# Patient Record
Sex: FEMALE | Race: WHITE | NOT HISPANIC OR LATINO | Employment: OTHER | URBAN - METROPOLITAN AREA
[De-identification: names, ages, dates, MRNs, and addresses within clinical notes are randomized per-mention and may not be internally consistent; named-entity substitution may affect disease eponyms.]

---

## 2017-01-08 DIAGNOSIS — I10 BENIGN ESSENTIAL HYPERTENSION: ICD-10-CM

## 2017-01-09 RX ORDER — METOPROLOL SUCCINATE 100 MG/1
TABLET, EXTENDED RELEASE ORAL
Qty: 90 TABLET | Refills: 1 | OUTPATIENT
Start: 2017-01-09

## 2017-01-09 RX ORDER — TRIAMTERENE AND HYDROCHLOROTHIAZIDE 37.5; 25 MG/1; MG/1
TABLET ORAL
Qty: 90 TABLET | Refills: 1 | OUTPATIENT
Start: 2017-01-09

## 2017-01-10 DIAGNOSIS — E11.9 DIABETES MELLITUS WITHOUT COMPLICATION (HCC): Primary | ICD-10-CM

## 2017-01-15 LAB — HBA1C MFR BLD: 7.9 % (ref 4.8–5.6)

## 2017-01-17 ENCOUNTER — OFFICE VISIT (OUTPATIENT)
Dept: FAMILY MEDICINE CLINIC | Facility: CLINIC | Age: 57
End: 2017-01-17

## 2017-01-17 VITALS
HEART RATE: 68 BPM | BODY MASS INDEX: 39.68 KG/M2 | DIASTOLIC BLOOD PRESSURE: 68 MMHG | WEIGHT: 293 LBS | SYSTOLIC BLOOD PRESSURE: 115 MMHG | RESPIRATION RATE: 16 BRPM | HEIGHT: 72 IN | TEMPERATURE: 98.6 F

## 2017-01-17 DIAGNOSIS — M54.2 CERVICALGIA: ICD-10-CM

## 2017-01-17 DIAGNOSIS — E03.9 ACQUIRED HYPOTHYROIDISM: ICD-10-CM

## 2017-01-17 DIAGNOSIS — IMO0001 UNCONTROLLED TYPE 2 DIABETES MELLITUS WITHOUT COMPLICATION, WITHOUT LONG-TERM CURRENT USE OF INSULIN: Primary | ICD-10-CM

## 2017-01-17 DIAGNOSIS — I10 BENIGN ESSENTIAL HYPERTENSION: ICD-10-CM

## 2017-01-17 DIAGNOSIS — F33.42 MAJOR DEPRESSIVE DISORDER, RECURRENT, IN FULL REMISSION (HCC): ICD-10-CM

## 2017-01-17 PROBLEM — E78.5 DYSLIPIDEMIA: Status: ACTIVE | Noted: 2017-01-17

## 2017-01-17 PROBLEM — I48.0 AF (PAROXYSMAL ATRIAL FIBRILLATION): Status: ACTIVE | Noted: 2017-01-17

## 2017-01-17 PROBLEM — G47.33 OBSTRUCTIVE APNEA: Status: ACTIVE | Noted: 2017-01-17

## 2017-01-17 PROCEDURE — 99214 OFFICE O/P EST MOD 30 MIN: CPT | Performed by: FAMILY MEDICINE

## 2017-01-17 RX ORDER — DAPAGLIFLOZIN 5 MG/1
5 TABLET, FILM COATED ORAL DAILY
Qty: 90 TABLET | Refills: 1 | Status: SHIPPED | OUTPATIENT
Start: 2017-01-17 | End: 2017-04-11 | Stop reason: SDUPTHER

## 2017-01-17 RX ORDER — BACLOFEN 10 MG/1
10 TABLET ORAL 3 TIMES DAILY
Qty: 270 TABLET | Refills: 1 | Status: SHIPPED | OUTPATIENT
Start: 2017-01-17 | End: 2017-04-11 | Stop reason: SDUPTHER

## 2017-01-17 RX ORDER — ALPRAZOLAM 0.5 MG/1
0.5 TABLET ORAL DAILY
Qty: 30 TABLET | Refills: 2 | Status: SHIPPED | OUTPATIENT
Start: 2017-01-17 | End: 2017-04-11 | Stop reason: SDUPTHER

## 2017-01-17 RX ORDER — LEVOTHYROXINE SODIUM 0.07 MG/1
75 TABLET ORAL DAILY
Qty: 90 TABLET | Refills: 1 | Status: SHIPPED | OUTPATIENT
Start: 2017-01-17 | End: 2017-04-11 | Stop reason: SDUPTHER

## 2017-01-17 RX ORDER — METOPROLOL SUCCINATE 100 MG/1
100 TABLET, EXTENDED RELEASE ORAL DAILY
Qty: 90 TABLET | Refills: 1 | Status: SHIPPED | OUTPATIENT
Start: 2017-01-17 | End: 2017-04-11 | Stop reason: SDUPTHER

## 2017-01-17 RX ORDER — LOSARTAN POTASSIUM 50 MG/1
50 TABLET ORAL DAILY
Qty: 90 TABLET | Refills: 1 | Status: SHIPPED | OUTPATIENT
Start: 2017-01-17 | End: 2017-04-11 | Stop reason: SDUPTHER

## 2017-01-17 RX ORDER — TRIAMTERENE AND HYDROCHLOROTHIAZIDE 37.5; 25 MG/1; MG/1
1 TABLET ORAL DAILY
Qty: 90 TABLET | Refills: 1 | Status: SHIPPED | OUTPATIENT
Start: 2017-01-17 | End: 2017-04-11 | Stop reason: SDUPTHER

## 2017-01-17 NOTE — MR AVS SNAPSHOT
Pratibha Pascal   1/17/2017 3:45 PM   Office Visit    Provider:  Benitez Wu MD   Department:  [facility]   Dept Phone:  [phone]                Your Full Care Plan              Today's Medication Changes          These changes are accurate as of: 1/17/17  4:38 PM.  If you have any questions, ask your nurse or doctor.               Stop taking medication(s)listed here:     hydrOXYzine 25 MG tablet   Commonly known as:  ATARAX   Stopped by:  Benitez Wu MD                Where to Get Your Medications      These medications were sent to [facility]/pharmacy #[id number] - [facility], [place] - [address] AT [facility] [phone] [facility] [phone]   [address] [facility] [address]     Phone:  [phone]     baclofen 10 MG tablet    FARXIGA 5 MG tablet tablet    levothyroxine 75 MCG tablet    losartan 50 MG tablet    metFORMIN 1000 MG tablet    metoprolol succinate  MG 24 hr tablet    triamterene-hydrochlorothiazide 37.5-25 MG per tablet         You can get these medications from any pharmacy     Bring a paper prescription for each of these medications     ALPRAZolam 0.5 MG tablet                  Your Updated Medication List          This list is accurate as of: 1/17/17  4:38 PM.  Always use your most recent med list.                ALPRAZolam 0.5 MG tablet   Commonly known as:  XANAX   Take 1 tablet by mouth Daily. TAKE 1 TABLET BY MOUTH EVERY DAY AS NEEDED       aspirin 81 MG tablet       atorvastatin 40 MG tablet   Commonly known as:  LIPITOR   Take one tablet once daily       baclofen 10 MG tablet   Commonly known as:  LIORESAL   Take 1 tablet by mouth 3 (Three) Times a Day.       celecoxib 200 MG capsule   Commonly known as:  CeleBREX       CIMZIA STARTER KIT 6 X 200 MG/ML kit   Generic drug:  Certolizumab Pegol       FARXIGA 5 MG tablet tablet   Generic drug:  dapagliflozin   Take 1 tablet by mouth Daily. TAKE 1  TABLET BY MOUTH EVERY MORNING       FLUoxetine 20 MG capsule   Commonly known as:  PROzac   Take 3 capsules daily       folic acid 1 MG tablet   Commonly known as:  FOLVITE       hydroxychloroquine 200 MG tablet   Commonly known as:  PLAQUENIL       levothyroxine 75 MCG tablet   Commonly known as:  SYNTHROID, LEVOTHROID   Take 1 tablet by mouth Daily.       losartan 50 MG tablet   Commonly known as:  COZAAR   Take 1 tablet by mouth Daily. TAKE 1 TABLET BY MOUTH EVERY DAY       metFORMIN 1000 MG tablet   Commonly known as:  GLUCOPHAGE   Take 1 tablet by mouth 2 (Two) Times a Day With Meals.       metoprolol succinate  MG 24 hr tablet   Commonly known as:  TOPROL-XL   Take 1 tablet by mouth Daily. TAKE 1 TABLET BY MOUTH EVERY DAY       sulfaSALAzine 500 MG tablet   Commonly known as:  AZULFIDINE       triamterene-hydrochlorothiazide 37.5-25 MG per tablet   Commonly known as:  MAXZIDE-25   Take 1 tablet by mouth Daily. TAKE 1 TABLET BY MOUTH EVERY DAY               You Were Diagnosed With        Codes Comments    Uncontrolled type 2 diabetes mellitus without complication, without long-term current use of insulin    -  Primary ICD-10-CM: E11.65  ICD-9-CM: 250.02     Major depressive disorder, recurrent, in full remission     ICD-10-CM: F33.42  ICD-9-CM: 296.36     Acquired hypothyroidism     ICD-10-CM: E03.9  ICD-9-CM: 244.9     Benign essential hypertension     ICD-10-CM: I10  ICD-9-CM: 401.1     Cervicalgia     ICD-10-CM: M54.2  ICD-9-CM: 723.1       Instructions     None    Patient Instructions History      [facility] Signup     Our records indicate that you have an active [employer] account.    You can view your After Visit Summary by going to [facility] and logging in with your V[employer] username and password.  If you don't have a [employer] username and password but a parent or guardian has access to your record, the parent or guardian should login with their own V[employer] username and  "password and access your record to view the After Visit Summary.    If you have questions, you can email [email or web address] or call [phone] to talk to our [employer] staff.  Remember, [employer] is NOT to be used for urgent needs.  For medical emergencies, dial 911.               Other Info from Your Visit           Allergies     Erythromycin      Talwin [Pentazocine]        Reason for Visit     Diabetes med refill     Hypertension     Hyperlipidemia     Hypothyroidism     lab results           Vital Signs     Blood Pressure Pulse Temperature Respirations Height Weight    115/68 68 98.6 °F (37 °C) (Oral) 16 72\" (182.9 cm) 329 lb (149 kg)    Body Mass Index Smoking Status                44.62 kg/m2 Former Smoker          Problems and Diagnoses Noted     Atrial fibrillation (irregular heartbeat)    Benign essential hypertension    High blood pressure    Neck pain    Dyslipidemia    Underactive thyroid    Major depressive disorder, recurrent, in full remission    Obstructive apnea    Type II diabetes mellitus without control      "

## 2017-01-17 NOTE — PROGRESS NOTES
Subjective   Pratibha Pascal is a 56 y.o. female.     History of Present Illness     Chief Complaint:   Chief Complaint   Patient presents with   • Diabetes     med refill    • Hypertension   • Hyperlipidemia   • Hypothyroidism   • lab results       Pratibha Pascal 56 y.o. female who presents today for Medical Management of the below listed issues and medication refills.  she has a history of   Patient Active Problem List   Diagnosis   • CAD (coronary artery disease)   • Type 2 diabetes mellitus, uncontrolled   • HLD (hyperlipidemia)   • Benign essential hypertension   • Hypothyroidism   • Major depressive disorder, recurrent, in full remission   • Rheumatoid arthritis   • Tobacco abuse   • Cervicalgia   • Diabetes mellitus   • Dyslipidemia   • BP (high blood pressure)   • Obstructive apnea   • AF (paroxysmal atrial fibrillation)   .  Since the last visit, she has overall felt well.  she has been compliant with   Current Outpatient Prescriptions:   •  ALPRAZolam (XANAX) 0.5 MG tablet, Take 1 tablet by mouth Daily. TAKE 1 TABLET BY MOUTH EVERY DAY AS NEEDED, Disp: 30 tablet, Rfl: 2  •  baclofen (LIORESAL) 10 MG tablet, Take 1 tablet by mouth 3 (Three) Times a Day., Disp: 270 tablet, Rfl: 1  •  FARXIGA 5 MG tablet tablet, Take 1 tablet by mouth Daily. TAKE 1 TABLET BY MOUTH EVERY MORNING, Disp: 90 tablet, Rfl: 1  •  levothyroxine (SYNTHROID, LEVOTHROID) 75 MCG tablet, Take 1 tablet by mouth Daily., Disp: 90 tablet, Rfl: 1  •  losartan (COZAAR) 50 MG tablet, Take 1 tablet by mouth Daily. TAKE 1 TABLET BY MOUTH EVERY DAY, Disp: 90 tablet, Rfl: 1  •  metFORMIN (GLUCOPHAGE) 1000 MG tablet, Take 1 tablet by mouth 2 (Two) Times a Day With Meals., Disp: 180 tablet, Rfl: 1  •  metoprolol succinate XL (TOPROL-XL) 100 MG 24 hr tablet, Take 1 tablet by mouth Daily. TAKE 1 TABLET BY MOUTH EVERY DAY, Disp: 90 tablet, Rfl: 1  •  triamterene-hydrochlorothiazide (MAXZIDE-25) 37.5-25 MG per tablet, Take 1 tablet by mouth Daily. TAKE 1  "TABLET BY MOUTH EVERY DAY, Disp: 90 tablet, Rfl: 1  •  aspirin 81 MG tablet, Take by mouth daily., Disp: , Rfl:   •  atorvastatin (LIPITOR) 40 MG tablet, Take one tablet once daily, Disp: 90 tablet, Rfl: 1  •  celecoxib (CeleBREX) 200 MG capsule, TAKE ONE CAPSULE BY MOUTH EVERY DAY, Disp: , Rfl: 1  •  CIMZIA STARTER KIT 6 X 200 MG/ML kit, , Disp: , Rfl:   •  FLUoxetine (PROzac) 20 MG capsule, Take 3 capsules daily, Disp: 270 capsule, Rfl: 1  •  folic acid (FOLVITE) 1 MG tablet, , Disp: , Rfl:   •  hydroxychloroquine (PLAQUENIL) 200 MG tablet, TAKE 2 TABLETS AT BEDTIME, Disp: , Rfl: 11  •  sulfaSALAzine (AZULFIDINE) 500 MG tablet, TAKE 3 TABLETS BY MOUTH TWICE DAILY, Disp: , Rfl: 1.  she denies medication side effects.    All of the chronic condition(s) listed above are stable w/o issues.    Visit Vitals   • /68   • Pulse 68   • Temp 98.6 °F (37 °C) (Oral)   • Resp 16   • Ht 72\" (182.9 cm)   • Wt (!) 329 lb (149 kg)   • BMI 44.62 kg/m2       Results for orders placed or performed in visit on 01/10/17   Hemoglobin A1c   Result Value Ref Range    Hemoglobin A1C 7.9 (H) 4.8 - 5.6 %         The following portions of the patient's history were reviewed and updated as appropriate: allergies, current medications, past family history, past medical history, past social history, past surgical history and problem list.    Review of Systems   Constitutional: Negative for activity change, chills, fatigue and fever.   Respiratory: Negative for cough and chest tightness.    Cardiovascular: Negative for chest pain and palpitations.   Gastrointestinal: Negative for abdominal pain and nausea.   Endocrine: Negative for cold intolerance.   Psychiatric/Behavioral: Negative for behavioral problems and dysphoric mood.       Objective   Physical Exam   Constitutional: She appears well-developed and well-nourished.   Neck: Neck supple. No thyromegaly present.   Cardiovascular: Normal rate and regular rhythm.    No murmur " heard.  Pulmonary/Chest: Effort normal and breath sounds normal.   Abdominal: Bowel sounds are normal.   Psychiatric: She has a normal mood and affect. Her behavior is normal.   Nursing note and vitals reviewed.  Labs reviewed with pt today during visit. All questions answered.    The patient has read and signed the [facility] Controlled Substance Contract.  I will continue to see patient for regular follow up appointments.  They are well controlled on their medication.  DILLAN has been reviewed by me and is updated every 3 months. The patient is aware of the potential for addiction and dependence.    Assessment/Plan   Pratibha was seen today for diabetes, hypertension, hyperlipidemia, hypothyroidism and lab results.    Diagnoses and all orders for this visit:    Uncontrolled type 2 diabetes mellitus without complication, without long-term current use of insulin  -     FARXIGA 5 MG tablet tablet; Take 1 tablet by mouth Daily. TAKE 1 TABLET BY MOUTH EVERY MORNING  -     metFORMIN (GLUCOPHAGE) 1000 MG tablet; Take 1 tablet by mouth 2 (Two) Times a Day With Meals.    Major depressive disorder, recurrent, in full remission  -     ALPRAZolam (XANAX) 0.5 MG tablet; Take 1 tablet by mouth Daily. TAKE 1 TABLET BY MOUTH EVERY DAY AS NEEDED    Acquired hypothyroidism  -     levothyroxine (SYNTHROID, LEVOTHROID) 75 MCG tablet; Take 1 tablet by mouth Daily.    Benign essential hypertension  -     triamterene-hydrochlorothiazide (MAXZIDE-25) 37.5-25 MG per tablet; Take 1 tablet by mouth Daily. TAKE 1 TABLET BY MOUTH EVERY DAY  -     losartan (COZAAR) 50 MG tablet; Take 1 tablet by mouth Daily. TAKE 1 TABLET BY MOUTH EVERY DAY  -     metoprolol succinate XL (TOPROL-XL) 100 MG 24 hr tablet; Take 1 tablet by mouth Daily. TAKE 1 TABLET BY MOUTH EVERY DAY    Cervicalgia  -     baclofen (LIORESAL) 10 MG tablet; Take 1 tablet by mouth 3 (Three) Times a Day.      Diet/exercise/weight management to get HgA1C down.

## 2017-01-22 DIAGNOSIS — E03.9 ACQUIRED HYPOTHYROIDISM: ICD-10-CM

## 2017-02-07 DIAGNOSIS — F33.42 MAJOR DEPRESSIVE DISORDER, RECURRENT, IN FULL REMISSION (HCC): ICD-10-CM

## 2017-02-07 RX ORDER — FLUOXETINE HYDROCHLORIDE 20 MG/1
CAPSULE ORAL
Qty: 270 CAPSULE | Refills: 1 | Status: SHIPPED | OUTPATIENT
Start: 2017-02-07 | End: 2017-04-11 | Stop reason: SDUPTHER

## 2017-04-11 ENCOUNTER — OFFICE VISIT (OUTPATIENT)
Dept: FAMILY MEDICINE CLINIC | Facility: CLINIC | Age: 57
End: 2017-04-11

## 2017-04-11 VITALS
TEMPERATURE: 98.5 F | BODY MASS INDEX: 39.68 KG/M2 | RESPIRATION RATE: 16 BRPM | HEART RATE: 80 BPM | SYSTOLIC BLOOD PRESSURE: 132 MMHG | HEIGHT: 72 IN | WEIGHT: 293 LBS | DIASTOLIC BLOOD PRESSURE: 79 MMHG

## 2017-04-11 DIAGNOSIS — M54.2 CERVICALGIA: ICD-10-CM

## 2017-04-11 DIAGNOSIS — E03.9 ACQUIRED HYPOTHYROIDISM: ICD-10-CM

## 2017-04-11 DIAGNOSIS — I10 BENIGN ESSENTIAL HYPERTENSION: ICD-10-CM

## 2017-04-11 DIAGNOSIS — F33.42 MAJOR DEPRESSIVE DISORDER, RECURRENT, IN FULL REMISSION (HCC): ICD-10-CM

## 2017-04-11 DIAGNOSIS — IMO0001 UNCONTROLLED TYPE 2 DIABETES MELLITUS WITHOUT COMPLICATION, WITHOUT LONG-TERM CURRENT USE OF INSULIN: ICD-10-CM

## 2017-04-11 PROCEDURE — 99214 OFFICE O/P EST MOD 30 MIN: CPT | Performed by: FAMILY MEDICINE

## 2017-04-11 RX ORDER — DAPAGLIFLOZIN 5 MG/1
5 TABLET, FILM COATED ORAL DAILY
Qty: 90 TABLET | Refills: 1 | Status: SHIPPED | OUTPATIENT
Start: 2017-04-11 | End: 2017-10-09 | Stop reason: SDUPTHER

## 2017-04-11 RX ORDER — FLUOXETINE HYDROCHLORIDE 20 MG/1
60 CAPSULE ORAL DAILY
Qty: 270 CAPSULE | Refills: 1 | Status: SHIPPED | OUTPATIENT
Start: 2017-04-11 | End: 2017-10-16 | Stop reason: SDUPTHER

## 2017-04-11 RX ORDER — ALPRAZOLAM 0.5 MG/1
0.5 TABLET ORAL DAILY
Qty: 30 TABLET | Refills: 2 | Status: SHIPPED | OUTPATIENT
Start: 2017-04-11 | End: 2017-08-03 | Stop reason: SDUPTHER

## 2017-04-11 RX ORDER — LEVOTHYROXINE SODIUM 0.07 MG/1
75 TABLET ORAL DAILY
Qty: 90 TABLET | Refills: 1 | Status: SHIPPED | OUTPATIENT
Start: 2017-04-11 | End: 2017-10-16 | Stop reason: SDUPTHER

## 2017-04-11 RX ORDER — BACLOFEN 10 MG/1
10 TABLET ORAL 3 TIMES DAILY
Qty: 270 TABLET | Refills: 1 | Status: SHIPPED | OUTPATIENT
Start: 2017-04-11 | End: 2017-10-16 | Stop reason: SDUPTHER

## 2017-04-11 RX ORDER — METOPROLOL SUCCINATE 100 MG/1
100 TABLET, EXTENDED RELEASE ORAL DAILY
Qty: 90 TABLET | Refills: 1 | Status: SHIPPED | OUTPATIENT
Start: 2017-04-11 | End: 2017-10-16 | Stop reason: SDUPTHER

## 2017-04-11 RX ORDER — TRIAMTERENE AND HYDROCHLOROTHIAZIDE 37.5; 25 MG/1; MG/1
1 TABLET ORAL DAILY
Qty: 90 TABLET | Refills: 1 | Status: SHIPPED | OUTPATIENT
Start: 2017-04-11 | End: 2017-10-12 | Stop reason: SDUPTHER

## 2017-04-11 RX ORDER — LOSARTAN POTASSIUM 50 MG/1
50 TABLET ORAL DAILY
Qty: 90 TABLET | Refills: 1 | Status: SHIPPED | OUTPATIENT
Start: 2017-04-11 | End: 2017-10-16 | Stop reason: SDUPTHER

## 2017-04-11 NOTE — PROGRESS NOTES
Subjective   Pratibha Pascal is a 57 y.o. female.     History of Present Illness     Chief Complaint:   Chief Complaint   Patient presents with   • Hypertension     MED REFILL   - [facility]   • Hyperlipidemia   • Hypothyroidism   • Diabetes       Pratibha Pascal 57 y.o. female who presents today for Medical Management of the below listed issues and medication refills.  she has a history of   Patient Active Problem List   Diagnosis   • CAD (coronary artery disease)   • Type 2 diabetes mellitus, uncontrolled   • HLD (hyperlipidemia)   • Benign essential hypertension   • Hypothyroidism   • Major depressive disorder, recurrent, in full remission   • Rheumatoid arthritis   • Tobacco abuse   • Cervicalgia   • Diabetes mellitus   • Dyslipidemia   • BP (high blood pressure)   • Obstructive apnea   • AF (paroxysmal atrial fibrillation)   .  Since the last visit, she has overall felt well.  she has been compliant with   Current Outpatient Prescriptions:   •  ALPRAZolam (XANAX) 0.5 MG tablet, Take 1 tablet by mouth Daily. TAKE 1 TABLET BY MOUTH EVERY DAY AS NEEDED, Disp: 30 tablet, Rfl: 2  •  baclofen (LIORESAL) 10 MG tablet, Take 1 tablet by mouth 3 (Three) Times a Day., Disp: 270 tablet, Rfl: 1  •  FARXIGA 5 MG tablet tablet, Take 1 tablet by mouth Daily. TAKE 1 TABLET BY MOUTH EVERY MORNING, Disp: 90 tablet, Rfl: 1  •  FLUoxetine (PROzac) 20 MG capsule, Take 3 capsules by mouth Daily., Disp: 270 capsule, Rfl: 1  •  levothyroxine (SYNTHROID, LEVOTHROID) 75 MCG tablet, Take 1 tablet by mouth Daily., Disp: 90 tablet, Rfl: 1  •  losartan (COZAAR) 50 MG tablet, Take 1 tablet by mouth Daily. TAKE 1 TABLET BY MOUTH EVERY DAY, Disp: 90 tablet, Rfl: 1  •  metFORMIN (GLUCOPHAGE) 1000 MG tablet, Take 1 tablet by mouth 2 (Two) Times a Day With Meals., Disp: 180 tablet, Rfl: 1  •  metoprolol succinate XL (TOPROL-XL) 100 MG 24 hr tablet, Take 1 tablet by mouth Daily. TAKE 1 TABLET BY MOUTH EVERY DAY, Disp: 90 tablet, Rfl: 1  •   "triamterene-hydrochlorothiazide (MAXZIDE-25) 37.5-25 MG per tablet, Take 1 tablet by mouth Daily. TAKE 1 TABLET BY MOUTH EVERY DAY, Disp: 90 tablet, Rfl: 1  •  aspirin 81 MG tablet, Take by mouth daily., Disp: , Rfl:   •  atorvastatin (LIPITOR) 40 MG tablet, Take one tablet once daily, Disp: 90 tablet, Rfl: 1  •  celecoxib (CeleBREX) 200 MG capsule, TAKE ONE CAPSULE BY MOUTH EVERY DAY, Disp: , Rfl: 1  •  CIMZIA STARTER KIT 6 X 200 MG/ML kit, , Disp: , Rfl:   •  folic acid (FOLVITE) 1 MG tablet, , Disp: , Rfl:   •  sulfaSALAzine (AZULFIDINE) 500 MG tablet, TAKE 3 TABLETS BY MOUTH TWICE DAILY, Disp: , Rfl: 1.  she denies medication side effects.    All of the chronic condition(s) listed above are stable w/o issues.    /79  Pulse 80  Temp 98.5 °F (36.9 °C) (Oral)   Resp 16  Ht 72\" (182.9 cm)  Wt (!) 326 lb (148 kg)  BMI 44.21 kg/m2    Results for orders placed or performed in visit on 01/10/17   Hemoglobin A1c   Result Value Ref Range    Hemoglobin A1C 7.9 (H) 4.8 - 5.6 %         The following portions of the patient's history were reviewed and updated as appropriate: allergies, current medications, past family history, past medical history, past social history, past surgical history and problem list.    Review of Systems   Constitutional: Negative for activity change, chills, fatigue and fever.   Respiratory: Negative for cough and chest tightness.    Cardiovascular: Negative for chest pain and palpitations.   Gastrointestinal: Negative for abdominal pain and nausea.   Endocrine: Negative for cold intolerance.   Psychiatric/Behavioral: Negative for behavioral problems and dysphoric mood.       Objective   Physical Exam   Constitutional: She appears well-developed and well-nourished.   Neck: Neck supple. No thyromegaly present.   Cardiovascular: Normal rate and regular rhythm.    No murmur heard.  Pulmonary/Chest: Effort normal and breath sounds normal.   Abdominal: Bowel sounds are normal.   Psychiatric: " She has a normal mood and affect. Her behavior is normal.   Nursing note and vitals reviewed.  Labs reviewed with pt today during visit. All questions answered.    The patient has read and signed the [facility] Controlled Substance Contract.  I will continue to see patient for regular follow up appointments.  They are well controlled on their medication.  DILLAN has been reviewed by me and is updated every 3 months. The patient is aware of the potential for addiction and dependence.    Assessment/Plan   Pratibha was seen today for hypertension, hyperlipidemia, hypothyroidism and diabetes.    Diagnoses and all orders for this visit:    Uncontrolled type 2 diabetes mellitus without complication, without long-term current use of insulin  -     FARXIGA 5 MG tablet tablet; Take 1 tablet by mouth Daily. TAKE 1 TABLET BY MOUTH EVERY MORNING  -     metFORMIN (GLUCOPHAGE) 1000 MG tablet; Take 1 tablet by mouth 2 (Two) Times a Day With Meals.  -     Lipid Panel  -     MicroAlbumin, Urine, Random  -     Hemoglobin A1c    Major depressive disorder, recurrent, in full remission  -     ALPRAZolam (XANAX) 0.5 MG tablet; Take 1 tablet by mouth Daily. TAKE 1 TABLET BY MOUTH EVERY DAY AS NEEDED  -     FLUoxetine (PROzac) 20 MG capsule; Take 3 capsules by mouth Daily.    Cervicalgia  -     baclofen (LIORESAL) 10 MG tablet; Take 1 tablet by mouth 3 (Three) Times a Day.    Acquired hypothyroidism  -     levothyroxine (SYNTHROID, LEVOTHROID) 75 MCG tablet; Take 1 tablet by mouth Daily.    Benign essential hypertension  -     losartan (COZAAR) 50 MG tablet; Take 1 tablet by mouth Daily. TAKE 1 TABLET BY MOUTH EVERY DAY  -     metoprolol succinate XL (TOPROL-XL) 100 MG 24 hr tablet; Take 1 tablet by mouth Daily. TAKE 1 TABLET BY MOUTH EVERY DAY  -     triamterene-hydrochlorothiazide (MAXZIDE-25) 37.5-25 MG per tablet; Take 1 tablet by mouth Daily. TAKE 1 TABLET BY MOUTH EVERY DAY  -     Lipid Panel

## 2017-07-09 LAB
CHOLEST SERPL-MCNC: 93 MG/DL (ref 100–199)
HBA1C MFR BLD: 8.4 % (ref 4.8–5.6)
HDLC SERPL-MCNC: 34 MG/DL
LDLC SERPL CALC-MCNC: ABNORMAL MG/DL (ref 0–99)
MICROALBUMIN UR-MCNC: 886.2 UG/ML
TRIGL SERPL-MCNC: 325 MG/DL (ref 0–149)
VLDLC SERPL CALC-MCNC: 65 MG/DL (ref 5–40)

## 2017-07-13 ENCOUNTER — OFFICE VISIT (OUTPATIENT)
Dept: FAMILY MEDICINE CLINIC | Facility: CLINIC | Age: 57
End: 2017-07-13

## 2017-07-13 VITALS
WEIGHT: 293 LBS | TEMPERATURE: 98.4 F | RESPIRATION RATE: 16 BRPM | HEART RATE: 80 BPM | HEIGHT: 72 IN | DIASTOLIC BLOOD PRESSURE: 84 MMHG | BODY MASS INDEX: 39.68 KG/M2 | SYSTOLIC BLOOD PRESSURE: 138 MMHG

## 2017-07-13 DIAGNOSIS — I10 BENIGN ESSENTIAL HYPERTENSION: ICD-10-CM

## 2017-07-13 DIAGNOSIS — IMO0001 UNCONTROLLED TYPE 2 DIABETES MELLITUS WITHOUT COMPLICATION, WITHOUT LONG-TERM CURRENT USE OF INSULIN: Primary | ICD-10-CM

## 2017-07-13 DIAGNOSIS — R80.9 PROTEINURIA: ICD-10-CM

## 2017-07-13 DIAGNOSIS — E78.2 MIXED HYPERLIPIDEMIA: ICD-10-CM

## 2017-07-13 PROCEDURE — 99214 OFFICE O/P EST MOD 30 MIN: CPT | Performed by: FAMILY MEDICINE

## 2017-07-13 RX ORDER — ATORVASTATIN CALCIUM 10 MG/1
5 TABLET, FILM COATED ORAL DAILY
Qty: 45 TABLET | Refills: 1 | Status: SHIPPED | OUTPATIENT
Start: 2017-07-13 | End: 2017-10-16 | Stop reason: SDUPTHER

## 2017-07-13 NOTE — PROGRESS NOTES
Subjective   Pratibha Pascal is a 57 y.o. female.     History of Present Illness     Chief Complaint:   Chief Complaint   Patient presents with   • Diabetes     STATES DOES NOT NEED MED REFILLS AT THIS TIME   • Hypertension     DIABETIC FOOT EXAM -  DUE - PT DECLINES - NOT SURE IF NEEDED    • Hyperlipidemia   • Hypothyroidism       Pratibha Pascal 57 y.o. female who presents today for Medical Management of the below listed issues and medication refills.  she has a history of   Patient Active Problem List   Diagnosis   • CAD (coronary artery disease)   • Type 2 diabetes mellitus, uncontrolled   • HLD (hyperlipidemia)   • Benign essential hypertension   • Hypothyroidism   • Major depressive disorder, recurrent, in full remission   • Rheumatoid arthritis   • Tobacco abuse   • Cervicalgia   • Diabetes mellitus   • Dyslipidemia   • BP (high blood pressure)   • Obstructive apnea   • AF (paroxysmal atrial fibrillation)   • Proteinuria   .  Since the last visit, she has overall felt well. She is already cutting her Lipitor to 20mg already with these labs reflecting this.   she has been compliant with   Current Outpatient Prescriptions:   •  ALPRAZolam (XANAX) 0.5 MG tablet, Take 1 tablet by mouth Daily. TAKE 1 TABLET BY MOUTH EVERY DAY AS NEEDED, Disp: 30 tablet, Rfl: 2  •  aspirin 81 MG tablet, Take by mouth daily., Disp: , Rfl:   •  atorvastatin (LIPITOR) 10 MG tablet, Take 0.5 tablets by mouth Daily. Take one tablet once daily, Disp: 45 tablet, Rfl: 1  •  baclofen (LIORESAL) 10 MG tablet, Take 1 tablet by mouth 3 (Three) Times a Day., Disp: 270 tablet, Rfl: 1  •  celecoxib (CeleBREX) 200 MG capsule, TAKE ONE CAPSULE BY MOUTH EVERY DAY, Disp: , Rfl: 1  •  CIMZIA STARTER KIT 6 X 200 MG/ML kit, , Disp: , Rfl:   •  FARXIGA 5 MG tablet tablet, Take 1 tablet by mouth Daily. TAKE 1 TABLET BY MOUTH EVERY MORNING, Disp: 90 tablet, Rfl: 1  •  FLUoxetine (PROzac) 20 MG capsule, Take 3 capsules by mouth Daily., Disp: 270 capsule, Rfl:  "1  •  folic acid (FOLVITE) 1 MG tablet, , Disp: , Rfl:   •  levothyroxine (SYNTHROID, LEVOTHROID) 75 MCG tablet, Take 1 tablet by mouth Daily., Disp: 90 tablet, Rfl: 1  •  losartan (COZAAR) 50 MG tablet, Take 1 tablet by mouth Daily. TAKE 1 TABLET BY MOUTH EVERY DAY, Disp: 90 tablet, Rfl: 1  •  metFORMIN (GLUCOPHAGE) 1000 MG tablet, Take 1 tablet by mouth 2 (Two) Times a Day With Meals., Disp: 180 tablet, Rfl: 1  •  metoprolol succinate XL (TOPROL-XL) 100 MG 24 hr tablet, Take 1 tablet by mouth Daily. TAKE 1 TABLET BY MOUTH EVERY DAY, Disp: 90 tablet, Rfl: 1  •  SITagliptin (JANUVIA) 100 MG tablet, Take 1 tablet by mouth Daily., Disp: 90 tablet, Rfl: 1  •  sulfaSALAzine (AZULFIDINE) 500 MG tablet, TAKE 3 TABLETS BY MOUTH TWICE DAILY, Disp: , Rfl: 1  •  triamterene-hydrochlorothiazide (MAXZIDE-25) 37.5-25 MG per tablet, Take 1 tablet by mouth Daily. TAKE 1 TABLET BY MOUTH EVERY DAY, Disp: 90 tablet, Rfl: 1.  she denies medication side effects.    All of the chronic condition(s) listed above are stable w/o issues.    /84  Pulse 80  Temp 98.4 °F (36.9 °C) (Oral)   Resp 16  Ht 72\" (182.9 cm)  Wt (!) 316 lb (143 kg)  BMI 42.86 kg/m2    Results for orders placed or performed in visit on 04/11/17   Lipid Panel   Result Value Ref Range    Total Cholesterol 93 (L) 100 - 199 mg/dL    Triglycerides 325 (H) 0 - 149 mg/dL    HDL Cholesterol 34 (L) >39 mg/dL    VLDL Cholesterol 65 (H) 5 - 40 mg/dL    LDL Cholesterol  Comment (A) 0 - 99 mg/dL   MicroAlbumin, Urine, Random   Result Value Ref Range    Microalbumin, Urine 886.2 Not Estab. ug/mL   Hemoglobin A1c   Result Value Ref Range    Hemoglobin A1C 8.4 (H) 4.8 - 5.6 %         The following portions of the patient's history were reviewed and updated as appropriate: allergies, current medications, past family history, past medical history, past social history, past surgical history and problem list.    Review of Systems   Constitutional: Negative for activity change, " chills, fatigue and fever.   Respiratory: Negative for cough and chest tightness.    Cardiovascular: Negative for chest pain and palpitations.   Gastrointestinal: Negative for abdominal pain and nausea.   Endocrine: Negative for cold intolerance.   Psychiatric/Behavioral: Negative for behavioral problems and dysphoric mood.       Objective   Physical Exam   Constitutional: She appears well-developed and well-nourished.   Neck: Neck supple. No thyromegaly present.   Cardiovascular: Normal rate and regular rhythm.    No murmur heard.  Pulmonary/Chest: Effort normal and breath sounds normal.   Abdominal: Bowel sounds are normal.   Psychiatric: She has a normal mood and affect. Her behavior is normal.   Nursing note and vitals reviewed.  Labs reviewed with pt today during visit. All questions answered.      Assessment/Plan   Pratibha was seen today for diabetes, hypertension, hyperlipidemia and hypothyroidism.    Diagnoses and all orders for this visit:    Uncontrolled type 2 diabetes mellitus without complication, without long-term current use of insulin  -     SITagliptin (JANUVIA) 100 MG tablet; Take 1 tablet by mouth Daily.    Benign essential hypertension    Mixed hyperlipidemia  -     atorvastatin (LIPITOR) 10 MG tablet; Take 0.5 tablets by mouth Daily. Take one tablet once daily    Proteinuria  -     Ambulatory Referral to Nephrology

## 2017-07-21 ENCOUNTER — OFFICE VISIT (OUTPATIENT)
Dept: RETAIL CLINIC | Facility: CLINIC | Age: 57
End: 2017-07-21

## 2017-07-21 VITALS — TEMPERATURE: 98.8 F

## 2017-07-21 DIAGNOSIS — T30.0 BURN: Primary | ICD-10-CM

## 2017-07-21 PROCEDURE — 99213 OFFICE O/P EST LOW 20 MIN: CPT | Performed by: NURSE PRACTITIONER

## 2017-07-21 NOTE — PROGRESS NOTES
Subjective   Patient ID: Pratibha Pascal is a 57 y.o. female presents with   Chief Complaint   Patient presents with   • Abrasion       HPI Comments: 58 yo wf  Cc: developed large blister to l plantar foot yesterday.  Blister ruptured this am after walking. She denies direct injury.  She rarely goes barefoot due to her neuropathy but does occasionally walk out to her mailbox. She has not taken any rx for relief and denies severe pain. Her sensory perception is decreased.    Abrasion   Pertinent negatives include no abdominal pain, chest pain, fatigue, fever, joint swelling, nausea, rash or vomiting.       Allergies   Allergen Reactions   • Erythromycin    • Talwin [Pentazocine]        The following portions of the patient's history were reviewed and updated as appropriate: allergies, current medications, past family history, past medical history, past social history, past surgical history and problem list.      Review of Systems   Constitutional: Negative for activity change, appetite change, fatigue, fever and unexpected weight change.   HENT: Negative for facial swelling, mouth sores and trouble swallowing.    Eyes: Negative for pain, discharge, itching and visual disturbance.   Respiratory: Negative for chest tightness, shortness of breath and wheezing.    Cardiovascular: Negative for chest pain and palpitations.   Gastrointestinal: Negative for abdominal pain, diarrhea, nausea and vomiting.   Endocrine: Negative.    Genitourinary: Negative.    Musculoskeletal: Negative for joint swelling.   Skin: Positive for color change and wound. Negative for rash.   Allergic/Immunologic: Negative.    Neurological: Negative for seizures and syncope.   Hematological: Does not bruise/bleed easily.   Psychiatric/Behavioral: Negative.        Objective     Vitals:    07/21/17 1808   Temp: 98.8 °F (37.1 °C)         Physical Exam   Constitutional: She is oriented to person, place, and time. She appears well-developed and  well-nourished. No distress.   HENT:   Head: Normocephalic and atraumatic.   Right Ear: External ear normal.   Left Ear: External ear normal.   Eyes: Conjunctivae and EOM are normal. Pupils are equal, round, and reactive to light. Right eye exhibits no discharge. Left eye exhibits no discharge. No scleral icterus.   Neck: Normal range of motion. Neck supple.   Pulmonary/Chest: Effort normal.   Musculoskeletal: Normal range of motion.   Neurological: She is alert and oriented to person, place, and time. She exhibits normal muscle tone.   Skin: Skin is warm and dry. Burn noted. No rash noted. She is not diaphoretic. There is erythema.   Large bullae to plantar aspect of l 5th mtp no d/c or tenderness at site.    Psychiatric: She has a normal mood and affect. Her behavior is normal. Judgment and thought content normal.   Nursing note and vitals reviewed.        Pratibha was seen today for abrasion.    Diagnoses and all orders for this visit:    Burn  -     silver sulfadiazine (SILVADENE) 1 % cream; Apply  topically 2 (Two) Times a Day.      Keep area clean and dry with bid dressing changes. She was advised to see Dr. Wu on Monday for f/u.   Follow-up with Primary Care Physician in 24-48 hours if these symptoms worsen or fail to improve as anticipated.

## 2017-07-22 ENCOUNTER — DOCUMENTATION (OUTPATIENT)
Dept: RETAIL CLINIC | Facility: CLINIC | Age: 57
End: 2017-07-22

## 2017-07-22 ENCOUNTER — HOSPITAL ENCOUNTER (INPATIENT)
Facility: HOSPITAL | Age: 57
LOS: 3 days | Discharge: HOME HEALTH | End: 2017-07-25
Attending: FAMILY MEDICINE | Admitting: HOSPITALIST

## 2017-07-22 DIAGNOSIS — L03.116 CELLULITIS OF LEFT LEG: Primary | ICD-10-CM

## 2017-07-22 PROBLEM — L97.509 DIABETIC FOOT ULCER: Status: ACTIVE | Noted: 2017-07-22

## 2017-07-22 PROBLEM — E11.621 DIABETIC FOOT ULCER: Status: ACTIVE | Noted: 2017-07-22

## 2017-07-22 PROBLEM — L03.90 CELLULITIS: Status: ACTIVE | Noted: 2017-07-22

## 2017-07-22 PROBLEM — E11.49 DIABETES MELLITUS WITH NEUROLOGICAL MANIFESTATION: Status: ACTIVE | Noted: 2017-07-22

## 2017-07-22 LAB
ALBUMIN SERPL-MCNC: 4.4 G/DL (ref 3.5–5.2)
ALBUMIN/GLOB SERPL: 1.4 G/DL
ALP SERPL-CCNC: 73 U/L (ref 39–117)
ALT SERPL W P-5'-P-CCNC: 43 U/L (ref 1–33)
ANION GAP SERPL CALCULATED.3IONS-SCNC: 18.4 MMOL/L
AST SERPL-CCNC: 26 U/L (ref 1–32)
BACTERIA UR QL AUTO: ABNORMAL /HPF
BASOPHILS # BLD AUTO: 0.06 10*3/MM3 (ref 0–0.2)
BASOPHILS NFR BLD AUTO: 0.6 % (ref 0–1.5)
BILIRUB SERPL-MCNC: 0.8 MG/DL (ref 0.1–1.2)
BILIRUB UR QL STRIP: NEGATIVE
BUN BLD-MCNC: 13 MG/DL (ref 6–20)
BUN/CREAT SERPL: 17.8 (ref 7–25)
CALCIUM SPEC-SCNC: 10.5 MG/DL (ref 8.6–10.5)
CHLORIDE SERPL-SCNC: 97 MMOL/L (ref 98–107)
CLARITY UR: ABNORMAL
CO2 SERPL-SCNC: 25.6 MMOL/L (ref 22–29)
COLOR UR: YELLOW
CREAT BLD-MCNC: 0.73 MG/DL (ref 0.57–1)
D-LACTATE SERPL-SCNC: 2 MMOL/L (ref 0.5–2)
D-LACTATE SERPL-SCNC: 3.3 MMOL/L (ref 0.5–2)
DEPRECATED RDW RBC AUTO: 45.2 FL (ref 37–54)
EOSINOPHIL # BLD AUTO: 0.22 10*3/MM3 (ref 0–0.7)
EOSINOPHIL NFR BLD AUTO: 2.2 % (ref 0.3–6.2)
ERYTHROCYTE [DISTWIDTH] IN BLOOD BY AUTOMATED COUNT: 13.3 % (ref 11.7–13)
GFR SERPL CREATININE-BSD FRML MDRD: 82 ML/MIN/1.73
GLOBULIN UR ELPH-MCNC: 3.1 GM/DL
GLUCOSE BLD-MCNC: 179 MG/DL (ref 65–99)
GLUCOSE BLDC GLUCOMTR-MCNC: 137 MG/DL (ref 70–130)
GLUCOSE BLDC GLUCOMTR-MCNC: 143 MG/DL (ref 70–130)
GLUCOSE UR STRIP-MCNC: ABNORMAL MG/DL
HCT VFR BLD AUTO: 44.5 % (ref 35.6–45.5)
HGB BLD-MCNC: 14.5 G/DL (ref 11.9–15.5)
HGB UR QL STRIP.AUTO: ABNORMAL
HYALINE CASTS UR QL AUTO: ABNORMAL /LPF
IMM GRANULOCYTES # BLD: 0.07 10*3/MM3 (ref 0–0.03)
IMM GRANULOCYTES NFR BLD: 0.7 % (ref 0–0.5)
KETONES UR QL STRIP: NEGATIVE
LEUKOCYTE ESTERASE UR QL STRIP.AUTO: ABNORMAL
LYMPHOCYTES # BLD AUTO: 2.03 10*3/MM3 (ref 0.9–4.8)
LYMPHOCYTES NFR BLD AUTO: 20.1 % (ref 19.6–45.3)
MCH RBC QN AUTO: 30.3 PG (ref 26.9–32)
MCHC RBC AUTO-ENTMCNC: 32.6 G/DL (ref 32.4–36.3)
MCV RBC AUTO: 92.9 FL (ref 80.5–98.2)
MONOCYTES # BLD AUTO: 1.86 10*3/MM3 (ref 0.2–1.2)
MONOCYTES NFR BLD AUTO: 18.4 % (ref 5–12)
NEUTROPHILS # BLD AUTO: 5.85 10*3/MM3 (ref 1.9–8.1)
NEUTROPHILS NFR BLD AUTO: 58 % (ref 42.7–76)
NITRITE UR QL STRIP: NEGATIVE
PH UR STRIP.AUTO: 6 [PH] (ref 5–8)
PLATELET # BLD AUTO: 231 10*3/MM3 (ref 140–500)
PMV BLD AUTO: 9.7 FL (ref 6–12)
POTASSIUM BLD-SCNC: 3.8 MMOL/L (ref 3.5–5.2)
PROT SERPL-MCNC: 7.5 G/DL (ref 6–8.5)
PROT UR QL STRIP: ABNORMAL
RBC # BLD AUTO: 4.79 10*6/MM3 (ref 3.9–5.2)
RBC # UR: ABNORMAL /HPF
REF LAB TEST METHOD: ABNORMAL
SODIUM BLD-SCNC: 141 MMOL/L (ref 136–145)
SP GR UR STRIP: 1.03 (ref 1–1.03)
SQUAMOUS #/AREA URNS HPF: ABNORMAL /HPF
UROBILINOGEN UR QL STRIP: ABNORMAL
WBC NRBC COR # BLD: 10.09 10*3/MM3 (ref 4.5–10.7)
WBC UR QL AUTO: ABNORMAL /HPF

## 2017-07-22 PROCEDURE — 25010000002 VANCOMYCIN: Performed by: FAMILY MEDICINE

## 2017-07-22 PROCEDURE — 87205 SMEAR GRAM STAIN: CPT | Performed by: FAMILY MEDICINE

## 2017-07-22 PROCEDURE — 87186 SC STD MICRODIL/AGAR DIL: CPT | Performed by: FAMILY MEDICINE

## 2017-07-22 PROCEDURE — 87147 CULTURE TYPE IMMUNOLOGIC: CPT | Performed by: FAMILY MEDICINE

## 2017-07-22 PROCEDURE — 87086 URINE CULTURE/COLONY COUNT: CPT | Performed by: FAMILY MEDICINE

## 2017-07-22 PROCEDURE — 85025 COMPLETE CBC W/AUTO DIFF WBC: CPT | Performed by: FAMILY MEDICINE

## 2017-07-22 PROCEDURE — 36415 COLL VENOUS BLD VENIPUNCTURE: CPT | Performed by: FAMILY MEDICINE

## 2017-07-22 PROCEDURE — 25010000002 ENOXAPARIN PER 10 MG: Performed by: HOSPITALIST

## 2017-07-22 PROCEDURE — 63710000001 INSULIN DETEMER PER 5 UNITS: Performed by: HOSPITALIST

## 2017-07-22 PROCEDURE — 87015 SPECIMEN INFECT AGNT CONCNTJ: CPT | Performed by: FAMILY MEDICINE

## 2017-07-22 PROCEDURE — 81001 URINALYSIS AUTO W/SCOPE: CPT | Performed by: FAMILY MEDICINE

## 2017-07-22 PROCEDURE — 82962 GLUCOSE BLOOD TEST: CPT

## 2017-07-22 PROCEDURE — 83605 ASSAY OF LACTIC ACID: CPT | Performed by: FAMILY MEDICINE

## 2017-07-22 PROCEDURE — 80053 COMPREHEN METABOLIC PANEL: CPT | Performed by: FAMILY MEDICINE

## 2017-07-22 PROCEDURE — 87070 CULTURE OTHR SPECIMN AEROBIC: CPT | Performed by: FAMILY MEDICINE

## 2017-07-22 PROCEDURE — 87077 CULTURE AEROBIC IDENTIFY: CPT | Performed by: FAMILY MEDICINE

## 2017-07-22 PROCEDURE — 25010000002 PIPERACILLIN SOD-TAZOBACTAM PER 1 G: Performed by: HOSPITALIST

## 2017-07-22 PROCEDURE — 99283 EMERGENCY DEPT VISIT LOW MDM: CPT

## 2017-07-22 RX ORDER — TRIAMTERENE AND HYDROCHLOROTHIAZIDE 37.5; 25 MG/1; MG/1
1 TABLET ORAL DAILY
Status: DISCONTINUED | OUTPATIENT
Start: 2017-07-22 | End: 2017-07-25 | Stop reason: HOSPADM

## 2017-07-22 RX ORDER — ATORVASTATIN CALCIUM 10 MG/1
5 TABLET, FILM COATED ORAL DAILY
Status: DISCONTINUED | OUTPATIENT
Start: 2017-07-22 | End: 2017-07-25 | Stop reason: HOSPADM

## 2017-07-22 RX ORDER — BACLOFEN 10 MG/1
10 TABLET ORAL 3 TIMES DAILY
Status: DISCONTINUED | OUTPATIENT
Start: 2017-07-22 | End: 2017-07-25 | Stop reason: HOSPADM

## 2017-07-22 RX ORDER — ACETAMINOPHEN 325 MG/1
650 TABLET ORAL EVERY 4 HOURS PRN
Status: DISCONTINUED | OUTPATIENT
Start: 2017-07-22 | End: 2017-07-25 | Stop reason: HOSPADM

## 2017-07-22 RX ORDER — ASPIRIN 81 MG/1
81 TABLET, CHEWABLE ORAL DAILY
Status: DISCONTINUED | OUTPATIENT
Start: 2017-07-22 | End: 2017-07-25 | Stop reason: HOSPADM

## 2017-07-22 RX ORDER — SACCHAROMYCES BOULARDII 250 MG
250 CAPSULE ORAL 2 TIMES DAILY
Status: DISCONTINUED | OUTPATIENT
Start: 2017-07-22 | End: 2017-07-25 | Stop reason: HOSPADM

## 2017-07-22 RX ORDER — FLUOXETINE HYDROCHLORIDE 20 MG/1
60 CAPSULE ORAL NIGHTLY
Status: DISCONTINUED | OUTPATIENT
Start: 2017-07-23 | End: 2017-07-25 | Stop reason: HOSPADM

## 2017-07-22 RX ORDER — NICOTINE POLACRILEX 4 MG
15 LOZENGE BUCCAL
Status: DISCONTINUED | OUTPATIENT
Start: 2017-07-22 | End: 2017-07-25 | Stop reason: HOSPADM

## 2017-07-22 RX ORDER — SODIUM CHLORIDE 0.9 % (FLUSH) 0.9 %
1-10 SYRINGE (ML) INJECTION AS NEEDED
Status: DISCONTINUED | OUTPATIENT
Start: 2017-07-22 | End: 2017-07-25 | Stop reason: HOSPADM

## 2017-07-22 RX ORDER — LEVOTHYROXINE SODIUM 0.07 MG/1
75 TABLET ORAL DAILY
Status: DISCONTINUED | OUTPATIENT
Start: 2017-07-22 | End: 2017-07-25 | Stop reason: HOSPADM

## 2017-07-22 RX ORDER — HYDROCODONE BITARTRATE AND ACETAMINOPHEN 7.5; 325 MG/1; MG/1
1 TABLET ORAL EVERY 6 HOURS PRN
Status: DISCONTINUED | OUTPATIENT
Start: 2017-07-22 | End: 2017-07-25 | Stop reason: HOSPADM

## 2017-07-22 RX ORDER — DEXTROSE MONOHYDRATE 25 G/50ML
25 INJECTION, SOLUTION INTRAVENOUS
Status: DISCONTINUED | OUTPATIENT
Start: 2017-07-22 | End: 2017-07-25 | Stop reason: HOSPADM

## 2017-07-22 RX ORDER — FLUOXETINE HYDROCHLORIDE 20 MG/1
60 CAPSULE ORAL DAILY
Status: DISCONTINUED | OUTPATIENT
Start: 2017-07-22 | End: 2017-07-22

## 2017-07-22 RX ORDER — ONDANSETRON 2 MG/ML
4 INJECTION INTRAMUSCULAR; INTRAVENOUS EVERY 6 HOURS PRN
Status: DISCONTINUED | OUTPATIENT
Start: 2017-07-22 | End: 2017-07-25 | Stop reason: HOSPADM

## 2017-07-22 RX ORDER — LOSARTAN POTASSIUM 50 MG/1
50 TABLET ORAL DAILY
Status: DISCONTINUED | OUTPATIENT
Start: 2017-07-22 | End: 2017-07-25 | Stop reason: HOSPADM

## 2017-07-22 RX ORDER — METOPROLOL SUCCINATE 100 MG/1
100 TABLET, EXTENDED RELEASE ORAL DAILY
Status: DISCONTINUED | OUTPATIENT
Start: 2017-07-22 | End: 2017-07-25 | Stop reason: HOSPADM

## 2017-07-22 RX ORDER — ALPRAZOLAM 0.5 MG/1
0.5 TABLET ORAL DAILY
Status: DISCONTINUED | OUTPATIENT
Start: 2017-07-22 | End: 2017-07-25 | Stop reason: HOSPADM

## 2017-07-22 RX ADMIN — ACETAMINOPHEN 650 MG: 325 TABLET ORAL at 20:40

## 2017-07-22 RX ADMIN — Medication 250 MG: at 17:50

## 2017-07-22 RX ADMIN — FLUOXETINE HYDROCHLORIDE 60 MG: 20 CAPSULE ORAL at 20:39

## 2017-07-22 RX ADMIN — TAZOBACTAM SODIUM AND PIPERACILLIN SODIUM 3.38 G: 375; 3 INJECTION, SOLUTION INTRAVENOUS at 18:48

## 2017-07-22 RX ADMIN — VANCOMYCIN HYDROCHLORIDE 2750 MG: 1 INJECTION, POWDER, LYOPHILIZED, FOR SOLUTION INTRAVENOUS at 13:05

## 2017-07-22 RX ADMIN — INSULIN DETEMIR 10 UNITS: 100 INJECTION, SOLUTION SUBCUTANEOUS at 20:41

## 2017-07-22 RX ADMIN — ENOXAPARIN SODIUM 40 MG: 40 INJECTION SUBCUTANEOUS at 16:30

## 2017-07-22 RX ADMIN — BACLOFEN 10 MG: 10 TABLET ORAL at 20:40

## 2017-07-22 RX ADMIN — SILVER SULFADIAZINE: 10 CREAM TOPICAL at 17:50

## 2017-07-22 RX ADMIN — TAZOBACTAM SODIUM AND PIPERACILLIN SODIUM 3.38 G: 375; 3 INJECTION, SOLUTION INTRAVENOUS at 23:30

## 2017-07-22 RX ADMIN — METFORMIN HYDROCHLORIDE 1000 MG: 1000 TABLET ORAL at 17:50

## 2017-07-22 NOTE — ED NOTES
Report called and given to Tyesha LUCIO on 6S - 607, updated on pt current status, discussed completed ER orders, and admitting diagnosis. RN had no further questions at this time.   Pt had no questions at this time.       Kelsey Holden RN  07/22/17 4873

## 2017-07-22 NOTE — ED NOTES
Attempted to call and give report. Unsuccessful will attempt again later.     Kelsey Holden RN  07/22/17 [id number]

## 2017-07-22 NOTE — PROGRESS NOTES
Phone call from patient today.  She reports advancing redness to foot and ankle region over past 12 hours. She reports seepage of fluid from the blistering area. I have advised her to proceed without delay to ER for wound cultures and surgical eval.  I now suspect possible strep or staph infection of this wound  but this needs confirmation by culture due to her DM condition.  She reports understanding.

## 2017-07-22 NOTE — ED NOTES
Attempted to call and give report unsuccessful will attempt later.     Kelsey Holden RN  07/22/17 [id number]

## 2017-07-22 NOTE — ED PROVIDER NOTES
EMERGENCY DEPARTMENT ENCOUNTER    CHIEF COMPLAINT  Chief Complaint: lower extremity problem  History given by: patient  History limited by: N/A  Room Number: 10/10  PMD: Benitez Wu MD      HPI:  Pt has hx of diabetes (on oral medication, not using insulin) and does not regularly check her blood glucose levels. About 4 days ago, she developed a blistering tender wound to the plantar aspect of the left foot. She has also had drainage from the wound, left foot bruising, and left foot swelling. This morning, she noticed that there was redness that propagated from the left foot to the left knee. She denies recent known direct injury or trauma, recent left foot procedures, walking on any hot substances such as concrete, fevers, chills, chest pain, trouble breathing, N/V/D, abd pain, pain and difficulty with urination, numbness, and motor loss. She states that she has chronic intermittent BLE tingling secondary to lower back surgery in 1995 (no acute change). She reports that she went to [facility] yesterday and was prescribed silvadene cream. However, despite using the silvadene cream and applying dressings to the left foot wound, her sx have worsened. Pt has no other complaints at this time.     Location: left lower extremity  Radiation: none  Quality: tender  Intensity/Severity: moderate  Duration: started about 4 days ago  Onset quality: gradual  Timing: constant  Progression: worsening  Aggravating Factors: bearing weight  Alleviating Factors: none  Previous Episodes: none  Treatment before arrival: Pt states that she has used silvadene cream and applied dressings to the left foot wound without sx relief.   Associated Symptoms: blistering tender wound to plantar aspect of left foot with drainage, left foot bruising, left foot swelling, redness from left foot to left knee       PAST MEDICAL HISTORY  Active Ambulatory Problems     Diagnosis Date Noted   • CAD (coronary artery disease)    • Type 2 diabetes mellitus,  uncontrolled    • HLD (hyperlipidemia)    • Benign essential hypertension    • Hypothyroidism    • Major depressive disorder, recurrent, in full remission    • Rheumatoid arthritis    • Tobacco abuse    • Cervicalgia 01/11/2016   • Diabetes mellitus 05/12/2016   • Dyslipidemia 01/17/2017   • BP (high blood pressure) 01/17/2017   • Obstructive apnea 01/17/2017   • AF (paroxysmal atrial fibrillation) 01/17/2017   • Proteinuria 07/13/2017   • Burn 07/21/2017     Resolved Ambulatory Problems     Diagnosis Date Noted   • IFG (impaired fasting glucose)      Past Medical History:   Diagnosis Date   • Allergy to mold    • Benign essential hypertension    • CAD (coronary artery disease)    • Diabetes mellitus    • Encounter for urine test 07/2015   • H/O bone density study unclear   • H/O complete eye exam 2 -3 years   • History of myocardial infarction    • HLD (hyperlipidemia)    • Hypothyroidism    • Major depressive disorder, recurrent, in full remission    • Myocardial infarction    • BRANNON (obstructive sleep apnea)    • Rheumatoid arthritis    • Seasonal allergies    • Tobacco abuse          PAST SURGICAL HISTORY  Past Surgical History:   Procedure Laterality Date   • BACK SURGERY  10/1995    also 2/02; L5-S1; Dr. Carroll Avitia   • CORONARY STENT PLACEMENT  06/2014   • KNEE ARTHROSCOPY Right 2010   • MAMMO BILATERAL  due   • PAP SMEAR  due   • TONSILLECTOMY      age 26         FAMILY HISTORY  Family History   Problem Relation Age of Onset   • Cancer Mother      breast   • Hypertension Mother    • Rheum arthritis Mother    • Thyroid disease Mother    • Hypertension Father    • Thyroid disease Father    • Heart failure Maternal Grandmother    • Heart attack Maternal Grandmother    • Heart attack Paternal Grandfather    • Rheum arthritis Other      aunt         SOCIAL HISTORY  Social History     Social History   • Marital status:      Spouse name: N/A   • Number of children: N/A   • Years of education: N/A      Occupational History   • Not on file.     Social History Main Topics   • Smoking status: Former Smoker     Packs/day: 0.50   • Smokeless tobacco: Never Used      Comment: quit 6/27/2014   • Alcohol use No   • Drug use: No   • Sexual activity: Yes     Partners: Male     Other Topics Concern   • Not on file     Social History Narrative         ALLERGIES  Erythromycin and Talwin [pentazocine]        REVIEW OF SYSTEMS  Review of Systems   Constitutional: Negative for chills and fever.   HENT: Negative for congestion, rhinorrhea and sore throat.    Eyes: Negative for pain.   Respiratory: Negative for cough and shortness of breath.    Cardiovascular: Negative for chest pain and palpitations.   Gastrointestinal: Negative for abdominal pain, diarrhea, nausea and vomiting.   Endocrine: Negative.    Genitourinary: Negative for difficulty urinating.   Musculoskeletal: Negative for myalgias.        Left foot bruising, left foot swelling   Skin: Positive for color change (redness from left foot to left knee   ) and wound (blistering tender wound to plantar aspect of left foot with drainage).   Neurological: Negative for speech difficulty, weakness, numbness and headaches.        Intermittent tingling to BLE (chronic, no acute change)   Psychiatric/Behavioral: Negative.    All other systems reviewed and are negative.           PHYSICAL EXAM  ED Triage Vitals   Temp Heart Rate Resp BP SpO2   07/22/17 1225 07/22/17 1225 07/22/17 1225 07/22/17 1234 07/22/17 1225   97.4 °F (36.3 °C) 110 16 142/76 96 % WNL      Temp src Heart Rate Source Patient Position BP Location FiO2 (%)   -- 07/22/17 1320 07/22/17 1234 07/22/17 1234 --    Monitor Lying Right arm        Physical Exam   Constitutional: She is oriented to person, place, and time.  Non-toxic appearance. No distress.   HENT:   Head: Normocephalic.   Mouth/Throat: Mucous membranes are normal.   Eyes: EOM are normal. Pupils are equal, round, and reactive to light.   Neck: Normal  range of motion. Neck supple.   Cardiovascular: Normal rate, regular rhythm and normal heart sounds.    Pulmonary/Chest: Effort normal and breath sounds normal. No respiratory distress. She has no decreased breath sounds. She has no wheezes. She has no rhonchi. She has no rales.   Abdominal: Soft. There is no tenderness. There is no rebound and no guarding.   Musculoskeletal: Normal range of motion.   Neurological: She is alert and oriented to person, place, and time. She has normal sensation.   Skin: Skin is warm and dry.   LLE- Erythema to left foot that extends to left ankle, anterior left lower leg, and anterior left knee; mild left foot swelling; bulla that has ruptured over the left 5th metatarsal; hemorrhagic fluid that tracks from the sole of the left foot to the left heel; no signs of necrotizing fasciitis to LLE; NV intact distally to LLE   Psychiatric: Mood and affect normal.   Nursing note and vitals reviewed.          LAB RESULTS  Recent Results (from the past 24 hour(s))   Comprehensive Metabolic Panel    Collection Time: 07/22/17  1:01 PM   Result Value Ref Range    Glucose 179 (H) 65 - 99 mg/dL    BUN 13 6 - 20 mg/dL    Creatinine 0.73 0.57 - 1.00 mg/dL    Sodium 141 136 - 145 mmol/L    Potassium 3.8 3.5 - 5.2 mmol/L    Chloride 97 (L) 98 - 107 mmol/L    CO2 25.6 22.0 - 29.0 mmol/L    Calcium 10.5 8.6 - 10.5 mg/dL    Total Protein 7.5 6.0 - 8.5 g/dL    Albumin 4.40 3.50 - 5.20 g/dL    ALT (SGPT) 43 (H) 1 - 33 U/L    AST (SGOT) 26 1 - 32 U/L    Alkaline Phosphatase 73 39 - 117 U/L    Total Bilirubin 0.8 0.1 - 1.2 mg/dL    eGFR Non African Amer 82 >60 mL/min/1.73    Globulin 3.1 gm/dL    A/G Ratio 1.4 g/dL    BUN/Creatinine Ratio 17.8 7.0 - 25.0    Anion Gap 18.4 mmol/L   Lactic Acid, Plasma    Collection Time: 07/22/17  1:01 PM   Result Value Ref Range    Lactate 3.3 (C) 0.5 - 2.0 mmol/L   CBC Auto Differential    Collection Time: 07/22/17  1:01 PM   Result Value Ref Range    WBC 10.09 4.50 - 10.70  10*3/mm3    RBC 4.79 3.90 - 5.20 10*6/mm3    Hemoglobin 14.5 11.9 - 15.5 g/dL    Hematocrit 44.5 35.6 - 45.5 %    MCV 92.9 80.5 - 98.2 fL    MCH 30.3 26.9 - 32.0 pg    MCHC 32.6 32.4 - 36.3 g/dL    RDW 13.3 (H) 11.7 - 13.0 %    RDW-SD 45.2 37.0 - 54.0 fl    MPV 9.7 6.0 - 12.0 fL    Platelets 231 140 - 500 10*3/mm3    Neutrophil % 58.0 42.7 - 76.0 %    Lymphocyte % 20.1 19.6 - 45.3 %    Monocyte % 18.4 (H) 5.0 - 12.0 %    Eosinophil % 2.2 0.3 - 6.2 %    Basophil % 0.6 0.0 - 1.5 %    Immature Grans % 0.7 (H) 0.0 - 0.5 %    Neutrophils, Absolute 5.85 1.90 - 8.10 10*3/mm3    Lymphocytes, Absolute 2.03 0.90 - 4.80 10*3/mm3    Monocytes, Absolute 1.86 (H) 0.20 - 1.20 10*3/mm3    Eosinophils, Absolute 0.22 0.00 - 0.70 10*3/mm3    Basophils, Absolute 0.06 0.00 - 0.20 10*3/mm3    Immature Grans, Absolute 0.07 (H) 0.00 - 0.03 10*3/mm3   Urinalysis With / Culture If Indicated    Collection Time: 07/22/17  1:13 PM   Result Value Ref Range    Color, UA Yellow Yellow, Straw    Appearance, UA Cloudy (A) Clear    pH, UA 6.0 5.0 - 8.0    Specific Gravity, UA 1.027 1.005 - 1.030    Glucose, UA >=1000 mg/dL (3+) (A) Negative    Ketones, UA Negative Negative    Bilirubin, UA Negative Negative    Blood, UA Trace (A) Negative    Protein, UA 30 mg/dL (1+) (A) Negative    Leuk Esterase, UA Moderate (2+) (A) Negative    Nitrite, UA Negative Negative    Urobilinogen, UA 0.2 E.U./dL 0.2 - 1.0 E.U./dL   Urinalysis, Microscopic Only    Collection Time: 07/22/17  1:13 PM   Result Value Ref Range    RBC, UA 3-5 (A) None Seen, 0-2 /HPF    WBC, UA Too Numerous to Count (A) None Seen, 0-2 /HPF    Bacteria, UA 2+ (A) None Seen /HPF    Squamous Epithelial Cells, UA 3-6 (A) None Seen, 0-2 /HPF    Hyaline Casts, UA 3-6 None Seen /LPF    Methodology Automated Microscopy    Lactate Acid, Reflex    Collection Time: 07/22/17  5:04 PM   Result Value Ref Range    Lactate 2.0 0.5 - 2.0 mmol/L   POC Glucose Fingerstick    Collection Time: 07/22/17  5:17 PM    Result Value Ref Range    Glucose 143 (H) 70 - 130 mg/dL       Ordered the above labs and reviewed the results.        PROCEDURES  Procedures      PROGRESS AND CONSULTS  ED Course     12:39 PM- Ordered blood work, wound culture, lactic acid, and UA for further evaluation. Sent call out to [facility]. Admission decision time= 1239    12:41 PM- Ordered vancomycin for LLE cellulitis.    1:37 PM- Discussed case with Dr Zaragoza ([facility] hospitalist)  Reviewed history, exam, results and treatments.  Discussed concerns and plan of care. Dr Zaragoza accepts pt to be admitted to med/surg.    1:39 PM- Rechecked pt. She is resting comfortably and is in no acute distress. Discussed with pt about all pertinent results that are concerning for LLE cellulitis. Discussed pt admission for further care and observation. Pt verbalizes understanding and agrees with plan. Pt is ready for admission.      MEDICAL DECISION MAKING  Results were reviewed/discussed with the patient.     MDM  Number of Diagnoses or Management Options     Amount and/or Complexity of Data Reviewed  Clinical lab tests: ordered and reviewed (WBC= 10.09, lactic acid= 3.3, glucose= 179, BUN= 13, creatinine= 0.73, UA)  Discuss the patient with other providers: yes (Dr Zaragoza ([facility] hospitalist) will admit. )    Patient Progress  Patient progress: stable             DIAGNOSIS  Final diagnoses:   Cellulitis of left leg         DISPOSITION  Admitted- med/surg    Discussed treatment plan and reason for admission with pt and admitting physician.  Pt voiced understanding of the plan for admission for further testing/treatment as needed.         Latest Documented Vital Signs:  As of 7:24 PM  BP- 132/76 HR- 80 Temp- 98.2 °F (36.8 °C) (Oral) O2 sat- 96%        --  Documentation assistance provided by alesia Healy for Dr Mtz.  Information recorded by the scribe was done at my direction and has been verified and validated by me.        Jarvis Healy  07/22/17 1928       Carlos Mtz,  MD  07/22/17 2017

## 2017-07-22 NOTE — ED TRIAGE NOTES
Pt states she has an sore on bottom of Left foot. Was seen yesterday and given Silvadene Cream for foot. Pt feels something more is wrong

## 2017-07-22 NOTE — PROGRESS NOTES
"Pharmacokinetic Consult - Vancomycin Dosing     Pratibha Pascal is a 57 y.o. female who is on day 1 pharmacy to dose vancomycin for SSTI.  Pharmacy dosing vancomycin per Dr. Zaragoza's request.   Other antimicrobials: Zosyn 3.375 gram IV q8h extended infusion  Goal trough: 10-20 mg/L    Current Vancomycin dose: s/p Vancomycin 2750mg IV x1 dose started at ~ 1300    HPI: from KAL[facility] Wilson note on 7/22  Phone call from patient today.  She reports advancing redness to foot and ankle region over past 12 hours. She reports seepage of fluid from the blistering area. I have advised her to proceed without delay to ER for wound cultures and surgical eval.  I now suspect possible strep or staph infection of this wound  but this needs confirmation by culture due to her DM condition.  She reports understanding.     Relevant clinical data and objective history reviewed:  72\" (182.9 cm)  (!) 315 lb (143 kg)  Body mass index is 42.72 kg/(m^2).     She has a past medical history of Allergy to mold; Benign essential hypertension; CAD (coronary artery disease); Diabetes mellitus; Encounter for urine test (07/2015); H/O bone density study (unclear); H/O complete eye exam (2 -3 years); History of myocardial infarction; HLD (hyperlipidemia); Hypothyroidism; Major depressive disorder, recurrent, in full remission; Myocardial infarction; BRANNON (obstructive sleep apnea); Rheumatoid arthritis; Seasonal allergies; and Tobacco abuse.    Allergies as of 07/22/2017 - Timi as Reviewed 07/22/2017   Allergen Reaction Noted   • Erythromycin  01/08/2016   • Talwin [pentazocine]  01/08/2016     Vital Signs (last 24 hours)       07/21 0700  -  07/22 0659 07/22 0700  -  07/22 1448   Most Recent    Temp (°F)   97.4 -  98.2     98.2 (36.8)    Heart Rate   80 -  110     80    Resp     16     16    BP   125/71 -  142/76     132/76    SpO2 (%)     96     96        Estimated Creatinine Clearance: 133.9 mL/min (by C-G formula based on Cr of 0.73).    Results " from last 7 days  Lab Units 07/22/17  1301   CREATININE mg/dL 0.73       Results from last 7 days  Lab Units 07/22/17  1301   WBC 10*3/mm3 10.09     Baseline culture/source/susceptibility:   7/22 UC pending  7/22 L foot fluid culture pending     Labs:  7/22 Lactate 3.3  7/22 UA has >1000 mg/dL glucose, 30 mg/dL protein, TNTC WBC, 2+ bacteria, moderate leuk est    Assessment/Plan  1) Will continue Vancomycin at 1500mg IV q12h given obesity and DM history (~10mg/kg ABW or ~15mg/kg AdjBW)  Plan for trough level before 4th 1500mg IV dose scheduled 7/24 afternoon  2) Will monitor serum creatinine at least every 24h for first 72h followed by at least q48 hours per dosing recommendations.    3) Encourage hydration as allowed by MD to help prevent toxic accumulation; monitor for s/sxn of toxicity including increase in SCr and decrease in UOP.    Pharmacy will continue to follow daily while on vancomycin and adjust as needed.     Thanks, Benitez Carrasquillo, PharmD, BCPS

## 2017-07-22 NOTE — H&P
HISTORY AND PHYSICAL   [facility]        Patient Identification:  Name: Pratibha Pascal  Age: 57 y.o.  Sex: female  :  1960  MRN: [id number]                     Primary Care Physician: Benitez Wu MD    Chief Complaint:  Left leg redness    History of Present Illness:   Ms Pascal is a 57-year-old female who has uncontrolled diabetes.  Her A1c has gotten worse from 7.9 to 8.4.  She is on 3 by mouth medications but is not using any type of insulin.  She is unsure whether or not she has a pre-existing diagnosis of neuropathy though she does make complaints that are consistent with this diagnosis.  She is unsure about this blister on the bottom of her foot as far as when it showed up how long it's been there as she has not felt it.  She noticed over the last 24 hours that when she awoke her left leg was red all up to her knee.  She denies any fever chills or night sweats nausea vomiting or diarrhea.  She denies having any recent foot procedures and denies walking on any hot substances such as asphalt or concrete though she does normally check her mail barefoot walking out to her mailbox but she claims she normally walks in the grass.  In the ER she was found to have cellulitis and was given vancomycin and A was called for further management.    Past Medical History:  Past Medical History:   Diagnosis Date   • Allergy to mold    • Benign essential hypertension    • CAD (coronary artery disease)    • Diabetes mellitus    • Encounter for urine test 2015    microalbumin; 461   • H/O bone density study unclear   • H/O complete eye exam 2 -3 years   • History of myocardial infarction    • HLD (hyperlipidemia)    • Hypothyroidism    • Major depressive disorder, recurrent, in full remission    • Myocardial infarction    • BRANNON (obstructive sleep apnea)    • Rheumatoid arthritis    • Seasonal allergies    • Tobacco abuse      Past Surgical History:  Past Surgical History:   Procedure Laterality Date   •  BACK SURGERY  10/1995    also 2/02; L5-S1; Dr. Carroll Avitia   • CORONARY STENT PLACEMENT  06/2014   • KNEE ARTHROSCOPY Right 2010   • MAMMO BILATERAL  due   • PAP SMEAR  due   • TONSILLECTOMY      age 26      Home Meds:  Prescriptions Prior to Admission   Medication Sig Dispense Refill Last Dose   • ALPRAZolam (XANAX) 0.5 MG tablet Take 1 tablet by mouth Daily. TAKE 1 TABLET BY MOUTH EVERY DAY AS NEEDED 30 tablet 2    • aspirin 81 MG tablet Take by mouth daily.      • atorvastatin (LIPITOR) 10 MG tablet Take 0.5 tablets by mouth Daily. Take one tablet once daily 45 tablet 1    • baclofen (LIORESAL) 10 MG tablet Take 1 tablet by mouth 3 (Three) Times a Day. 270 tablet 1    • celecoxib (CeleBREX) 200 MG capsule TAKE ONE CAPSULE BY MOUTH EVERY DAY  1    • CIMZIA STARTER KIT 6 X 200 MG/ML kit       • FARXIGA 5 MG tablet tablet Take 1 tablet by mouth Daily. TAKE 1 TABLET BY MOUTH EVERY MORNING 90 tablet 1    • FLUoxetine (PROzac) 20 MG capsule Take 3 capsules by mouth Daily. 270 capsule 1    • folic acid (FOLVITE) 1 MG tablet       • levothyroxine (SYNTHROID, LEVOTHROID) 75 MCG tablet Take 1 tablet by mouth Daily. 90 tablet 1    • losartan (COZAAR) 50 MG tablet Take 1 tablet by mouth Daily. TAKE 1 TABLET BY MOUTH EVERY DAY 90 tablet 1    • metFORMIN (GLUCOPHAGE) 1000 MG tablet Take 1 tablet by mouth 2 (Two) Times a Day With Meals. 180 tablet 1    • metoprolol succinate XL (TOPROL-XL) 100 MG 24 hr tablet Take 1 tablet by mouth Daily. TAKE 1 TABLET BY MOUTH EVERY DAY 90 tablet 1    • silver sulfadiazine (SILVADENE) 1 % cream Apply  topically 2 (Two) Times a Day. 50 g 1    • SITagliptin (JANUVIA) 100 MG tablet Take 1 tablet by mouth Daily. 90 tablet 1    • sulfaSALAzine (AZULFIDINE) 500 MG tablet TAKE 3 TABLETS BY MOUTH TWICE DAILY  1 Taking   • triamterene-hydrochlorothiazide (MAXZIDE-25) 37.5-25 MG per tablet Take 1 tablet by mouth Daily. TAKE 1 TABLET BY MOUTH EVERY DAY 90 tablet 1        Allergies:  Allergies    Allergen Reactions   • Erythromycin    • Talwin [Pentazocine]      Immunizations:  Immunization History   Administered Date(s) Administered   • Influenza (IM) Preservative Free 10/03/2016   • Influenza TIV (IM) 10/07/2014   • Pneumococcal Conjugate 13-Valent 2016     Social History:   Social History     Social History Narrative     Social History     Social History   • Marital status:      Spouse name: N/A   • Number of children: N/A   • Years of education: N/A     Occupational History   • Not on file.     Social History Main Topics   • Smoking status: Former Smoker     Packs/day: 0.50   • Smokeless tobacco: Never Used      Comment: quit 2014   • Alcohol use No   • Drug use: No   • Sexual activity: Yes     Partners: Male     Other Topics Concern   • Not on file     Social History Narrative       Family History:  Family History   Problem Relation Age of Onset   • Cancer Mother      breast   • Hypertension Mother    • Rheum arthritis Mother    • Thyroid disease Mother    • Hypertension Father    • Thyroid disease Father    • Heart failure Maternal Grandmother    • Heart attack Maternal Grandmother    • Heart attack Paternal Grandfather    • Rheum arthritis Other      aunt        Review of Systems  See history of present illness and past medical history.  Patient denies any acute changes to vision smell taste or sound.  Denies any headache dizziness or loss of consciousness.  Denies any neck pain stiffness or dysphagia.  Denies any chest pain palpitations cough shortness of breath nausea vomiting abdominal pain dysuria.  Admits to redness to her left lower leg as well as blister.  Remainder of ROS is negative.    Objective:  tMax 24 hrs: Temp (24hrs), Av.8 °F (36.6 °C), Min:97.4 °F (36.3 °C), Max:98.2 °F (36.8 °C)    Vitals Ranges:   Temp:  [97.4 °F (36.3 °C)-98.2 °F (36.8 °C)] 98.2 °F (36.8 °C)  Heart Rate:  [] 80  Resp:  [16] 16  BP: (125-142)/(69-76) 132/76      Exam:  /76 (BP  "Location: Right arm, Patient Position: Lying)  Pulse 80  Temp 98.2 °F (36.8 °C) (Oral)   Resp 16  Ht 72\" (182.9 cm)  Wt (!) 315 lb (143 kg)  SpO2 96%  BMI 42.72 kg/m2    General Appearance:    Alert, cooperative, no distress, AOx3, not toxic   Head:    Normocephalic, without obvious abnormality, atraumatic   Eyes:    PERRL, conjunctiva/corneas clear, EOM's intact, both eyes   Ears:    Normal external ear canals, both ears   Nose:   Nares normal, septum midline, mucosa normal, no drainage    or sinus tenderness   Throat:   Lips, mucosa, and tongue normal   Neck:   Supple, No meningismus or JVD       Lungs:     Clear to auscultation bilaterally, respirations unlabored   Chest Wall:    No tenderness or deformity    Heart:    Regular rate and rhythm, S1 and S2 normal, no murmur, rub   or gallop   Abdomen:     Soft, non-tender, bowel sounds active all four quadrants,     no masses, no hepatomegaly, no splenomegaly   Extremities:   Left plantar aspect of her foot has blistering which almost seems consistent with a burn.  She has poor foot hygiene with cellulitis streaking up the anterior portion of her leg to her knee which is outlined    Pulses:   2+ and symmetric all extremities           Neurologic:   CNII-XII intact, normal strength, No focal deficit       .    Data Review:  Labs in chart were reviewed.             Imaging Results (all)     None            Assessment:  Principal Problem:    Cellulitis of left leg  Active Problems:    CAD (coronary artery disease)    Type 2 diabetes mellitus, uncontrolled    HLD (hyperlipidemia)    Benign essential hypertension    Hypothyroidism    Diabetes mellitus with neurological manifestation      Plan:  Cellulitis of left foot extending up the anterior aspect of her left lower extremity to her knee with the etiology secondary to large diabetic foot ulcer vs burn/blister on the bottom of her left foot.  It appears as if she burned her foot given the recent high heat indexes " but she states she has not walked on any type of concrete or asphalt barefooted though I that she has.  I presume this patient has aspects of neuropathy secondary to uncontrolled diabetes.  The hygiene on her adjacent foot is very poor.   -Continue Vanco and Zosyn and add Florastor for GI aversion prophylaxis   -Wound care for left foot blister.  May need additional surgical evaluation but will hold for now   -Elevated lactic acid at admission though no fevers and no elevated white blood cell count and I feel sepsis is not present at admission    DM2 - uncontrolled with probable neuropathy   -A1c is worsening 8.4 per review previous lab work.  Will continue with metformin though hold additional hypoglycemic medications and place on sliding scale insulin as well as initiate Levemir 10 units subcutaneous daily at bedtime    HTN/HLD/CAD - stable continue meds    Questionable UTI as sample was obtained via clean catch and favor contamination given her body habitus as well as no genitourinary symptoms    Lovenox for DVT prophylaxis    Further recommendations to follow as clinical course unfolds    Quentin Zaragoza MD  7/22/2017  2:53 PM

## 2017-07-22 NOTE — PLAN OF CARE
Problem: Patient Care Overview (Adult)  Goal: Plan of Care Review  Outcome: Ongoing (interventions implemented as appropriate)    07/22/17 4820   Coping/Psychosocial Response Interventions   Plan Of Care Reviewed With patient   Patient Care Overview   Progress no change   Outcome Evaluation   Outcome Summary/Follow up Plan PT admitted to unit for wound on left foot turned into cellulitis, cellulitis area marked on pt's leg, wound cleaned and dressed per MD order, awaiting wound consult, pt on iv antibiotics, will continue to monitor.        Goal: Discharge Needs Assessment  Outcome: Ongoing (interventions implemented as appropriate)    Problem: Cellulitis (Adult)  Goal: Signs and Symptoms of Listed Potential Problems Will be Absent or Manageable (Cellulitis)  Outcome: Ongoing (interventions implemented as appropriate)    Problem: Diabetes, Type 2 (Adult)  Goal: Signs and Symptoms of Listed Potential Problems Will be Absent or Manageable (Diabetes, Type 2)  Outcome: Outcome(s) achieved Date Met:  07/22/17    Problem: Wound, Traumatic, Nonburn (Adult)  Goal: Signs and Symptoms of Listed Potential Problems Will be Absent or Manageable (Wound, Traumatic, Nonburn)  Outcome: Outcome(s) achieved Date Met:  07/22/17    Problem: Infection, Risk/Actual (Adult)  Goal: Identify Related Risk Factors and Signs and Symptoms  Outcome: Outcome(s) achieved Date Met:  07/22/17  Goal: Infection Prevention/Resolution  Outcome: Ongoing (interventions implemented as appropriate)

## 2017-07-23 LAB
CREAT BLD-MCNC: 0.69 MG/DL (ref 0.57–1)
GFR SERPL CREATININE-BSD FRML MDRD: 87 ML/MIN/1.73
GLUCOSE BLDC GLUCOMTR-MCNC: 148 MG/DL (ref 70–130)
GLUCOSE BLDC GLUCOMTR-MCNC: 151 MG/DL (ref 70–130)
GLUCOSE BLDC GLUCOMTR-MCNC: 173 MG/DL (ref 70–130)
GLUCOSE BLDC GLUCOMTR-MCNC: 281 MG/DL (ref 70–130)

## 2017-07-23 PROCEDURE — 82962 GLUCOSE BLOOD TEST: CPT

## 2017-07-23 PROCEDURE — 25010000002 PIPERACILLIN SOD-TAZOBACTAM PER 1 G: Performed by: HOSPITALIST

## 2017-07-23 PROCEDURE — 25010000002 ENOXAPARIN PER 10 MG: Performed by: HOSPITALIST

## 2017-07-23 PROCEDURE — 82565 ASSAY OF CREATININE: CPT | Performed by: HOSPITALIST

## 2017-07-23 PROCEDURE — 63710000001 INSULIN DETEMER PER 5 UNITS: Performed by: HOSPITALIST

## 2017-07-23 PROCEDURE — 25010000002 VANCOMYCIN 10 G RECONSTITUTED SOLUTION: Performed by: HOSPITALIST

## 2017-07-23 PROCEDURE — 63710000001 INSULIN ASPART PER 5 UNITS: Performed by: HOSPITALIST

## 2017-07-23 RX ADMIN — INSULIN DETEMIR 10 UNITS: 100 INJECTION, SOLUTION SUBCUTANEOUS at 20:30

## 2017-07-23 RX ADMIN — SILVER SULFADIAZINE: 10 CREAM TOPICAL at 08:31

## 2017-07-23 RX ADMIN — Medication 250 MG: at 08:19

## 2017-07-23 RX ADMIN — VANCOMYCIN HYDROCHLORIDE 1500 MG: 10 INJECTION, POWDER, LYOPHILIZED, FOR SOLUTION INTRAVENOUS at 03:55

## 2017-07-23 RX ADMIN — Medication 250 MG: at 16:59

## 2017-07-23 RX ADMIN — SILVER SULFADIAZINE: 10 CREAM TOPICAL at 18:54

## 2017-07-23 RX ADMIN — ASPIRIN 81 MG: 81 TABLET, CHEWABLE ORAL at 08:19

## 2017-07-23 RX ADMIN — INSULIN ASPART 2 UNITS: 100 INJECTION, SOLUTION INTRAVENOUS; SUBCUTANEOUS at 20:30

## 2017-07-23 RX ADMIN — INSULIN ASPART 6 UNITS: 100 INJECTION, SOLUTION INTRAVENOUS; SUBCUTANEOUS at 08:38

## 2017-07-23 RX ADMIN — METFORMIN HYDROCHLORIDE 1000 MG: 1000 TABLET ORAL at 17:00

## 2017-07-23 RX ADMIN — TAZOBACTAM SODIUM AND PIPERACILLIN SODIUM 3.38 G: 375; 3 INJECTION, SOLUTION INTRAVENOUS at 18:33

## 2017-07-23 RX ADMIN — METOPROLOL SUCCINATE 100 MG: 100 TABLET, FILM COATED, EXTENDED RELEASE ORAL at 08:19

## 2017-07-23 RX ADMIN — METFORMIN HYDROCHLORIDE 1000 MG: 1000 TABLET ORAL at 08:19

## 2017-07-23 RX ADMIN — LEVOTHYROXINE SODIUM 75 MCG: 75 TABLET ORAL at 08:19

## 2017-07-23 RX ADMIN — FLUOXETINE HYDROCHLORIDE 60 MG: 20 CAPSULE ORAL at 20:30

## 2017-07-23 RX ADMIN — BACLOFEN 10 MG: 10 TABLET ORAL at 20:30

## 2017-07-23 RX ADMIN — ENOXAPARIN SODIUM 40 MG: 40 INJECTION SUBCUTANEOUS at 17:00

## 2017-07-23 RX ADMIN — TAZOBACTAM SODIUM AND PIPERACILLIN SODIUM 3.38 G: 375; 3 INJECTION, SOLUTION INTRAVENOUS at 08:20

## 2017-07-23 RX ADMIN — ATORVASTATIN CALCIUM 5 MG: 10 TABLET, FILM COATED ORAL at 08:19

## 2017-07-23 RX ADMIN — ALPRAZOLAM 0.5 MG: 0.5 TABLET ORAL at 08:18

## 2017-07-23 RX ADMIN — TRIAMTERENE AND HYDROCHLOROTHIAZIDE 1 TABLET: 37.5; 25 TABLET ORAL at 08:19

## 2017-07-23 RX ADMIN — VANCOMYCIN HYDROCHLORIDE 1500 MG: 10 INJECTION, POWDER, LYOPHILIZED, FOR SOLUTION INTRAVENOUS at 14:51

## 2017-07-23 RX ADMIN — LOSARTAN POTASSIUM 50 MG: 50 TABLET, FILM COATED ORAL at 08:19

## 2017-07-23 NOTE — PLAN OF CARE
Problem: Patient Care Overview (Adult)  Goal: Plan of Care Review  Outcome: Ongoing (interventions implemented as appropriate)    07/23/17 0601 07/23/17 1451 07/23/17 1803   Coping/Psychosocial Response Interventions   Plan Of Care Reviewed With --  patient --    Patient Care Overview   Progress no change --  --    Outcome Evaluation   Outcome Summary/Follow up Plan --  --  Pt receiving IV abx, wound care BID until wound care RN -expected on monday 7/24. vitals stable will continue to monitor.        Goal: Discharge Needs Assessment  Outcome: Ongoing (interventions implemented as appropriate)    Problem: Cellulitis (Adult)  Goal: Signs and Symptoms of Listed Potential Problems Will be Absent or Manageable (Cellulitis)  Outcome: Outcome(s) achieved Date Met:  07/23/17    Problem: Infection, Risk/Actual (Adult)  Goal: Infection Prevention/Resolution  Outcome: Ongoing (interventions implemented as appropriate)

## 2017-07-23 NOTE — PLAN OF CARE
Problem: Patient Care Overview (Adult)  Goal: Plan of Care Review  Outcome: Ongoing (interventions implemented as appropriate)    07/23/17 0601   Coping/Psychosocial Response Interventions   Plan Of Care Reviewed With patient   Patient Care Overview   Progress no change   Outcome Evaluation   Outcome Summary/Follow up Plan Pt receiving IV ABX. Wound care BID until wound nurse comes to see pt. Pt tolerated meds without difficulty.

## 2017-07-23 NOTE — PROGRESS NOTES
Name: Pratibha Pascal ADMIT: 2017   : 1960  PCP: Benitez Wu MD    MRN: [id number] LOS: 1 days   AGE/SEX: 57 y.o. female  ROOM: 607/   Subjective      Leg still erythematous and edematous but better   no soa or chest pain  No n/v/d/rash    Subjective    Objective   Vital Signs  Temp:  [98.2 °F (36.8 °C)-98.6 °F (37 °C)] 98.6 °F (37 °C)  Heart Rate:  [64-86] 86  Resp:  [16] 16  BP: ()/(50-76) 120/66  SpO2:  [96 %-97 %] 96 %  on   ;   O2 Device: room air  Body mass index is 42.72 kg/(m^2).    Physical Exam   Constitutional: She is oriented to person, place, and time. She appears well-developed and well-nourished.   HENT:   Head: Normocephalic and atraumatic.   Mouth/Throat: Oropharynx is clear and moist. No oropharyngeal exudate.   Eyes: Conjunctivae and EOM are normal. Pupils are equal, round, and reactive to light. No scleral icterus.   Cardiovascular: Normal rate, regular rhythm and normal heart sounds.  Exam reveals no gallop and no friction rub.    No murmur heard.  Pulmonary/Chest: Effort normal and breath sounds normal. No respiratory distress. She has no wheezes. She has no rales.   Abdominal: Soft. Bowel sounds are normal. She exhibits no distension. There is no tenderness. There is no rebound and no guarding.   Musculoskeletal: She exhibits edema (left leg ). She exhibits no tenderness.   Neurological: She is alert and oriented to person, place, and time. No cranial nerve deficit.   Skin: Skin is warm and dry. There is erythema (left leg, seems to be improving based on marker lines).   Large blister on the dorsum of left foot, foot wrapped so didn't unwrap it, seen on pictures before and after chemical debridement today     Psychiatric: She has a normal mood and affect. Her behavior is normal.       Results Review:       I reviewed the patient's new clinical results.    Results from last 7 days  Lab Units 17  1301   WBC 10*3/mm3 10.09   HEMOGLOBIN g/dL 14.5   PLATELETS 10*3/mm3  231     Results from last 7 days  Lab Units 07/23/17  0512 07/22/17  1301   SODIUM mmol/L  --  141   POTASSIUM mmol/L  --  3.8   CHLORIDE mmol/L  --  97*   CO2 mmol/L  --  25.6   BUN mg/dL  --  13   CREATININE mg/dL 0.69 0.73   GLUCOSE mg/dL  --  179*   Estimated Creatinine Clearance: 141.7 mL/min (by C-G formula based on Cr of 0.69).  Results from last 7 days  Lab Units 07/22/17  1301   CALCIUM mg/dL 10.5   ALBUMIN g/dL 4.40     Lab Results   Component Value Date    HGBA1C 8.4 (H) 07/08/2017    HGBA1C 7.9 (H) 01/14/2017    HGBA1C 7.1 (H) 08/27/2016     Glucose   Date/Time Value Ref Range Status   07/23/2017 1208 148 (H) 70 - 130 mg/dL Final   07/23/2017 0813 281 (H) 70 - 130 mg/dL Final   07/22/2017 2022 137 (H) 70 - 130 mg/dL Final   07/22/2017 1717 143 (H) 70 - 130 mg/dL Final         ALPRAZolam 0.5 mg Oral Daily   aspirin 81 mg Oral Daily   atorvastatin 5 mg Oral Daily   baclofen 10 mg Oral TID   enoxaparin 40 mg Subcutaneous Daily   FLUoxetine 60 mg Oral Nightly   insulin aspart 0-9 Units Subcutaneous 4x Daily With Meals & Nightly   insulin detemir 10 Units Subcutaneous Nightly   levothyroxine 75 mcg Oral Daily   losartan 50 mg Oral Daily   metFORMIN 1,000 mg Oral BID With Meals   metoprolol succinate  mg Oral Daily   piperacillin-tazobactam 3.375 g Intravenous Q8H   saccharomyces boulardii 250 mg Oral BID   silver sulfadiazine  Topical BID   triamterene-hydrochlorothiazide 1 tablet Oral Daily   vancomycin 1,500 mg Intravenous Q12H       Pharmacy to dose vancomycin    Diet Regular; Consistent Carbohydrate, Cardiac      Assessment/Plan   Assessment:     Active Hospital Problems (** Indicates Principal Problem)    Diagnosis Date Noted   • **Cellulitis of left leg [L03.116] 07/22/2017   • Diabetes mellitus with neurological manifestation [E11.49] 07/22/2017   • Type 2 diabetes mellitus, uncontrolled [E11.65]    • CAD (coronary artery disease) [I25.10]    • HLD (hyperlipidemia) [E78.5]    • Benign essential  hypertension [I10]    • Hypothyroidism [E03.9]       Resolved Hospital Problems    Diagnosis Date Noted Date Resolved   No resolved problems to display.       Plan:     Cellulitis of left foot extending up the anterior aspect of her left lower extremity to her knee with the etiology secondary to large diabetic foot ulcer vs burn/blister on the bottom of her left foot. Underlying neuropathy                        -Continue Vanc and Zosyn, probably can stop zosyn tomorrow    -Cx growing staph aureus and GNR, follow cultures                        -Wound care for left foot blister.  May need additional surgical evaluation but doesn't need now                        -Elevated lactic acid at admission resolved, no sepsis     DM2 - uncontrolled with probable neuropathy                        -A1c is worsening 8.4 per review previous lab work.      -continue metformin though hold additional hypoglycemic medications and place on sliding   scale insulin     -initiated Levemir 10 units subcutaneous daily at bedtime     HTN/HLD/CAD - stable continue meds     Questionable UTI as sample was obtained via clean catch and favor contamination given her body habitus as well as no genitourinary symptoms     Lovenox for DVT prophylaxis      Disposition  TBD.      Eric Johnson MD  [facility]  07/23/17  12:35 PM

## 2017-07-24 ENCOUNTER — APPOINTMENT (OUTPATIENT)
Dept: GENERAL RADIOLOGY | Facility: HOSPITAL | Age: 57
End: 2017-07-24
Attending: INTERNAL MEDICINE

## 2017-07-24 PROBLEM — A49.01 MSSA (METHICILLIN SUSCEPTIBLE STAPHYLOCOCCUS AUREUS) INFECTION: Status: ACTIVE | Noted: 2017-07-24

## 2017-07-24 LAB
BACTERIA FLD CULT: ABNORMAL
BACTERIA FLD CULT: ABNORMAL
BACTERIA SPEC AEROBE CULT: NORMAL
CREAT BLD-MCNC: 0.65 MG/DL (ref 0.57–1)
GFR SERPL CREATININE-BSD FRML MDRD: 94 ML/MIN/1.73
GLUCOSE BLDC GLUCOMTR-MCNC: 159 MG/DL (ref 70–130)
GLUCOSE BLDC GLUCOMTR-MCNC: 180 MG/DL (ref 70–130)
GLUCOSE BLDC GLUCOMTR-MCNC: 182 MG/DL (ref 70–130)
GLUCOSE BLDC GLUCOMTR-MCNC: 199 MG/DL (ref 70–130)
GRAM STN SPEC: ABNORMAL
VANCOMYCIN TROUGH SERPL-MCNC: 7.4 MCG/ML (ref 5–20)

## 2017-07-24 PROCEDURE — 63710000001 INSULIN ASPART PER 5 UNITS: Performed by: HOSPITALIST

## 2017-07-24 PROCEDURE — 73630 X-RAY EXAM OF FOOT: CPT

## 2017-07-24 PROCEDURE — 25010000002 VANCOMYCIN 10 G RECONSTITUTED SOLUTION: Performed by: HOSPITALIST

## 2017-07-24 PROCEDURE — 82962 GLUCOSE BLOOD TEST: CPT

## 2017-07-24 PROCEDURE — 25010000002 ENOXAPARIN PER 10 MG: Performed by: HOSPITALIST

## 2017-07-24 PROCEDURE — 25010000002 PIPERACILLIN SOD-TAZOBACTAM PER 1 G: Performed by: HOSPITALIST

## 2017-07-24 PROCEDURE — 80202 ASSAY OF VANCOMYCIN: CPT | Performed by: HOSPITALIST

## 2017-07-24 PROCEDURE — 82565 ASSAY OF CREATININE: CPT | Performed by: HOSPITALIST

## 2017-07-24 RX ORDER — PETROLATUM 42 G/100G
OINTMENT TOPICAL
Status: DISCONTINUED | OUTPATIENT
Start: 2017-07-24 | End: 2017-07-25 | Stop reason: HOSPADM

## 2017-07-24 RX ORDER — SULFASALAZINE 500 MG/1
1500 TABLET ORAL EVERY 12 HOURS SCHEDULED
Status: DISCONTINUED | OUTPATIENT
Start: 2017-07-24 | End: 2017-07-25 | Stop reason: HOSPADM

## 2017-07-24 RX ADMIN — TAZOBACTAM SODIUM AND PIPERACILLIN SODIUM 3.38 G: 375; 3 INJECTION, SOLUTION INTRAVENOUS at 02:20

## 2017-07-24 RX ADMIN — TAZOBACTAM SODIUM AND PIPERACILLIN SODIUM 3.38 G: 375; 3 INJECTION, SOLUTION INTRAVENOUS at 17:49

## 2017-07-24 RX ADMIN — BACLOFEN 10 MG: 10 TABLET ORAL at 21:45

## 2017-07-24 RX ADMIN — INSULIN ASPART 2 UNITS: 100 INJECTION, SOLUTION INTRAVENOUS; SUBCUTANEOUS at 09:00

## 2017-07-24 RX ADMIN — INSULIN DETEMIR 10 UNITS: 100 INJECTION, SOLUTION SUBCUTANEOUS at 21:44

## 2017-07-24 RX ADMIN — TRIAMTERENE AND HYDROCHLOROTHIAZIDE 1 TABLET: 37.5; 25 TABLET ORAL at 09:01

## 2017-07-24 RX ADMIN — VANCOMYCIN HYDROCHLORIDE 1500 MG: 10 INJECTION, POWDER, LYOPHILIZED, FOR SOLUTION INTRAVENOUS at 02:20

## 2017-07-24 RX ADMIN — PETROLATUM: 42 OINTMENT TOPICAL at 17:56

## 2017-07-24 RX ADMIN — INSULIN ASPART 2 UNITS: 100 INJECTION, SOLUTION INTRAVENOUS; SUBCUTANEOUS at 17:49

## 2017-07-24 RX ADMIN — METFORMIN HYDROCHLORIDE 1000 MG: 1000 TABLET ORAL at 17:49

## 2017-07-24 RX ADMIN — LEVOTHYROXINE SODIUM 75 MCG: 75 TABLET ORAL at 09:01

## 2017-07-24 RX ADMIN — ENOXAPARIN SODIUM 40 MG: 40 INJECTION SUBCUTANEOUS at 17:50

## 2017-07-24 RX ADMIN — ATORVASTATIN CALCIUM 5 MG: 10 TABLET, FILM COATED ORAL at 09:01

## 2017-07-24 RX ADMIN — INSULIN ASPART 2 UNITS: 100 INJECTION, SOLUTION INTRAVENOUS; SUBCUTANEOUS at 21:49

## 2017-07-24 RX ADMIN — SULFASALAZINE 1500 MG: 500 TABLET ORAL at 21:45

## 2017-07-24 RX ADMIN — METFORMIN HYDROCHLORIDE 1000 MG: 1000 TABLET ORAL at 09:02

## 2017-07-24 RX ADMIN — LOSARTAN POTASSIUM 50 MG: 50 TABLET, FILM COATED ORAL at 09:01

## 2017-07-24 RX ADMIN — ASPIRIN 81 MG: 81 TABLET, CHEWABLE ORAL at 09:01

## 2017-07-24 RX ADMIN — Medication 250 MG: at 09:02

## 2017-07-24 RX ADMIN — FLUOXETINE HYDROCHLORIDE 60 MG: 20 CAPSULE ORAL at 21:45

## 2017-07-24 RX ADMIN — VANCOMYCIN HYDROCHLORIDE 1500 MG: 10 INJECTION, POWDER, LYOPHILIZED, FOR SOLUTION INTRAVENOUS at 17:49

## 2017-07-24 RX ADMIN — Medication 250 MG: at 17:49

## 2017-07-24 RX ADMIN — TAZOBACTAM SODIUM AND PIPERACILLIN SODIUM 3.38 G: 375; 3 INJECTION, SOLUTION INTRAVENOUS at 09:02

## 2017-07-24 RX ADMIN — METOPROLOL SUCCINATE 100 MG: 100 TABLET, FILM COATED, EXTENDED RELEASE ORAL at 09:00

## 2017-07-24 RX ADMIN — VANCOMYCIN HYDROCHLORIDE 1250 MG: 10 INJECTION, POWDER, LYOPHILIZED, FOR SOLUTION INTRAVENOUS at 21:49

## 2017-07-24 NOTE — NURSING NOTE
"Pt seen for large superficial open area to left plantar foot.  Pt states she \"felt something different\" on bottom of her left foot one day last week, asked her mother to look at it and then went to urgent care center, where she was sent to [facility] ER.   Pt is diabetic and has neuropathy of both feet. Has heel fissure on left heel.  Right plantar foot with dried wound bed measuring approximately 8x5 cms;  Soaked foot in peroxide as ordered x 15 minutes and after soak, wound with macerated skin that easily cleansed off with gauze; continues with area distal to open area that is dried blister.  Pt states she has no knowledge of trauma or stepping on anything but she does admit to walking barefoot in her home and also out to her mailbox.  Concerned that she may have stepped on something, which may be imbedded in her foot, causing this reaction?  Left dorsal foot with edema and erythema; erythema along leg from ankle to knee improving.  Discussed with pt and primary RN that a plain film of her foot may be indicated;  Staff RN to discuss with hospitalist when he makes rounds.  Changed topical tx to silvasorb gel, foam drsg, kerlex and ace daily; will dc peroxide soaks.  Will use aquaphor to right heel; discussed treatment plan with pt.  Follow up at [facility] recommended  "

## 2017-07-24 NOTE — PROGRESS NOTES
"Continued Stay Note  [facility]     Patient Name: Pratibha Pascal  MRN: [id number]  Today's Date: 7/24/2017    Admit Date: 7/22/2017          Discharge Plan       07/24/17 1348    Case Management/Social Work Plan    Plan To see if APS accepted report    Additional Comments Report was made to APS on 7-24 but  the report wasn't accepted . Per Elena on the Hotline,  \"there is no indication that patient is a vulnerable adult\" ...  EVANGELINA Cornejo               Discharge Codes     None            EVANGELINA Cornejo    "

## 2017-07-24 NOTE — PLAN OF CARE
Problem: Patient Care Overview (Adult)  Goal: Plan of Care Review  Outcome: Ongoing (interventions implemented as appropriate)    07/24/17 2369   Coping/Psychosocial Response Interventions   Plan Of Care Reviewed With patient   Patient Care Overview   Progress no change   Outcome Evaluation   Outcome Summary/Follow up Plan wound saw patient today, Dressing changes ordered once a day- silver gel used, APS case denied, XRAY of foot done today, IV abx, vitals stable, will continue to monitor.          Problem: Cellulitis (Adult)  Goal: Signs and Symptoms of Listed Potential Problems Will be Absent or Manageable (Cellulitis)  Outcome: Ongoing (interventions implemented as appropriate)    Problem: Diabetes, Type 2 (Adult)  Goal: Signs and Symptoms of Listed Potential Problems Will be Absent or Manageable (Diabetes, Type 2)  Outcome: Outcome(s) achieved Date Met:  07/24/17    Problem: Wound, Traumatic, Nonburn (Adult)  Goal: Signs and Symptoms of Listed Potential Problems Will be Absent or Manageable (Wound, Traumatic, Nonburn)  Outcome: Outcome(s) achieved Date Met:  07/24/17    Problem: Infection, Risk/Actual (Adult)  Goal: Infection Prevention/Resolution  Outcome: Ongoing (interventions implemented as appropriate)

## 2017-07-24 NOTE — PROGRESS NOTES
"Discharge Planning Assessment  [facility]     Patient Name: Pratibha Pascal  MRN: [id number]  Today's Date: 7/24/2017    Admit Date: 7/22/2017          Discharge Needs Assessment       07/24/17 1354    Living Environment    Lives With child(jorge alberto), adult    Living Arrangements house    Home Accessibility no concerns    Stair Railings at Home outside, present on right side    Type of Financial/Environmental Concern none    Transportation Available car;family or friend will provide    Living Environment    Quality Of Family Relationships supportive    Able to Return to Prior Living Arrangements yes    Discharge Needs Assessment    Concerns To Be Addressed denies needs/concerns at this time    Readmission Within The Last 30 Days no previous admission in last 30 days    Equipment Currently Used at Home cane, straight;crutches    Equipment Needed After Discharge none            Discharge Plan       07/24/17 1356    Case Management/Social Work Plan    Plan home with family- may need HH vs follow up in outpatient wound care center    Patient/Family In Agreement With Plan yes    Additional Comments Facesheet verified.  Spoke with patient in room. Introduced self and explained role.  Patient lives in a ss house and her 27 year old daughter lives with her.  Patient has a cane and walker.  She is [place] and is able to drive.  She does not  have a HH or SNU history.  Discussed  possibly needing follow up at outpatient Wound Care Center vs Home Health.  Patient unsure what needs will be.  Advised that CCP will continue to follow.       07/24/17 1355    Case Management/Social Work Plan    Plan Return home with family    Patient/Family In Agreement With Plan yes      07/24/17 1348    Case Management/Social Work Plan    Plan To see if APS accepted report    Additional Comments Report was made to APS on 7-24 but  the report wasn't accepted . Per Elena on the Hotline,  \"there is no indication that patient is a vulnerable adult\" ...  " Haley Padilla MATHEW         Discharge Placement     No information found                Demographic Summary       07/24/17 2331    Referral Information    Admission Type inpatient    Arrived From admitted as an inpatient    Referral Source admission list    Reason For Consult discharge planning    Primary Care Physician Information    Name Dr Benitez Wu            Functional Status       07/24/17 2972    Functional Status Current    Ambulation 1-->assistive equipment    Transferring 1-->assistive equipment    Toileting 1-->assistive equipment    Bathing 1-->assistive equipment    Dressing 0-->independent    Eating 0-->independent    Communication 0-->understands/communicates without difficulty    Swallowing (if score 2 or more for any item, consult Rehab Services) 0-->swallows foods/liquids without difficulty    Change in Functional Status Since Onset of Current Illness/Injury yes    Functional Status Prior    Ambulation 1-->assistive equipment    Transferring 1-->assistive equipment    Toileting 1-->assistive equipment    Bathing 1-->assistive equipment    Dressing 1-->assistive equipment    Eating 1-->assistive equipment    Communication 0-->understands/communicates without difficulty    Swallowing 0-->swallows foods/liquids without difficulty    Cognitive/Perceptual/Developmental    Current Mental Status/Cognitive Functioning no deficits noted    Recent Changes in Mental Status/Cognitive Functioning no changes            Psychosocial     None            Abuse/Neglect     None            Legal     None            Substance Abuse     None            Patient Forms     None          Mari Rich, RN

## 2017-07-24 NOTE — PROGRESS NOTES
"Pharmacokinetic Consult - Vancomycin Dosing (Follow-up Note)    Pratibha Pascal is on day 3 pharmacy to dose vancomycin for skin and soft tissue infection.  Consult for Dr. Zaragoza  Goal trough: 15-20mg/L     Relevant clinical data and objective history reviewed:  57 y.o. female 72\" (182.9 cm) (!) 315 lb (143 kg)    Creatinine   Date Value Ref Range Status   2017 0.65 0.57 - 1.00 mg/dL Final   2017 0.69 0.57 - 1.00 mg/dL Final   2017 0.73 0.57 - 1.00 mg/dL Final     BUN   Date Value Ref Range Status   2017 13 6 - 20 mg/dL Final     Estimated Creatinine Clearance: 150.4 mL/min (by C-G formula based on Cr of 0.65).    Lab Results   Component Value Date    WBC 10.09 2017     Temp Readings from Last 3 Encounters:   17 98.6 °F (37 °C) (Oral)       IV Anti-Infectives     Ordered     Dose/Rate Route Frequency Start Stop    17 1434  vancomycin 1250 mg/250 mL 0.9% NS IVPB ([facility])     Ordering Provider:  Quentin Zaragoza MD    1,250 mg Intravenous Every 8 Hours 17 2200      17 1455  vancomycin 1500 mg/500 mL 0.9% NS IVPB ([facility])     Brittaney Gee [facility] let the order  on 17 1434.   Ordering Provider:  Quentin Zaragoza MD    1,500 mg  over 150 Minutes Intravenous Every 12 Hours 17 0100 17 1600    17 1442  piperacillin-tazobactam (ZOSYN) 3.375 g in iso-osmotic dextrose 50 ml (premix)     Ordering Provider:  Quentin Zaragoza MD    3.375 g  over 4 Hours Intravenous Every 8 Hours 17 1515      17 1442  Pharmacy to dose vancomycin     Ordering Provider:  Quentin Zaragoza MD     Does not apply Continuous PRN 17 1442           Lab Results   Component Value Date    VANCOTROUGH 7.40 2017     Assessment/Plan  Vancomycin trough subtherapeutic today on 1500mg IV q12h regimen.  Changed to vancomycin 1250mg IV q8h. Vancomycin level due 1330 tomorrow. Pharmacy will continue to follow daily while on vancomycin and adjust as needed. "     Brittaney Gee, PharmD  7/24/2017  2:52 PM

## 2017-07-24 NOTE — PROGRESS NOTES
Name: Pratibha Pascal ADMIT: 2017   : 1960  PCP: Benitez Wu MD    MRN: [id number] LOS: 2 days   AGE/SEX: 57 y.o. female  ROOM: [facility]/   Subjective      Leg still erythematous and edematous but much better   no soa or chest pain  No n/v/d/rash    Subjective    Objective   Vital Signs  Temp:  [97.8 °F (36.6 °C)-98.9 °F (37.2 °C)] 98.6 °F (37 °C)  Heart Rate:  [77-94] 81  Resp:  [16] 16  BP: (121-128)/(65-81) 128/65  SpO2:  [92 %-95 %] 95 %  on   ;   O2 Device: room air  Body mass index is 42.72 kg/(m^2).    Physical Exam   Constitutional: She is oriented to person, place, and time. She appears well-developed and well-nourished.   HENT:   Head: Normocephalic and atraumatic.   Mouth/Throat: Oropharynx is clear and moist. No oropharyngeal exudate.   Eyes: Conjunctivae and EOM are normal. Pupils are equal, round, and reactive to light. No scleral icterus.   Cardiovascular: Normal rate, regular rhythm and normal heart sounds.  Exam reveals no gallop and no friction rub.    No murmur heard.  Pulmonary/Chest: Effort normal and breath sounds normal. No respiratory distress. She has no wheezes. She has no rales.   Abdominal: Soft. Bowel sounds are normal. She exhibits no distension. There is no tenderness. There is no rebound and no guarding.   Musculoskeletal: She exhibits edema (left leg ). She exhibits no tenderness.   Neurological: She is alert and oriented to person, place, and time. No cranial nerve deficit.   Skin: Skin is warm and dry. There is erythema (left leg, seems to be improving based on marker lines).   Large blister on the dorsum of left foot, foot wrapped so didn't unwrap it, seen on pictures before and after chemical debridement today     Psychiatric: She has a normal mood and affect. Her behavior is normal.       Results Review:       I reviewed the patient's new clinical results.    Results from last 7 days  Lab Units 17  1301   WBC 10*3/mm3 10.09   HEMOGLOBIN g/dL 14.5   PLATELETS  10*3/mm3 231       Results from last 7 days  Lab Units 07/24/17  0641 07/23/17  0512 07/22/17  1301   SODIUM mmol/L  --   --  141   POTASSIUM mmol/L  --   --  3.8   CHLORIDE mmol/L  --   --  97*   CO2 mmol/L  --   --  25.6   BUN mg/dL  --   --  13   CREATININE mg/dL 0.65 0.69 0.73   GLUCOSE mg/dL  --   --  179*   Estimated Creatinine Clearance: 150.4 mL/min (by C-G formula based on Cr of 0.65).    Results from last 7 days  Lab Units 07/22/17  1301   CALCIUM mg/dL 10.5   ALBUMIN g/dL 4.40     Lab Results   Component Value Date    HGBA1C 8.4 (H) 07/08/2017    HGBA1C 7.9 (H) 01/14/2017    HGBA1C 7.1 (H) 08/27/2016     Glucose   Date/Time Value Ref Range Status   07/24/2017 1155 182 (H) 70 - 130 mg/dL Final   07/24/2017 0816 180 (H) 70 - 130 mg/dL Final   07/23/2017 1945 173 (H) 70 - 130 mg/dL Final   07/23/2017 1647 151 (H) 70 - 130 mg/dL Final   07/23/2017 1208 148 (H) 70 - 130 mg/dL Final   07/23/2017 0813 281 (H) 70 - 130 mg/dL Final   07/22/2017 2022 137 (H) 70 - 130 mg/dL Final   07/22/2017 1717 143 (H) 70 - 130 mg/dL Final         ALPRAZolam 0.5 mg Oral Daily   aspirin 81 mg Oral Daily   atorvastatin 5 mg Oral Daily   baclofen 10 mg Oral TID   enoxaparin 40 mg Subcutaneous Daily   FLUoxetine 60 mg Oral Nightly   hydrophor  Topical Q24H   insulin aspart 0-9 Units Subcutaneous 4x Daily With Meals & Nightly   insulin detemir 10 Units Subcutaneous Nightly   levothyroxine 75 mcg Oral Daily   losartan 50 mg Oral Daily   metFORMIN 1,000 mg Oral BID With Meals   metoprolol succinate  mg Oral Daily   piperacillin-tazobactam 3.375 g Intravenous Q8H   saccharomyces boulardii 250 mg Oral BID   sulfaSALAzine 1,500 mg Oral Q12H   triamterene-hydrochlorothiazide 1 tablet Oral Daily   vancomycin 1,500 mg Intravenous Q12H       Pharmacy to dose vancomycin    Diet Regular; Consistent Carbohydrate, Cardiac      Assessment/Plan   Assessment:     Active Hospital Problems (** Indicates Principal Problem)    Diagnosis Date Noted    • **Cellulitis of left leg [L03.116] 07/22/2017   • MSSA (methicillin susceptible Staphylococcus aureus) infection [A49.01] 07/24/2017   • Diabetes mellitus with neurological manifestation [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       Plan:     Cellulitis of left foot extending up the anterior aspect of her left lower extremity to her knee with the etiology secondary to large diabetic foot ulcer vs burn/blister on the bottom of her left foot. Underlying neuropathy                        -Continue Vanc and Zosyn,     -Cx growing MSSA and Pantoea agglomerans: both sensitive to bactrim                        -Wound care for left foot blister.  f/u  wound care at discharge              -Check plain foot xray to look for foreign body                        -Elevated lactic acid at admission resolved, no sepsis     DM2 - uncontrolled with probable neuropathy                        -A1c is worsening 8.4 per review previous lab work.                - BS ok now    -continue metformin though hold additional hypoglycemic medications and place on sliding   scale insulin     -initiated Levemir 10 units subcutaneous daily at bedtime     HTN/HLD/CAD - stable continue meds     NO UTI     Lovenox for DVT prophylaxis    DISPO: should be able to go home tomorrow    D/W RN    Disposition  TBD.      Eric Johnson MD  [facility]  07/24/17  2:20 PM

## 2017-07-24 NOTE — PAYOR COMM NOTE
"Pratibha Pascal (57 y.o. Female)     PLEASE SEE ATTACHED CLINICAL REVIEW  ON PATIENT. PT. WAS ADMITTED TO [facility] ON 17.    REF#[id number]    PLEASE CALL   OR  [phone] WITH INPT AUTH AND DAYS APPROVED. [facility] IS  DRG WITH [facility].       THANK YOU    LONI COLE LPJONI, CCP    Date of Birth Social Security Number Address Home Phone MRN    1960  [id number] [id number] [phone] [id number]    Denominational Marital Status          Orthodoxy        Admission Date Admission Type Admitting Provider Attending Provider Department, Room/Bed    17 Emergency Quentin Zaragoza MD Hog[place]amp, Eric Hurt MD [address], 607/1    Discharge Date Discharge Disposition Discharge Destination                      Attending Provider: Eric Johnson MD     Allergies:  Erythromycin, Talwin [Pentazocine]    Isolation:  None   Infection:  None   Code Status:  FULL    Ht:  72\" (182.9 cm)   Wt:  315 lb (143 kg)    Admission Cmt:  None   Principal Problem:  Cellulitis of left leg [L03.116]                 Active Insurance as of 2017     Primary Coverage     Payor Plan Insurance Group Employer/Plan Group    [employer] EMPLOYEE [id number]     Payor Plan Address Payor Plan Phone Number Effective From Effective To    [address] [phone] 2015     [place] [address]       Subscriber Name Subscriber Birth Date Member ID       PRATIBHA PASCAL 1960 [id number]                 Emergency Contacts      (Rel.) Home Phone Work Phone Mobile Phone    Hannah Wallace (Mother) [phone] -- --               History & Physical      Quentin Zaragoza MD at 2017  2:40 PM          HISTORY AND PHYSICAL   [facility]        Patient Identification:  Name: Pratibha Pascal  Age: 57 y.o.  Sex: female  :  1960  MRN: [id number]                     Primary Care Physician: Benitez Wu, " MD    Chief Complaint:  Left leg redness    History of Present Illness:   Ms Pascal is a 57-year-old female who has uncontrolled diabetes.  Her A1c has gotten worse from 7.9 to 8.4.  She is on 3 by mouth medications but is not using any type of insulin.  She is unsure whether or not she has a pre-existing diagnosis of neuropathy though she does make complaints that are consistent with this diagnosis.  She is unsure about this blister on the bottom of her foot as far as when it showed up how long it's been there as she has not felt it.  She noticed over the last 24 hours that when she awoke her left leg was red all up to her knee.  She denies any fever chills or night sweats nausea vomiting or diarrhea.  She denies having any recent foot procedures and denies walking on any hot substances such as asphalt or concrete though she does normally check her mail barefoot walking out to her mailbox but she claims she normally walks in the grass.  In the ER she was found to have cellulitis and was given vancomycin and [facility] was called for further management.    Past Medical History:  Past Medical History:   Diagnosis Date   • Allergy to mold    • Benign essential hypertension    • CAD (coronary artery disease)    • Diabetes mellitus    • Encounter for urine test 07/2015    microalbumin; 461   • H/O bone density study unclear   • H/O complete eye exam 2 -3 years   • History of myocardial infarction    • HLD (hyperlipidemia)    • Hypothyroidism    • Major depressive disorder, recurrent, in full remission    • Myocardial infarction    • BRANNON (obstructive sleep apnea)    • Rheumatoid arthritis    • Seasonal allergies    • Tobacco abuse      Past Surgical History:  Past Surgical History:   Procedure Laterality Date   • BACK SURGERY  10/1995    also 2/02; L5-S1; Dr. Carroll Avitia   • CORONARY STENT PLACEMENT  06/2014   • KNEE ARTHROSCOPY Right 2010   • MAMMO BILATERAL  due   • PAP SMEAR  due   • TONSILLECTOMY      age 26      Home  Meds:  Prescriptions Prior to Admission   Medication Sig Dispense Refill Last Dose   • ALPRAZolam (XANAX) 0.5 MG tablet Take 1 tablet by mouth Daily. TAKE 1 TABLET BY MOUTH EVERY DAY AS NEEDED 30 tablet 2    • aspirin 81 MG tablet Take by mouth daily.      • atorvastatin (LIPITOR) 10 MG tablet Take 0.5 tablets by mouth Daily. Take one tablet once daily 45 tablet 1    • baclofen (LIORESAL) 10 MG tablet Take 1 tablet by mouth 3 (Three) Times a Day. 270 tablet 1    • celecoxib (CeleBREX) 200 MG capsule TAKE ONE CAPSULE BY MOUTH EVERY DAY  1    • CIMZIA STARTER KIT 6 X 200 MG/ML kit       • FARXIGA 5 MG tablet tablet Take 1 tablet by mouth Daily. TAKE 1 TABLET BY MOUTH EVERY MORNING 90 tablet 1    • FLUoxetine (PROzac) 20 MG capsule Take 3 capsules by mouth Daily. 270 capsule 1    • folic acid (FOLVITE) 1 MG tablet       • levothyroxine (SYNTHROID, LEVOTHROID) 75 MCG tablet Take 1 tablet by mouth Daily. 90 tablet 1    • losartan (COZAAR) 50 MG tablet Take 1 tablet by mouth Daily. TAKE 1 TABLET BY MOUTH EVERY DAY 90 tablet 1    • metFORMIN (GLUCOPHAGE) 1000 MG tablet Take 1 tablet by mouth 2 (Two) Times a Day With Meals. 180 tablet 1    • metoprolol succinate XL (TOPROL-XL) 100 MG 24 hr tablet Take 1 tablet by mouth Daily. TAKE 1 TABLET BY MOUTH EVERY DAY 90 tablet 1    • silver sulfadiazine (SILVADENE) 1 % cream Apply  topically 2 (Two) Times a Day. 50 g 1    • SITagliptin (JANUVIA) 100 MG tablet Take 1 tablet by mouth Daily. 90 tablet 1    • sulfaSALAzine (AZULFIDINE) 500 MG tablet TAKE 3 TABLETS BY MOUTH TWICE DAILY  1 Taking   • triamterene-hydrochlorothiazide (MAXZIDE-25) 37.5-25 MG per tablet Take 1 tablet by mouth Daily. TAKE 1 TABLET BY MOUTH EVERY DAY 90 tablet 1        Allergies:  Allergies   Allergen Reactions   • Erythromycin    • Talwin [Pentazocine]      Immunizations:  Immunization History   Administered Date(s) Administered   • Influenza (IM) Preservative Free 10/03/2016   • Influenza TIV (IM) 10/07/2014  "  • Pneumococcal Conjugate 13-Valent 2016     Social History:   Social History     Social History Narrative     Social History     Social History   • Marital status:      Spouse name: N/A   • Number of children: N/A   • Years of education: N/A     Occupational History   • Not on file.     Social History Main Topics   • Smoking status: Former Smoker     Packs/day: 0.50   • Smokeless tobacco: Never Used      Comment: quit 2014   • Alcohol use No   • Drug use: No   • Sexual activity: Yes     Partners: Male     Other Topics Concern   • Not on file     Social History Narrative       Family History:  Family History   Problem Relation Age of Onset   • Cancer Mother      breast   • Hypertension Mother    • Rheum arthritis Mother    • Thyroid disease Mother    • Hypertension Father    • Thyroid disease Father    • Heart failure Maternal Grandmother    • Heart attack Maternal Grandmother    • Heart attack Paternal Grandfather    • Rheum arthritis Other      aunt        Review of Systems  See history of present illness and past medical history.  Patient denies any acute changes to vision smell taste or sound.  Denies any headache dizziness or loss of consciousness.  Denies any neck pain stiffness or dysphagia.  Denies any chest pain palpitations cough shortness of breath nausea vomiting abdominal pain dysuria.  Admits to redness to her left lower leg as well as blister.  Remainder of ROS is negative.    Objective:  tMax 24 hrs: Temp (24hrs), Av.8 °F (36.6 °C), Min:97.4 °F (36.3 °C), Max:98.2 °F (36.8 °C)    Vitals Ranges:   Temp:  [97.4 °F (36.3 °C)-98.2 °F (36.8 °C)] 98.2 °F (36.8 °C)  Heart Rate:  [] 80  Resp:  [16] 16  BP: (125-142)/(69-76) 132/76      Exam:  /76 (BP Location: Right arm, Patient Position: Lying)  Pulse 80  Temp 98.2 °F (36.8 °C) (Oral)   Resp 16  Ht 72\" (182.9 cm)  Wt (!) 315 lb (143 kg)  SpO2 96%  BMI 42.72 kg/m2    General Appearance:    Alert, cooperative, no " distress, AOx3, not toxic   Head:    Normocephalic, without obvious abnormality, atraumatic   Eyes:    PERRL, conjunctiva/corneas clear, EOM's intact, both eyes   Ears:    Normal external ear canals, both ears   Nose:   Nares normal, septum midline, mucosa normal, no drainage    or sinus tenderness   Throat:   Lips, mucosa, and tongue normal   Neck:   Supple, No meningismus or JVD       Lungs:     Clear to auscultation bilaterally, respirations unlabored   Chest Wall:    No tenderness or deformity    Heart:    Regular rate and rhythm, S1 and S2 normal, no murmur, rub   or gallop   Abdomen:     Soft, non-tender, bowel sounds active all four quadrants,     no masses, no hepatomegaly, no splenomegaly   Extremities:   Left plantar aspect of her foot has blistering which almost seems consistent with a burn.  She has poor foot hygiene with cellulitis streaking up the anterior portion of her leg to her knee which is outlined    Pulses:   2+ and symmetric all extremities           Neurologic:   CNII-XII intact, normal strength, No focal deficit       .    Data Review:  Labs in chart were reviewed.               Assessment:  Principal Problem:    Cellulitis of left leg  Active Problems:    CAD (coronary artery disease)    Type 2 diabetes mellitus, uncontrolled    HLD (hyperlipidemia)    Benign essential hypertension    Hypothyroidism    Diabetes mellitus with neurological manifestation      Plan:  Cellulitis of left foot extending up the anterior aspect of her left lower extremity to her knee with the etiology secondary to large diabetic foot ulcer vs burn/blister on the bottom of her left foot.  It appears as if she burned her foot given the recent high heat indexes but she states she has not walked on any type of concrete or asphalt barefooted though I that she has.  I presume this patient has aspects of neuropathy secondary to uncontrolled diabetes.  The hygiene on her adjacent foot is very poor.   -Continue Vanco and  Zosyn and add Florastor for GI aversion prophylaxis   -Wound care for left foot blister.  May need additional surgical evaluation but will hold for now   -Elevated lactic acid at admission though no fevers and no elevated white blood cell count and I feel sepsis is not present at admission    DM2 - uncontrolled with probable neuropathy   -A1c is worsening 8.4 per review previous lab work.  Will continue with metformin though hold additional hypoglycemic medications and place on sliding scale insulin as well as initiate Levemir 10 units subcutaneous daily at bedtime    HTN/HLD/CAD - stable continue meds    Questionable UTI as sample was obtained via clean catch and favor contamination given her body habitus as well as no genitourinary symptoms    Lovenox for DVT prophylaxis    Further recommendations to follow as clinical course unfolds    Quentin Zaragoza MD  7/22/2017  2:53 PM     Electronically signed by Quentin Zaragoza MD at 7/22/2017  2:53 PM           Emergency Department Notes      Angel Trujillo RN at 7/22/2017 12:24 PM          Pt states she has an sore on bottom of Left foot. Was seen yesterday and given Silvadene Cream for foot. Pt feels something more is wrong     Electronically signed by Angel Trujillo RN at 7/22/2017 12:25 PM      Carlos Mtz MD at 7/22/2017 12:32 PM           EMERGENCY DEPARTMENT ENCOUNTER    CHIEF COMPLAINT  Chief Complaint: lower extremity problem  History given by: patient  History limited by: N/A  Room Number: 10/10  PMD: Benitez Wu MD      HPI:  Pt has hx of diabetes (on oral medication, not using insulin) and does not regularly check her blood glucose levels. About 4 days ago, she developed a blistering tender wound to the plantar aspect of the left foot. She has also had drainage from the wound, left foot bruising, and left foot swelling. This morning, she noticed that there was redness that propagated from the left foot to the left knee. She denies recent known direct  injury or trauma, recent left foot procedures, walking on any hot substances such as concrete, fevers, chills, chest pain, trouble breathing, N/V/D, abd pain, pain and difficulty with urination, numbness, and motor loss. She states that she has chronic intermittent BLE tingling secondary to lower back surgery in 1995 (no acute change). She reports that she went to [facility] yesterday and was prescribed silvadene cream. However, despite using the silvadene cream and applying dressings to the left foot wound, her sx have worsened. Pt has no other complaints at this time.     Location: left lower extremity  Radiation: none  Quality: tender  Intensity/Severity: moderate  Duration: started about 4 days ago  Onset quality: gradual  Timing: constant  Progression: worsening  Aggravating Factors: bearing weight  Alleviating Factors: none  Previous Episodes: none  Treatment before arrival: Pt states that she has used silvadene cream and applied dressings to the left foot wound without sx relief.   Associated Symptoms: blistering tender wound to plantar aspect of left foot with drainage, left foot bruising, left foot swelling, redness from left foot to left knee       SOCIAL HISTORY  Social History     Social History   • Marital status:      Spouse name: N/A   • Number of children: N/A   • Years of education: N/A     Occupational History   • Not on file.     Social History Main Topics   • Smoking status: Former Smoker     Packs/day: 0.50   • Smokeless tobacco: Never Used      Comment: quit 6/27/2014   • Alcohol use No   • Drug use: No   • Sexual activity: Yes     Partners: Male     Other Topics Concern   • Not on file     Social History Narrative         ALLERGIES  Erythromycin and Talwin [pentazocine]        REVIEW OF SYSTEMS  Review of Systems   Constitutional: Negative for chills and fever.   HENT: Negative for congestion, rhinorrhea and sore throat.    Eyes: Negative for pain.   Respiratory: Negative for cough and  shortness of breath.    Cardiovascular: Negative for chest pain and palpitations.   Gastrointestinal: Negative for abdominal pain, diarrhea, nausea and vomiting.   Endocrine: Negative.    Genitourinary: Negative for difficulty urinating.   Musculoskeletal: Negative for myalgias.        Left foot bruising, left foot swelling   Skin: Positive for color change (redness from left foot to left knee   ) and wound (blistering tender wound to plantar aspect of left foot with drainage).   Neurological: Negative for speech difficulty, weakness, numbness and headaches.        Intermittent tingling to BLE (chronic, no acute change)   Psychiatric/Behavioral: Negative.    All other systems reviewed and are negative.           PHYSICAL EXAM  ED Triage Vitals   Temp Heart Rate Resp BP SpO2   07/22/17 1225 07/22/17 1225 07/22/17 1225 07/22/17 1234 07/22/17 1225   97.4 °F (36.3 °C) 110 16 142/76 96 % WNL      Temp src Heart Rate Source Patient Position BP Location FiO2 (%)   -- 07/22/17 1320 07/22/17 1234 07/22/17 1234 --    Monitor Lying Right arm        Physical Exam   Constitutional: She is oriented to person, place, and time.  Non-toxic appearance. No distress.   HENT:   Head: Normocephalic.   Mouth/Throat: Mucous membranes are normal.   Eyes: EOM are normal. Pupils are equal, round, and reactive to light.   Neck: Normal range of motion. Neck supple.   Cardiovascular: Normal rate, regular rhythm and normal heart sounds.    Pulmonary/Chest: Effort normal and breath sounds normal. No respiratory distress. She has no decreased breath sounds. She has no wheezes. She has no rhonchi. She has no rales.   Abdominal: Soft. There is no tenderness. There is no rebound and no guarding.   Musculoskeletal: Normal range of motion.   Neurological: She is alert and oriented to person, place, and time. She has normal sensation.   Skin: Skin is warm and dry.   LLE- Erythema to left foot that extends to left ankle, anterior left lower leg, and  anterior left knee; mild left foot swelling; bulla that has ruptured over the left 5th metatarsal; hemorrhagic fluid that tracks from the sole of the left foot to the left heel; no signs of necrotizing fasciitis to LLE; NV intact distally to LLE   Psychiatric: Mood and affect normal.   Nursing note and vitals reviewed.      PROCEDURES  Procedures      PROGRESS AND CONSULTS  ED Course     12:39 PM- Ordered blood work, wound culture, lactic acid, and UA for further evaluation. Sent call out to [facility]. Admission decision time= 1239    12:41 PM- Ordered vancomycin for LLE cellulitis.    1:37 PM- Discussed case with Dr Zaragoza ([facility] hospitalist)  Reviewed history, exam, results and treatments.  Discussed concerns and plan of care. Dr Zaragoza accepts pt to be admitted to med/surg.    1:39 PM- Rechecked pt. She is resting comfortably and is in no acute distress. Discussed with pt about all pertinent results that are concerning for LLE cellulitis. Discussed pt admission for further care and observation. Pt verbalizes understanding and agrees with plan. Pt is ready for admission.      MEDICAL DECISION MAKING  Results were reviewed/discussed with the patient.     MDM  Number of Diagnoses or Management Options     Amount and/or Complexity of Data Reviewed  Clinical lab tests: ordered and reviewed (WBC= 10.09, lactic acid= 3.3, glucose= 179, BUN= 13, creatinine= 0.73, UA)  Discuss the patient with other providers: yes (Dr Zaragoza ([facility] hospitalist) will admit. )    Patient Progress  Patient progress: stable             DIAGNOSIS  Final diagnoses:   Cellulitis of left leg         DISPOSITION  Admitted- med/surg    Discussed treatment plan and reason for admission with pt and admitting physician.  Pt voiced understanding of the plan for admission for further testing/treatment as needed.         Latest Documented Vital Signs:  As of 7:24 PM  BP- 132/76 HR- 80 Temp- 98.2 °F (36.8 °C) (Oral) O2 sat- 96%        --  Documentation assistance  provided by scribe Jarvis Healy for Dr Mtz.  Information recorded by the scribe was done at my direction and has been verified and validated by me.        Jarvis Healy  07/22/17 1928       Carlos Mtz MD  07/22/17 2017       Electronically signed by Carlos Mtz MD at 7/22/2017  8:17 PM      Kelsey Holden RN at 7/22/2017  1:53 PM          Attempted to call and give report. Unsuccessful will attempt again later.     Kelsey Holden RN  07/22/17 1408       Electronically signed by Kelsey Holden RN at 7/22/2017  2:08 PM      Kelsey Holden RN at 7/22/2017  2:08 PM          Attempted to call and give report unsuccessful will attempt later.     Kelsey Holden RN  07/22/17 1408       Electronically signed by Kelsey Holden RN at 7/22/2017  2:08 PM      Kelsey Holden RN at 7/22/2017  2:15 PM          Report called and given to Tyeshamae LUCIO on 6S - 607, updated on pt current status, discussed completed ER orders, and admitting diagnosis. RN had no further questions at this time.   Pt had no questions at this time.       Kelsey Holden RN  07/22/17 1416       Electronically signed by Kelsey Holden RN at 7/22/2017  2:16 PM        Hospital Medications (active)       Dose Frequency Start End    acetaminophen (TYLENOL) tablet 650 mg 650 mg Every 4 Hours PRN 7/22/2017     Sig - Route: Take 2 tablets by mouth Every 4 (Four) Hours As Needed for Mild Pain (1-3). - Oral    ALPRAZolam (XANAX) tablet 0.5 mg 0.5 mg Daily 7/22/2017     Sig - Route: Take 1 tablet by mouth Daily. - Oral    aspirin chewable tablet 81 mg 81 mg Daily 7/22/2017     Sig - Route: Chew 1 tablet Daily. - Oral    atorvastatin (LIPITOR) tablet 5 mg 5 mg Daily 7/22/2017     Sig - Route: Take 0.5 tablets by mouth Daily. - Oral    baclofen (LIORESAL) tablet 10 mg 10 mg 3 Times Daily 7/22/2017     Sig - Route: Take 1 tablet by mouth 3 (Three) Times a Day. - Oral    dextrose (D50W) solution 25 g 25 g Every 15 Minutes PRN 7/22/2017     Sig -  Route: Infuse 50 mL into a venous catheter Every 15 (Fifteen) Minutes As Needed for Low Blood Sugar (Blood Sugar Less Than 70, Patient Has IV Access - Unresponsive, NPO or Unable To Safely Swallow). - Intravenous    dextrose (GLUTOSE) oral gel 15 g 15 g Every 15 Minutes PRN 7/22/2017     Sig - Route: Take 15 g by mouth Every 15 (Fifteen) Minutes As Needed for Low Blood Sugar (Blood Sugar Less Than 70, Patient Alert, Is Not NPO & Can Safely Swallow). - Oral    enoxaparin (LOVENOX) syringe 40 mg 40 mg Daily 7/22/2017     Sig - Route: Inject 0.4 mL under the skin Daily. - Subcutaneous    FLUoxetine (PROzac) capsule 60 mg 60 mg Nightly 7/23/2017     Sig - Route: Take 3 capsules by mouth Every Night. - Oral    glucagon (human recombinant) (GLUCAGEN DIAGNOSTIC) injection 1 mg 1 mg Every 15 Minutes PRN 7/22/2017     Sig - Route: Inject 1 mg under the skin Every 15 (Fifteen) Minutes As Needed (Blood Glucose Less Than 70 - Patient Without IV Access - Unresponsive, NPO or Unable To Safely Swallow). - Subcutaneous    HYDROcodone-acetaminophen (NORCO) 7.5-325 MG per tablet 1 tablet 1 tablet Every 6 Hours PRN 7/22/2017 8/1/2017    Sig - Route: Take 1 tablet by mouth Every 6 (Six) Hours As Needed for Moderate Pain (4-6). - Oral    insulin aspart (novoLOG) injection 0-9 Units 0-9 Units 4 Times Daily With Meals & Nightly 7/22/2017     Sig - Route: Inject 0-9 Units under the skin 4 (Four) Times a Day With Meals & at Bedtime. - Subcutaneous    insulin detemir (LEVEMIR) injection 10 Units 10 Units Nightly 7/22/2017     Sig - Route: Inject 10 Units under the skin Every Night. - Subcutaneous    levothyroxine (SYNTHROID, LEVOTHROID) tablet 75 mcg 75 mcg Daily 7/22/2017     Sig - Route: Take 1 tablet by mouth Daily. - Oral    losartan (COZAAR) tablet 50 mg 50 mg Daily 7/22/2017     Sig - Route: Take 1 tablet by mouth Daily. - Oral    metFORMIN (GLUCOPHAGE) tablet 1,000 mg 1,000 mg 2 Times Daily With Meals 7/22/2017     Sig - Route: Take 1  tablet by mouth 2 (Two) Times a Day With Meals. - Oral    metoprolol succinate XL (TOPROL-XL) 24 hr tablet 100 mg 100 mg Daily 7/22/2017     Sig - Route: Take 1 tablet by mouth Daily. - Oral    ondansetron (ZOFRAN) injection 4 mg 4 mg Every 6 Hours PRN 7/22/2017     Sig - Route: Infuse 2 mL into a venous catheter Every 6 (Six) Hours As Needed for Nausea or Vomiting. - Intravenous    Pharmacy to dose vancomycin  Continuous PRN 7/22/2017     Sig - Route: Continuous As Needed for Consult. - Does not apply    piperacillin-tazobactam (ZOSYN) 3.375 g in iso-osmotic dextrose 50 ml (premix) 3.375 g Every 8 Hours 7/22/2017     Sig - Route: Infuse 50 mL into a venous catheter Every 8 (Eight) Hours. - Intravenous    saccharomyces boulardii (FLORASTOR) capsule 250 mg 250 mg 2 Times Daily 7/22/2017     Sig - Route: Take 1 capsule by mouth 2 (Two) Times a Day. - Oral    silver sulfadiazine (SILVADENE, SSD) 1 % cream  2 Times Daily 7/22/2017     Sig - Route: Apply  topically 2 (Two) Times a Day. - Topical    sodium chloride 0.9 % flush 1-10 mL 1-10 mL As Needed 7/22/2017     Sig - Route: Infuse 1-10 mL into a venous catheter As Needed for Line Care. - Intravenous    triamterene-hydrochlorothiazide (MAXZIDE-25) 37.5-25 MG per tablet 1 tablet 1 tablet Daily 7/22/2017     Sig - Route: Take 1 tablet by mouth Daily. - Oral    vancomycin 1500 mg/500 mL 0.9% NS IVPB ([facility]) 1,500 mg Every 12 Hours 7/23/2017     Sig - Route: Infuse 500 mL into a venous catheter Every 12 (Twelve) Hours. - Intravenous          Lab Results (all)     Procedure Component Value Units Date/Time    CBC & Differential [[id number]] Collected:  07/22/17 1301    Specimen:  Blood Updated:  07/22/17 1320    Narrative:       CBC Auto Differential [[id number]]  (Abnormal) Collected:  07/22/17 1301    Specimen:  Blood Updated:  07/22/17 1320     WBC 10.09 10*3/mm3      RBC 4.79 10*6/mm3      Hemoglobin 14.5 g/dL      Hematocrit 44.5 %      MCV 92.9 fL      MCH 30.3 pg       MCHC 32.6 g/dL      RDW 13.3 (H) %      RDW-SD 45.2 fl      MPV 9.7 fL      Platelets 231 10*3/mm3      Neutrophil % 58.0 %      Lymphocyte % 20.1 %      Monocyte % 18.4 (H) %      Eosinophil % 2.2 %      Basophil % 0.6 %      Immature Grans % 0.7 (H) %      Neutrophils, Absolute 5.85 10*3/mm3      Lymphocytes, Absolute 2.03 10*3/mm3      Monocytes, Absolute 1.86 (H) 10*3/mm3      Eosinophils, Absolute 0.22 10*3/mm3      Basophils, Absolute 0.06 10*3/mm3      Immature Grans, Absolute 0.07 (H) 10*3/mm3     Lactic Acid, Plasma [[id number]]  (Abnormal) Collected:  07/22/17 1301    Specimen:  Blood Updated:  07/22/17 1335     Lactate 3.3 (C) mmol/L     Comprehensive Metabolic Panel [[id number]]  (Abnormal) Collected:  07/22/17 1301    Specimen:  Blood Updated:  07/22/17 1340     Glucose 179 (H) mg/dL      BUN 13 mg/dL      Creatinine 0.73 mg/dL      Sodium 141 mmol/L      Potassium 3.8 mmol/L      Chloride 97 (L) mmol/L      CO2 25.6 mmol/L      Calcium 10.5 mg/dL      Total Protein 7.5 g/dL      Albumin 4.40 g/dL      ALT (SGPT) 43 (H) U/L      AST (SGOT) 26 U/L      Alkaline Phosphatase 73 U/L      Total Bilirubin 0.8 mg/dL      eGFR Non African Amer 82 mL/min/1.73      Globulin 3.1 gm/dL      A/G Ratio 1.4 g/dL      BUN/Creatinine Ratio 17.8     Anion Gap 18.4 mmol/L     Urinalysis With / Culture If Indicated [[id number]]  (Abnormal) Collected:  07/22/17 1313    Specimen:  Urine from Urine, Clean Catch Updated:  07/22/17 1344     Color, UA Yellow     Appearance, UA Cloudy (A)     pH, UA 6.0     Specific Gravity, UA 1.027     Glucose, UA >=1000 mg/dL (3+) (A)     Ketones, UA Negative     Bilirubin, UA Negative     Blood, UA Trace (A)     Protein, UA 30 mg/dL (1+) (A)     Leuk Esterase, UA Moderate (2+) (A)     Nitrite, UA Negative     Urobilinogen, UA 0.2 E.U./dL    Urinalysis, Microscopic Only [[id number]]  (Abnormal) Collected:  07/22/17 1313    Specimen:  Urine from Urine, Clean Catch Updated:  07/22/17 1344     RBC,  UA 3-5 (A) /HPF      WBC, UA Too Numerous to Count (A) /HPF      Bacteria, UA 2+ (A) /HPF      Squamous Epithelial Cells, UA 3-6 (A) /HPF      Hyaline Casts, UA 3-6 /LPF      Methodology Automated Microscopy    POC Glucose Fingerstick [[id number]]  (Abnormal) Collected:  07/22/17 1717    Specimen:  Blood Updated:  07/22/17 1718     Glucose 143 (H) mg/dL     Narrative:       Meter: [id number] : [id number] Jessenia Arias    Lactate Acid, Reflex [[id number]]  (Normal) Collected:  07/22/17 1704    Specimen:  Blood Updated:  07/22/17 1736     Lactate 2.0 mmol/L     POC Glucose Fingerstick [[id number]]  (Abnormal) Collected:  07/22/17 2022    Specimen:  Blood Updated:  07/22/17 2023     Glucose 137 (H) mg/dL     Narrative:       Meter: [id number] : [id number] Davy VILLEDA    Creatinine, Serum [[id number]]  (Normal) Collected:  07/23/17 0512    Specimen:  Blood Updated:  07/23/17 0643     Creatinine 0.69 mg/dL      eGFR Non African Amer 87 mL/min/1.73     POC Glucose Fingerstick [[id number]]  (Abnormal) Collected:  07/23/17 0813    Specimen:  Blood Updated:  07/23/17 0815     Glucose 281 (H) mg/dL     Narrative:       Meter: [id number] : [id number] [facility]    POC Glucose Fingerstick [[id number]]  (Abnormal) Collected:  07/23/17 1208    Specimen:  Blood Updated:  07/23/17 1209     Glucose 148 (H) mg/dL     Narrative:       Meter: [id number] : [id number] Mindi Sandoval RN    POC Glucose Fingerstick [[id number]]  (Abnormal) Collected:  07/23/17 1647    Specimen:  Blood Updated:  07/23/17 1648     Glucose 151 (H) mg/dL     Narrative:       Meter: [id number] : [id number] Alton Arias    POC Glucose Fingerstick [[id number]]  (Abnormal) Collected:  07/23/17 1945    Specimen:  Blood Updated:  07/23/17 1946     Glucose 173 (H) mg/dL     Narrative:       Meter: [id number] : [id number] Davy VILLEDA    Urine Culture [[id number]] Collected:  07/22/17 1313    Specimen:  Urine from Urine, Clean Catch Updated:  " 07/24/17 0717     Urine Culture >100,000 CFU/mL Mixed Culture    Narrative:         Specimen contains mixed organisms of questionable pathogenicity which indicates contamination with commensal jaimie.  Further identification is unlikely to provide clinically useful information.  Suggest recollection.    Creatinine, Serum [[id number]]  (Normal) Collected:  07/24/17 0641    Specimen:  Blood Updated:  07/24/17 0732     Creatinine 0.65 mg/dL      eGFR Non African Amer 94 mL/min/1.73     Body Fluid Culture [[id number]]  (Abnormal)  (Susceptibility) Collected:  07/22/17 1313    Specimen:  Body Fluid from Foot, Left Updated:  07/24/17 0736     BF Culture Heavy growth (4+) Staphylococcus aureus (A)      D test is negative. Isolate does not exhibit \"inducible\" resistance to Clindamycin (isolate remains susceptible to Clindamycin).          Gram Stain Result Rare (1+) WBCs seen      Many (4+) Gram positive cocci in pairs and clusters      Rare (1+) Gram positive bacilli      Rare (1+) Gram negative bacilli    Susceptibility      Staphylococcus aureus     EDMOND     Clindamycin <=0.25 ug/ml Susceptible     Erythromycin >=8 ug/ml Resistant     Oxacillin 0.5 ug/ml Susceptible     Penicillin G >=0.5 ug/ml Resistant     Rifampin <=0.5 ug/ml Susceptible     Tetracycline <=1 ug/ml Susceptible     Trimethoprim + Sulfamethoxazole <=10 ug/ml Susceptible     Vancomycin <=0.5 ug/ml Susceptible                    POC Glucose Fingerstick [[id number]]  (Abnormal) Collected:  07/24/17 0816    Specimen:  Blood Updated:  07/24/17 0817     Glucose 180 (H) mg/dL     Narrative:       Meter: [id number] : [id number] Davy VILLEDA    POC Glucose Fingerstick [[id number]]  (Abnormal) Collected:  07/24/17 1155    Specimen:  Blood Updated:  07/24/17 1157     Glucose 182 (H) mg/dL     Narrative:       Meter: [id number] : [id number] Davy VILLEDA            Orders (all)     Start     Ordered    07/24/17 1330  Vancomycin, Trough Vancomycin trough " due 30 minutes before dose scheduled 7/24 @ 1400, please make sure Vancomycin isn't infusing before drawing level.  Timed     Comments:  Vancomycin trough due 30 minutes before dose scheduled 7/24 @ 1400, please make sure Vancomycin isn't infusing before drawing level.    07/23/17 0913    07/24/17 1230  Vancomycin, Trough Vancomycin trough due 30 minutes before dose scheduled 7/24 @ 1300, please make sure Vancomycin isn't infusing before drawing level.  Timed,   Status:  Canceled     Comments:  Vancomycin trough due 30 minutes before dose scheduled 7/24 @ 1300, please make sure Vancomycin isn't infusing before drawing level.    07/22/17 1455    07/24/17 1158  POC Glucose Fingerstick  Once      07/24/17 1157    07/23/17 2100  FLUoxetine (PROzac) capsule 60 mg  Nightly      07/22/17 2136    07/23/17 0100  vancomycin 1500 mg/500 mL 0.9% NS IVPB ([facility])  Every 12 Hours      07/22/17 1455    07/22/17 2100  insulin detemir (LEVEMIR) injection 10 Units  Nightly      07/22/17 1442    07/22/17 2024  POC Glucose Fingerstick  Once      07/22/17 2023    07/22/17 1800  metFORMIN (GLUCOPHAGE) tablet 1,000 mg  2 Times Daily With Meals      07/22/17 1522    07/22/17 1800  silver sulfadiazine (SILVADENE, SSD) 1 % cream  2 Times Daily      07/22/17 1522    07/22/17 1800  saccharomyces boulardii (FLORASTOR) capsule 250 mg  2 Times Daily      07/22/17 1442    07/22/17 1800  insulin aspart (novoLOG) injection 0-9 Units  4 Times Daily With Meals & Nightly      07/22/17 1442    07/22/17 1719  POC Glucose Fingerstick  Once      07/22/17 1718    07/22/17 1701  Lactate Acid, Reflex  Timed      07/22/17 1333    07/22/17 1700  POC Glucose Fingerstick  4 Times Daily Before Meals & at Bedtime      07/22/17 1442    07/22/17 1600  ALPRAZolam (XANAX) tablet 0.5 mg  Daily      07/22/17 1522    07/22/17 1600  aspirin chewable tablet 81 mg  Daily      07/22/17 1522 07/22/17 1600  atorvastatin (LIPITOR) tablet 5 mg  Daily      07/22/17 1522     07/22/17 1600  baclofen (LIORESAL) tablet 10 mg  3 Times Daily      07/22/17 1522    07/22/17 1600  FLUoxetine (PROzac) capsule 60 mg  Daily,   Status:  Discontinued      07/22/17 1522    07/22/17 1600  levothyroxine (SYNTHROID, LEVOTHROID) tablet 75 mcg  Daily      07/22/17 1522    07/22/17 1600  losartan (COZAAR) tablet 50 mg  Daily      07/22/17 1522    07/22/17 1600  metoprolol succinate XL (TOPROL-XL) 24 hr tablet 100 mg  Daily      07/22/17 1522    07/22/17 1600  triamterene-hydrochlorothiazide (MAXZIDE-25) 37.5-25 MG per tablet 1 tablet  Daily      07/22/17 1522    07/22/17 1600  Vital Signs  Every 4 Hours      07/22/17 1438    07/22/17 1515  piperacillin-tazobactam (ZOSYN) 3.375 g in iso-osmotic dextrose 50 ml (premix)  Every 8 Hours      07/22/17 1442    07/22/17 1451  HYDROcodone-acetaminophen (NORCO) 7.5-325 MG per tablet 1 tablet  Every 6 Hours PRN      07/22/17 1453    07/22/17 1443  Do NOT Hold Basal Insulin When Patient is NPO, Hold Bolus Dose if NPO  Continuous      07/22/17 1442    07/22/17 1443  Follow [facility] Hypoglycemia Protocol For Blood Glucose Less Than 70 mg/dL  Until Discontinued      07/22/17 1442    07/22/17 1443  Hypoglycemia Treatment - Alert Patient That is Not NPO and Can Safely Swallow  Until Discontinued     Comments:  Administer 4 oz Fruit Juice OR 4 oz Regular Soda OR 8 oz Milk OR 15-30 grams (1 tube) of Glucose Gel  Recheck Blood Glucose 15 Minutes After Ingestion, Repeat Treatment & Continue to Recheck Blood Sugar Every 15 Minutes Until Blood Glucose is 70 or Higher  Once Blood Glucose is 70 or Higher, Give Snack (Peanut Butter & Crackers) if Next Meal Will Be Given in More Than 60 Minutes, Provide Meal Tray As Soon As Possible    07/22/17 1442    07/22/17 1443  Hypoglycemia Treatment - Patient Has IV Access - Unresponsive, NPO or Unable To Safely Swallow  Until Discontinued     Comments:  Administer 25g D50W IV Push (1 Whole Vial)  Recheck Blood Glucose 15 Minutes After  Administration, if Blood Glucose Remains Less Than 70, Repeat Treatment   Recheck Blood Glucose 15 Minutes After 2nd Administration, if Blood Glucose Remains Less Than 70 After 2nd Dose of D50W Contact Provider for Further Treatment Orders & Consider Adding IVF With D5 for Maintenance    07/22/17 1442    07/22/17 1443  Hypoglycemia Treatment - Patient Without IV Access - Unresponsive, NPO or Unable To Safely Swallow  Until Discontinued     Comments:  Administer 1mg Glucagon SQ & Establish IV Access  Turn Patient on Side - Nausea / Vomiting May Occur  Recheck Blood Glucose 15 Minutes After Administration  If IV Access Has Not Been Established & Blood Glucose Remains Less Than 70, Repeat Treatment With Glucagon  If IV Access Established, Administer 25g D50W IV Push (1 Whole Vial)  Recheck Blood Glucose 15 Minutes After Administration of 2nd Medication, if Blood Glucose Remains Less Than 70, Contact Provider for Further Treatment Orders & Consider Adding IVF With D5 for Maintenance    07/22/17 1442    07/22/17 1442  dextrose (D50W) solution 25 g  Every 15 Minutes PRN      07/22/17 1442    07/22/17 1442  glucagon (human recombinant) (GLUCAGEN DIAGNOSTIC) injection 1 mg  Every 15 Minutes PRN      07/22/17 1442    07/22/17 1442  dextrose (GLUTOSE) oral gel 15 g  Every 15 Minutes PRN      07/22/17 1442    07/22/17 1442  Nursing Communication Until wound care can give further recs, soak left foot in hyrdogen peroxide twice daily then cover blister w/ silvadene then nonadhesive bandage and wrap in kerlix  Continuous     Comments:  Until wound care can give further recs, soak left foot in hyrdogen peroxide twice daily then cover blister w/ silvadene then nonadhesive bandage and wrap in kerlix    07/22/17 1442    07/22/17 1442  Pharmacy to dose vancomycin  Continuous PRN      07/22/17 1442    07/22/17 1441  Wound Ostomy Eval & Treat  Once      07/22/17 1442    07/22/17 1440  enoxaparin (LOVENOX) syringe 40 mg  Daily       17 1438    17 1439  Full Code  Continuous      17 1438    17 1439  VTE Risk Assessment - Moderate Risk  Once      17 1438    17 1439  Insert Peripheral IV  Once      17 1438    17 1439  Saline Lock & Maintain IV Access  Continuous      17 1438    17 1439  Mechanical VTE Prophylaxis Not Indicated: Wounds / Ulcers / Burns on Bilateral Lower Extremities  Once      17 1438    17 1439  Activity - Ad Ame  Until Discontinued      17 1438    17 1439  Diet Regular; Consistent Carbohydrate, Cardiac  Diet Effective Now      17 1438    17 1438  sodium chloride 0.9 % flush 1-10 mL  As Needed      17 1438    17 1438  acetaminophen (TYLENOL) tablet 650 mg  Every 4 Hours PRN      17 1438    17 1438  ondansetron (ZOFRAN) injection 4 mg  Every 6 Hours PRN      17 1438    17 1340  vancomycin 2750 mg/500 mL 0.9% NS IVPB ([facility])  Once,   Status:  Discontinued      17 1338    17 1339  Inpatient Admission  Once      17 1339    17 1243  vancomycin 2750 mg/500 mL 0.9% NS IVPB ([facility])  Once      17 1241    17 1242  Contact Adult Protection  Once      17 1241             Physician Progress Notes (all)      Eric Johnson MD at 2017 12:35 PM  Version 1 of 1             Name: Pratibha Pascal ADMIT: 2017   : 1960  PCP: Benitez Wu MD    MRN: [id number] LOS: 1 days   AGE/SEX: 57 y.o. female  ROOM: 607/1   Subjective      Leg still erythematous and edematous but better   no soa or chest pain  No n/v/d/rash    Subjective    Objective   Vital Signs  Temp:  [98.2 °F (36.8 °C)-98.6 °F (37 °C)] 98.6 °F (37 °C)  Heart Rate:  [64-86] 86  Resp:  [16] 16  BP: ()/(50-76) 120/66  SpO2:  [96 %-97 %] 96 %  on   ;   O2 Device: room air  Body mass index is 42.72 kg/(m^2).    Physical Exam   Constitutional: She is oriented to person, place, and time. She appears  well-developed and well-nourished.   HENT:   Head: Normocephalic and atraumatic.   Mouth/Throat: Oropharynx is clear and moist. No oropharyngeal exudate.   Eyes: Conjunctivae and EOM are normal. Pupils are equal, round, and reactive to light. No scleral icterus.   Cardiovascular: Normal rate, regular rhythm and normal heart sounds.  Exam reveals no gallop and no friction rub.    No murmur heard.  Pulmonary/Chest: Effort normal and breath sounds normal. No respiratory distress. She has no wheezes. She has no rales.   Abdominal: Soft. Bowel sounds are normal. She exhibits no distension. There is no tenderness. There is no rebound and no guarding.   Musculoskeletal: She exhibits edema (left leg ). She exhibits no tenderness.   Neurological: She is alert and oriented to person, place, and time. No cranial nerve deficit.   Skin: Skin is warm and dry. There is erythema (left leg, seems to be improving based on marker lines).   Large blister on the dorsum of left foot, foot wrapped so didn't unwrap it, seen on pictures before and after chemical debridement today     Psychiatric: She has a normal mood and affect. Her behavior is normal.       Results Review:       I reviewed the patient's new clinical results.      ALPRAZolam 0.5 mg Oral Daily   aspirin 81 mg Oral Daily   atorvastatin 5 mg Oral Daily   baclofen 10 mg Oral TID   enoxaparin 40 mg Subcutaneous Daily   FLUoxetine 60 mg Oral Nightly   insulin aspart 0-9 Units Subcutaneous 4x Daily With Meals & Nightly   insulin detemir 10 Units Subcutaneous Nightly   levothyroxine 75 mcg Oral Daily   losartan 50 mg Oral Daily   metFORMIN 1,000 mg Oral BID With Meals   metoprolol succinate  mg Oral Daily   piperacillin-tazobactam 3.375 g Intravenous Q8H   saccharomyces boulardii 250 mg Oral BID   silver sulfadiazine  Topical BID   triamterene-hydrochlorothiazide 1 tablet Oral Daily   vancomycin 1,500 mg Intravenous Q12H       Pharmacy to dose vancomycin    Diet Regular;  Consistent Carbohydrate, Cardiac      Assessment/Plan   Assessment:     Active Hospital Problems (** Indicates Principal Problem)    Diagnosis Date Noted   • **Cellulitis of left leg [L03.116] 07/22/2017   • Diabetes mellitus with neurological manifestation [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       Plan:     Cellulitis of left foot extending up the anterior aspect of her left lower extremity to her knee with the etiology secondary to large diabetic foot ulcer vs burn/blister on the bottom of her left foot. Underlying neuropathy                        -Continue Vanc and Zosyn, probably can stop zosyn tomorrow    -Cx growing staph aureus and GNR, follow cultures                        -Wound care for left foot blister.  May need additional surgical evaluation but doesn't need now                        -Elevated lactic acid at admission resolved, no sepsis     DM2 - uncontrolled with probable neuropathy                        -A1c is worsening 8.4 per review previous lab work.      -continue metformin though hold additional hypoglycemic medications and place on sliding   scale insulin     -initiated Levemir 10 units subcutaneous daily at bedtime     HTN/HLD/CAD - stable continue meds     Questionable UTI as sample was obtained via clean catch and favor contamination given her body habitus as well as no genitourinary symptoms     Lovenox for DVT prophylaxis      Disposition  TBD.      Eric Johnson MD  [facility]  07/23/17  12:35 PM       Electronically signed by Eric Johnson MD at 7/23/2017 12:42 PM

## 2017-07-24 NOTE — PLAN OF CARE
Problem: Patient Care Overview (Adult)  Goal: Plan of Care Review  Outcome: Ongoing (interventions implemented as appropriate)    07/23/17 0601 07/23/17 2057 07/23/17 [id number]   Coping/Psychosocial Response Interventions   Plan Of Care Reviewed With --  patient --    Patient Care Overview   Progress no change --  --    Outcome Evaluation   Outcome Summary/Follow up Plan --  --  wound to see, dressing changes BID to LLE, iv antibx, ac/hs, APS following, plan d/c [facility]       Goal: Adult Individualization and Mutuality  Outcome: Ongoing (interventions implemented as appropriate)  Goal: Discharge Needs Assessment  Outcome: Ongoing (interventions implemented as appropriate)    Problem: Cellulitis (Adult)  Goal: Signs and Symptoms of Listed Potential Problems Will be Absent or Manageable (Cellulitis)  Outcome: Ongoing (interventions implemented as appropriate)    Problem: Diabetes, Type 2 (Adult)  Goal: Signs and Symptoms of Listed Potential Problems Will be Absent or Manageable (Diabetes, Type 2)  Outcome: Ongoing (interventions implemented as appropriate)    Problem: Wound, Traumatic, Nonburn (Adult)  Goal: Signs and Symptoms of Listed Potential Problems Will be Absent or Manageable (Wound, Traumatic, Nonburn)  Outcome: Ongoing (interventions implemented as appropriate)    Problem: Infection, Risk/Actual (Adult)  Goal: Infection Prevention/Resolution  Outcome: Ongoing (interventions implemented as appropriate)

## 2017-07-25 VITALS
WEIGHT: 293 LBS | HEART RATE: 84 BPM | BODY MASS INDEX: 39.68 KG/M2 | HEIGHT: 72 IN | SYSTOLIC BLOOD PRESSURE: 142 MMHG | OXYGEN SATURATION: 95 % | DIASTOLIC BLOOD PRESSURE: 87 MMHG | RESPIRATION RATE: 16 BRPM | TEMPERATURE: 98.5 F

## 2017-07-25 LAB — GLUCOSE BLDC GLUCOMTR-MCNC: 175 MG/DL (ref 70–130)

## 2017-07-25 PROCEDURE — 63710000001 INSULIN ASPART PER 5 UNITS: Performed by: HOSPITALIST

## 2017-07-25 PROCEDURE — 25010000002 VANCOMYCIN 10 G RECONSTITUTED SOLUTION: Performed by: HOSPITALIST

## 2017-07-25 PROCEDURE — 25010000002 PIPERACILLIN SOD-TAZOBACTAM PER 1 G: Performed by: HOSPITALIST

## 2017-07-25 PROCEDURE — 82962 GLUCOSE BLOOD TEST: CPT

## 2017-07-25 RX ORDER — PETROLATUM 42 G/100G
OINTMENT TOPICAL
Qty: 454 G | Refills: 0 | Status: SHIPPED | OUTPATIENT
Start: 2017-07-25 | End: 2018-06-22 | Stop reason: ALTCHOICE

## 2017-07-25 RX ORDER — SULFAMETHOXAZOLE AND TRIMETHOPRIM 800; 160 MG/1; MG/1
1 TABLET ORAL EVERY 12 HOURS SCHEDULED
Status: DISCONTINUED | OUTPATIENT
Start: 2017-07-25 | End: 2017-07-25 | Stop reason: HOSPADM

## 2017-07-25 RX ORDER — SACCHAROMYCES BOULARDII 250 MG
250 CAPSULE ORAL 2 TIMES DAILY
Qty: 60 CAPSULE | Refills: 0 | Status: SHIPPED | OUTPATIENT
Start: 2017-07-25 | End: 2018-06-22 | Stop reason: ALTCHOICE

## 2017-07-25 RX ORDER — SULFAMETHOXAZOLE AND TRIMETHOPRIM 800; 160 MG/1; MG/1
1 TABLET ORAL EVERY 12 HOURS SCHEDULED
Qty: 14 TABLET | Refills: 0 | Status: SHIPPED | OUTPATIENT
Start: 2017-07-25 | End: 2017-08-01

## 2017-07-25 RX ADMIN — TAZOBACTAM SODIUM AND PIPERACILLIN SODIUM 3.38 G: 375; 3 INJECTION, SOLUTION INTRAVENOUS at 03:19

## 2017-07-25 RX ADMIN — ASPIRIN 81 MG: 81 TABLET, CHEWABLE ORAL at 09:07

## 2017-07-25 RX ADMIN — METOPROLOL SUCCINATE 100 MG: 100 TABLET, FILM COATED, EXTENDED RELEASE ORAL at 09:07

## 2017-07-25 RX ADMIN — Medication 250 MG: at 09:07

## 2017-07-25 RX ADMIN — ALPRAZOLAM 0.5 MG: 0.5 TABLET ORAL at 09:10

## 2017-07-25 RX ADMIN — VANCOMYCIN HYDROCHLORIDE 1250 MG: 10 INJECTION, POWDER, LYOPHILIZED, FOR SOLUTION INTRAVENOUS at 05:09

## 2017-07-25 RX ADMIN — LOSARTAN POTASSIUM 50 MG: 50 TABLET, FILM COATED ORAL at 09:07

## 2017-07-25 RX ADMIN — PETROLATUM: 42 OINTMENT TOPICAL at 09:12

## 2017-07-25 RX ADMIN — METFORMIN HYDROCHLORIDE 1000 MG: 1000 TABLET ORAL at 09:08

## 2017-07-25 RX ADMIN — TRIAMTERENE AND HYDROCHLOROTHIAZIDE 1 TABLET: 37.5; 25 TABLET ORAL at 09:08

## 2017-07-25 RX ADMIN — LEVOTHYROXINE SODIUM 75 MCG: 75 TABLET ORAL at 09:09

## 2017-07-25 RX ADMIN — INSULIN ASPART 2 UNITS: 100 INJECTION, SOLUTION INTRAVENOUS; SUBCUTANEOUS at 09:09

## 2017-07-25 RX ADMIN — TAZOBACTAM SODIUM AND PIPERACILLIN SODIUM 3.38 G: 375; 3 INJECTION, SOLUTION INTRAVENOUS at 09:09

## 2017-07-25 RX ADMIN — ATORVASTATIN CALCIUM 5 MG: 10 TABLET, FILM COATED ORAL at 09:07

## 2017-07-25 RX ADMIN — SULFASALAZINE 1500 MG: 500 TABLET ORAL at 09:08

## 2017-07-25 NOTE — DISCHARGE SUMMARY
NAME: Pratibha Pascal ADMIT: 2017   : 1960  PCP: Benitez Wu MD    MRN: [id number] LOS: 3 days   AGE/SEX: 57 y.o. female  ROOM: 607/1     Date of Admission:  2017  Date of Discharge:  2017    PCP: Benitez Wu MD      CHIEF COMPLAINT  Foot Pain      DISCHARGE DIAGNOSIS  Active Hospital Problems (** Indicates Principal Problem)    Diagnosis Date Noted   • **Cellulitis of left leg [L03.116] 2017   • MSSA (methicillin susceptible Staphylococcus aureus) infection [A49.01] 2017   • Diabetes mellitus with neurological manifestation [E11.49] 2017   • Type 2 diabetes mellitus, uncontrolled [E11.65]    • CAD (coronary artery disease) [I25.10]    • HLD (hyperlipidemia) [E78.5]    • Benign essential hypertension [I10]    • Hypothyroidism [E03.9]       Resolved Hospital Problems    Diagnosis Date Noted Date Resolved   No resolved problems to display.       SECONDARY DIAGNOSES  Past Medical History:   Diagnosis Date   • Allergy to mold    • Benign essential hypertension    • CAD (coronary artery disease)    • Diabetes mellitus    • Encounter for urine test 2015    microalbumin; 461   • H/O bone density study unclear   • H/O complete eye exam 2 -3 years   • History of myocardial infarction    • HLD (hyperlipidemia)    • Hypothyroidism    • Major depressive disorder, recurrent, in full remission    • Myocardial infarction    • BRANNON (obstructive sleep apnea)    • Rheumatoid arthritis    • Seasonal allergies    • Tobacco abuse        CONSULTS   None  Consult Orders (all)     Start     Ordered    17 1240  [facility] (on-call MD unless specified)  Once     Specialty:  Internal Medicine  Provider:  Quentin Zaragoza MD    17 1239        Treatment Team     Provider Relationship Specialty Contact    Eric Johnson MD Attending --  [phone]    Lori Obregon, RN Registered Nurse --  [phone]    Hamida Bhatti Certified Nursing Assistant --      Margaret Dyer  RN Registered Nurse --  [phone]    Mari Rich, NAVEED Case Manager --      Denise Ellis, RRT Respiratory Therapist Respiratory Therapy  [phone]          PROCEDURES PERFORMED  None        HOSPITAL COURSE  The patient is a very pleasant 57-year-old female with history of uncontrolled type 2 diabetes with an A1c of 8.4.  She also has a history of peripheral neuropathy.  She came into the hospital with left leg redness and left heel blister and wound.  She reviewed was diagnosed with left leg cellulitis in a diabetic foot ulcer and was started on vancomycin and Zosyn.  Cultures from the wound grew MSSA and Pantoea Agglomerans.  She was evaluated by our wound care nurse.  Her infection continued to improve throughout the hospitalization.  She remained afebrile throughout the hospitalization as well.  She did not have a leukocytosis.  Symptomatically she improved.  Her antibiotics were changed to Bactrim at the time of discharge as both of the bacteria were sensitive to this.  I will give her total of 10 days.  She is to follow up with our wound care clinic.  I've also recommended her to see a podiatrist for fitting for diabetic shoes.  I discussed discharge plan with patient and she is in agreement.  We will have nursing come to her house to help with dressing changes    PHYSICAL EXAM  Temp:  [98.4 °F (36.9 °C)-99.1 °F (37.3 °C)] 98.5 °F (36.9 °C)  Heart Rate:  [75-84] 84  Resp:  [16] 16  BP: (109-142)/(57-87) 142/87  Body mass index is 42.72 kg/(m^2).  Physical Exam  Constitutional: She is oriented to person, place, and time. She appears well-developed and well-nourished.   HENT:   Head: Normocephalic and atraumatic.   Mouth/Throat: Oropharynx is clear and moist. No oropharyngeal exudate.   Eyes: Conjunctivae and EOM are normal. Pupils are equal, round, and reactive to light. No scleral icterus.   Cardiovascular: Normal rate, regular rhythm and normal heart sounds.  Exam reveals no gallop and no friction rub.     No murmur heard.  Pulmonary/Chest: Effort normal and breath sounds normal. No respiratory distress. She has no wheezes. She has no rales.   Abdominal: Soft. Bowel sounds are normal. She exhibits no distension. There is no tenderness. There is no rebound and no guarding.   Musculoskeletal: She exhibits edema (left leg ). She exhibits no tenderness.   Neurological: She is alert and oriented to person, place, and time. No cranial nerve deficit.   Skin: Skin is warm and dry. There is erythema (left leg, improvin daily).   Large blister on the dorsum of left foot, foot wrapped so didn't unwrap it, seen on pictures before and after chemical debridement today       CONDITION ON DISCHARGE  Stable.      DISCHARGE DISPOSITION   Home or Self Care  Home with Home Health      DISCHARGE MEDICATIONS   Pratibha Pascal   Home Medication Instructions JAVIER:[id number]    Printed on:07/25/17 104   Medication Information                      ALPRAZolam (XANAX) 0.5 MG tablet  Take 1 tablet by mouth Daily. TAKE 1 TABLET BY MOUTH EVERY DAY AS NEEDED             aspirin 81 MG tablet  Take by mouth daily.             atorvastatin (LIPITOR) 10 MG tablet  Take 0.5 tablets by mouth Daily. Take one tablet once daily             baclofen (LIORESAL) 10 MG tablet  Take 1 tablet by mouth 3 (Three) Times a Day.             CIMZIA STARTER KIT 6 X 200 MG/ML kit               FARXIGA 5 MG tablet tablet  Take 1 tablet by mouth Daily. TAKE 1 TABLET BY MOUTH EVERY MORNING             FLUoxetine (PROzac) 20 MG capsule  Take 3 capsules by mouth Daily.             folic acid (FOLVITE) 1 MG tablet               hydrophor (AQUAPHOR) ointment ointment  Apply  topically Daily.             levothyroxine (SYNTHROID, LEVOTHROID) 75 MCG tablet  Take 1 tablet by mouth Daily.             losartan (COZAAR) 50 MG tablet  Take 1 tablet by mouth Daily. TAKE 1 TABLET BY MOUTH EVERY DAY             metFORMIN (GLUCOPHAGE) 1000 MG tablet  Take 1 tablet by mouth 2 (Two) Times  a Day With Meals.             metoprolol succinate XL (TOPROL-XL) 100 MG 24 hr tablet  Take 1 tablet by mouth Daily. TAKE 1 TABLET BY MOUTH EVERY DAY             saccharomyces boulardii (FLORASTOR) 250 MG capsule  Take 1 capsule by mouth 2 (Two) Times a Day.             silver sulfadiazine (SILVADENE) 1 % cream  Apply  topically 2 (Two) Times a Day.             SITagliptin (JANUVIA) 100 MG tablet  Take 1 tablet by mouth Daily.             sulfamethoxazole-trimethoprim (BACTRIM DS,SEPTRA DS) 800-160 MG per tablet  Take 1 tablet by mouth Every 12 (Twelve) Hours for 7 days. Indications: Skin and Soft Tissue Infection             sulfaSALAzine (AZULFIDINE) 500 MG tablet  TAKE 3 TABLETS BY MOUTH TWICE DAILY             triamterene-hydrochlorothiazide (MAXZIDE-25) 37.5-25 MG per tablet  Take 1 tablet by mouth Daily. TAKE 1 TABLET BY MOUTH EVERY DAY                No future appointments.  Additional Instructions for the Follow-ups that You Need to Schedule     Ambulatory Referral to Home Health    As directed    Face to Face Visit Date:  7/25/2017   Follow-up Provider for Plan of Care?:  I treated the patient in an acute care facility and will not continue treatment after discharge.   Follow-up Provider:  RASHIDA WU   Reason/Clinical Findings:  Left diabetic foot ulcer   Describe mobility limitations that make leaving home difficult:  foot ulcer   Nursing/Therapeutic Services Requested:  Skilled Nursing   Skilled nursing orders:  Wound care dressing/changes   Frequency:  1 Week 1             Follow-up Information     Follow up with [facility] .    Specialty:  Wound Care    Contact information:    [address] [facility] [address] [phone]        Follow up with [facility] [place] .    Specialty:  Home Health Services    Contact information:    [address]  [facility] [address] [phone]        Follow up with Rashida Wu MD  Follow up in 1 week(s).    Specialty:  Family Medicine    Contact information:    [address] [id number] [phone]            TEST  RESULTS PENDING AT DISCHARGE     None       Eric Johnson MD  [facility]  07/25/17  10:41 AM      Time: greater than 30 minutes.

## 2017-07-25 NOTE — PLAN OF CARE
Problem: Patient Care Overview (Adult)  Goal: Plan of Care Review  Outcome: Ongoing (interventions implemented as appropriate)    07/25/17 0329 07/25/17 1141   Coping/Psychosocial Response Interventions   Plan Of Care Reviewed With --  patient   Patient Care Overview   Progress improving --    Outcome Evaluation   Outcome Summary/Follow up Plan --  Pt being discharged today, home with home ciro, wound dressing completed before discharge.          Problem: Cellulitis (Adult)  Goal: Signs and Symptoms of Listed Potential Problems Will be Absent or Manageable (Cellulitis)  Outcome: Ongoing (interventions implemented as appropriate)    Problem: Infection, Risk/Actual (Adult)  Goal: Infection Prevention/Resolution  Outcome: Ongoing (interventions implemented as appropriate)

## 2017-07-25 NOTE — PROGRESS NOTES
Continued Stay Note  [facility]     Patient Name: Pratibha Pascal  MRN: [id number]  Today's Date: 7/25/2017    Admit Date: 7/22/2017          Discharge Plan       07/25/17 1313    Case Management/Social Work Plan    Plan Home with family and [facility]    Patient/Family In Agreement With Plan yes    Additional Comments Spoke with patient at bedside.  She states she would like to use [facility] at DC - spoke with Gail.      Final Note    Final Note Patient has been DC'd home with [facility] to follow.              Discharge Codes       07/25/17 1313    Discharge Codes    Discharge Codes 06  Discharged/transferred to home under care of organized home health service organization in anticipation of skilled care        Expected Discharge Date and Time     Expected Discharge Date Expected Discharge Time    Jul 25, 2017             Becky S. [place], RN

## 2017-07-25 NOTE — PLAN OF CARE
Problem: Patient Care Overview (Adult)  Goal: Plan of Care Review  Outcome: Ongoing (interventions implemented as appropriate)    07/24/17 2020 07/25/17 0329   Coping/Psychosocial Response Interventions   Plan Of Care Reviewed With patient --    Patient Care Overview   Progress --  improving   Outcome Evaluation   Outcome Summary/Follow up Plan --  wound dressing changed,iv antibx, ac/hs, cellulitis not advancing, loose stools, monitor temperature, plan d/c HH vs FU       Goal: Adult Individualization and Mutuality  Outcome: Ongoing (interventions implemented as appropriate)  Goal: Discharge Needs Assessment  Outcome: Ongoing (interventions implemented as appropriate)    Problem: Cellulitis (Adult)  Goal: Signs and Symptoms of Listed Potential Problems Will be Absent or Manageable (Cellulitis)  Outcome: Ongoing (interventions implemented as appropriate)    Problem: Infection, Risk/Actual (Adult)  Goal: Infection Prevention/Resolution  Outcome: Ongoing (interventions implemented as appropriate)

## 2017-07-26 ENCOUNTER — TELEPHONE (OUTPATIENT)
Dept: FAMILY MEDICINE CLINIC | Facility: CLINIC | Age: 57
End: 2017-07-26

## 2017-07-26 NOTE — PAYOR COMM NOTE
"  Please see attached discharge summary    Ref#[id number]    THANK YOU    LONI COLE, RACHAEL, CCP  Pratibha Pascal (57 y.o. Female)     Date of Birth Social Security Number Address Home Phone MRN    1960  [address] [address] [phone] [id number]    Buddhism Marital Status          Mandaen        Admission Date Admission Type Admitting Provider Attending Provider Department, Room/Bed    17 Emergency Quentin Zaragoza MD  [address], 607/1    Discharge Date Discharge Disposition Discharge Destination        2017 Home or Self Care             Attending Provider: (none)    Allergies:  Erythromycin, Talwin [Pentazocine]    Isolation:  None   Infection:  None   Code Status:  Prior    Ht:  72\" (182.9 cm)   Wt:  315 lb (143 kg)    Admission Cmt:  None   Principal Problem:  Cellulitis of left leg [L03.116]                 Active Insurance as of 2017     Primary Coverage     Payor Plan Insurance Group Employer/Plan Group    [facility] [facility] [employer] EMPL[employer]EE [id number]     Payor Plan Address Payor Plan Phone Number Effective From Effective To    [address] [phone] 2015     [place] [address]       Subscriber Name Subscriber Birth Date Member ID       PRATIBHA PASCAL 1960 [id number]                 Emergency Contacts      (Rel.) Home Phone Work Phone Mobile Phone    Hannah Wallace (Mother) [phone] -- --               Discharge Summary      Eric Jhonson MD at 2017 10:41 AM                 NAME: Pratibha Pascal ADMIT: 2017   : 1960  PCP: Benitez Wu MD    MRN: [id number] LOS: 3 days   AGE/SEX: 57 y.o. female  ROOM: 607/1     Date of Admission:  2017  Date of Discharge:  2017    PCP: Benitez Wu MD      CHIEF COMPLAINT  Foot Pain      DISCHARGE DIAGNOSIS  Active Hospital Problems (** Indicates Principal Problem)    Diagnosis Date Noted   • **Cellulitis of " left leg [L03.116] 07/22/2017   • MSSA (methicillin susceptible Staphylococcus aureus) infection [A49.01] 07/24/2017   • Diabetes mellitus with neurological manifestation [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       SECONDARY DIAGNOSES  Past Medical History:   Diagnosis Date   • Allergy to mold    • Benign essential hypertension    • CAD (coronary artery disease)    • Diabetes mellitus    • Encounter for urine test 07/2015    microalbumin; 461   • H/O bone density study unclear   • H/O complete eye exam 2 -3 years   • History of myocardial infarction    • HLD (hyperlipidemia)    • Hypothyroidism    • Major depressive disorder, recurrent, in full remission    • Myocardial infarction    • BRANNON (obstructive sleep apnea)    • Rheumatoid arthritis    • Seasonal allergies    • Tobacco abuse        CONSULTS   None  Consult Orders (all)     Start     Ordered    07/22/17 1240  [facility] (on-call MD unless specified)  Once     Specialty:  Internal Medicine  Provider:  Quentin Zaragoza MD    07/22/17 1239        Treatment Team     Provider Relationship Specialty Contact    Eric Johnson MD Attending --  [phone]    Lori Obregon, RN Registered Nurse --  [phone]    Hamida Bhatti Certified Nursing Assistant --      Margaret Dyer, RN Registered Nurse --  [phone]    Mari Rich RN Case Manager --      Denise Ellis, RRT Respiratory Therapist Respiratory Therapy  [phone]          PROCEDURES PERFORMED  None        HOSPITAL COURSE  The patient is a very pleasant 57-year-old female with history of uncontrolled type 2 diabetes with an A1c of 8.4.  She also has a history of peripheral neuropathy.  She came into the hospital  with left leg redness and left heel blister and wound.  She reviewed was diagnosed with left leg cellulitis in a diabetic foot ulcer and was started on vancomycin and Zosyn.  Cultures from the wound grew MSSA and Pantoea Agglomerans.  She was evaluated by our wound care nurse.  Her infection continued to improve throughout the hospitalization.  She remained afebrile throughout the hospitalization as well.  She did not have a leukocytosis.  Symptomatically she improved.  Her antibiotics were changed to Bactrim at the time of discharge as both of the bacteria were sensitive to this.  I will give her total of 10 days.  She is to follow up with our wound care clinic.  I've also recommended her to see a podiatrist for fitting for diabetic shoes.  I discussed discharge plan with patient and she is in agreement.  We will have nursing come to her house to help with dressing changes    PHYSICAL EXAM  Temp:  [98.4 °F (36.9 °C)-99.1 °F (37.3 °C)] 98.5 °F (36.9 °C)  Heart Rate:  [75-84] 84  Resp:  [16] 16  BP: (109-142)/(57-87) 142/87  Body mass index is 42.72 kg/(m^2).  Physical Exam  Constitutional: She is oriented to person, place, and time. She appears well-developed and well-nourished.   HENT:   Head: Normocephalic and atraumatic.   Mouth/Throat: Oropharynx is clear and moist. No oropharyngeal exudate.   Eyes: Conjunctivae and EOM are normal. Pupils are equal, round, and reactive to light. No scleral icterus.   Cardiovascular: Normal rate, regular rhythm and normal heart sounds.  Exam reveals no gallop and no friction rub.    No murmur heard.  Pulmonary/Chest: Effort normal and breath sounds normal. No respiratory distress. She has no wheezes. She has no rales.   Abdominal: Soft. Bowel sounds are normal. She exhibits no distension. There is no tenderness. There is no rebound and no guarding.   Musculoskeletal: She exhibits edema (left leg ). She exhibits no tenderness.   Neurological: She is alert and oriented to person,  place, and time. No cranial nerve deficit.   Skin: Skin is warm and dry. There is erythema (left leg, improvin daily).   Large blister on the dorsum of left foot, foot wrapped so didn't unwrap it, seen on pictures before and after chemical debridement today       CONDITION ON DISCHARGE  Stable.      DISCHARGE DISPOSITION   Home or Self Care  Home with Home Health      DISCHARGE MEDICATIONS   Pratibha Pascal   Home Medication Instructions JAVIER:[id number]    Printed on:07/25/17 9998   Medication Information                      ALPRAZolam (XANAX) 0.5 MG tablet  Take 1 tablet by mouth Daily. TAKE 1 TABLET BY MOUTH EVERY DAY AS NEEDED             aspirin 81 MG tablet  Take by mouth daily.             atorvastatin (LIPITOR) 10 MG tablet  Take 0.5 tablets by mouth Daily. Take one tablet once daily             baclofen (LIORESAL) 10 MG tablet  Take 1 tablet by mouth 3 (Three) Times a Day.             CIMZIA STARTER KIT 6 X 200 MG/ML kit               FARXIGA 5 MG tablet tablet  Take 1 tablet by mouth Daily. TAKE 1 TABLET BY MOUTH EVERY MORNING             FLUoxetine (PROzac) 20 MG capsule  Take 3 capsules by mouth Daily.             folic acid (FOLVITE) 1 MG tablet               hydrophor (AQUAPHOR) ointment ointment  Apply  topically Daily.             levothyroxine (SYNTHROID, LEVOTHROID) 75 MCG tablet  Take 1 tablet by mouth Daily.             losartan (COZAAR) 50 MG tablet  Take 1 tablet by mouth Daily. TAKE 1 TABLET BY MOUTH EVERY DAY             metFORMIN (GLUCOPHAGE) 1000 MG tablet  Take 1 tablet by mouth 2 (Two) Times a Day With Meals.             metoprolol succinate XL (TOPROL-XL) 100 MG 24 hr tablet  Take 1 tablet by mouth Daily. TAKE 1 TABLET BY MOUTH EVERY DAY             saccharomyces boulardii (FLORASTOR) 250 MG capsule  Take 1 capsule by mouth 2 (Two) Times a Day.             silver sulfadiazine (SILVADENE) 1 % cream  Apply  topically 2 (Two) Times a Day.             SITagliptin (JANUVIA) 100 MG tablet  Take  1 tablet by mouth Daily.             sulfamethoxazole-trimethoprim (BACTRIM DS,SEPTRA DS) 800-160 MG per tablet  Take 1 tablet by mouth Every 12 (Twelve) Hours for 7 days. Indications: Skin and Soft Tissue Infection             sulfaSALAzine (AZULFIDINE) 500 MG tablet  TAKE 3 TABLETS BY MOUTH TWICE DAILY             triamterene-hydrochlorothiazide (MAXZIDE-25) 37.5-25 MG per tablet  Take 1 tablet by mouth Daily. TAKE 1 TABLET BY MOUTH EVERY DAY                No future appointments.  Additional Instructions for the Follow-ups that You Need to Schedule     Ambulatory Referral to Home Health    As directed    Face to Face Visit Date:  7/25/2017   Follow-up Provider for Plan of Care?:  I treated the patient in an acute care facility and will not continue treatment after discharge.   Follow-up Provider:  RASHIDA WU   Reason/Clinical Findings:  Left diabetic foot ulcer   Describe mobility limitations that make leaving home difficult:  foot ulcer   Nursing/Therapeutic Services Requested:  Skilled Nursing   Skilled nursing orders:  Wound care dressing/changes   Frequency:  1 Week 1             Follow-up Information     Follow up with [facility] .    Specialty:  Wound Care    Contact information:    [address]  [facility] [address] [phone]        Follow up with [facility] .    Specialty:  Home Health Services    Contact information:    [address] [facility] [address]  [facility] [address] [phone]        Follow up with Rashida Wu MD Follow up in 1 week(s).    Specialty:  Family Medicine    Contact information:    [address] [facility] [address]  [facility] [id number] [phone]            TEST  RESULTS PENDING AT DISCHARGE     None       Eric Johnson MD  [facility]  07/25/17  10:41 AM      Time: greater than 30 minutes.       Electronically signed by Eric Johnson MD at 7/25/2017 10:47 AM

## 2017-08-03 ENCOUNTER — OFFICE VISIT (OUTPATIENT)
Dept: FAMILY MEDICINE CLINIC | Facility: CLINIC | Age: 57
End: 2017-08-03

## 2017-08-03 VITALS
TEMPERATURE: 99.1 F | RESPIRATION RATE: 16 BRPM | HEART RATE: 74 BPM | SYSTOLIC BLOOD PRESSURE: 136 MMHG | DIASTOLIC BLOOD PRESSURE: 77 MMHG | BODY MASS INDEX: 39.68 KG/M2 | HEIGHT: 72 IN | WEIGHT: 293 LBS

## 2017-08-03 DIAGNOSIS — L97.529 DIABETIC ULCER OF LEFT FOOT ASSOCIATED WITH DIABETES MELLITUS DUE TO UNDERLYING CONDITION (HCC): ICD-10-CM

## 2017-08-03 DIAGNOSIS — E08.621 DIABETIC ULCER OF LEFT FOOT ASSOCIATED WITH DIABETES MELLITUS DUE TO UNDERLYING CONDITION (HCC): ICD-10-CM

## 2017-08-03 DIAGNOSIS — F33.42 MAJOR DEPRESSIVE DISORDER, RECURRENT, IN FULL REMISSION (HCC): ICD-10-CM

## 2017-08-03 DIAGNOSIS — L03.116 CELLULITIS OF LEFT LEG: Primary | ICD-10-CM

## 2017-08-03 PROCEDURE — 99213 OFFICE O/P EST LOW 20 MIN: CPT | Performed by: FAMILY MEDICINE

## 2017-08-03 RX ORDER — ALPRAZOLAM 0.5 MG/1
0.5 TABLET ORAL DAILY
Qty: 30 TABLET | Refills: 2 | Status: SHIPPED | OUTPATIENT
Start: 2017-08-03 | End: 2017-10-16 | Stop reason: SDUPTHER

## 2017-08-03 NOTE — PROGRESS NOTES
Subjective   Pratibha Pascal is a 57 y.o. female.     CC: Hospital f/U for Cellulitis and Ulcer    History of Present Illness     Pt returns today after recent hospitalization for the above. That visit was as follows:    Date of Admission:  7/22/2017  Date of Discharge:  7/25/2017     PCP: Benitez Wu MD        CHIEF COMPLAINT  Foot Pain        DISCHARGE DIAGNOSIS        Active Hospital Problems (** Indicates Principal Problem)     Diagnosis Date Noted   • **Cellulitis of left leg [L03.116] 07/22/2017   • MSSA (methicillin susceptible Staphylococcus aureus) infection [A49.01] 07/24/2017   • Diabetes mellitus with neurological manifestation [E11.49] 07/22/2017   • Type 2 diabetes mellitus, uncontrolled [E11.65]     • CAD (coronary artery disease) [I25.10]     • HLD (hyperlipidemia) [E78.5]     • Benign essential hypertension [I10]     • Hypothyroidism [E03.9]         Resolved Hospital Problems     Diagnosis Date Noted Date Resolved   No resolved problems to display.         SECONDARY DIAGNOSES   Medical History         Past Medical History:   Diagnosis Date   • Allergy to mold     • Benign essential hypertension     • CAD (coronary artery disease)     • Diabetes mellitus     • Encounter for urine test 07/2015     microalbumin; 461   • H/O bone density study unclear   • H/O complete eye exam 2 -3 years   • History of myocardial infarction     • HLD (hyperlipidemia)     • Hypothyroidism     • Major depressive disorder, recurrent, in full remission     • Myocardial infarction     • BRANNON (obstructive sleep apnea)     • Rheumatoid arthritis     • Seasonal allergies     • Tobacco abuse              CONSULTS   None  Consult Orders (all)     Start       Ordered     07/22/17 1240   [facility] (on-call MD unless specified)  Once     Specialty:  Internal Medicine  Provider:  Quentin Zaragoza MD     07/22/17 1239         Treatment Team             Provider Relationship Specialty Contact     Eric Johnson MD Attending --    [phone]     Lori Obregon, RN Registered Nurse --   [phone]     Hamida Bhatti Certified Nursing Assistant --         Margaret Dyer, RN Registered Nurse --   [phone]     Mari Rich, NAVEED Case Manager --         Denise Ellis, RRT Respiratory Therapist Respiratory Therapy   [phone]            PROCEDURES PERFORMED  None           HOSPITAL COURSE  The patient is a very pleasant 57-year-old female with history of uncontrolled type 2 diabetes with an A1c of 8.4.  She also has a history of peripheral neuropathy.  She came into the hospital with left leg redness and left heel blister and wound.  She reviewed was diagnosed with left leg cellulitis in a diabetic foot ulcer and was started on vancomycin and Zosyn.  Cultures from the wound grew MSSA and Pantoea Aggl[employer].  She was evaluated by our wound care nurse.  Her infection continued to improve throughout the hospitalization.  She remained afebrile throughout the hospitalization as well.  She did not have a leukocytosis.  Symptomatically she improved.  Her antibiotics were changed to Bactrim at the time of discharge as both of the bacteria were sensitive to this.  I will give her total of 10 days.  She is to follow up with our wound care clinic.  I've also recommended her to see a podiatrist for fitting for diabetic shoes.  I discussed discharge plan with patient and she is in agreement.  We will have nursing come to her house to help with dressing changes        Current medication list is compared to recent hospital d/c and the medications are reconciled.    The following portions of the patient's history were reviewed and updated as appropriate: allergies, current medications, past family history, past medical history, past social history, past surgical history and problem list.    Review of Systems   Constitutional: Negative for activity change, chills, fatigue and fever.   Respiratory: Negative for cough and chest tightness.   "  Cardiovascular: Negative for chest pain and palpitations.   Gastrointestinal: Negative for abdominal pain and nausea.   Endocrine: Negative for cold intolerance.   Psychiatric/Behavioral: Negative for behavioral problems and dysphoric mood.     /77  Pulse 74  Temp 99.1 °F (37.3 °C) (Oral)   Resp 16  Ht 72\" (182.9 cm)  Wt (!) 315 lb (143 kg)  BMI 42.72 kg/m2    Objective   Physical Exam   Constitutional: She appears well-developed and well-nourished.   Neck: Neck supple. No thyromegaly present.   Cardiovascular: Normal rate and regular rhythm.    No murmur heard.  Pulmonary/Chest: Effort normal and breath sounds normal.   Abdominal: Bowel sounds are normal.   Skin:   Left plantar foot with healing ulcer noted   Psychiatric: She has a normal mood and affect. Her behavior is normal.   Nursing note and vitals reviewed.  Hospital records reviewed with pt confirming HPI.      Assessment/Plan   Pratibha was seen today for diabetic ulcer lt foot and diabetes.    Diagnoses and all orders for this visit:    Cellulitis of left leg    Diabetic ulcer of left foot associated with diabetes mellitus due to underlying condition  -     Ambulatory Referral to Podiatry    Major depressive disorder, recurrent, in full remission  -     ALPRAZolam (XANAX) 0.5 MG tablet; Take 1 tablet by mouth Daily. TAKE 1 TABLET BY MOUTH EVERY DAY AS NEEDED    Pt has finished the abx and is improving.           "

## 2017-08-18 DIAGNOSIS — IMO0001 UNCONTROLLED TYPE 2 DIABETES MELLITUS WITHOUT COMPLICATION, WITHOUT LONG-TERM CURRENT USE OF INSULIN: ICD-10-CM

## 2017-08-22 RX ORDER — DAPAGLIFLOZIN 5 MG/1
TABLET, FILM COATED ORAL
Qty: 90 TABLET | Refills: 1 | OUTPATIENT
Start: 2017-08-22

## 2017-09-08 DIAGNOSIS — E78.2 MIXED HYPERLIPIDEMIA: ICD-10-CM

## 2017-09-08 RX ORDER — ATORVASTATIN CALCIUM 10 MG/1
TABLET, FILM COATED ORAL
Qty: 45 TABLET | Refills: 1 | OUTPATIENT
Start: 2017-09-08

## 2017-10-02 DIAGNOSIS — IMO0001 UNCONTROLLED TYPE 2 DIABETES MELLITUS WITHOUT COMPLICATION, WITHOUT LONG-TERM CURRENT USE OF INSULIN: ICD-10-CM

## 2017-10-02 DIAGNOSIS — I10 BENIGN ESSENTIAL HYPERTENSION: ICD-10-CM

## 2017-10-02 DIAGNOSIS — M54.2 CERVICALGIA: ICD-10-CM

## 2017-10-02 RX ORDER — TRIAMTERENE AND HYDROCHLOROTHIAZIDE 37.5; 25 MG/1; MG/1
TABLET ORAL
Qty: 90 TABLET | Refills: 1 | OUTPATIENT
Start: 2017-10-02

## 2017-10-02 RX ORDER — METOPROLOL SUCCINATE 100 MG/1
TABLET, EXTENDED RELEASE ORAL
Qty: 90 TABLET | Refills: 1 | OUTPATIENT
Start: 2017-10-02

## 2017-10-02 RX ORDER — BACLOFEN 10 MG/1
TABLET ORAL
Qty: 270 TABLET | Refills: 1 | OUTPATIENT
Start: 2017-10-02

## 2017-10-07 ENCOUNTER — PATIENT MESSAGE (OUTPATIENT)
Dept: FAMILY MEDICINE CLINIC | Facility: CLINIC | Age: 57
End: 2017-10-07

## 2017-10-07 DIAGNOSIS — IMO0001 UNCONTROLLED TYPE 2 DIABETES MELLITUS WITHOUT COMPLICATION, WITHOUT LONG-TERM CURRENT USE OF INSULIN: ICD-10-CM

## 2017-10-09 DIAGNOSIS — I10 BENIGN ESSENTIAL HYPERTENSION: ICD-10-CM

## 2017-10-09 DIAGNOSIS — E78.2 MIXED HYPERLIPIDEMIA: Primary | ICD-10-CM

## 2017-10-09 DIAGNOSIS — E11.9 TYPE 2 DIABETES MELLITUS WITHOUT COMPLICATION, WITHOUT LONG-TERM CURRENT USE OF INSULIN (HCC): ICD-10-CM

## 2017-10-09 RX ORDER — DAPAGLIFLOZIN 5 MG/1
TABLET, FILM COATED ORAL
Qty: 30 TABLET | Refills: 0 | Status: SHIPPED | OUTPATIENT
Start: 2017-10-09 | End: 2017-10-09 | Stop reason: SDUPTHER

## 2017-10-09 RX ORDER — DAPAGLIFLOZIN 5 MG/1
5 TABLET, FILM COATED ORAL DAILY
Qty: 30 TABLET | Refills: 0 | Status: SHIPPED | OUTPATIENT
Start: 2017-10-09 | End: 2017-10-16 | Stop reason: SDUPTHER

## 2017-10-12 DIAGNOSIS — I10 BENIGN ESSENTIAL HYPERTENSION: ICD-10-CM

## 2017-10-12 RX ORDER — TRIAMTERENE AND HYDROCHLOROTHIAZIDE 37.5; 25 MG/1; MG/1
1 TABLET ORAL DAILY
Qty: 30 TABLET | Refills: 0 | Status: SHIPPED | OUTPATIENT
Start: 2017-10-12 | End: 2017-10-16 | Stop reason: SDUPTHER

## 2017-10-15 LAB
ALBUMIN SERPL-MCNC: 4.3 G/DL (ref 3.5–5.5)
ALBUMIN/GLOB SERPL: 2.2 {RATIO} (ref 1.2–2.2)
ALP SERPL-CCNC: 81 IU/L (ref 39–117)
ALT SERPL-CCNC: 42 IU/L (ref 0–32)
AST SERPL-CCNC: 39 IU/L (ref 0–40)
BILIRUB SERPL-MCNC: 0.4 MG/DL (ref 0–1.2)
BUN SERPL-MCNC: 19 MG/DL (ref 6–24)
BUN/CREAT SERPL: 23 (ref 9–23)
CALCIUM SERPL-MCNC: 9.7 MG/DL (ref 8.7–10.2)
CHLORIDE SERPL-SCNC: 97 MMOL/L (ref 96–106)
CHOLEST SERPL-MCNC: 140 MG/DL (ref 100–199)
CO2 SERPL-SCNC: 22 MMOL/L (ref 18–29)
CREAT SERPL-MCNC: 0.82 MG/DL (ref 0.57–1)
GLOBULIN SER CALC-MCNC: 2 G/DL (ref 1.5–4.5)
GLUCOSE SERPL-MCNC: 177 MG/DL (ref 65–99)
HBA1C MFR BLD: 6.7 % (ref 4.8–5.6)
HDLC SERPL-MCNC: 39 MG/DL
LDLC SERPL CALC-MCNC: 46 MG/DL (ref 0–99)
POTASSIUM SERPL-SCNC: 4 MMOL/L (ref 3.5–5.2)
PROT SERPL-MCNC: 6.3 G/DL (ref 6–8.5)
SODIUM SERPL-SCNC: 139 MMOL/L (ref 134–144)
TRIGL SERPL-MCNC: 273 MG/DL (ref 0–149)
VLDLC SERPL CALC-MCNC: 55 MG/DL (ref 5–40)

## 2017-10-16 ENCOUNTER — OFFICE VISIT (OUTPATIENT)
Dept: FAMILY MEDICINE CLINIC | Facility: CLINIC | Age: 57
End: 2017-10-16

## 2017-10-16 VITALS
TEMPERATURE: 98.3 F | DIASTOLIC BLOOD PRESSURE: 71 MMHG | HEIGHT: 72 IN | RESPIRATION RATE: 16 BRPM | WEIGHT: 293 LBS | BODY MASS INDEX: 39.68 KG/M2 | SYSTOLIC BLOOD PRESSURE: 131 MMHG | HEART RATE: 80 BPM

## 2017-10-16 DIAGNOSIS — E78.2 MIXED HYPERLIPIDEMIA: ICD-10-CM

## 2017-10-16 DIAGNOSIS — E11.9 TYPE 2 DIABETES MELLITUS WITHOUT COMPLICATION, WITHOUT LONG-TERM CURRENT USE OF INSULIN (HCC): ICD-10-CM

## 2017-10-16 DIAGNOSIS — I10 BENIGN ESSENTIAL HYPERTENSION: ICD-10-CM

## 2017-10-16 DIAGNOSIS — F33.42 MAJOR DEPRESSIVE DISORDER, RECURRENT, IN FULL REMISSION (HCC): ICD-10-CM

## 2017-10-16 DIAGNOSIS — M54.2 CERVICALGIA: ICD-10-CM

## 2017-10-16 DIAGNOSIS — E03.9 ACQUIRED HYPOTHYROIDISM: ICD-10-CM

## 2017-10-16 PROCEDURE — 99214 OFFICE O/P EST MOD 30 MIN: CPT | Performed by: FAMILY MEDICINE

## 2017-10-16 RX ORDER — DAPAGLIFLOZIN 5 MG/1
5 TABLET, FILM COATED ORAL DAILY
Qty: 90 TABLET | Refills: 1 | Status: SHIPPED | OUTPATIENT
Start: 2017-10-16 | End: 2018-04-16 | Stop reason: SDUPTHER

## 2017-10-16 RX ORDER — METOPROLOL SUCCINATE 100 MG/1
100 TABLET, EXTENDED RELEASE ORAL DAILY
Qty: 90 TABLET | Refills: 1 | Status: SHIPPED | OUTPATIENT
Start: 2017-10-16 | End: 2018-04-16 | Stop reason: SDUPTHER

## 2017-10-16 RX ORDER — LOSARTAN POTASSIUM 50 MG/1
50 TABLET ORAL DAILY
Qty: 90 TABLET | Refills: 1 | Status: SHIPPED | OUTPATIENT
Start: 2017-10-16 | End: 2018-04-16 | Stop reason: SDUPTHER

## 2017-10-16 RX ORDER — FLUOXETINE HYDROCHLORIDE 20 MG/1
60 CAPSULE ORAL DAILY
Qty: 270 CAPSULE | Refills: 1 | Status: SHIPPED | OUTPATIENT
Start: 2017-10-16 | End: 2018-04-16 | Stop reason: SDUPTHER

## 2017-10-16 RX ORDER — TRIAMTERENE AND HYDROCHLOROTHIAZIDE 37.5; 25 MG/1; MG/1
1 TABLET ORAL DAILY
Qty: 90 TABLET | Refills: 1 | Status: SHIPPED | OUTPATIENT
Start: 2017-10-16 | End: 2018-04-16 | Stop reason: SDUPTHER

## 2017-10-16 RX ORDER — ATORVASTATIN CALCIUM 10 MG/1
5 TABLET, FILM COATED ORAL DAILY
Qty: 45 TABLET | Refills: 1 | Status: SHIPPED | OUTPATIENT
Start: 2017-10-16 | End: 2017-10-19 | Stop reason: SDUPTHER

## 2017-10-16 RX ORDER — LEVOTHYROXINE SODIUM 0.07 MG/1
75 TABLET ORAL DAILY
Qty: 90 TABLET | Refills: 1 | Status: SHIPPED | OUTPATIENT
Start: 2017-10-16 | End: 2018-04-16 | Stop reason: SDUPTHER

## 2017-10-16 RX ORDER — ALPRAZOLAM 0.5 MG/1
0.5 TABLET ORAL DAILY
Qty: 30 TABLET | Refills: 2 | Status: SHIPPED | OUTPATIENT
Start: 2017-10-16 | End: 2018-01-16 | Stop reason: SDUPTHER

## 2017-10-16 RX ORDER — BACLOFEN 10 MG/1
10 TABLET ORAL 3 TIMES DAILY
Qty: 270 TABLET | Refills: 1 | Status: SHIPPED | OUTPATIENT
Start: 2017-10-16 | End: 2018-04-16 | Stop reason: SDUPTHER

## 2017-10-16 NOTE — PROGRESS NOTES
Subjective   Pratibha Pascal is a 57 y.o. female.     History of Present Illness     Chief Complaint:   Chief Complaint   Patient presents with   • Hypertension     med refill [facility] - mammogram / pap due   • Hyperlipidemia   • Anxiety   • Diabetes     diabetic foot exam due.       Pratibha Pascal 57 y.o. female who presents today for Medical Management of the below listed issues and medication refills.  she has a problem list of   Patient Active Problem List   Diagnosis   • CAD (coronary artery disease)   • Type 2 diabetes mellitus, uncontrolled   • HLD (hyperlipidemia)   • Benign essential hypertension   • Hypothyroidism   • Major depressive disorder, recurrent, in full remission   • Rheumatoid arthritis   • Tobacco abuse   • Cervicalgia   • Diabetes mellitus   • Dyslipidemia   • BP (high blood pressure)   • Obstructive apnea   • AF (paroxysmal atrial fibrillation)   • Proteinuria   • Burn   • Cellulitis of left leg   • Diabetes mellitus with neurological manifestation   • MSSA (methicillin susceptible Staphylococcus aureus) infection   • Diabetic ulcer of left foot associated with diabetes mellitus due to underlying condition   .  Since the last visit, she has overall felt well.  she has been compliant with   Current Outpatient Prescriptions:   •  ALPRAZolam (XANAX) 0.5 MG tablet, Take 1 tablet by mouth Daily. TAKE 1 TABLET BY MOUTH EVERY DAY AS NEEDED, Disp: 30 tablet, Rfl: 2  •  atorvastatin (LIPITOR) 10 MG tablet, Take 0.5 tablets by mouth Daily. Take one tablet once daily, Disp: 45 tablet, Rfl: 1  •  FLUoxetine (PROzac) 20 MG capsule, Take 3 capsules by mouth Daily., Disp: 270 capsule, Rfl: 1  •  levothyroxine (SYNTHROID, LEVOTHROID) 75 MCG tablet, Take 1 tablet by mouth Daily., Disp: 90 tablet, Rfl: 1  •  losartan (COZAAR) 50 MG tablet, Take 1 tablet by mouth Daily. TAKE 1 TABLET BY MOUTH EVERY DAY, Disp: 90 tablet, Rfl: 1  •  metFORMIN (GLUCOPHAGE) 1000 MG tablet, Take 1 tablet by mouth 2 (Two) Times a Day With  "Meals., Disp: 180 tablet, Rfl: 1  •  metoprolol succinate XL (TOPROL-XL) 100 MG 24 hr tablet, Take 1 tablet by mouth Daily. TAKE 1 TABLET BY MOUTH EVERY DAY, Disp: 90 tablet, Rfl: 1  •  triamterene-hydrochlorothiazide (MAXZIDE-25) 37.5-25 MG per tablet, Take 1 tablet by mouth Daily. TAKE 1 TABLET BY MOUTH EVERY DAY, Disp: 90 tablet, Rfl: 1  •  aspirin 81 MG tablet, Take by mouth daily., Disp: , Rfl:   •  baclofen (LIORESAL) 10 MG tablet, Take 1 tablet by mouth 3 (Three) Times a Day., Disp: 270 tablet, Rfl: 1  •  CIMZIA STARTER KIT 6 X 200 MG/ML kit, , Disp: , Rfl:   •  FARXIGA 5 MG tablet tablet, Take 1 tablet by mouth Daily. TAKE 1 TABLET BY MOUTH EVERY MORNING, Disp: 90 tablet, Rfl: 1  •  folic acid (FOLVITE) 1 MG tablet, , Disp: , Rfl:   •  hydrophor (AQUAPHOR) ointment ointment, Apply  topically Daily., Disp: 454 g, Rfl: 0  •  saccharomyces boulardii (FLORASTOR) 250 MG capsule, Take 1 capsule by mouth 2 (Two) Times a Day., Disp: 60 capsule, Rfl: 0  •  silver sulfadiazine (SILVADENE) 1 % cream, Apply  topically 2 (Two) Times a Day., Disp: 50 g, Rfl: 1  •  SITagliptin (JANUVIA) 100 MG tablet, Take 1 tablet by mouth Daily., Disp: 90 tablet, Rfl: 1  •  sulfaSALAzine (AZULFIDINE) 500 MG tablet, TAKE 3 TABLETS BY MOUTH TWICE DAILY, Disp: , Rfl: 1  •  triamcinolone (KENALOG) 0.1 % ointment, Apply  topically 3 (Three) Times a Day., Disp: 80 g, Rfl: 0.  she denies medication side effects.    All of the chronic condition(s) listed above are stable w/o issues.    /71  Pulse 80  Temp 98.3 °F (36.8 °C) (Oral)   Resp 16  Ht 72\" (182.9 cm)  Wt (!) 330 lb (150 kg)  BMI 44.76 kg/m2    Results for orders placed or performed in visit on 10/09/17   Hemoglobin A1c   Result Value Ref Range    Hemoglobin A1C 6.7 (H) 4.8 - 5.6 %   Comprehensive metabolic panel   Result Value Ref Range    Glucose 177 (H) 65 - 99 mg/dL    BUN 19 6 - 24 mg/dL    Creatinine 0.82 0.57 - 1.00 mg/dL    eGFR Non African Am 79 >59 mL/min/1.73    " eGFR African Am 91 >59 mL/min/1.73    BUN/Creatinine Ratio 23 9 - 23    Sodium 139 134 - 144 mmol/L    Potassium 4.0 3.5 - 5.2 mmol/L    Chloride 97 96 - 106 mmol/L    Total CO2 22 18 - 29 mmol/L    Calcium 9.7 8.7 - 10.2 mg/dL    Total Protein 6.3 6.0 - 8.5 g/dL    Albumin 4.3 3.5 - 5.5 g/dL    Globulin 2.0 1.5 - 4.5 g/dL    A/G Ratio 2.2 1.2 - 2.2    Total Bilirubin 0.4 0.0 - 1.2 mg/dL    Alkaline Phosphatase 81 39 - 117 IU/L    AST (SGOT) 39 0 - 40 IU/L    ALT (SGPT) 42 (H) 0 - 32 IU/L   Lipid panel   Result Value Ref Range    Total Cholesterol 140 100 - 199 mg/dL    Triglycerides 273 (H) 0 - 149 mg/dL    HDL Cholesterol 39 (L) >39 mg/dL    VLDL Cholesterol 55 (H) 5 - 40 mg/dL    LDL Cholesterol  46 0 - 99 mg/dL         The following portions of the patient's history were reviewed and updated as appropriate: allergies, current medications, past family history, past medical history, past social history, past surgical history and problem list.    Review of Systems   Constitutional: Negative for activity change, chills, fatigue and fever.   Respiratory: Negative for cough and chest tightness.    Cardiovascular: Negative for chest pain and palpitations.   Gastrointestinal: Negative for abdominal pain and nausea.   Endocrine: Negative for cold intolerance.   Psychiatric/Behavioral: Negative for behavioral problems and dysphoric mood.       Objective   Physical Exam   Constitutional: She appears well-developed and well-nourished.   Neck: Neck supple. No thyromegaly present.   Cardiovascular: Normal rate and regular rhythm.    No murmur heard.  Pulmonary/Chest: Effort normal and breath sounds normal.   Abdominal: Bowel sounds are normal.    Pratibha had a diabetic foot exam performed today.   During the foot exam she had a monofilament test performed.    Neurological Sensory Findings - Unaltered hot/cold right ankle/foot discrimination and unaltered hot/cold left ankle/foot discrimination. Altered sharp/dull right  ankle/foot discrimination (plantar surface) and altered sharp/dull left ankle/foot discrimination (plantar surface). No right ankle/foot altered proprioception and no left ankle/foot altered proprioception    Vascular Status -  Her exam exhibits right foot vasculature normal. Her exam exhibits no right foot edema. Her exam exhibits left foot vasculature normal. Her exam exhibits no left foot edema.   Skin Integrity  -  Her right foot skin is intact.     Pratibha 's left foot skin is intact. .  Psychiatric: She has a normal mood and affect. Her behavior is normal.   Nursing note and vitals reviewed.  Labs reviewed with pt today during visit. All questions answered.    The patient has read and signed the [facility] Controlled Substance Contract.  I will continue to see patient for regular follow up appointments.  They are well controlled on their medication.  DILLAN has been reviewed by me and is updated every 3 months. The patient is aware of the potential for addiction and dependence.    Assessment/Plan   Pratibha was seen today for hypertension, hyperlipidemia, anxiety and diabetes.    Diagnoses and all orders for this visit:    Type 2 diabetes mellitus without complication, without long-term current use of insulin  -     metFORMIN (GLUCOPHAGE) 1000 MG tablet; Take 1 tablet by mouth 2 (Two) Times a Day With Meals.  -     FARXIGA 5 MG tablet tablet; Take 1 tablet by mouth Daily. TAKE 1 TABLET BY MOUTH EVERY MORNING    Major depressive disorder, recurrent, in full remission  -     ALPRAZolam (XANAX) 0.5 MG tablet; Take 1 tablet by mouth Daily. TAKE 1 TABLET BY MOUTH EVERY DAY AS NEEDED  -     FLUoxetine (PROzac) 20 MG capsule; Take 3 capsules by mouth Daily.    Acquired hypothyroidism  -     levothyroxine (SYNTHROID, LEVOTHROID) 75 MCG tablet; Take 1 tablet by mouth Daily.    Mixed hyperlipidemia  -     atorvastatin (LIPITOR) 10 MG tablet; Take 0.5 tablets by mouth Daily. Take one tablet once daily    Benign  essential hypertension  -     losartan (COZAAR) 50 MG tablet; Take 1 tablet by mouth Daily. TAKE 1 TABLET BY MOUTH EVERY DAY  -     metoprolol succinate XL (TOPROL-XL) 100 MG 24 hr tablet; Take 1 tablet by mouth Daily. TAKE 1 TABLET BY MOUTH EVERY DAY  -     triamterene-hydrochlorothiazide (MAXZIDE-25) 37.5-25 MG per tablet; Take 1 tablet by mouth Daily. TAKE 1 TABLET BY MOUTH EVERY DAY    Cervicalgia  -     baclofen (LIORESAL) 10 MG tablet; Take 1 tablet by mouth 3 (Three) Times a Day.

## 2017-10-19 ENCOUNTER — TELEPHONE (OUTPATIENT)
Dept: FAMILY MEDICINE CLINIC | Facility: CLINIC | Age: 57
End: 2017-10-19

## 2017-10-19 DIAGNOSIS — E78.2 MIXED HYPERLIPIDEMIA: ICD-10-CM

## 2017-10-19 RX ORDER — ATORVASTATIN CALCIUM 10 MG/1
5 TABLET, FILM COATED ORAL DAILY
Qty: 45 TABLET | Refills: 1 | Status: SHIPPED | OUTPATIENT
Start: 2017-10-19 | End: 2018-04-15 | Stop reason: SDUPTHER

## 2017-11-20 ENCOUNTER — OFFICE VISIT (OUTPATIENT)
Dept: FAMILY MEDICINE CLINIC | Facility: CLINIC | Age: 57
End: 2017-11-20

## 2017-11-20 VITALS
SYSTOLIC BLOOD PRESSURE: 151 MMHG | RESPIRATION RATE: 16 BRPM | WEIGHT: 293 LBS | DIASTOLIC BLOOD PRESSURE: 82 MMHG | TEMPERATURE: 99.1 F | BODY MASS INDEX: 39.68 KG/M2 | HEART RATE: 80 BPM | HEIGHT: 72 IN

## 2017-11-20 DIAGNOSIS — J02.9 ACUTE PHARYNGITIS, UNSPECIFIED ETIOLOGY: Primary | ICD-10-CM

## 2017-11-20 PROCEDURE — 99213 OFFICE O/P EST LOW 20 MIN: CPT | Performed by: FAMILY MEDICINE

## 2017-11-20 RX ORDER — AMOXICILLIN AND CLAVULANATE POTASSIUM 875; 125 MG/1; MG/1
1 TABLET, FILM COATED ORAL 2 TIMES DAILY
Qty: 20 TABLET | Refills: 0 | Status: SHIPPED | OUTPATIENT
Start: 2017-11-20 | End: 2018-01-16

## 2017-11-20 NOTE — PROGRESS NOTES
Subjective   Pratibha Pascal is a 57 y.o. female.     CC: URI    History of Present Illness     Pratibha Pascal 57 y.o. female who presents for evaluation of sore throat. Symptoms include congestion and myalgias.  Onset of symptoms was 2 days ago, rapidly worsening since that time. Patient denies shortness of breath, wheezing.   Evaluation to date: none Treatment to date:  none.     PT's daughter was tested and confirmed to have Strep.3 days ago.    The following portions of the patient's history were reviewed and updated as appropriate: allergies, current medications, past family history, past medical history, past social history, past surgical history and problem list.    Review of Systems   Constitutional: Positive for fatigue. Negative for activity change, chills and fever.   HENT: Positive for sore throat and trouble swallowing. Negative for congestion, drooling, ear discharge and ear pain.    Respiratory: Negative for cough, chest tightness, shortness of breath and stridor.    Cardiovascular: Negative for chest pain and palpitations.   Gastrointestinal: Negative for abdominal pain, diarrhea, nausea and vomiting.   Endocrine: Negative for cold intolerance.   Musculoskeletal: Positive for myalgias. Negative for neck pain.   Neurological: Negative for headaches.   Psychiatric/Behavioral: Negative for behavioral problems and dysphoric mood.       Objective   Physical Exam   Constitutional: She appears well-developed and well-nourished.   HENT:   Mouth/Throat: Posterior oropharyngeal erythema present.   Neck: Neck supple. No thyromegaly present.   Cardiovascular: Normal rate and regular rhythm.    No murmur heard.  Pulmonary/Chest: Effort normal and breath sounds normal.   Abdominal: Bowel sounds are normal.   Psychiatric: She has a normal mood and affect. Her behavior is normal.   Nursing note and vitals reviewed.      Assessment/Plan   Pratibha was seen today for nasal congestion and sore throat.    Diagnoses and all  orders for this visit:    Acute pharyngitis, unspecified etiology  -     amoxicillin-clavulanate (AUGMENTIN) 875-125 MG per tablet; Take 1 tablet by mouth 2 (Two) Times a Day.

## 2018-01-06 DIAGNOSIS — IMO0001 UNCONTROLLED TYPE 2 DIABETES MELLITUS WITHOUT COMPLICATION, WITHOUT LONG-TERM CURRENT USE OF INSULIN: ICD-10-CM

## 2018-01-09 RX ORDER — SITAGLIPTIN 100 MG/1
TABLET, FILM COATED ORAL
Qty: 90 TABLET | Refills: 0 | Status: SHIPPED | OUTPATIENT
Start: 2018-01-09 | End: 2018-04-16 | Stop reason: SDUPTHER

## 2018-01-16 ENCOUNTER — OFFICE VISIT (OUTPATIENT)
Dept: FAMILY MEDICINE CLINIC | Facility: CLINIC | Age: 58
End: 2018-01-16

## 2018-01-16 VITALS
SYSTOLIC BLOOD PRESSURE: 124 MMHG | HEART RATE: 79 BPM | DIASTOLIC BLOOD PRESSURE: 76 MMHG | TEMPERATURE: 97.8 F | BODY MASS INDEX: 39.68 KG/M2 | WEIGHT: 293 LBS | RESPIRATION RATE: 16 BRPM | HEIGHT: 72 IN

## 2018-01-16 DIAGNOSIS — F41.9 ANXIETY: Primary | ICD-10-CM

## 2018-01-16 DIAGNOSIS — I10 BENIGN ESSENTIAL HYPERTENSION: ICD-10-CM

## 2018-01-16 PROCEDURE — 99213 OFFICE O/P EST LOW 20 MIN: CPT | Performed by: FAMILY MEDICINE

## 2018-01-16 RX ORDER — ETANERCEPT 50 MG/ML
SOLUTION SUBCUTANEOUS
COMMUNITY
Start: 2018-01-15 | End: 2018-06-22 | Stop reason: ALTCHOICE

## 2018-01-16 RX ORDER — ALPRAZOLAM 0.5 MG/1
0.5 TABLET ORAL DAILY
Qty: 30 TABLET | Refills: 2 | Status: SHIPPED | OUTPATIENT
Start: 2018-01-16 | End: 2018-04-16 | Stop reason: SDUPTHER

## 2018-01-16 NOTE — PROGRESS NOTES
Subjective   Pratibha Pascal is a 57 y.o. female.     History of Present Illness     Chief Complaint:   Chief Complaint   Patient presents with   • Anxiety   • Hypertension       Pratibha Pascal 57 y.o. female who presents today for Medical Management of the below listed issues and medication refills.  she has a problem list of   Patient Active Problem List   Diagnosis   • CAD (coronary artery disease)   • Type 2 diabetes mellitus, uncontrolled   • HLD (hyperlipidemia)   • Benign essential hypertension   • Hypothyroidism   • Major depressive disorder, recurrent, in full remission   • Rheumatoid arthritis   • Tobacco abuse   • Cervicalgia   • Diabetes mellitus   • Dyslipidemia   • BP (high blood pressure)   • Obstructive apnea   • AF (paroxysmal atrial fibrillation)   • Proteinuria   • Burn   • Cellulitis of left leg   • Diabetes mellitus with neurological manifestation   • MSSA (methicillin susceptible Staphylococcus aureus) infection   • Diabetic ulcer of left foot associated with diabetes mellitus due to underlying condition   .  Since the last visit, she has overall felt well.  she has been compliant with   Current Outpatient Prescriptions:   •  ALPRAZolam (XANAX) 0.5 MG tablet, Take 1 tablet by mouth Daily. TAKE 1 TABLET BY MOUTH EVERY DAY AS NEEDED, Disp: 30 tablet, Rfl: 2  •  aspirin 81 MG tablet, Take by mouth daily., Disp: , Rfl:   •  atorvastatin (LIPITOR) 10 MG tablet, Take 0.5 tablets by mouth Daily., Disp: 45 tablet, Rfl: 1  •  baclofen (LIORESAL) 10 MG tablet, Take 1 tablet by mouth 3 (Three) Times a Day., Disp: 270 tablet, Rfl: 1  •  CIMZIA STARTER KIT 6 X 200 MG/ML kit, , Disp: , Rfl:   •  ENBREL 50 MG/ML injection, , Disp: , Rfl:   •  FARXIGA 5 MG tablet tablet, Take 1 tablet by mouth Daily. TAKE 1 TABLET BY MOUTH EVERY MORNING, Disp: 90 tablet, Rfl: 1  •  FLUoxetine (PROzac) 20 MG capsule, Take 3 capsules by mouth Daily., Disp: 270 capsule, Rfl: 1  •  folic acid (FOLVITE) 1 MG tablet, , Disp: , Rfl:   •   "hydrophor (AQUAPHOR) ointment ointment, Apply  topically Daily., Disp: 454 g, Rfl: 0  •  JANUVIA 100 MG tablet, TAKE 1 TABLET BY MOUTH DAILY., Disp: 90 tablet, Rfl: 0  •  levothyroxine (SYNTHROID, LEVOTHROID) 75 MCG tablet, Take 1 tablet by mouth Daily., Disp: 90 tablet, Rfl: 1  •  losartan (COZAAR) 50 MG tablet, Take 1 tablet by mouth Daily. TAKE 1 TABLET BY MOUTH EVERY DAY, Disp: 90 tablet, Rfl: 1  •  metFORMIN (GLUCOPHAGE) 1000 MG tablet, Take 1 tablet by mouth 2 (Two) Times a Day With Meals., Disp: 180 tablet, Rfl: 1  •  metoprolol succinate XL (TOPROL-XL) 100 MG 24 hr tablet, Take 1 tablet by mouth Daily. TAKE 1 TABLET BY MOUTH EVERY DAY, Disp: 90 tablet, Rfl: 1  •  saccharomyces boulardii (FLORASTOR) 250 MG capsule, Take 1 capsule by mouth 2 (Two) Times a Day., Disp: 60 capsule, Rfl: 0  •  silver sulfadiazine (SILVADENE) 1 % cream, Apply  topically 2 (Two) Times a Day., Disp: 50 g, Rfl: 1  •  sulfaSALAzine (AZULFIDINE) 500 MG tablet, TAKE 3 TABLETS BY MOUTH TWICE DAILY, Disp: , Rfl: 1  •  triamcinolone (KENALOG) 0.1 % ointment, Apply  topically 3 (Three) Times a Day., Disp: 80 g, Rfl: 0  •  triamterene-hydrochlorothiazide (MAXZIDE-25) 37.5-25 MG per tablet, Take 1 tablet by mouth Daily. TAKE 1 TABLET BY MOUTH EVERY DAY, Disp: 90 tablet, Rfl: 1.  she denies medication side effects.    All of the chronic condition(s) listed above are stable w/o issues.    /76  Pulse 79  Temp 97.8 °F (36.6 °C) (Oral)   Resp 16  Ht 182.9 cm (72\")  Wt (!) 148 kg (326 lb)  BMI 44.21 kg/m2    Results for orders placed or performed during the hospital encounter of 01/05/18   POCT Rapid Strep A   Result Value Ref Range    Rapid Strep A Screen Negative Negative, VALID, INVALID, Not Performed    Internal Control Passed Passed    Lot Number [id number]     Expiration Date 3552514    POCT Influenza A/B   Result Value Ref Range    Rapid Influenza A Ag neg     Rapid Influenza B Ag neg     Internal Control Passed Passed    Lot Number " [id number]     Expiration Date 3212020            The following portions of the patient's history were reviewed and updated as appropriate: allergies, current medications, past family history, past medical history, past social history, past surgical history and problem list.    Review of Systems   Constitutional: Negative for activity change, chills, fatigue and fever.   Respiratory: Negative for cough and chest tightness.    Cardiovascular: Negative for chest pain and palpitations.   Gastrointestinal: Negative for abdominal pain and nausea.   Endocrine: Negative for cold intolerance.   Psychiatric/Behavioral: Negative for behavioral problems and dysphoric mood.       Objective   Physical Exam   Constitutional: She appears well-developed and well-nourished.   Neck: Neck supple. No thyromegaly present.   Cardiovascular: Normal rate and regular rhythm.    No murmur heard.  Pulmonary/Chest: Effort normal and breath sounds normal.   Abdominal: Bowel sounds are normal.   Psychiatric: She has a normal mood and affect. Her behavior is normal.   Nursing note and vitals reviewed.    The patient has read and signed the [facility] Controlled Substance Contract.  I will continue to see patient for regular follow up appointments.  They are well controlled on their medication.  DILLAN has been reviewed by me and is updated every 3 months. The patient is aware of the potential for addiction and dependence.    Assessment/Plan   Pratibha was seen today for anxiety and hypertension.    Diagnoses and all orders for this visit:    Anxiety  -     ALPRAZolam (XANAX) 0.5 MG tablet; Take 1 tablet by mouth Daily. TAKE 1 TABLET BY MOUTH EVERY DAY AS NEEDED    Benign essential hypertension      Continue current BP meds/diet/exercise.

## 2018-04-15 DIAGNOSIS — E78.2 MIXED HYPERLIPIDEMIA: ICD-10-CM

## 2018-04-16 ENCOUNTER — OFFICE VISIT (OUTPATIENT)
Dept: FAMILY MEDICINE CLINIC | Facility: CLINIC | Age: 58
End: 2018-04-16

## 2018-04-16 VITALS
TEMPERATURE: 97.8 F | DIASTOLIC BLOOD PRESSURE: 81 MMHG | WEIGHT: 293 LBS | RESPIRATION RATE: 16 BRPM | HEART RATE: 80 BPM | HEIGHT: 72 IN | SYSTOLIC BLOOD PRESSURE: 141 MMHG | BODY MASS INDEX: 39.68 KG/M2

## 2018-04-16 DIAGNOSIS — E11.9 TYPE 2 DIABETES MELLITUS WITHOUT COMPLICATION, WITHOUT LONG-TERM CURRENT USE OF INSULIN (HCC): Primary | ICD-10-CM

## 2018-04-16 DIAGNOSIS — M54.2 CERVICALGIA: ICD-10-CM

## 2018-04-16 DIAGNOSIS — E03.9 ACQUIRED HYPOTHYROIDISM: ICD-10-CM

## 2018-04-16 DIAGNOSIS — I10 BENIGN ESSENTIAL HYPERTENSION: ICD-10-CM

## 2018-04-16 DIAGNOSIS — F41.9 ANXIETY: ICD-10-CM

## 2018-04-16 DIAGNOSIS — E78.2 MIXED HYPERLIPIDEMIA: ICD-10-CM

## 2018-04-16 DIAGNOSIS — F33.42 MAJOR DEPRESSIVE DISORDER, RECURRENT, IN FULL REMISSION (HCC): ICD-10-CM

## 2018-04-16 PROCEDURE — 99214 OFFICE O/P EST MOD 30 MIN: CPT | Performed by: FAMILY MEDICINE

## 2018-04-16 RX ORDER — ALPRAZOLAM 0.5 MG/1
0.5 TABLET ORAL DAILY
Qty: 30 TABLET | Refills: 2 | Status: SHIPPED | OUTPATIENT
Start: 2018-04-16 | End: 2018-07-16 | Stop reason: SDUPTHER

## 2018-04-16 RX ORDER — LEVOTHYROXINE SODIUM 0.07 MG/1
75 TABLET ORAL DAILY
Qty: 90 TABLET | Refills: 1 | Status: SHIPPED | OUTPATIENT
Start: 2018-04-16 | End: 2018-06-25

## 2018-04-16 RX ORDER — TRIAMTERENE AND HYDROCHLOROTHIAZIDE 37.5; 25 MG/1; MG/1
1 TABLET ORAL DAILY
Qty: 90 TABLET | Refills: 1 | Status: SHIPPED | OUTPATIENT
Start: 2018-04-16 | End: 2018-06-22 | Stop reason: ALTCHOICE

## 2018-04-16 RX ORDER — ATORVASTATIN CALCIUM 10 MG/1
TABLET, FILM COATED ORAL
Qty: 45 TABLET | Refills: 0 | Status: SHIPPED | OUTPATIENT
Start: 2018-04-16 | End: 2018-04-16 | Stop reason: SDUPTHER

## 2018-04-16 RX ORDER — LOSARTAN POTASSIUM 50 MG/1
50 TABLET ORAL DAILY
Qty: 90 TABLET | Refills: 1 | Status: SHIPPED | OUTPATIENT
Start: 2018-04-16 | End: 2018-06-22 | Stop reason: ALTCHOICE

## 2018-04-16 RX ORDER — DAPAGLIFLOZIN 5 MG/1
5 TABLET, FILM COATED ORAL DAILY
Qty: 90 TABLET | Refills: 1 | Status: SHIPPED | OUTPATIENT
Start: 2018-04-16 | End: 2018-06-22 | Stop reason: ALTCHOICE

## 2018-04-16 RX ORDER — BACLOFEN 10 MG/1
10 TABLET ORAL 3 TIMES DAILY
Qty: 270 TABLET | Refills: 1 | Status: SHIPPED | OUTPATIENT
Start: 2018-04-16 | End: 2018-10-15 | Stop reason: SDUPTHER

## 2018-04-16 RX ORDER — METOPROLOL SUCCINATE 100 MG/1
100 TABLET, EXTENDED RELEASE ORAL DAILY
Qty: 90 TABLET | Refills: 1 | Status: SHIPPED | OUTPATIENT
Start: 2018-04-16 | End: 2018-10-15 | Stop reason: SDUPTHER

## 2018-04-16 RX ORDER — FLUOXETINE HYDROCHLORIDE 20 MG/1
60 CAPSULE ORAL DAILY
Qty: 270 CAPSULE | Refills: 1 | Status: SHIPPED | OUTPATIENT
Start: 2018-04-16 | End: 2018-10-15 | Stop reason: SDUPTHER

## 2018-04-16 RX ORDER — ATORVASTATIN CALCIUM 10 MG/1
5 TABLET, FILM COATED ORAL DAILY
Qty: 45 TABLET | Refills: 1 | Status: SHIPPED | OUTPATIENT
Start: 2018-04-16 | End: 2018-10-15 | Stop reason: SDUPTHER

## 2018-04-16 NOTE — PROGRESS NOTES
Subjective   Pratibha Pascal is a 58 y.o. female.     History of Present Illness     Chief Complaint:   Chief Complaint   Patient presents with   • Anxiety     MED REFILL DILLAN    • Diabetes   • Hyperlipidemia   • Hypothyroidism       Pratibha Pascal 58 y.o. female who presents today for Medical Management of the below listed issues and medication refills.  she has a problem list of   Patient Active Problem List   Diagnosis   • CAD (coronary artery disease)   • Type 2 diabetes mellitus, uncontrolled   • HLD (hyperlipidemia)   • Benign essential hypertension   • Hypothyroidism   • Major depressive disorder, recurrent, in full remission   • Rheumatoid arthritis   • Tobacco abuse   • Cervicalgia   • Diabetes mellitus   • Dyslipidemia   • BP (high blood pressure)   • Obstructive apnea   • AF (paroxysmal atrial fibrillation)   • Proteinuria   • Burn   • Cellulitis of left leg   • Diabetes mellitus with neurological manifestation   • MSSA (methicillin susceptible Staphylococcus aureus) infection   • Diabetic ulcer of left foot associated with diabetes mellitus due to underlying condition   .  Since the last visit, she has overall felt well.  she has been compliant with   Current Outpatient Prescriptions:   •  ALPRAZolam (XANAX) 0.5 MG tablet, Take 1 tablet by mouth Daily. TAKE 1 TABLET BY MOUTH EVERY DAY AS NEEDED, Disp: 30 tablet, Rfl: 2  •  atorvastatin (LIPITOR) 10 MG tablet, Take 0.5 tablets by mouth Daily., Disp: 45 tablet, Rfl: 1  •  baclofen (LIORESAL) 10 MG tablet, Take 1 tablet by mouth 3 (Three) Times a Day., Disp: 270 tablet, Rfl: 1  •  FARXIGA 5 MG tablet tablet, Take 1 tablet by mouth Daily. TAKE 1 TABLET BY MOUTH EVERY MORNING, Disp: 90 tablet, Rfl: 1  •  FLUoxetine (PROzac) 20 MG capsule, Take 3 capsules by mouth Daily., Disp: 270 capsule, Rfl: 1  •  levothyroxine (SYNTHROID, LEVOTHROID) 75 MCG tablet, Take 1 tablet by mouth Daily., Disp: 90 tablet, Rfl: 1  •  losartan (COZAAR) 50 MG tablet, Take 1 tablet by  "mouth Daily. TAKE 1 TABLET BY MOUTH EVERY DAY, Disp: 90 tablet, Rfl: 1  •  metFORMIN (GLUCOPHAGE) 1000 MG tablet, Take 1 tablet by mouth 2 (Two) Times a Day With Meals., Disp: 180 tablet, Rfl: 1  •  metoprolol succinate XL (TOPROL-XL) 100 MG 24 hr tablet, Take 1 tablet by mouth Daily. TAKE 1 TABLET BY MOUTH EVERY DAY, Disp: 90 tablet, Rfl: 1  •  SITagliptin (JANUVIA) 100 MG tablet, Take 1 tablet by mouth Daily., Disp: 90 tablet, Rfl: 1  •  triamterene-hydrochlorothiazide (MAXZIDE-25) 37.5-25 MG per tablet, Take 1 tablet by mouth Daily. TAKE 1 TABLET BY MOUTH EVERY DAY, Disp: 90 tablet, Rfl: 1  •  aspirin 81 MG tablet, Take by mouth daily., Disp: , Rfl:   •  CIMZIA STARTER KIT 6 X 200 MG/ML kit, , Disp: , Rfl:   •  ENBREL 50 MG/ML injection, , Disp: , Rfl:   •  folic acid (FOLVITE) 1 MG tablet, , Disp: , Rfl:   •  hydrophor (AQUAPHOR) ointment ointment, Apply  topically Daily., Disp: 454 g, Rfl: 0  •  saccharomyces boulardii (FLORASTOR) 250 MG capsule, Take 1 capsule by mouth 2 (Two) Times a Day., Disp: 60 capsule, Rfl: 0  •  silver sulfadiazine (SILVADENE) 1 % cream, Apply  topically 2 (Two) Times a Day., Disp: 50 g, Rfl: 1  •  sulfaSALAzine (AZULFIDINE) 500 MG tablet, TAKE 3 TABLETS BY MOUTH TWICE DAILY, Disp: , Rfl: 1.  she denies medication side effects.    All of the chronic condition(s) listed above are stable w/o issues.    /81   Pulse 80   Temp 97.8 °F (36.6 °C) (Oral)   Resp 16   Ht 182.9 cm (72\")   Wt (!) 149 kg (329 lb)   BMI 44.62 kg/m²     Results for orders placed or performed during the hospital encounter of 01/05/18   POCT Rapid Strep A   Result Value Ref Range    Rapid Strep A Screen Negative Negative, VALID, INVALID, Not Performed    Internal Control Passed Passed    Lot Number [id number]     Expiration Date 5,312,019    POCT Influenza A/B   Result Value Ref Range    Rapid Influenza A Ag neg     Rapid Influenza B Ag neg     Internal Control Passed Passed    Lot Number [id number]     " Expiration Date 3,212,020            The following portions of the patient's history were reviewed and updated as appropriate: allergies, current medications, past family history, past medical history, past social history, past surgical history and problem list.    Review of Systems   Constitutional: Negative for activity change, chills, fatigue and fever.   Respiratory: Negative for cough and chest tightness.    Cardiovascular: Negative for chest pain and palpitations.   Gastrointestinal: Negative for abdominal pain and nausea.   Endocrine: Negative for cold intolerance.   Psychiatric/Behavioral: Negative for behavioral problems and dysphoric mood.       Objective   Physical Exam   Constitutional: She appears well-developed and well-nourished.   Neck: Neck supple. No thyromegaly present.   Cardiovascular: Normal rate and regular rhythm.    No murmur heard.  Pulmonary/Chest: Effort normal and breath sounds normal.   Abdominal: Bowel sounds are normal.   Psychiatric: She has a normal mood and affect. Her behavior is normal.   Nursing note and vitals reviewed.    The patient has read and signed the [facility] Controlled Substance Contract.  I will continue to see patient for regular follow up appointments.  They are well controlled on their medication.  DILLAN has been reviewed by me and is updated every 3 months. The patient is aware of the potential for addiction and dependence.    Assessment/Plan   Pratibha was seen today for anxiety, diabetes, hyperlipidemia and hypothyroidism.    Diagnoses and all orders for this visit:    Type 2 diabetes mellitus without complication, without long-term current use of insulin  -     Comprehensive metabolic panel  -     Lipid panel  -     Hemoglobin A1c  -     MicroAlbumin, Urine, Random - Urine, Clean Catch  -     FARXIGA 5 MG tablet tablet; Take 1 tablet by mouth Daily. TAKE 1 TABLET BY MOUTH EVERY MORNING  -     SITagliptin (JANUVIA) 100 MG tablet; Take 1 tablet by mouth  Daily.  -     metFORMIN (GLUCOPHAGE) 1000 MG tablet; Take 1 tablet by mouth 2 (Two) Times a Day With Meals.    Anxiety  -     ALPRAZolam (XANAX) 0.5 MG tablet; Take 1 tablet by mouth Daily. TAKE 1 TABLET BY MOUTH EVERY DAY AS NEEDED    Mixed hyperlipidemia  -     atorvastatin (LIPITOR) 10 MG tablet; Take 0.5 tablets by mouth Daily.    Cervicalgia  -     baclofen (LIORESAL) 10 MG tablet; Take 1 tablet by mouth 3 (Three) Times a Day.    Major depressive disorder, recurrent, in full remission  -     FLUoxetine (PROzac) 20 MG capsule; Take 3 capsules by mouth Daily.    Acquired hypothyroidism  -     levothyroxine (SYNTHROID, LEVOTHROID) 75 MCG tablet; Take 1 tablet by mouth Daily.  -     T4, Free    Benign essential hypertension  -     CBC and Differential  -     TSH  -     losartan (COZAAR) 50 MG tablet; Take 1 tablet by mouth Daily. TAKE 1 TABLET BY MOUTH EVERY DAY  -     metoprolol succinate XL (TOPROL-XL) 100 MG 24 hr tablet; Take 1 tablet by mouth Daily. TAKE 1 TABLET BY MOUTH EVERY DAY  -     triamterene-hydrochlorothiazide (MAXZIDE-25) 37.5-25 MG per tablet; Take 1 tablet by mouth Daily. TAKE 1 TABLET BY MOUTH EVERY DAY

## 2018-04-22 LAB
ALBUMIN SERPL-MCNC: 4.1 G/DL (ref 3.5–5.5)
ALBUMIN/GLOB SERPL: 1.6 {RATIO} (ref 1.2–2.2)
ALP SERPL-CCNC: 127 IU/L (ref 39–117)
ALT SERPL-CCNC: 52 IU/L (ref 0–32)
AST SERPL-CCNC: 49 IU/L (ref 0–40)
BASOPHILS # BLD AUTO: 0.1 X10E3/UL (ref 0–0.2)
BASOPHILS NFR BLD AUTO: 1 %
BILIRUB SERPL-MCNC: 0.4 MG/DL (ref 0–1.2)
BUN SERPL-MCNC: 19 MG/DL (ref 6–24)
BUN/CREAT SERPL: 24 (ref 9–23)
CALCIUM SERPL-MCNC: 10.1 MG/DL (ref 8.7–10.2)
CHLORIDE SERPL-SCNC: 95 MMOL/L (ref 96–106)
CHOLEST SERPL-MCNC: 120 MG/DL (ref 100–199)
CO2 SERPL-SCNC: 25 MMOL/L (ref 18–29)
CREAT SERPL-MCNC: 0.78 MG/DL (ref 0.57–1)
EOSINOPHIL # BLD AUTO: 0.2 X10E3/UL (ref 0–0.4)
EOSINOPHIL NFR BLD AUTO: 3 %
ERYTHROCYTE [DISTWIDTH] IN BLOOD BY AUTOMATED COUNT: 13.4 % (ref 12.3–15.4)
GFR SERPLBLD CREATININE-BSD FMLA CKD-EPI: 83 ML/MIN/1.73
GFR SERPLBLD CREATININE-BSD FMLA CKD-EPI: 96 ML/MIN/1.73
GLOBULIN SER CALC-MCNC: 2.5 G/DL (ref 1.5–4.5)
GLUCOSE SERPL-MCNC: 257 MG/DL (ref 65–99)
HBA1C MFR BLD: 9 % (ref 4.8–5.6)
HCT VFR BLD AUTO: 45.2 % (ref 34–46.6)
HDLC SERPL-MCNC: 35 MG/DL
HGB BLD-MCNC: 14.8 G/DL (ref 11.1–15.9)
IMM GRANULOCYTES # BLD: 0 X10E3/UL (ref 0–0.1)
IMM GRANULOCYTES NFR BLD: 0 %
LDLC SERPL CALC-MCNC: 43 MG/DL (ref 0–99)
LYMPHOCYTES # BLD AUTO: 2.9 X10E3/UL (ref 0.7–3.1)
LYMPHOCYTES NFR BLD AUTO: 37 %
MCH RBC QN AUTO: 29.7 PG (ref 26.6–33)
MCHC RBC AUTO-ENTMCNC: 32.7 G/DL (ref 31.5–35.7)
MCV RBC AUTO: 91 FL (ref 79–97)
MICROALBUMIN UR-MCNC: 605.8 UG/ML
MONOCYTES # BLD AUTO: 0.9 X10E3/UL (ref 0.1–0.9)
MONOCYTES NFR BLD AUTO: 11 %
NEUTROPHILS # BLD AUTO: 3.8 X10E3/UL (ref 1.4–7)
NEUTROPHILS NFR BLD AUTO: 48 %
PLATELET # BLD AUTO: 262 X10E3/UL (ref 150–379)
POTASSIUM SERPL-SCNC: 4 MMOL/L (ref 3.5–5.2)
PROT SERPL-MCNC: 6.6 G/DL (ref 6–8.5)
RBC # BLD AUTO: 4.99 X10E6/UL (ref 3.77–5.28)
SODIUM SERPL-SCNC: 138 MMOL/L (ref 134–144)
T4 FREE SERPL-MCNC: 1.19 NG/DL (ref 0.82–1.77)
TRIGL SERPL-MCNC: 212 MG/DL (ref 0–149)
TSH SERPL DL<=0.005 MIU/L-ACNC: 3.11 UIU/ML (ref 0.45–4.5)
VLDLC SERPL CALC-MCNC: 42 MG/DL (ref 5–40)
WBC # BLD AUTO: 7.9 X10E3/UL (ref 3.4–10.8)

## 2018-04-23 DIAGNOSIS — E13.9 DIABETES 1.5, MANAGED AS TYPE 2 (HCC): Primary | ICD-10-CM

## 2018-04-23 DIAGNOSIS — R73.09 ELEVATED GLUCOSE: ICD-10-CM

## 2018-05-08 DIAGNOSIS — I10 BENIGN ESSENTIAL HYPERTENSION: ICD-10-CM

## 2018-05-08 DIAGNOSIS — E11.9 TYPE 2 DIABETES MELLITUS WITHOUT COMPLICATION, WITHOUT LONG-TERM CURRENT USE OF INSULIN (HCC): ICD-10-CM

## 2018-05-08 RX ORDER — DAPAGLIFLOZIN 5 MG/1
TABLET, FILM COATED ORAL
Qty: 90 TABLET | Refills: 1 | Status: SHIPPED | OUTPATIENT
Start: 2018-05-08 | End: 2018-06-22

## 2018-05-08 RX ORDER — TRIAMTERENE AND HYDROCHLOROTHIAZIDE 37.5; 25 MG/1; MG/1
TABLET ORAL
Qty: 90 TABLET | Refills: 1 | Status: SHIPPED | OUTPATIENT
Start: 2018-05-08 | End: 2018-10-15 | Stop reason: SDUPTHER

## 2018-06-12 DIAGNOSIS — I10 BENIGN ESSENTIAL HYPERTENSION: ICD-10-CM

## 2018-06-12 RX ORDER — LOSARTAN POTASSIUM 50 MG/1
TABLET ORAL
Qty: 90 TABLET | Refills: 0 | Status: SHIPPED | OUTPATIENT
Start: 2018-06-12 | End: 2018-10-15 | Stop reason: SDUPTHER

## 2018-06-22 ENCOUNTER — OFFICE VISIT (OUTPATIENT)
Dept: ENDOCRINOLOGY | Age: 58
End: 2018-06-22

## 2018-06-22 VITALS
HEIGHT: 72 IN | SYSTOLIC BLOOD PRESSURE: 126 MMHG | WEIGHT: 293 LBS | BODY MASS INDEX: 39.68 KG/M2 | DIASTOLIC BLOOD PRESSURE: 72 MMHG | RESPIRATION RATE: 16 BRPM

## 2018-06-22 DIAGNOSIS — I10 BENIGN ESSENTIAL HYPERTENSION: ICD-10-CM

## 2018-06-22 DIAGNOSIS — E06.3 HYPOTHYROIDISM DUE TO HASHIMOTO'S THYROIDITIS: ICD-10-CM

## 2018-06-22 DIAGNOSIS — IMO0001 UNCONTROLLED TYPE 2 DIABETES MELLITUS WITHOUT COMPLICATION, WITHOUT LONG-TERM CURRENT USE OF INSULIN: Primary | ICD-10-CM

## 2018-06-22 DIAGNOSIS — E03.8 HYPOTHYROIDISM DUE TO HASHIMOTO'S THYROIDITIS: ICD-10-CM

## 2018-06-22 DIAGNOSIS — E78.2 MIXED HYPERLIPIDEMIA: ICD-10-CM

## 2018-06-22 DIAGNOSIS — Z72.0 TOBACCO ABUSE: ICD-10-CM

## 2018-06-22 DIAGNOSIS — R80.9 PROTEINURIA, UNSPECIFIED TYPE: ICD-10-CM

## 2018-06-22 DIAGNOSIS — E66.01 MORBID OBESITY (HCC): ICD-10-CM

## 2018-06-22 PROCEDURE — 99204 OFFICE O/P NEW MOD 45 MIN: CPT | Performed by: INTERNAL MEDICINE

## 2018-06-22 RX ORDER — DAPAGLIFLOZIN AND METFORMIN HYDROCHLORIDE 5; 1000 MG/1; MG/1
2 TABLET, FILM COATED, EXTENDED RELEASE ORAL DAILY
Qty: 60 TABLET | Refills: 5 | Status: SHIPPED | OUTPATIENT
Start: 2018-06-22 | End: 2018-11-07 | Stop reason: SDUPTHER

## 2018-06-22 RX ORDER — PEN NEEDLE, DIABETIC 30 GX3/16"
NEEDLE, DISPOSABLE MISCELLANEOUS
Qty: 90 EACH | Refills: 3 | Status: SHIPPED | OUTPATIENT
Start: 2018-06-22 | End: 2018-11-07 | Stop reason: SDUPTHER

## 2018-06-22 RX ORDER — DULAGLUTIDE 1.5 MG/.5ML
1 INJECTION, SOLUTION SUBCUTANEOUS WEEKLY
Qty: 4 PEN | Refills: 5 | Status: SHIPPED | OUTPATIENT
Start: 2018-06-22 | End: 2018-06-22

## 2018-06-22 NOTE — PROGRESS NOTES
"Subjective   Pratibha Pascal is a 58 y.o. female seen as a new patient for DM2. She is not checking BG. 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 She is having fatigue, dizziness, and anxiety. She states that she was not diagnosed with diabetes until after her heart attack which was in 2014. She was given steroids for her arthritis and only took 2 pills but was told to ask how much it would impact her diabetes.     History of Present Illness this is a 58-year-old female who has been referred for further evaluation and treatment of diabetes which is uncontrolled.  Her diabetes was diagnosed the about the time she had an acute myocardial infarction and about 4 years ago.  About the same time she was also diagnosed as having hypothyroidism.  She was a smoker until then but she has a stopped smoking and as a result she has gained some weight.  She also suffers from rheumatoid arthritis and status post operation for disc disease in her lower back which limits her ability to work out and exercise.  On top of that since October of last year she has been dealing with a E0 father who ultimately passed away.  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  She is been having a frustrating time losing weight.    /72   Resp 16   Ht 182.9 cm (72\")   Wt (!) 147 kg (324 lb 12.8 oz)   BMI 44.05 kg/m²      Allergies   Allergen Reactions   • Cefuroxime Diarrhea   • Erythromycin    • Talwin [Pentazocine]        Current Outpatient Prescriptions:   •  ALPRAZolam (XANAX) 0.5 MG tablet, Take 1 tablet by mouth Daily. TAKE 1 TABLET BY MOUTH EVERY DAY AS NEEDED, Disp: 30 tablet, Rfl: 2  •  aspirin 81 MG tablet, Take by mouth daily., Disp: , Rfl:   •  atorvastatin (LIPITOR) 10 MG tablet, Take 0.5 tablets by mouth Daily., Disp: 45 tablet, Rfl: 1  •  " baclofen (LIORESAL) 10 MG tablet, Take 1 tablet by mouth 3 (Three) Times a Day., Disp: 270 tablet, Rfl: 1  •  FARXIGA 5 MG tablet tablet, TAKE 1 TABLET BY MOUTH EVERY MORNING, Disp: 90 tablet, Rfl: 1  •  FLUoxetine (PROzac) 20 MG capsule, Take 3 capsules by mouth Daily., Disp: 270 capsule, Rfl: 1  •  folic acid (FOLVITE) 1 MG tablet, , Disp: , Rfl:   •  levothyroxine (SYNTHROID, LEVOTHROID) 75 MCG tablet, Take 1 tablet by mouth Daily., Disp: 90 tablet, Rfl: 1  •  losartan (COZAAR) 50 MG tablet, TAKE 1 TABLET EVERY DAY, Disp: 90 tablet, Rfl: 0  •  metFORMIN (GLUCOPHAGE) 1000 MG tablet, Take 1 tablet by mouth 2 (Two) Times a Day With Meals., Disp: 180 tablet, Rfl: 1  •  metoprolol succinate XL (TOPROL-XL) 100 MG 24 hr tablet, Take 1 tablet by mouth Daily. TAKE 1 TABLET BY MOUTH EVERY DAY, Disp: 90 tablet, Rfl: 1  •  SITagliptin (JANUVIA) 100 MG tablet, Take 1 tablet by mouth Daily., Disp: 90 tablet, Rfl: 1  •  sulfaSALAzine (AZULFIDINE) 500 MG tablet, TAKE 3 TABLETS BY MOUTH TWICE DAILY, Disp: , Rfl: 1  •  triamterene-hydrochlorothiazide (MAXZIDE-25) 37.5-25 MG per tablet, TAKE 1 TABLET EVERY DAY, Disp: 90 tablet, Rfl: 1      The following portions of the patient's history were reviewed and updated as appropriate: allergies, current medications, past family history, past medical history, past social history, past surgical history and problem list.    Review of Systems   Constitutional: Positive for fatigue.   HENT: Negative.    Eyes: Negative.    Respiratory: Negative.    Cardiovascular: Negative.  Negative for chest pain.   Gastrointestinal: Negative.    Endocrine: Negative.    Genitourinary: Negative.    Musculoskeletal: Negative.    Skin: Negative.  Negative for pallor.   Allergic/Immunologic: Negative.    Neurological: Positive for dizziness. Negative for tremors, seizures, speech difficulty, weakness and headaches.   Hematological: Negative.    Psychiatric/Behavioral: Negative for confusion. The patient is  nervous/anxious.        Objective   Physical Exam   Constitutional: She is oriented to person, place, and time. She appears well-developed and well-nourished. No distress.   Morbidly obese.  Patient using cane for ambulation.   HENT:   Head: Normocephalic and atraumatic.   Right Ear: External ear normal.   Left Ear: External ear normal.   Nose: Nose normal.   Mouth/Throat: Oropharynx is clear and moist. No oropharyngeal exudate.   Eyes: Conjunctivae and EOM are normal. Pupils are equal, round, and reactive to light. Right eye exhibits no discharge. Left eye exhibits no discharge. No scleral icterus.   Neck: Normal range of motion. Neck supple. No JVD present. No tracheal deviation present. No thyromegaly present.   Cardiovascular: Normal rate, regular rhythm, normal heart sounds and intact distal pulses.  Exam reveals no gallop and no friction rub.    No murmur heard.  Superficial varicosities in right lower he leg and right foot   Pulmonary/Chest: Effort normal and breath sounds normal. No stridor. No respiratory distress. She has no wheezes. She has no rales. She exhibits no tenderness.   Abdominal: Soft. Bowel sounds are normal. She exhibits no distension and no mass. There is no tenderness. There is no rebound and no guarding. No hernia.   Musculoskeletal: Normal range of motion. She exhibits no edema, tenderness or deformity.   Lymphadenopathy:     She has no cervical adenopathy.   Neurological: She is alert and oriented to person, place, and time. She has normal reflexes. She displays normal reflexes. No cranial nerve deficit or sensory deficit. She exhibits normal muscle tone. Coordination normal.   Skin: Skin is warm and dry. No rash noted. She is not diaphoretic. No erythema. No pallor.   Psychiatric: She has a normal mood and affect. Her behavior is normal. Judgment and thought content normal.   Nursing note and vitals reviewed.        Assessment/Plan   Diagnoses and all orders for this  visit:    Uncontrolled type 2 diabetes mellitus without complication, without long-term current use of insulin  -     T3, Free  -     T4 & TSH ([facility])  -     T4, Free  -     Uric Acid  -     Vitamin D 25 Hydroxy  -     Comprehensive Metabolic Panel  -     C-Peptide  -     Hemoglobin A1c  -     Lipid Panel  -     Luteinizing Hormone  -     Follicle Stimulating Hormone  -     Prolactin  -     ACTH  -     Cortisol  -     Liraglutide -Weight Management (SAXENDA) 18 MG/3ML solution pen-injector; Inject 3 mg under the skin Daily.  -     T4 & TSH ([facility]); Future  -     T4, Free; Future  -     T3, Free; Future  -     Uric Acid; Future  -     Vitamin D 25 Hydroxy; Future  -     Comprehensive Metabolic Panel; Future  -     C-Peptide; Future  -     Hemoglobin A1c; Future  -     Lipid Panel; Future  -     MicroAlbumin, Urine, Random - Urine, Clean Catch; Future    Hypothyroidism due to Hashimoto's thyroiditis  -     T3, Free  -     T4 & TSH ([facility])  -     T4, Free  -     Uric Acid  -     Vitamin D 25 Hydroxy  -     Comprehensive Metabolic Panel  -     C-Peptide  -     Hemoglobin A1c  -     Lipid Panel  -     Luteinizing Hormone  -     Follicle Stimulating Hormone  -     Prolactin  -     ACTH  -     Cortisol  -     Liraglutide -Weight Management (SAXENDA) 18 MG/3ML solution pen-injector; Inject 3 mg under the skin Daily.  -     T4 & TSH ([facility]); Future  -     T4, Free; Future  -     T3, Free; Future  -     Uric Acid; Future  -     Vitamin D 25 Hydroxy; Future  -     Comprehensive Metabolic Panel; Future  -     C-Peptide; Future  -     Hemoglobin A1c; Future  -     Lipid Panel; Future  -     MicroAlbumin, Urine, Random - Urine, Clean Catch; Future    Mixed hyperlipidemia  -     T3, Free  -     T4 & TSH ([facility])  -     T4, Free  -     Uric Acid  -     Vitamin D 25 Hydroxy  -     Comprehensive Metabolic Panel  -     C-Peptide  -     Hemoglobin A1c  -     Lipid Panel  -     Luteinizing Hormone  -     Follicle Stimulating  Hormone  -     Prolactin  -     ACTH  -     Cortisol  -     Liraglutide -Weight Management (SAXENDA) 18 MG/3ML solution pen-injector; Inject 3 mg under the skin Daily.  -     T4 & TSH ([facility]); Future  -     T4, Free; Future  -     T3, Free; Future  -     Uric Acid; Future  -     Vitamin D 25 Hydroxy; Future  -     Comprehensive Metabolic Panel; Future  -     C-Peptide; Future  -     Hemoglobin A1c; Future  -     Lipid Panel; Future  -     MicroAlbumin, Urine, Random - Urine, Clean Catch; Future    Benign essential hypertension  -     T3, Free  -     T4 & TSH ([facility])  -     T4, Free  -     Uric Acid  -     Vitamin D 25 Hydroxy  -     Comprehensive Metabolic Panel  -     C-Peptide  -     Hemoglobin A1c  -     Lipid Panel  -     Luteinizing Hormone  -     Follicle Stimulating Hormone  -     Prolactin  -     ACTH  -     Cortisol  -     Liraglutide -Weight Management (SAXENDA) 18 MG/3ML solution pen-injector; Inject 3 mg under the skin Daily.  -     T4 & TSH ([facility]); Future  -     T4, Free; Future  -     T3, Free; Future  -     Uric Acid; Future  -     Vitamin D 25 Hydroxy; Future  -     Comprehensive Metabolic Panel; Future  -     C-Peptide; Future  -     Hemoglobin A1c; Future  -     Lipid Panel; Future  -     MicroAlbumin, Urine, Random - Urine, Clean Catch; Future    Proteinuria, unspecified type  -     T3, Free  -     T4 & TSH ([facility])  -     T4, Free  -     Uric Acid  -     Vitamin D 25 Hydroxy  -     Comprehensive Metabolic Panel  -     C-Peptide  -     Hemoglobin A1c  -     Lipid Panel  -     Luteinizing Hormone  -     Follicle Stimulating Hormone  -     Prolactin  -     ACTH  -     Cortisol  -     Liraglutide -Weight Management (SAXENDA) 18 MG/3ML solution pen-injector; Inject 3 mg under the skin Daily.  -     T4 & TSH ([facility]); Future  -     T4, Free; Future  -     T3, Free; Future  -     Uric Acid; Future  -     Vitamin D 25 Hydroxy; Future  -     Comprehensive Metabolic Panel; Future  -     C-Peptide;  Future  -     Hemoglobin A1c; Future  -     Lipid Panel; Future  -     MicroAlbumin, Urine, Random - Urine, Clean Catch; Future    Tobacco abuse  -     T3, Free  -     T4 & TSH ([facility])  -     T4, Free  -     Uric Acid  -     Vitamin D 25 Hydroxy  -     Comprehensive Metabolic Panel  -     C-Peptide  -     Hemoglobin A1c  -     Lipid Panel  -     Luteinizing Hormone  -     Follicle Stimulating Hormone  -     Prolactin  -     ACTH  -     Cortisol  -     Liraglutide -Weight Management (SAXENDA) 18 MG/3ML solution pen-injector; Inject 3 mg under the skin Daily.  -     T4 & TSH ([facility]); Future  -     T4, Free; Future  -     T3, Free; Future  -     Uric Acid; Future  -     Vitamin D 25 Hydroxy; Future  -     Comprehensive Metabolic Panel; Future  -     C-Peptide; Future  -     Hemoglobin A1c; Future  -     Lipid Panel; Future  -     MicroAlbumin, Urine, Random - Urine, Clean Catch; Future    Morbid obesity  -     T3, Free  -     T4 & TSH ([facility])  -     T4, Free  -     Uric Acid  -     Vitamin D 25 Hydroxy  -     Comprehensive Metabolic Panel  -     C-Peptide  -     Hemoglobin A1c  -     Lipid Panel  -     Luteinizing Hormone  -     Follicle Stimulating Hormone  -     Prolactin  -     ACTH  -     Cortisol  -     Liraglutide -Weight Management (SAXENDA) 18 MG/3ML solution pen-injector; Inject 3 mg under the skin Daily.  -     T4 & TSH ([facility]); Future  -     T4, Free; Future  -     T3, Free; Future  -     Uric Acid; Future  -     Vitamin D 25 Hydroxy; Future  -     Comprehensive Metabolic Panel; Future  -     C-Peptide; Future  -     Hemoglobin A1c; Future  -     Lipid Panel; Future  -     MicroAlbumin, Urine, Random - Urine, Clean Catch; Future    Other orders  -     XIGDUO XR 5-1000 MG tablet; Take 2 tablets by mouth Daily.  -     Discontinue: TRULICITY 1.5 MG/0.5ML solution pen-injector; Inject 1 pen under the skin 1 (One) Time Per Week.               His summary I saw and examined this 58-year-old female for  above-mentioned problems.  I reviewed her laboratory evaluations of 10/14/2017 and 04/21/2018 where her hemoglobin A1c from 6.7 in 10/14/2017 jumped to 9.0 on 04/21/2018.  At this time I am going to go ahead and order a comprehensive laboratory evaluation and once the results come back we will go ahead and call for any possible modification or new medications.  In the meantime I am going to stop her current antidiabetic medications and will start her on Saxenda 0.6 mg daily for 7 days, 1.2, 1.8, 2.4 mg per day each for 7 days and 3.0 maintenance daily moving forward.  Additionally am starting her on Xigduo 5/1000 to be taken 2 tablets in the morning.  She will see Ms. Regina Bledsoe in 4 months or sooner if needed with laboratory evaluation prior to each office visit.

## 2018-06-25 LAB
25(OH)D3+25(OH)D2 SERPL-MCNC: 31.1 NG/ML (ref 30–100)
ACTH PLAS-MCNC: 19 PG/ML (ref 7.2–63.3)
ALBUMIN SERPL-MCNC: 4.7 G/DL (ref 3.5–5.2)
ALBUMIN/GLOB SERPL: 1.6 G/DL
ALP SERPL-CCNC: 102 U/L (ref 39–117)
ALT SERPL-CCNC: 66 U/L (ref 1–33)
AST SERPL-CCNC: 64 U/L (ref 1–32)
BILIRUB SERPL-MCNC: 0.5 MG/DL (ref 0.1–1.2)
BUN SERPL-MCNC: 16 MG/DL (ref 6–20)
BUN/CREAT SERPL: 17.8 (ref 7–25)
C PEPTIDE SERPL-MCNC: 5.7 NG/ML (ref 1.1–4.4)
CALCIUM SERPL-MCNC: 10.5 MG/DL (ref 8.6–10.5)
CHLORIDE SERPL-SCNC: 96 MMOL/L (ref 98–107)
CHOLEST SERPL-MCNC: 136 MG/DL (ref 0–200)
CO2 SERPL-SCNC: 24.6 MMOL/L (ref 22–29)
CORTIS SERPL-MCNC: 8.3 UG/DL
CREAT SERPL-MCNC: 0.9 MG/DL (ref 0.57–1)
FSH SERPL-ACNC: 34.5 MIU/ML
GFR SERPLBLD CREATININE-BSD FMLA CKD-EPI: 64 ML/MIN/1.73
GFR SERPLBLD CREATININE-BSD FMLA CKD-EPI: 78 ML/MIN/1.73
GLOBULIN SER CALC-MCNC: 2.9 GM/DL
GLUCOSE SERPL-MCNC: 186 MG/DL (ref 65–99)
HBA1C MFR BLD: 8.28 % (ref 4.8–5.6)
HDLC SERPL-MCNC: 41 MG/DL (ref 40–60)
INTERPRETATION: NORMAL
LDLC SERPL CALC-MCNC: 46 MG/DL (ref 0–100)
LH SERPL-ACNC: 23.4 MIU/ML
Lab: NORMAL
POTASSIUM SERPL-SCNC: 4.2 MMOL/L (ref 3.5–5.2)
PROLACTIN SERPL-MCNC: 24.1 NG/ML (ref 4.8–23.3)
PROT SERPL-MCNC: 7.6 G/DL (ref 6–8.5)
SODIUM SERPL-SCNC: 140 MMOL/L (ref 136–145)
T3FREE SERPL-MCNC: 3 PG/ML (ref 2–4.4)
T4 FREE SERPL-MCNC: 1.29 NG/DL (ref 0.93–1.7)
T4 SERPL-MCNC: 8.09 MCG/DL (ref 4.5–11.7)
TRIGL SERPL-MCNC: 243 MG/DL (ref 0–150)
TSH SERPL DL<=0.005 MIU/L-ACNC: 5.17 MIU/ML (ref 0.27–4.2)
URATE SERPL-MCNC: 4.4 MG/DL (ref 2.4–5.7)
VLDLC SERPL CALC-MCNC: 48.6 MG/DL (ref 5–40)

## 2018-06-25 RX ORDER — LEVOTHYROXINE SODIUM 125 MCG
125 TABLET ORAL DAILY
Qty: 30 TABLET | Refills: 11 | Status: SHIPPED | OUTPATIENT
Start: 2018-06-25 | End: 2018-06-26 | Stop reason: SDUPTHER

## 2018-06-25 RX ORDER — ERGOCALCIFEROL 1.25 MG/1
50000 CAPSULE ORAL WEEKLY
Qty: 13 CAPSULE | Refills: 3 | Status: SHIPPED | OUTPATIENT
Start: 2018-06-25 | End: 2018-11-07 | Stop reason: SDUPTHER

## 2018-06-26 RX ORDER — LEVOTHYROXINE SODIUM 0.12 MG/1
125 TABLET ORAL DAILY
Qty: 90 TABLET | Refills: 1 | Status: SHIPPED | OUTPATIENT
Start: 2018-06-26 | End: 2018-11-07 | Stop reason: SDUPTHER

## 2018-07-14 DIAGNOSIS — E78.2 MIXED HYPERLIPIDEMIA: ICD-10-CM

## 2018-07-16 ENCOUNTER — OFFICE VISIT (OUTPATIENT)
Dept: FAMILY MEDICINE CLINIC | Facility: CLINIC | Age: 58
End: 2018-07-16

## 2018-07-16 VITALS
WEIGHT: 293 LBS | DIASTOLIC BLOOD PRESSURE: 79 MMHG | SYSTOLIC BLOOD PRESSURE: 143 MMHG | RESPIRATION RATE: 18 BRPM | HEIGHT: 72 IN | HEART RATE: 90 BPM | TEMPERATURE: 99.1 F | BODY MASS INDEX: 39.68 KG/M2

## 2018-07-16 DIAGNOSIS — F51.01 PRIMARY INSOMNIA: ICD-10-CM

## 2018-07-16 DIAGNOSIS — I10 BENIGN ESSENTIAL HYPERTENSION: ICD-10-CM

## 2018-07-16 DIAGNOSIS — F41.9 ANXIETY: Primary | ICD-10-CM

## 2018-07-16 DIAGNOSIS — Z23 IMMUNIZATION DUE: ICD-10-CM

## 2018-07-16 PROCEDURE — 99214 OFFICE O/P EST MOD 30 MIN: CPT | Performed by: FAMILY MEDICINE

## 2018-07-16 RX ORDER — ATORVASTATIN CALCIUM 10 MG/1
TABLET, FILM COATED ORAL
Qty: 45 TABLET | Refills: 0 | OUTPATIENT
Start: 2018-07-16

## 2018-07-16 RX ORDER — ALPRAZOLAM 0.5 MG/1
0.5 TABLET ORAL DAILY
Qty: 30 TABLET | Refills: 2 | Status: SHIPPED | OUTPATIENT
Start: 2018-07-16 | End: 2018-10-15 | Stop reason: SDUPTHER

## 2018-07-16 RX ORDER — TRAZODONE HYDROCHLORIDE 150 MG/1
TABLET ORAL
Qty: 60 TABLET | Refills: 5 | Status: SHIPPED | OUTPATIENT
Start: 2018-07-16 | End: 2018-10-15 | Stop reason: SINTOL

## 2018-07-16 NOTE — PROGRESS NOTES
Subjective   Pratibha Pascal is a 58 y.o. female.     History of Present Illness     Chief Complaint:   Chief Complaint   Patient presents with   • Anxiety     med refill gayle  - mammo / pap smear due       Pratibha Pascal 58 y.o. female who presents today for Medical Management of the below listed issues and medication refills.  she has a problem list of   Patient Active Problem List   Diagnosis   • CAD (coronary artery disease)   • Type 2 diabetes mellitus, uncontrolled (CMS/[facility])   • HLD (hyperlipidemia)   • Benign essential hypertension   • Hypothyroidism   • Major depressive disorder, recurrent, in full remission (CMS/[facility])   • Rheumatoid arthritis (CMS/[facility])   • Tobacco abuse   • Cervicalgia   • Diabetes mellitus (CMS/[facility])   • Dyslipidemia   • BP (high blood pressure)   • Obstructive apnea   • AF (paroxysmal atrial fibrillation) (CMS/[facility])   • Proteinuria   • Burn   • Cellulitis of left leg   • Diabetes mellitus with neurological manifestation (CMS/[facility])   • MSSA (methicillin susceptible Staphylococcus aureus) infection   • Diabetic ulcer of left foot associated with diabetes mellitus due to underlying condition (CMS/[facility])   • Morbid obesity (CMS/[facility])   • Primary insomnia   • Anxiety   .  Since the last visit, she has overall felt well with her current medications, although having some longstanding insomnia issues that she would like help with.  she has been compliant with   Current Outpatient Prescriptions:   •  ALPRAZolam (XANAX) 0.5 MG tablet, Take 1 tablet by mouth Daily. TAKE 1 TABLET BY MOUTH EVERY DAY AS NEEDED, Disp: 30 tablet, Rfl: 2  •  aspirin 81 MG tablet, Take by mouth daily., Disp: , Rfl:   •  atorvastatin (LIPITOR) 10 MG tablet, Take 0.5 tablets by mouth Daily., Disp: 45 tablet, Rfl: 1  •  baclofen (LIORESAL) 10 MG tablet, Take 1 tablet by mouth 3 (Three) Times a Day., Disp: 270 tablet, Rfl: 1  •  ergocalciferol (DRISDOL) 83027 units capsule, Take 1 capsule by mouth 1 (One) Time Per Week., Disp: 13  "capsule, Rfl: 3  •  FLUoxetine (PROzac) 20 MG capsule, Take 3 capsules by mouth Daily., Disp: 270 capsule, Rfl: 1  •  folic acid (FOLVITE) 1 MG tablet, , Disp: , Rfl:   •  Insulin Pen Needle (PEN NEEDLES) 31G X 8 MM misc, Use once a day for injection of Saxenda, Disp: 90 each, Rfl: 3  •  levothyroxine (SYNTHROID) 125 MCG tablet, Take 1 tablet by mouth Daily., Disp: 90 tablet, Rfl: 1  •  Liraglutide -Weight Management (SAXENDA) 18 MG/3ML solution pen-injector, Inject 3 mg under the skin Daily., Disp: 5 pen, Rfl: 5  •  losartan (COZAAR) 50 MG tablet, TAKE 1 TABLET EVERY DAY, Disp: 90 tablet, Rfl: 0  •  metoprolol succinate XL (TOPROL-XL) 100 MG 24 hr tablet, Take 1 tablet by mouth Daily. TAKE 1 TABLET BY MOUTH EVERY DAY, Disp: 90 tablet, Rfl: 1  •  sulfaSALAzine (AZULFIDINE) 500 MG tablet, TAKE 3 TABLETS BY MOUTH TWICE DAILY, Disp: , Rfl: 1  •  traZODone (DESYREL) 150 MG tablet, Take 0.5-2 tabs qhs prn sleep, Disp: 60 tablet, Rfl: 5  •  triamterene-hydrochlorothiazide (MAXZIDE-25) 37.5-25 MG per tablet, TAKE 1 TABLET EVERY DAY, Disp: 90 tablet, Rfl: 1  •  XIGDUO XR 5-1000 MG tablet, Take 2 tablets by mouth Daily., Disp: 60 tablet, Rfl: 5.  she denies medication side effects.    All of the chronic condition(s) listed above are stable w/o issues.    /79   Pulse 90   Temp 99.1 °F (37.3 °C) (Oral)   Resp 18   Ht 182.9 cm (72\")   Wt (!) 144 kg (318 lb)   BMI 43.13 kg/m²     Results for orders placed or performed in visit on 06/22/18   T3, Free   Result Value Ref Range    T3, Free 3.0 2.0 - 4.4 pg/mL   T4 & TSH ([facility])   Result Value Ref Range    TSH 5.170 (H) 0.270 - 4.200 mIU/mL    T4, Total 8.09 4.50 - 11.70 mcg/dL   T4, Free   Result Value Ref Range    Free T4 1.29 0.93 - 1.70 ng/dL   Uric Acid   Result Value Ref Range    Uric Acid 4.4 2.4 - 5.7 mg/dL   Vitamin D 25 Hydroxy   Result Value Ref Range    25 Hydroxy, Vitamin D 31.1 30.0 - 100.0 ng/ml   Comprehensive Metabolic Panel   Result Value Ref Range    " Glucose 186 (H) 65 - 99 mg/dL    BUN 16 6 - 20 mg/dL    Creatinine 0.90 0.57 - 1.00 mg/dL    eGFR Non African Am 64 >60 mL/min/1.73    eGFR African Am 78 >60 mL/min/1.73    BUN/Creatinine Ratio 17.8 7.0 - 25.0    Sodium 140 136 - 145 mmol/L    Potassium 4.2 3.5 - 5.2 mmol/L    Chloride 96 (L) 98 - 107 mmol/L    Total CO2 24.6 22.0 - 29.0 mmol/L    Calcium 10.5 8.6 - 10.5 mg/dL    Total Protein 7.6 6.0 - 8.5 g/dL    Albumin 4.70 3.50 - 5.20 g/dL    Globulin 2.9 gm/dL    A/G Ratio 1.6 g/dL    Total Bilirubin 0.5 0.1 - 1.2 mg/dL    Alkaline Phosphatase 102 39 - 117 U/L    AST (SGOT) 64 (H) 1 - 32 U/L    ALT (SGPT) 66 (H) 1 - 33 U/L   C-Peptide   Result Value Ref Range    C-Peptide 5.7 (H) 1.1 - 4.4 ng/mL   Hemoglobin A1c   Result Value Ref Range    Hemoglobin A1C 8.28 (H) 4.80 - 5.60 %   Lipid Panel   Result Value Ref Range    Total Cholesterol 136 0 - 200 mg/dL    Triglycerides 243 (H) 0 - 150 mg/dL    HDL Cholesterol 41 40 - 60 mg/dL    VLDL Cholesterol 48.6 (H) 5 - 40 mg/dL    LDL Cholesterol  46 0 - 100 mg/dL   Luteinizing Hormone   Result Value Ref Range    LH 23.4 mIU/mL   Follicle Stimulating Hormone   Result Value Ref Range    FSH 34.5 mIU/mL   Prolactin   Result Value Ref Range    Prolactin 24.1 (H) 4.8 - 23.3 ng/mL   ACTH   Result Value Ref Range    ACTH 19.0 7.2 - 63.3 pg/mL   Cortisol   Result Value Ref Range    Cortisol 8.3 ug/dL   Cardiovascular Risk Assessment   Result Value Ref Range    Interpretation Note    Diabetes Patient Education   Result Value Ref Range    PDF Image Not applicable            The following portions of the patient's history were reviewed and updated as appropriate: allergies, current medications, past family history, past medical history, past social history, past surgical history and problem list.    Review of Systems   Constitutional: Negative for activity change, chills, fatigue and fever.   Respiratory: Negative for cough and chest tightness.    Cardiovascular: Negative for  chest pain and palpitations.   Gastrointestinal: Negative for abdominal pain and nausea.   Endocrine: Negative for cold intolerance.   Psychiatric/Behavioral: Negative for behavioral problems and dysphoric mood.       Objective   Physical Exam   Constitutional: She appears well-developed and well-nourished.   Neck: Neck supple. No thyromegaly present.   Cardiovascular: Normal rate and regular rhythm.    No murmur heard.  Pulmonary/Chest: Effort normal and breath sounds normal.   Abdominal: Bowel sounds are normal. There is no tenderness.   Neurological: She is alert.   Psychiatric: She has a normal mood and affect. Her behavior is normal.   Nursing note and vitals reviewed.    The patient has read and signed the [facility] Controlled Substance Contract.  I will continue to see patient for regular follow up appointments.  They are well controlled on their medication.  DILLAN has been reviewed by me and is updated every 3 months. The patient is aware of the potential for addiction and dependence.      Assessment/Plan   Pratibha was seen today for anxiety.    Diagnoses and all orders for this visit:    Anxiety  -     ALPRAZolam (XANAX) 0.5 MG tablet; Take 1 tablet by mouth Daily. TAKE 1 TABLET BY MOUTH EVERY DAY AS NEEDED    Benign essential hypertension    Primary insomnia  -     traZODone (DESYREL) 150 MG tablet; Take 0.5-2 tabs qhs prn sleep    Immunization due  -     Hepatitis A Vaccine Adult IM    Continue with current BP meds and diet/exercise.

## 2018-10-04 DIAGNOSIS — I10 BENIGN ESSENTIAL HYPERTENSION: ICD-10-CM

## 2018-10-04 RX ORDER — METOPROLOL SUCCINATE 100 MG/1
TABLET, EXTENDED RELEASE ORAL
Qty: 30 TABLET | Refills: 0 | Status: SHIPPED | OUTPATIENT
Start: 2018-10-04 | End: 2018-10-15 | Stop reason: SDUPTHER

## 2018-10-08 ENCOUNTER — RESULTS ENCOUNTER (OUTPATIENT)
Dept: ENDOCRINOLOGY | Age: 58
End: 2018-10-08

## 2018-10-08 DIAGNOSIS — E06.3 HYPOTHYROIDISM DUE TO HASHIMOTO'S THYROIDITIS: ICD-10-CM

## 2018-10-08 DIAGNOSIS — E03.8 HYPOTHYROIDISM DUE TO HASHIMOTO'S THYROIDITIS: ICD-10-CM

## 2018-10-08 DIAGNOSIS — E66.01 MORBID OBESITY (HCC): ICD-10-CM

## 2018-10-08 DIAGNOSIS — R80.9 PROTEINURIA, UNSPECIFIED TYPE: ICD-10-CM

## 2018-10-08 DIAGNOSIS — E78.2 MIXED HYPERLIPIDEMIA: ICD-10-CM

## 2018-10-08 DIAGNOSIS — Z72.0 TOBACCO ABUSE: ICD-10-CM

## 2018-10-08 DIAGNOSIS — I10 BENIGN ESSENTIAL HYPERTENSION: ICD-10-CM

## 2018-10-08 DIAGNOSIS — IMO0001 UNCONTROLLED TYPE 2 DIABETES MELLITUS WITHOUT COMPLICATION, WITHOUT LONG-TERM CURRENT USE OF INSULIN: ICD-10-CM

## 2018-10-15 ENCOUNTER — OFFICE VISIT (OUTPATIENT)
Dept: FAMILY MEDICINE CLINIC | Facility: CLINIC | Age: 58
End: 2018-10-15

## 2018-10-15 VITALS
HEART RATE: 80 BPM | WEIGHT: 293 LBS | SYSTOLIC BLOOD PRESSURE: 134 MMHG | HEIGHT: 72 IN | TEMPERATURE: 98.3 F | DIASTOLIC BLOOD PRESSURE: 83 MMHG | BODY MASS INDEX: 39.68 KG/M2 | RESPIRATION RATE: 16 BRPM

## 2018-10-15 DIAGNOSIS — F33.42 MAJOR DEPRESSIVE DISORDER, RECURRENT, IN FULL REMISSION (HCC): ICD-10-CM

## 2018-10-15 DIAGNOSIS — F41.9 ANXIETY: ICD-10-CM

## 2018-10-15 DIAGNOSIS — E78.2 MIXED HYPERLIPIDEMIA: ICD-10-CM

## 2018-10-15 DIAGNOSIS — I10 BENIGN ESSENTIAL HYPERTENSION: ICD-10-CM

## 2018-10-15 DIAGNOSIS — M54.2 CERVICALGIA: ICD-10-CM

## 2018-10-15 PROCEDURE — 99214 OFFICE O/P EST MOD 30 MIN: CPT | Performed by: FAMILY MEDICINE

## 2018-10-15 RX ORDER — BACLOFEN 10 MG/1
TABLET ORAL
Qty: 270 TABLET | Refills: 1 | OUTPATIENT
Start: 2018-10-15

## 2018-10-15 RX ORDER — BACLOFEN 10 MG/1
10 TABLET ORAL 3 TIMES DAILY
Qty: 270 TABLET | Refills: 1 | Status: SHIPPED | OUTPATIENT
Start: 2018-10-15 | End: 2019-04-08 | Stop reason: SDUPTHER

## 2018-10-15 RX ORDER — FLUOXETINE HYDROCHLORIDE 20 MG/1
60 CAPSULE ORAL DAILY
Qty: 270 CAPSULE | Refills: 1 | OUTPATIENT
Start: 2018-10-15

## 2018-10-15 RX ORDER — TRIAMTERENE AND HYDROCHLOROTHIAZIDE 37.5; 25 MG/1; MG/1
1 TABLET ORAL DAILY
Qty: 90 TABLET | Refills: 1 | Status: SHIPPED | OUTPATIENT
Start: 2018-10-15 | End: 2019-04-08 | Stop reason: SDUPTHER

## 2018-10-15 RX ORDER — FLUOXETINE HYDROCHLORIDE 20 MG/1
60 CAPSULE ORAL DAILY
Qty: 270 CAPSULE | Refills: 1 | Status: SHIPPED | OUTPATIENT
Start: 2018-10-15 | End: 2019-04-08 | Stop reason: SDUPTHER

## 2018-10-15 RX ORDER — LOSARTAN POTASSIUM 50 MG/1
50 TABLET ORAL DAILY
Qty: 90 TABLET | Refills: 1 | Status: SHIPPED | OUTPATIENT
Start: 2018-10-15 | End: 2019-04-08 | Stop reason: SDUPTHER

## 2018-10-15 RX ORDER — ALPRAZOLAM 0.5 MG/1
0.5 TABLET ORAL DAILY
Qty: 30 TABLET | Refills: 2 | Status: SHIPPED | OUTPATIENT
Start: 2018-10-15 | End: 2019-01-14 | Stop reason: SDUPTHER

## 2018-10-15 RX ORDER — ATORVASTATIN CALCIUM 10 MG/1
5 TABLET, FILM COATED ORAL DAILY
Qty: 45 TABLET | Refills: 1 | Status: SHIPPED | OUTPATIENT
Start: 2018-10-15 | End: 2018-11-07 | Stop reason: SDUPTHER

## 2018-10-15 RX ORDER — METOPROLOL SUCCINATE 100 MG/1
100 TABLET, EXTENDED RELEASE ORAL DAILY
Qty: 90 TABLET | Refills: 1 | Status: SHIPPED | OUTPATIENT
Start: 2018-10-15 | End: 2019-04-08 | Stop reason: SDUPTHER

## 2018-10-15 NOTE — PROGRESS NOTES
Subjective   Pratibha Pascal is a 58 y.o. female.     History of Present Illness     Chief Complaint:   Chief Complaint   Patient presents with   • Hyperlipidemia     med refill galye    • Hypertension   • Anxiety   • Arthritis       Pratibha Pascal 58 y.o. female who presents today for Medical Management of the below listed issues and medication refills.  she has a problem list of   Patient Active Problem List   Diagnosis   • CAD (coronary artery disease)   • Type 2 diabetes mellitus, uncontrolled (CMS/[facility])   • HLD (hyperlipidemia)   • Benign essential hypertension   • Hypothyroidism   • Major depressive disorder, recurrent, in full remission (CMS/[facility])   • Rheumatoid arthritis (CMS/[facility])   • Tobacco abuse   • Cervicalgia   • Diabetes mellitus (CMS/[facility])   • Dyslipidemia   • BP (high blood pressure)   • Obstructive apnea   • AF (paroxysmal atrial fibrillation) (CMS/[facility])   • Proteinuria   • Burn   • Cellulitis of left leg   • Diabetes mellitus with neurological manifestation (CMS/[facility])   • MSSA (methicillin susceptible Staphylococcus aureus) infection   • Diabetic ulcer of left foot associated with diabetes mellitus due to underlying condition (CMS/[facility])   • Morbid obesity (CMS/[facility])   • Primary insomnia   • Anxiety   .  Since the last visit, she has overall felt well.  she has been compliant with   Current Outpatient Prescriptions:   •  ALPRAZolam (XANAX) 0.5 MG tablet, Take 1 tablet by mouth Daily. TAKE 1 TABLET BY MOUTH EVERY DAY AS NEEDED, Disp: 30 tablet, Rfl: 2  •  atorvastatin (LIPITOR) 10 MG tablet, Take 0.5 tablets by mouth Daily., Disp: 45 tablet, Rfl: 1  •  baclofen (LIORESAL) 10 MG tablet, Take 1 tablet by mouth 3 (Three) Times a Day., Disp: 270 tablet, Rfl: 1  •  metoprolol succinate XL (TOPROL-XL) 100 MG 24 hr tablet, Take 1 tablet by mouth Daily., Disp: 90 tablet, Rfl: 1  •  triamterene-hydrochlorothiazide (MAXZIDE-25) 37.5-25 MG per tablet, Take 1 tablet by mouth Daily., Disp: 90 tablet, Rfl: 1  •  aspirin 81  "MG tablet, Take by mouth daily., Disp: , Rfl:   •  ergocalciferol (DRISDOL) 54776 units capsule, Take 1 capsule by mouth 1 (One) Time Per Week., Disp: 13 capsule, Rfl: 3  •  FLUoxetine (PROzac) 20 MG capsule, Take 3 capsules by mouth Daily., Disp: 270 capsule, Rfl: 1  •  folic acid (FOLVITE) 1 MG tablet, , Disp: , Rfl:   •  Insulin Pen Needle (PEN NEEDLES) 31G X 8 MM misc, Use once a day for injection of Saxenda, Disp: 90 each, Rfl: 3  •  levothyroxine (SYNTHROID) 125 MCG tablet, Take 1 tablet by mouth Daily., Disp: 90 tablet, Rfl: 1  •  Liraglutide -Weight Management (SAXENDA) 18 MG/3ML solution pen-injector, Inject 3 mg under the skin Daily., Disp: 5 pen, Rfl: 5  •  losartan (COZAAR) 50 MG tablet, Take 1 tablet by mouth Daily., Disp: 90 tablet, Rfl: 1  •  sulfaSALAzine (AZULFIDINE) 500 MG tablet, TAKE 3 TABLETS BY MOUTH TWICE DAILY, Disp: , Rfl: 1  •  XIGDUO XR 5-1000 MG tablet, Take 2 tablets by mouth Daily., Disp: 60 tablet, Rfl: 5.  she denies medication side effects. Did not tolerate the Trazodone as it made her sick.    All of the chronic condition(s) listed above are stable w/o issues.    /83   Pulse 80   Temp 98.3 °F (36.8 °C) (Oral)   Resp 16   Ht 182.9 cm (72\")   Wt (!) 143 kg (315 lb)   BMI 42.72 kg/m²     Results for orders placed or performed in visit on 06/22/18   T3, Free   Result Value Ref Range    T3, Free 3.0 2.0 - 4.4 pg/mL   T4 & TSH ([facility])   Result Value Ref Range    TSH 5.170 (H) 0.270 - 4.200 mIU/mL    T4, Total 8.09 4.50 - 11.70 mcg/dL   T4, Free   Result Value Ref Range    Free T4 1.29 0.93 - 1.70 ng/dL   Uric Acid   Result Value Ref Range    Uric Acid 4.4 2.4 - 5.7 mg/dL   Vitamin D 25 Hydroxy   Result Value Ref Range    25 Hydroxy, Vitamin D 31.1 30.0 - 100.0 ng/ml   Comprehensive Metabolic Panel   Result Value Ref Range    Glucose 186 (H) 65 - 99 mg/dL    BUN 16 6 - 20 mg/dL    Creatinine 0.90 0.57 - 1.00 mg/dL    eGFR Non African Am 64 >60 mL/min/1.73    eGFR African Am " 78 >60 mL/min/1.73    BUN/Creatinine Ratio 17.8 7.0 - 25.0    Sodium 140 136 - 145 mmol/L    Potassium 4.2 3.5 - 5.2 mmol/L    Chloride 96 (L) 98 - 107 mmol/L    Total CO2 24.6 22.0 - 29.0 mmol/L    Calcium 10.5 8.6 - 10.5 mg/dL    Total Protein 7.6 6.0 - 8.5 g/dL    Albumin 4.70 3.50 - 5.20 g/dL    Globulin 2.9 gm/dL    A/G Ratio 1.6 g/dL    Total Bilirubin 0.5 0.1 - 1.2 mg/dL    Alkaline Phosphatase 102 39 - 117 U/L    AST (SGOT) 64 (H) 1 - 32 U/L    ALT (SGPT) 66 (H) 1 - 33 U/L   C-Peptide   Result Value Ref Range    C-Peptide 5.7 (H) 1.1 - 4.4 ng/mL   Hemoglobin A1c   Result Value Ref Range    Hemoglobin A1C 8.28 (H) 4.80 - 5.60 %   Lipid Panel   Result Value Ref Range    Total Cholesterol 136 0 - 200 mg/dL    Triglycerides 243 (H) 0 - 150 mg/dL    HDL Cholesterol 41 40 - 60 mg/dL    VLDL Cholesterol 48.6 (H) 5 - 40 mg/dL    LDL Cholesterol  46 0 - 100 mg/dL   Luteinizing Hormone   Result Value Ref Range    LH 23.4 mIU/mL   Follicle Stimulating Hormone   Result Value Ref Range    FSH 34.5 mIU/mL   Prolactin   Result Value Ref Range    Prolactin 24.1 (H) 4.8 - 23.3 ng/mL   ACTH   Result Value Ref Range    ACTH 19.0 7.2 - 63.3 pg/mL   Cortisol   Result Value Ref Range    Cortisol 8.3 ug/dL   Cardiovascular Risk Assessment   Result Value Ref Range    Interpretation Note    Diabetes Patient Education   Result Value Ref Range    PDF Image Not applicable            The following portions of the patient's history were reviewed and updated as appropriate: allergies, current medications, past family history, past medical history, past social history, past surgical history and problem list.    Review of Systems   Constitutional: Positive for fatigue. Negative for activity change, chills and fever.   Respiratory: Negative for cough and chest tightness.    Cardiovascular: Negative for chest pain and palpitations.   Gastrointestinal: Negative for abdominal pain and nausea.   Endocrine: Negative for cold intolerance,  polydipsia, polyphagia and polyuria.   Skin: Negative for pallor.   Neurological: Positive for dizziness and headaches. Negative for tremors, seizures, speech difficulty and weakness.   Psychiatric/Behavioral: Negative for behavioral problems, confusion and dysphoric mood. The patient is nervous/anxious.        Objective   Physical Exam   Constitutional: She appears well-developed and well-nourished.   Neck: Neck supple. No thyromegaly present.   Cardiovascular: Normal rate and regular rhythm.    No murmur heard.  Pulmonary/Chest: Effort normal and breath sounds normal.   Abdominal: Bowel sounds are normal. There is no tenderness.   Neurological: She is alert.   Psychiatric: She has a normal mood and affect. Her behavior is normal.   Nursing note and vitals reviewed.      Assessment/Plan   Pratibha was seen today for hyperlipidemia, hypertension, anxiety and arthritis.    Diagnoses and all orders for this visit:    Major depressive disorder, recurrent, in full remission (CMS/HCC)  -     FLUoxetine (PROzac) 20 MG capsule; Take 3 capsules by mouth Daily.    Benign essential hypertension  -     losartan (COZAAR) 50 MG tablet; Take 1 tablet by mouth Daily.  -     metoprolol succinate XL (TOPROL-XL) 100 MG 24 hr tablet; Take 1 tablet by mouth Daily.  -     triamterene-hydrochlorothiazide (MAXZIDE-25) 37.5-25 MG per tablet; Take 1 tablet by mouth Daily.    Anxiety  -     ALPRAZolam (XANAX) 0.5 MG tablet; Take 1 tablet by mouth Daily. TAKE 1 TABLET BY MOUTH EVERY DAY AS NEEDED    Mixed hyperlipidemia  -     atorvastatin (LIPITOR) 10 MG tablet; Take 0.5 tablets by mouth Daily.    Cervicalgia  -     baclofen (LIORESAL) 10 MG tablet; Take 1 tablet by mouth 3 (Three) Times a Day.

## 2018-10-23 ENCOUNTER — LAB (OUTPATIENT)
Dept: ENDOCRINOLOGY | Age: 58
End: 2018-10-23

## 2018-10-23 DIAGNOSIS — E78.2 MIXED HYPERLIPIDEMIA: ICD-10-CM

## 2018-10-23 DIAGNOSIS — E06.3 HYPOTHYROIDISM DUE TO HASHIMOTO'S THYROIDITIS: ICD-10-CM

## 2018-10-23 DIAGNOSIS — E11.65 UNCONTROLLED TYPE 2 DIABETES MELLITUS WITH HYPERGLYCEMIA (HCC): ICD-10-CM

## 2018-10-23 DIAGNOSIS — E03.8 HYPOTHYROIDISM DUE TO HASHIMOTO'S THYROIDITIS: ICD-10-CM

## 2018-10-23 DIAGNOSIS — E78.2 MIXED HYPERLIPIDEMIA: Primary | ICD-10-CM

## 2018-10-23 DIAGNOSIS — E55.9 VITAMIN D DEFICIENCY, UNSPECIFIED: ICD-10-CM

## 2018-10-24 LAB
25(OH)D3+25(OH)D2 SERPL-MCNC: 29 NG/ML (ref 30–100)
ALBUMIN SERPL-MCNC: 4.5 G/DL (ref 3.5–5.2)
ALBUMIN/GLOB SERPL: 1.5 G/DL
ALP SERPL-CCNC: 135 U/L (ref 39–117)
ALT SERPL-CCNC: 34 U/L (ref 1–33)
AST SERPL-CCNC: 19 U/L (ref 1–32)
BILIRUB SERPL-MCNC: 0.2 MG/DL (ref 0.1–1.2)
BUN SERPL-MCNC: 28 MG/DL (ref 6–20)
BUN/CREAT SERPL: 39.4 (ref 7–25)
C PEPTIDE SERPL-MCNC: 6.9 NG/ML (ref 1.1–4.4)
CALCIUM SERPL-MCNC: 10.4 MG/DL (ref 8.6–10.5)
CHLORIDE SERPL-SCNC: 96 MMOL/L (ref 98–107)
CHOLEST SERPL-MCNC: 149 MG/DL (ref 0–200)
CO2 SERPL-SCNC: 25.3 MMOL/L (ref 22–29)
CREAT SERPL-MCNC: 0.71 MG/DL (ref 0.57–1)
GLOBULIN SER CALC-MCNC: 3.1 GM/DL
GLUCOSE SERPL-MCNC: 219 MG/DL (ref 65–99)
HBA1C MFR BLD: 7.8 % (ref 4.8–5.6)
HDLC SERPL-MCNC: 47 MG/DL (ref 40–60)
INTERPRETATION: NORMAL
LDLC SERPL CALC-MCNC: 40 MG/DL (ref 0–100)
Lab: NORMAL
MICROALBUMIN UR-MCNC: 183.7 UG/ML
POTASSIUM SERPL-SCNC: 4.2 MMOL/L (ref 3.5–5.2)
PROT SERPL-MCNC: 7.6 G/DL (ref 6–8.5)
SODIUM SERPL-SCNC: 139 MMOL/L (ref 136–145)
T3FREE SERPL-MCNC: 2.4 PG/ML (ref 2–4.4)
T4 FREE SERPL-MCNC: 1.55 NG/DL (ref 0.93–1.7)
TRIGL SERPL-MCNC: 312 MG/DL (ref 0–150)
VLDLC SERPL CALC-MCNC: 62.4 MG/DL (ref 5–40)

## 2018-11-07 ENCOUNTER — OFFICE VISIT (OUTPATIENT)
Dept: ENDOCRINOLOGY | Age: 58
End: 2018-11-07

## 2018-11-07 VITALS
WEIGHT: 293 LBS | SYSTOLIC BLOOD PRESSURE: 116 MMHG | DIASTOLIC BLOOD PRESSURE: 78 MMHG | BODY MASS INDEX: 42.78 KG/M2 | RESPIRATION RATE: 16 BRPM

## 2018-11-07 DIAGNOSIS — E06.3 HYPOTHYROIDISM DUE TO HASHIMOTO'S THYROIDITIS: ICD-10-CM

## 2018-11-07 DIAGNOSIS — E66.01 MORBID OBESITY (HCC): ICD-10-CM

## 2018-11-07 DIAGNOSIS — E11.65 UNCONTROLLED TYPE 2 DIABETES MELLITUS WITH HYPERGLYCEMIA (HCC): Primary | ICD-10-CM

## 2018-11-07 DIAGNOSIS — E78.2 MIXED HYPERLIPIDEMIA: ICD-10-CM

## 2018-11-07 DIAGNOSIS — E03.8 HYPOTHYROIDISM DUE TO HASHIMOTO'S THYROIDITIS: ICD-10-CM

## 2018-11-07 PROCEDURE — 99214 OFFICE O/P EST MOD 30 MIN: CPT | Performed by: INTERNAL MEDICINE

## 2018-11-07 RX ORDER — PEN NEEDLE, DIABETIC 30 GX3/16"
NEEDLE, DISPOSABLE MISCELLANEOUS
Qty: 90 EACH | Refills: 3 | Status: SHIPPED | OUTPATIENT
Start: 2018-11-07 | End: 2019-01-09

## 2018-11-07 RX ORDER — DAPAGLIFLOZIN AND METFORMIN HYDROCHLORIDE 5; 1000 MG/1; MG/1
2 TABLET, FILM COATED, EXTENDED RELEASE ORAL DAILY
Qty: 60 TABLET | Refills: 5 | Status: SHIPPED | OUTPATIENT
Start: 2018-11-07 | End: 2019-03-07 | Stop reason: SDUPTHER

## 2018-11-07 RX ORDER — ERGOCALCIFEROL 1.25 MG/1
50000 CAPSULE ORAL 3 TIMES WEEKLY
Qty: 39 CAPSULE | Refills: 3 | Status: SHIPPED | OUTPATIENT
Start: 2018-11-07 | End: 2019-03-07 | Stop reason: SDUPTHER

## 2018-11-07 RX ORDER — LEVOTHYROXINE SODIUM 0.12 MG/1
125 TABLET ORAL DAILY
Qty: 90 TABLET | Refills: 1 | Status: SHIPPED | OUTPATIENT
Start: 2018-11-07 | End: 2019-03-07 | Stop reason: SDUPTHER

## 2018-11-07 RX ORDER — DULAGLUTIDE 1.5 MG/.5ML
1.5 INJECTION, SOLUTION SUBCUTANEOUS WEEKLY
Qty: 4 PEN | Refills: 5 | Status: SHIPPED | OUTPATIENT
Start: 2018-11-07 | End: 2018-11-12 | Stop reason: SDUPTHER

## 2018-11-07 RX ORDER — ATORVASTATIN CALCIUM 10 MG/1
5 TABLET, FILM COATED ORAL DAILY
Qty: 45 TABLET | Refills: 1 | Status: SHIPPED | OUTPATIENT
Start: 2018-11-07 | End: 2019-03-07 | Stop reason: SDUPTHER

## 2018-11-07 NOTE — PROGRESS NOTES
Subjective   Pratibha Pascal is a 58 y.o. female seen for follow up for DM2, hyperlipidemia, HTN, hypothyroidism, lab review. Patient states that during her last office visit she was given both Trulicity and Saxenda to start. She did not start taking Saxenda but has been taking Trulicity.     History of Present Illness this is a 58-year-old female known patient with type II diabetes hypertension and dyslipidemia as well as morbid obesity and vitamin D deficiency.  Over the course of last 4 months she has had no significant health problems for which to go to the emergency room or hospital.    /78   Resp 16   Wt (!) 143 kg (315 lb 6.4 oz)   BMI 42.78 kg/m²      Allergies   Allergen Reactions   • Cefuroxime Diarrhea   • Clindamycin/Lincomycin Diarrhea   • Erythromycin    • Talwin [Pentazocine]        Current Outpatient Prescriptions:   •  ALPRAZolam (XANAX) 0.5 MG tablet, Take 1 tablet by mouth Daily. TAKE 1 TABLET BY MOUTH EVERY DAY AS NEEDED, Disp: 30 tablet, Rfl: 2  •  aspirin 81 MG tablet, Take by mouth daily., Disp: , Rfl:   •  atorvastatin (LIPITOR) 10 MG tablet, Take 0.5 tablets by mouth Daily., Disp: 45 tablet, Rfl: 1  •  baclofen (LIORESAL) 10 MG tablet, Take 1 tablet by mouth 3 (Three) Times a Day., Disp: 270 tablet, Rfl: 1  •  Baricitinib (OLUMIANT) 2 MG tablet, , Disp: , Rfl:   •  Dulaglutide (TRULICITY) 0.75 MG/0.5ML solution pen-injector, , Disp: , Rfl:   •  ergocalciferol (DRISDOL) 19541 units capsule, Take 1 capsule by mouth 1 (One) Time Per Week., Disp: 13 capsule, Rfl: 3  •  FLUoxetine (PROzac) 20 MG capsule, Take 3 capsules by mouth Daily., Disp: 270 capsule, Rfl: 1  •  folic acid (FOLVITE) 1 MG tablet, , Disp: , Rfl:   •  Insulin Pen Needle (PEN NEEDLES) 31G X 8 MM misc, Use once a day for injection of Saxenda, Disp: 90 each, Rfl: 3  •  levothyroxine (SYNTHROID) 125 MCG tablet, Take 1 tablet by mouth Daily., Disp: 90 tablet, Rfl: 1  •  losartan (COZAAR) 50 MG tablet, Take 1 tablet by mouth  Daily., Disp: 90 tablet, Rfl: 1  •  metoprolol succinate XL (TOPROL-XL) 100 MG 24 hr tablet, Take 1 tablet by mouth Daily., Disp: 90 tablet, Rfl: 1  •  sulfaSALAzine (AZULFIDINE) 500 MG tablet, TAKE 3 TABLETS BY MOUTH TWICE DAILY, Disp: , Rfl: 1  •  triamterene-hydrochlorothiazide (MAXZIDE-25) 37.5-25 MG per tablet, Take 1 tablet by mouth Daily., Disp: 90 tablet, Rfl: 1  •  XIGDUO XR 5-1000 MG tablet, Take 2 tablets by mouth Daily., Disp: 60 tablet, Rfl: 5      The following portions of the patient's history were reviewed and updated as appropriate: allergies, current medications, past family history, past medical history, past social history, past surgical history and problem list.    Review of Systems   Constitutional: Negative.    HENT: Negative.    Eyes: Negative.    Respiratory: Negative.    Cardiovascular: Negative.    Gastrointestinal: Negative.    Endocrine: Negative.    Genitourinary: Negative.    Musculoskeletal: Negative.    Skin: Negative.    Allergic/Immunologic: Negative.    Neurological: Negative.    Hematological: Negative.    Psychiatric/Behavioral: Negative.        Objective   Physical Exam   Constitutional: She is oriented to person, place, and time. She appears well-developed and well-nourished. No distress.   Morbidly obese.  Patient using cane for ambulation.   HENT:   Head: Normocephalic and atraumatic.   Right Ear: External ear normal.   Left Ear: External ear normal.   Nose: Nose normal.   Mouth/Throat: Oropharynx is clear and moist. No oropharyngeal exudate.   Eyes: Pupils are equal, round, and reactive to light. Conjunctivae and EOM are normal. Right eye exhibits no discharge. Left eye exhibits no discharge. No scleral icterus.   Neck: Normal range of motion. Neck supple. No JVD present. No tracheal deviation present. No thyromegaly present.   Cardiovascular: Normal rate, regular rhythm, normal heart sounds and intact distal pulses.  Exam reveals no gallop and no friction rub.    No murmur  heard.  Superficial varicosities in right lower he leg and right foot   Pulmonary/Chest: Effort normal and breath sounds normal. No stridor. No respiratory distress. She has no wheezes. She has no rales. She exhibits no tenderness.   Abdominal: Soft. Bowel sounds are normal. She exhibits no distension and no mass. There is no tenderness. There is no rebound and no guarding. No hernia.   Musculoskeletal: Normal range of motion. She exhibits no edema, tenderness or deformity.   Lymphadenopathy:     She has no cervical adenopathy.   Neurological: She is alert and oriented to person, place, and time. She has normal reflexes. She displays normal reflexes. No cranial nerve deficit or sensory deficit. She exhibits normal muscle tone. Coordination normal.   Skin: Skin is warm and dry. No rash noted. She is not diaphoretic. No erythema. No pallor.   Psychiatric: She has a normal mood and affect. Her behavior is normal. Judgment and thought content normal.   Nursing note and vitals reviewed.       Results for orders placed or performed in visit on 10/23/18   Comprehensive Metabolic Panel   Result Value Ref Range    Glucose 219 (H) 65 - 99 mg/dL    BUN 28 (H) 6 - 20 mg/dL    Creatinine 0.71 0.57 - 1.00 mg/dL    eGFR Non African Am 84 >60 mL/min/1.73    eGFR African Am 102 >60 mL/min/1.73    BUN/Creatinine Ratio 39.4 (H) 7.0 - 25.0    Sodium 139 136 - 145 mmol/L    Potassium 4.2 3.5 - 5.2 mmol/L    Chloride 96 (L) 98 - 107 mmol/L    Total CO2 25.3 22.0 - 29.0 mmol/L    Calcium 10.4 8.6 - 10.5 mg/dL    Total Protein 7.6 6.0 - 8.5 g/dL    Albumin 4.50 3.50 - 5.20 g/dL    Globulin 3.1 gm/dL    A/G Ratio 1.5 g/dL    Total Bilirubin 0.2 0.1 - 1.2 mg/dL    Alkaline Phosphatase 135 (H) 39 - 117 U/L    AST (SGOT) 19 1 - 32 U/L    ALT (SGPT) 34 (H) 1 - 33 U/L   C-Peptide   Result Value Ref Range    C-Peptide 6.9 (H) 1.1 - 4.4 ng/mL   Hemoglobin A1c   Result Value Ref Range    Hemoglobin A1C 7.80 (H) 4.80 - 5.60 %   MicroAlbumin, Urine,  Random - Urine, Clean Catch   Result Value Ref Range    Microalbumin, Urine 183.7 Not Estab. ug/mL   Vitamin D 25 Hydroxy   Result Value Ref Range    25 Hydroxy, Vitamin D 29.0 (L) 30.0 - 100.0 ng/ml   T4, Free   Result Value Ref Range    Free T4 1.55 0.93 - 1.70 ng/dL   T3, Free   Result Value Ref Range    T3, Free 2.4 2.0 - 4.4 pg/mL   Lipid Panel   Result Value Ref Range    Total Cholesterol 149 0 - 200 mg/dL    Triglycerides 312 (H) 0 - 150 mg/dL    HDL Cholesterol 47 40 - 60 mg/dL    VLDL Cholesterol 62.4 (H) 5 - 40 mg/dL    LDL Cholesterol  40 0 - 100 mg/dL   Cardiovascular Risk Assessment   Result Value Ref Range    Interpretation Note    Diabetes Patient Education   Result Value Ref Range    PDF Image Not applicable          Assessment/Plan   Diagnoses and all orders for this visit:    Uncontrolled type 2 diabetes mellitus with hyperglycemia (CMS/HCC)  -     atorvastatin (LIPITOR) 10 MG tablet; Take 0.5 tablets by mouth Daily.  -     T3, Free; Future  -     T4 & TSH ([facility]); Future  -     T4, Free; Future  -     Uric Acid; Future  -     Vitamin D 25 Hydroxy; Future  -     Comprehensive Metabolic Panel; Future  -     C-Peptide; Future  -     Hemoglobin A1c; Future  -     Lipid Panel; Future  -     MicroAlbumin, Urine, Random - Urine, Clean Catch; Future    Mixed hyperlipidemia  -     atorvastatin (LIPITOR) 10 MG tablet; Take 0.5 tablets by mouth Daily.  -     T3, Free; Future  -     T4 & TSH ([facility]); Future  -     T4, Free; Future  -     Uric Acid; Future  -     Vitamin D 25 Hydroxy; Future  -     Comprehensive Metabolic Panel; Future  -     C-Peptide; Future  -     Hemoglobin A1c; Future  -     Lipid Panel; Future  -     MicroAlbumin, Urine, Random - Urine, Clean Catch; Future    Morbid obesity (CMS/[facility])  -     atorvastatin (LIPITOR) 10 MG tablet; Take 0.5 tablets by mouth Daily.  -     T3, Free; Future  -     T4 & TSH ([facility]); Future  -     T4, Free; Future  -     Uric Acid; Future  -     Vitamin D 25  Hydroxy; Future  -     Comprehensive Metabolic Panel; Future  -     C-Peptide; Future  -     Hemoglobin A1c; Future  -     Lipid Panel; Future  -     MicroAlbumin, Urine, Random - Urine, Clean Catch; Future    Hypothyroidism due to Hashimoto's thyroiditis  -     atorvastatin (LIPITOR) 10 MG tablet; Take 0.5 tablets by mouth Daily.  -     T3, Free; Future  -     T4 & TSH ([facility]); Future  -     T4, Free; Future  -     Uric Acid; Future  -     Vitamin D 25 Hydroxy; Future  -     Comprehensive Metabolic Panel; Future  -     C-Peptide; Future  -     Hemoglobin A1c; Future  -     Lipid Panel; Future  -     MicroAlbumin, Urine, Random - Urine, Clean Catch; Future    Other orders  -     TRULICITY 1.5 MG/0.5ML solution pen-injector; Inject 1.5 mg under the skin into the appropriate area as directed 1 (One) Time Per Week.  -     XIGDUO XR 5-1000 MG tablet; Take 2 tablets by mouth Daily.  -     levothyroxine (SYNTHROID) 125 MCG tablet; Take 1 tablet by mouth Daily.  -     Insulin Pen Needle (PEN NEEDLES) 31G X 8 MM misc; Use once a day for injection of Saxenda  -     ergocalciferol (DRISDOL) 33825 units capsule; Take 1 capsule by mouth 3 (Three) Times a Week.             in summary I saw and examined this 58-year-old female for above-mentioned problems.  I reviewed her laboratory evaluation of 10/23/2018 and provided her a hard copy of it.  She is clinically and metabolically stable however her vitamin D level is very low and therefore we will go ahead and continue all her current prescriptions except I will increase her vitamin D to 50,000 units 3 times weekly.  She will see Ms. Regina Bledsoe in 4 months or sooner if needed with laboratory evaluation prior to each office visit.

## 2018-11-12 RX ORDER — DULAGLUTIDE 1.5 MG/.5ML
1.5 INJECTION, SOLUTION SUBCUTANEOUS WEEKLY
Qty: 12 PEN | Refills: 1 | Status: SHIPPED | OUTPATIENT
Start: 2018-11-12 | End: 2019-03-07 | Stop reason: SDUPTHER

## 2019-01-14 ENCOUNTER — OFFICE VISIT (OUTPATIENT)
Dept: FAMILY MEDICINE CLINIC | Facility: CLINIC | Age: 59
End: 2019-01-14

## 2019-01-14 VITALS
RESPIRATION RATE: 16 BRPM | TEMPERATURE: 98.1 F | BODY MASS INDEX: 39.68 KG/M2 | HEIGHT: 72 IN | WEIGHT: 293 LBS | DIASTOLIC BLOOD PRESSURE: 76 MMHG | SYSTOLIC BLOOD PRESSURE: 133 MMHG | HEART RATE: 84 BPM

## 2019-01-14 DIAGNOSIS — M17.0 PRIMARY OSTEOARTHRITIS OF BOTH KNEES: ICD-10-CM

## 2019-01-14 DIAGNOSIS — F41.9 ANXIETY: ICD-10-CM

## 2019-01-14 DIAGNOSIS — Z23 IMMUNIZATION DUE: Primary | ICD-10-CM

## 2019-01-14 DIAGNOSIS — I10 BENIGN ESSENTIAL HYPERTENSION: ICD-10-CM

## 2019-01-14 PROBLEM — M05.79 RHEUMATOID ARTHRITIS OF MULTIPLE SITES WITHOUT ORGAN OR SYSTEM INVOLVEMENT WITH POSITIVE RHEUMATOID FACTOR (HCC): Status: ACTIVE | Noted: 2019-01-14

## 2019-01-14 PROBLEM — D84.9 IMMUNODEFICIENCY, UNSPECIFIED: Status: ACTIVE | Noted: 2019-01-14

## 2019-01-14 PROBLEM — Z92.25 PERSONAL HISTORY OF IMMUNOSUPRESSION THERAPY: Status: ACTIVE | Noted: 2019-01-14

## 2019-01-14 PROBLEM — Z79.899 OTHER LONG TERM (CURRENT) DRUG THERAPY: Status: ACTIVE | Noted: 2019-01-14

## 2019-01-14 PROBLEM — M15.0 PRIMARY GENERALIZED (OSTEO)ARTHRITIS: Status: ACTIVE | Noted: 2019-01-14

## 2019-01-14 PROBLEM — Z79.1 LONG TERM CURRENT USE OF NON-STEROIDAL ANTI-INFLAMMATORIES (NSAID): Status: ACTIVE | Noted: 2019-01-14

## 2019-01-14 PROBLEM — R76.0 RAISED ANTIBODY TITER: Status: ACTIVE | Noted: 2019-01-14

## 2019-01-14 PROCEDURE — 90632 HEPA VACCINE ADULT IM: CPT | Performed by: FAMILY MEDICINE

## 2019-01-14 PROCEDURE — 90471 IMMUNIZATION ADMIN: CPT | Performed by: FAMILY MEDICINE

## 2019-01-14 PROCEDURE — 99213 OFFICE O/P EST LOW 20 MIN: CPT | Performed by: FAMILY MEDICINE

## 2019-01-14 RX ORDER — ALPRAZOLAM 0.5 MG/1
0.5 TABLET ORAL DAILY
Qty: 30 TABLET | Refills: 2 | Status: SHIPPED | OUTPATIENT
Start: 2019-01-14 | End: 2019-04-08 | Stop reason: SDUPTHER

## 2019-01-14 RX ORDER — ALPRAZOLAM 0.5 MG/1
0.5 TABLET ORAL DAILY
Qty: 30 TABLET | Refills: 2 | Status: CANCELLED | OUTPATIENT
Start: 2019-01-14

## 2019-01-14 NOTE — PROGRESS NOTES
Subjective   Pratibha Pascal is a 58 y.o. female.     History of Present Illness     Chief Complaint:   Chief Complaint   Patient presents with   • Anxiety   • Immunizations     hep a given rt deltoid today    • Hypertension       Pratibha Pascal 58 y.o. female who presents today for Medical Management of the below listed issues and medication refills.  she has a problem list of   Patient Active Problem List   Diagnosis   • CAD (coronary artery disease)   • Type 2 diabetes mellitus, uncontrolled (CMS/[facility])   • HLD (hyperlipidemia)   • Benign essential hypertension   • Hypothyroidism   • Major depressive disorder, recurrent, in full remission (CMS/HCC)   • Rheumatoid arthritis (CMS/HCC)   • Tobacco abuse   • Cervicalgia   • Diabetes mellitus (CMS/HCC)   • Dyslipidemia   • BP (high blood pressure)   • Obstructive apnea   • AF (paroxysmal atrial fibrillation) (CMS/[facility])   • Proteinuria   • Burn   • Cellulitis of left leg   • Diabetes mellitus with neurological manifestation (CMS/[facility])   • MSSA (methicillin susceptible Staphylococcus aureus) infection   • Diabetic ulcer of left foot associated with diabetes mellitus due to underlying condition (CMS/[facility])   • Morbid obesity (CMS/[facility])   • Primary insomnia   • Anxiety   • Immunodeficiency, unspecified (CMS/[facility])   • Long term current use of non-steroidal anti-inflammatories (NSAID)   • Personal history of immunosupression therapy   • Primary generalized (osteo)arthritis   • Raised antibody titer   • Rheumatoid arthritis of multiple sites without organ or system involvement with positive rheumatoid factor (CMS/[facility])   • Other long term (current) drug therapy   .  Since the last visit, she has overall felt well.  she has been compliant with   Current Outpatient Medications:   •  ALPRAZolam (XANAX) 0.5 MG tablet, Take 1 tablet by mouth Daily. TAKE 1 TABLET BY MOUTH EVERY DAY AS NEEDED, Disp: 30 tablet, Rfl: 2  •  aspirin 81 MG tablet, Take by mouth daily., Disp: , Rfl:   •  atorvastatin  "(LIPITOR) 10 MG tablet, Take 0.5 tablets by mouth Daily., Disp: 45 tablet, Rfl: 1  •  baclofen (LIORESAL) 10 MG tablet, Take 1 tablet by mouth 3 (Three) Times a Day., Disp: 270 tablet, Rfl: 1  •  Baricitinib (OLUMIANT) 2 MG tablet, , Disp: , Rfl:   •  ergocalciferol (DRISDOL) 01012 units capsule, Take 1 capsule by mouth 3 (Three) Times a Week., Disp: 39 capsule, Rfl: 3  •  FLUoxetine (PROzac) 20 MG capsule, Take 3 capsules by mouth Daily., Disp: 270 capsule, Rfl: 1  •  folic acid (FOLVITE) 1 MG tablet, , Disp: , Rfl:   •  levothyroxine (SYNTHROID) 125 MCG tablet, Take 1 tablet by mouth Daily., Disp: 90 tablet, Rfl: 1  •  losartan (COZAAR) 50 MG tablet, Take 1 tablet by mouth Daily., Disp: 90 tablet, Rfl: 1  •  metoprolol succinate XL (TOPROL-XL) 100 MG 24 hr tablet, Take 1 tablet by mouth Daily., Disp: 90 tablet, Rfl: 1  •  sulfaSALAzine (AZULFIDINE) 500 MG tablet, TAKE 3 TABLETS BY MOUTH TWICE DAILY, Disp: , Rfl: 1  •  triamterene-hydrochlorothiazide (MAXZIDE-25) 37.5-25 MG per tablet, Take 1 tablet by mouth Daily., Disp: 90 tablet, Rfl: 1  •  TRULICITY 1.5 MG/0.5ML solution pen-injector, Inject 1.5 mg under the skin into the appropriate area as directed 1 (One) Time Per Week., Disp: 12 pen, Rfl: 1  •  XIGDUO XR 5-1000 MG tablet, Take 2 tablets by mouth Daily., Disp: 60 tablet, Rfl: 5    Current Facility-Administered Medications:   •  hepatitis A (HAVRIX) vaccine 1,440 Units, 1,440 Units, Intramuscular, Once, Benitez Wu MD.  she denies medication side effects.    All of the chronic condition(s) listed above are stable w/o issues.    /76   Pulse 84   Temp 98.1 °F (36.7 °C) (Oral)   Resp 16   Ht 182.9 cm (72\")   Wt (!) 141 kg (311 lb)   BMI 42.18 kg/m²     Results for orders placed or performed in visit on 10/23/18   Comprehensive Metabolic Panel   Result Value Ref Range    Glucose 219 (H) 65 - 99 mg/dL    BUN 28 (H) 6 - 20 mg/dL    Creatinine 0.71 0.57 - 1.00 mg/dL    eGFR Non African Am 84 >60 " mL/min/1.73    eGFR African Am 102 >60 mL/min/1.73    BUN/Creatinine Ratio 39.4 (H) 7.0 - 25.0    Sodium 139 136 - 145 mmol/L    Potassium 4.2 3.5 - 5.2 mmol/L    Chloride 96 (L) 98 - 107 mmol/L    Total CO2 25.3 22.0 - 29.0 mmol/L    Calcium 10.4 8.6 - 10.5 mg/dL    Total Protein 7.6 6.0 - 8.5 g/dL    Albumin 4.50 3.50 - 5.20 g/dL    Globulin 3.1 gm/dL    A/G Ratio 1.5 g/dL    Total Bilirubin 0.2 0.1 - 1.2 mg/dL    Alkaline Phosphatase 135 (H) 39 - 117 U/L    AST (SGOT) 19 1 - 32 U/L    ALT (SGPT) 34 (H) 1 - 33 U/L   C-Peptide   Result Value Ref Range    C-Peptide 6.9 (H) 1.1 - 4.4 ng/mL   Hemoglobin A1c   Result Value Ref Range    Hemoglobin A1C 7.80 (H) 4.80 - 5.60 %   MicroAlbumin, Urine, Random - Urine, Clean Catch   Result Value Ref Range    Microalbumin, Urine 183.7 Not Estab. ug/mL   Vitamin D 25 Hydroxy   Result Value Ref Range    25 Hydroxy, Vitamin D 29.0 (L) 30.0 - 100.0 ng/ml   T4, Free   Result Value Ref Range    Free T4 1.55 0.93 - 1.70 ng/dL   T3, Free   Result Value Ref Range    T3, Free 2.4 2.0 - 4.4 pg/mL   Lipid Panel   Result Value Ref Range    Total Cholesterol 149 0 - 200 mg/dL    Triglycerides 312 (H) 0 - 150 mg/dL    HDL Cholesterol 47 40 - 60 mg/dL    VLDL Cholesterol 62.4 (H) 5 - 40 mg/dL    LDL Cholesterol  40 0 - 100 mg/dL   Cardiovascular Risk Assessment   Result Value Ref Range    Interpretation Note    Diabetes Patient Education   Result Value Ref Range    PDF Image Not applicable            The following portions of the patient's history were reviewed and updated as appropriate: allergies, current medications, past family history, past medical history, past social history, past surgical history and problem list.    Review of Systems   Constitutional: Negative for activity change, chills, fatigue and fever.   Respiratory: Negative for cough and chest tightness.    Cardiovascular: Negative for chest pain and palpitations.   Gastrointestinal: Negative for abdominal pain and nausea.    Endocrine: Negative for cold intolerance.   Psychiatric/Behavioral: Negative for behavioral problems and dysphoric mood.       Objective   Physical Exam   Constitutional: She appears well-developed and well-nourished.   Neck: Neck supple. No thyromegaly present.   Cardiovascular: Normal rate and regular rhythm.   No murmur heard.  Pulmonary/Chest: Effort normal and breath sounds normal.   Abdominal: Bowel sounds are normal. There is no tenderness.   Neurological: She is alert.   Psychiatric: She has a normal mood and affect. Her behavior is normal.   Nursing note and vitals reviewed.    The patient has read and signed the [facility] Controlled Substance Contract.  I will continue to see patient for regular follow up appointments.  They are well controlled on their medication.  DILLAN has been reviewed by me and is updated every 3 months. The patient is aware of the potential for addiction and dependence.    Assessment/Plan   Pratibha was seen today for anxiety, immunizations and hypertension.    Diagnoses and all orders for this visit:    Anxiety  -     ALPRAZolam (XANAX) 0.5 MG tablet; Take 1 tablet by mouth Daily. TAKE 1 TABLET BY MOUTH EVERY DAY AS NEEDED    Benign essential hypertension    Primary osteoarthritis of both knees  -     Ambulatory Referral to Orthopedic Surgery    Immunization due  -     hepatitis A (HAVRIX) vaccine 1,440 Units; Inject 1 mL into the appropriate muscle as directed by prescriber 1 (One) Time.    Other orders  -     Cancel: ALPRAZolam (XANAX) 0.5 MG tablet; Take 1 tablet by mouth Daily. TAKE 1 TABLET BY MOUTH EVERY DAY AS NEEDED    Pt's BPs doing well. Will continue with current medication.

## 2019-02-21 ENCOUNTER — LAB (OUTPATIENT)
Dept: ENDOCRINOLOGY | Age: 59
End: 2019-02-21

## 2019-02-21 DIAGNOSIS — E03.8 HYPOTHYROIDISM DUE TO HASHIMOTO'S THYROIDITIS: ICD-10-CM

## 2019-02-21 DIAGNOSIS — E11.65 UNCONTROLLED TYPE 2 DIABETES MELLITUS WITH HYPERGLYCEMIA (HCC): ICD-10-CM

## 2019-02-21 DIAGNOSIS — E78.2 MIXED HYPERLIPIDEMIA: Primary | ICD-10-CM

## 2019-02-21 DIAGNOSIS — E06.3 HYPOTHYROIDISM DUE TO HASHIMOTO'S THYROIDITIS: ICD-10-CM

## 2019-02-21 DIAGNOSIS — E78.2 MIXED HYPERLIPIDEMIA: ICD-10-CM

## 2019-02-22 LAB
25(OH)D3+25(OH)D2 SERPL-MCNC: 65.3 NG/ML (ref 30–100)
ALBUMIN SERPL-MCNC: 4.4 G/DL (ref 3.5–5.5)
ALBUMIN/GLOB SERPL: 1.6 {RATIO} (ref 1.2–2.2)
ALP SERPL-CCNC: 90 IU/L (ref 39–117)
ALT SERPL-CCNC: 20 IU/L (ref 0–32)
AST SERPL-CCNC: 19 IU/L (ref 0–40)
BILIRUB SERPL-MCNC: 0.2 MG/DL (ref 0–1.2)
BUN SERPL-MCNC: 24 MG/DL (ref 6–24)
BUN/CREAT SERPL: 36 (ref 9–23)
C PEPTIDE SERPL-MCNC: 9.7 NG/ML (ref 1.1–4.4)
CALCIUM SERPL-MCNC: 10.3 MG/DL (ref 8.7–10.2)
CHLORIDE SERPL-SCNC: 95 MMOL/L (ref 96–106)
CHOLEST SERPL-MCNC: 155 MG/DL (ref 100–199)
CO2 SERPL-SCNC: 23 MMOL/L (ref 20–29)
CREAT SERPL-MCNC: 0.67 MG/DL (ref 0.57–1)
FT4I SERPL CALC-MCNC: 2.3 (ref 1.2–4.9)
GLOBULIN SER CALC-MCNC: 2.8 G/DL (ref 1.5–4.5)
GLUCOSE SERPL-MCNC: 160 MG/DL (ref 65–99)
HBA1C MFR BLD: 6.7 % (ref 4.8–5.6)
HDLC SERPL-MCNC: 40 MG/DL
INTERPRETATION: NORMAL
LDLC SERPL CALC-MCNC: 50 MG/DL (ref 0–99)
Lab: NORMAL
MICROALBUMIN UR-MCNC: 489.6 UG/ML
POTASSIUM SERPL-SCNC: 4 MMOL/L (ref 3.5–5.2)
PROT SERPL-MCNC: 7.2 G/DL (ref 6–8.5)
SODIUM SERPL-SCNC: 138 MMOL/L (ref 134–144)
T3FREE SERPL-MCNC: 2.2 PG/ML (ref 2–4.4)
T3RU NFR SERPL: 30 % (ref 24–39)
T4 FREE SERPL-MCNC: 1.22 NG/DL (ref 0.82–1.77)
T4 SERPL-MCNC: 7.7 UG/DL (ref 4.5–12)
TRIGL SERPL-MCNC: 324 MG/DL (ref 0–149)
TSH SERPL DL<=0.005 MIU/L-ACNC: 2 UIU/ML (ref 0.45–4.5)
VLDLC SERPL CALC-MCNC: 65 MG/DL (ref 5–40)

## 2019-02-26 ENCOUNTER — OFFICE VISIT (OUTPATIENT)
Dept: ORTHOPEDIC SURGERY | Facility: CLINIC | Age: 59
End: 2019-02-26

## 2019-02-26 VITALS — BODY MASS INDEX: 39.68 KG/M2 | WEIGHT: 293 LBS | TEMPERATURE: 98.4 F | HEIGHT: 72 IN

## 2019-02-26 DIAGNOSIS — M25.562 ACUTE PAIN OF BOTH KNEES: Primary | ICD-10-CM

## 2019-02-26 DIAGNOSIS — M25.561 ACUTE PAIN OF BOTH KNEES: Primary | ICD-10-CM

## 2019-02-26 PROCEDURE — 73562 X-RAY EXAM OF KNEE 3: CPT | Performed by: NURSE PRACTITIONER

## 2019-02-26 PROCEDURE — 99203 OFFICE O/P NEW LOW 30 MIN: CPT | Performed by: NURSE PRACTITIONER

## 2019-02-26 PROCEDURE — 20610 DRAIN/INJ JOINT/BURSA W/O US: CPT | Performed by: NURSE PRACTITIONER

## 2019-02-26 RX ORDER — LIDOCAINE HYDROCHLORIDE 10 MG/ML
2 INJECTION, SOLUTION EPIDURAL; INFILTRATION; INTRACAUDAL; PERINEURAL
Status: COMPLETED | OUTPATIENT
Start: 2019-02-26 | End: 2019-02-26

## 2019-02-26 RX ORDER — METHYLPREDNISOLONE ACETATE 80 MG/ML
80 INJECTION, SUSPENSION INTRA-ARTICULAR; INTRALESIONAL; INTRAMUSCULAR; SOFT TISSUE
Status: COMPLETED | OUTPATIENT
Start: 2019-02-26 | End: 2019-02-26

## 2019-02-26 RX ORDER — PREDNISONE 1 MG/1
TABLET ORAL
COMMUNITY
Start: 2019-02-22 | End: 2019-04-08

## 2019-02-26 RX ADMIN — LIDOCAINE HYDROCHLORIDE 2 ML: 10 INJECTION, SOLUTION EPIDURAL; INFILTRATION; INTRACAUDAL; PERINEURAL at 16:08

## 2019-02-26 RX ADMIN — METHYLPREDNISOLONE ACETATE 80 MG: 80 INJECTION, SUSPENSION INTRA-ARTICULAR; INTRALESIONAL; INTRAMUSCULAR; SOFT TISSUE at 16:08

## 2019-02-26 NOTE — PROGRESS NOTES
Patient: Pratibha Pascal  YOB: 1960 59 y.o. female  Medical Record Number: [id number]    Chief Complaints:   Chief Complaint   Patient presents with   • Left Knee - Establish Care, Pain   • Right Knee - Establish Care, Pain       History of Present Illness:Pratibha Pascal is a 59 y.o. female who presents as a new patient both myself as well as to the practice with complaints of bilateral knee pain right greater than left.  Patient reports she has had pain off and on for many years but worse the last 6 months.  She describes a bilateral knee pain as a severe constant stabbing type pain with intermittent redness clicking and swelling, worse with driving walking, better with ice and rest.  She saw Dr. Roberson about 4 months ago and at that time both knees were injected with cortisone and only helped temporarily.    Allergies:   Allergies   Allergen Reactions   • Cefuroxime Diarrhea   • Clindamycin/Lincomycin Diarrhea   • Dicloxacillin Unknown (See Comments)   • Erythromycin    • Talwin [Pentazocine]        Medications:   Current Outpatient Medications   Medication Sig Dispense Refill   • ALPRAZolam (XANAX) 0.5 MG tablet Take 1 tablet by mouth Daily. TAKE 1 TABLET BY MOUTH EVERY DAY AS NEEDED 30 tablet 2   • aspirin 81 MG tablet Take by mouth daily.     • atorvastatin (LIPITOR) 10 MG tablet Take 0.5 tablets by mouth Daily. 45 tablet 1   • baclofen (LIORESAL) 10 MG tablet Take 1 tablet by mouth 3 (Three) Times a Day. 270 tablet 1   • Baricitinib (OLUMIANT) 2 MG tablet      • ergocalciferol (DRISDOL) 48574 units capsule Take 1 capsule by mouth 3 (Three) Times a Week. 39 capsule 3   • FLUoxetine (PROzac) 20 MG capsule Take 3 capsules by mouth Daily. 270 capsule 1   • folic acid (FOLVITE) 1 MG tablet      • levothyroxine (SYNTHROID) 125 MCG tablet Take 1 tablet by mouth Daily. 90 tablet 1   • losartan (COZAAR) 50 MG tablet Take 1 tablet by mouth Daily. 90 tablet 1   • metoprolol succinate XL (TOPROL-XL) 100 MG 24 hr  "tablet Take 1 tablet by mouth Daily. 90 tablet 1   • predniSONE (DELTASONE) 5 MG tablet      • sulfaSALAzine (AZULFIDINE) 500 MG tablet TAKE 3 TABLETS BY MOUTH TWICE DAILY  1   • triamterene-hydrochlorothiazide (MAXZIDE-25) 37.5-25 MG per tablet Take 1 tablet by mouth Daily. 90 tablet 1   • TRULICITY 1.5 MG/0.5ML solution pen-injector Inject 1.5 mg under the skin into the appropriate area as directed 1 (One) Time Per Week. 12 pen 1   • XIGDUO XR 5-1000 MG tablet Take 2 tablets by mouth Daily. 60 tablet 5     No current facility-administered medications for this visit.          The following portions of the patient's history were reviewed and updated as appropriate: allergies, current medications, past family history, past medical history, past social history, past surgical history and problem list.    Review of Systems:   A 14 point review of systems was performed. All systems negative except pertinent positives/negative listed in HPI above    Physical Exam:   Vitals:    02/26/19 1544   Temp: 98.4 °F (36.9 °C)   Weight: (!) 141 kg (311 lb)   Height: 182.9 cm (72\")       General: A and O x 3, ASA, NAD    SCLERA:    Normal    DENTITION:   Normal  Skin clear no unusual lesions noted  Bilateral knees patient has trace amount of effusion noted with 115 degrees flexion neutral and extension with a positive Luz Marina negative Lockman calf soft nontender    Radiology:  Xrays 3views (ap,lateral, sunrise) bilateral knees were ordered and reviewed today secondary to pain show bone-on-bone end-stage osteoarthritis.  No comparative views available    Assessment/Plan:  End-stage osteoarthritis bilateral knees    Patient I discussed treatment options.  She would like to proceed with bilateral knee cortisone injection, physical therapy, weight loss including at least a 30 pound weight loss goal in order to obtain a BMI less than 40.  Her to injections risks were discussed including pain, infection, elevated blood sugar.  Patient " verbalized understanding would like to proceed with injections and we will otherwise see her back as needed    Large Joint Arthrocentesis: R knee  Date/Time: 2/26/2019 4:08 PM  Consent given by: patient  Site marked: site marked  Timeout: Immediately prior to procedure a time out was called to verify the correct patient, procedure, equipment, support staff and site/side marked as required   Supporting Documentation  Indications: pain and joint swelling   Procedure Details  Location: knee - R knee  Preparation: Patient was prepped and draped in the usual sterile fashion  Needle size: 22 G  Approach: anterolateral  Medications administered: 80 mg methylPREDNISolone acetate 80 MG/ML; 2 mL lidocaine PF 1% 1 %  Patient tolerance: patient tolerated the procedure well with no immediate complications    Large Joint Arthrocentesis: L knee  Date/Time: 2/26/2019 4:08 PM  Consent given by: patient  Site marked: site marked  Timeout: Immediately prior to procedure a time out was called to verify the correct patient, procedure, equipment, support staff and site/side marked as required   Supporting Documentation  Indications: pain and joint swelling   Procedure Details  Location: knee - L knee  Preparation: Patient was prepped and draped in the usual sterile fashion  Needle size: 22 G  Approach: anterolateral  Medications administered: 80 mg methylPREDNISolone acetate 80 MG/ML; 2 mL lidocaine PF 1% 1 %  Patient tolerance: patient tolerated the procedure well with no immediate complications

## 2019-02-28 ENCOUNTER — RESULTS ENCOUNTER (OUTPATIENT)
Dept: ENDOCRINOLOGY | Age: 59
End: 2019-02-28

## 2019-02-28 DIAGNOSIS — E06.3 HYPOTHYROIDISM DUE TO HASHIMOTO'S THYROIDITIS: ICD-10-CM

## 2019-02-28 DIAGNOSIS — E03.8 HYPOTHYROIDISM DUE TO HASHIMOTO'S THYROIDITIS: ICD-10-CM

## 2019-02-28 DIAGNOSIS — E78.2 MIXED HYPERLIPIDEMIA: ICD-10-CM

## 2019-02-28 DIAGNOSIS — E66.01 MORBID OBESITY (HCC): ICD-10-CM

## 2019-02-28 DIAGNOSIS — E11.65 UNCONTROLLED TYPE 2 DIABETES MELLITUS WITH HYPERGLYCEMIA (HCC): ICD-10-CM

## 2019-03-07 ENCOUNTER — OFFICE VISIT (OUTPATIENT)
Dept: ENDOCRINOLOGY | Age: 59
End: 2019-03-07

## 2019-03-07 VITALS
SYSTOLIC BLOOD PRESSURE: 124 MMHG | BODY MASS INDEX: 39.68 KG/M2 | WEIGHT: 293 LBS | HEIGHT: 72 IN | DIASTOLIC BLOOD PRESSURE: 82 MMHG

## 2019-03-07 DIAGNOSIS — E03.8 HYPOTHYROIDISM DUE TO HASHIMOTO'S THYROIDITIS: ICD-10-CM

## 2019-03-07 DIAGNOSIS — E78.2 MIXED HYPERLIPIDEMIA: ICD-10-CM

## 2019-03-07 DIAGNOSIS — E06.3 HYPOTHYROIDISM DUE TO HASHIMOTO'S THYROIDITIS: ICD-10-CM

## 2019-03-07 DIAGNOSIS — E55.9 VITAMIN D DEFICIENCY, UNSPECIFIED: ICD-10-CM

## 2019-03-07 DIAGNOSIS — E83.52 HYPERCALCEMIA: ICD-10-CM

## 2019-03-07 DIAGNOSIS — E66.01 MORBID OBESITY (HCC): ICD-10-CM

## 2019-03-07 DIAGNOSIS — E11.65 UNCONTROLLED TYPE 2 DIABETES MELLITUS WITH HYPERGLYCEMIA (HCC): Primary | ICD-10-CM

## 2019-03-07 PROCEDURE — 99214 OFFICE O/P EST MOD 30 MIN: CPT | Performed by: NURSE PRACTITIONER

## 2019-03-07 RX ORDER — LEVOTHYROXINE SODIUM 0.12 MG/1
125 TABLET ORAL DAILY
Qty: 90 TABLET | Refills: 1 | Status: SHIPPED | OUTPATIENT
Start: 2019-03-07 | End: 2019-06-24 | Stop reason: SDUPTHER

## 2019-03-07 RX ORDER — DULAGLUTIDE 1.5 MG/.5ML
1.5 INJECTION, SOLUTION SUBCUTANEOUS WEEKLY
Qty: 12 PEN | Refills: 1 | Status: SHIPPED | OUTPATIENT
Start: 2019-03-07 | End: 2019-06-24 | Stop reason: SDUPTHER

## 2019-03-07 RX ORDER — ATORVASTATIN CALCIUM 10 MG/1
5 TABLET, FILM COATED ORAL DAILY
Qty: 45 TABLET | Refills: 1 | Status: SHIPPED | OUTPATIENT
Start: 2019-03-07 | End: 2019-06-24 | Stop reason: SDUPTHER

## 2019-03-07 RX ORDER — ERGOCALCIFEROL 1.25 MG/1
50000 CAPSULE ORAL 3 TIMES WEEKLY
Qty: 39 CAPSULE | Refills: 3 | Status: SHIPPED | OUTPATIENT
Start: 2019-03-08 | End: 2019-06-24 | Stop reason: SDUPTHER

## 2019-03-07 RX ORDER — DAPAGLIFLOZIN AND METFORMIN HYDROCHLORIDE 5; 1000 MG/1; MG/1
2 TABLET, FILM COATED, EXTENDED RELEASE ORAL DAILY
Qty: 60 TABLET | Refills: 5 | Status: SHIPPED | OUTPATIENT
Start: 2019-03-07 | End: 2019-06-24 | Stop reason: SDUPTHER

## 2019-03-07 NOTE — PROGRESS NOTES
Pratibha ZURITA  presents to the office  for the follow-up appointment for Type 2 diabetes mellitus.     The diabete's condition location is throughout the system with the  clinical course has fluctuated, the severity is Mild, and the modifying/allievating factors are oral medications.  Medications and  Dosages were  reviewed with Pratibha ZURITA and suggested that compliance most of the time.    The patient reports associated symptoms of hyperglycemia have been none and associated symptoms of hypoglycemia have been none, with their  hypoglycemia threshold for symptoms is n/a mg/dl .     The patient is currently on oral medications .      Compliance with blood glucose monitoring: fair.     Meal panning: The patient is using avoidance of concentrated sweets.    The patient is currently taking home blood tests - Blood glucose testin times daily, that are:    Last instructions given were:  in summary I saw and examined this 58-year-old female for above-mentioned problems.  I reviewed her laboratory evaluation of 10/23/2018 and provided her a hard copy of it.  She is clinically and metabolically stable however her vitamin D level is very low and therefore we will go ahead and continue all her current prescriptions except I will increase her vitamin D to 50,000 units 3 times weekly.  She will see Ms. Regina Bledsoe in 4 months or sooner if needed with laboratory evaluation prior to each office visit.    Home blood glucose testing daily: 0    Last reported blood glucose readings are:  None.    Patterns reported per patient are none.         The following portions of the patient's history were reviewed and updated as appropriate: current medications, past family history, past medical history, past social history, past surgical history and problem list.      Current Outpatient Medications:   •  ALPRAZolam (XANAX) 0.5 MG tablet, Take 1 tablet by mouth Daily. TAKE 1 TABLET BY MOUTH EVERY DAY AS NEEDED, Disp: 30 tablet, Rfl:  2  •  aspirin 81 MG tablet, Take by mouth daily., Disp: , Rfl:   •  atorvastatin (LIPITOR) 10 MG tablet, Take 0.5 tablets by mouth Daily., Disp: 45 tablet, Rfl: 1  •  baclofen (LIORESAL) 10 MG tablet, Take 1 tablet by mouth 3 (Three) Times a Day., Disp: 270 tablet, Rfl: 1  •  Baricitinib (OLUMIANT) 2 MG tablet, , Disp: , Rfl:   •  ergocalciferol (DRISDOL) 94698 units capsule, Take 1 capsule by mouth 3 (Three) Times a Week., Disp: 39 capsule, Rfl: 3  •  FLUoxetine (PROzac) 20 MG capsule, Take 3 capsules by mouth Daily., Disp: 270 capsule, Rfl: 1  •  folic acid (FOLVITE) 1 MG tablet, , Disp: , Rfl:   •  levothyroxine (SYNTHROID) 125 MCG tablet, Take 1 tablet by mouth Daily., Disp: 90 tablet, Rfl: 1  •  losartan (COZAAR) 50 MG tablet, Take 1 tablet by mouth Daily., Disp: 90 tablet, Rfl: 1  •  metoprolol succinate XL (TOPROL-XL) 100 MG 24 hr tablet, Take 1 tablet by mouth Daily., Disp: 90 tablet, Rfl: 1  •  predniSONE (DELTASONE) 5 MG tablet, , Disp: , Rfl:   •  sulfaSALAzine (AZULFIDINE) 500 MG tablet, TAKE 3 TABLETS BY MOUTH TWICE DAILY, Disp: , Rfl: 1  •  triamterene-hydrochlorothiazide (MAXZIDE-25) 37.5-25 MG per tablet, Take 1 tablet by mouth Daily., Disp: 90 tablet, Rfl: 1  •  TRULICITY 1.5 MG/0.5ML solution pen-injector, Inject 1.5 mg under the skin into the appropriate area as directed 1 (One) Time Per Week., Disp: 12 pen, Rfl: 1  •  XIGDUO XR 5-1000 MG tablet, Take 2 tablets by mouth Daily., Disp: 60 tablet, Rfl: 5    Patient Active Problem List    Diagnosis   • Immunodeficiency, unspecified (CMS/HCC) [D84.9]   • Long term current use of non-steroidal anti-inflammatories (NSAID) [Z79.1]   • Personal history of immunosupression therapy [Z92.25]   • Primary generalized (osteo)arthritis [M15.0]   • Raised antibody titer [R76.0]   • Rheumatoid arthritis of multiple sites without organ or system involvement with positive rheumatoid factor (CMS/HCC) [M05.79]   • Other long term (current) drug therapy [Z79.899]   •  "Primary insomnia [F51.01]   • Anxiety [F41.9]   • Morbid obesity (CMS/HCC) [E66.01]   • Diabetic ulcer of left foot associated with diabetes mellitus due to underlying condition (CMS/[facility]) [E08.621, L97.529]   • MSSA (methicillin susceptible Staphylococcus aureus) infection [A49.01]   • Cellulitis of left leg [L03.116]   • Diabetes mellitus with neurological manifestation (CMS/[facility]) [E11.49]   • Burn [T30.0]   • Proteinuria [R80.9]   • Dyslipidemia [E78.5]   • BP (high blood pressure) [I10]   • Obstructive apnea [G47.33]   • AF (paroxysmal atrial fibrillation) (CMS/[facility]) [I48.0]   • Diabetes mellitus (CMS/[facility]) [E11.9]   • Cervicalgia [M54.2]   • CAD (coronary artery disease) [I25.10]   • Type 2 diabetes mellitus, uncontrolled (CMS/[facility]) [E11.65]   • HLD (hyperlipidemia) [E78.5]   • Benign essential hypertension [I10]   • Hypothyroidism [E03.9]   • Major depressive disorder, recurrent, in full remission (CMS/[facility]) [F33.42]   • Rheumatoid arthritis (CMS/[facility]) [M06.9]   • Tobacco abuse [Z72.0]       Review of Systems   A comprehensive review of the 14 systems was negative except of listed below:  Endocrine: hyperglycemia     Objective:     Wt Readings from Last 3 Encounters:   03/07/19 (!) 140 kg (309 lb)   02/26/19 (!) 141 kg (311 lb)   01/14/19 (!) 141 kg (311 lb)     Temp Readings from Last 3 Encounters:   02/26/19 98.4 °F (36.9 °C)   01/14/19 98.1 °F (36.7 °C) (Oral)   10/15/18 98.3 °F (36.8 °C) (Oral)     BP Readings from Last 3 Encounters:   03/07/19 124/82   01/14/19 133/76   11/07/18 116/78     Pulse Readings from Last 3 Encounters:   01/14/19 84   10/15/18 80   07/16/18 90        /82   Ht 182.9 cm (72.01\")   Wt (!) 140 kg (309 lb)   BMI 41.90 kg/m²        Physical Exam   Constitutional: She is oriented to person, place, and time. She appears well-developed and well-nourished. No distress.   HENT:   Head: Normocephalic and atraumatic.   Eyes: EOM are normal. Pupils are equal, round, and reactive to light. "   Neck: Normal range of motion. Neck supple. No thyromegaly present.   Cardiovascular: Normal rate, regular rhythm, normal heart sounds and intact distal pulses.   No murmur heard.  Pulmonary/Chest: Effort normal and breath sounds normal.   Abdominal: Soft. Bowel sounds are normal.   Musculoskeletal: Normal range of motion.   Neurological: She is alert and oriented to person, place, and time.   Skin: Skin is warm and dry. Capillary refill takes 2 to 3 seconds. She is not diaphoretic.   Psychiatric: She has a normal mood and affect. Her behavior is normal. Judgment and thought content normal.           Lab Review  Results for orders placed or performed in visit on 03/07/19   Calcium   Result Value Ref Range    Calcium 10.8 (H) 8.6 - 10.5 mg/dL   Calcium, Ionized   Result Value Ref Range    Ionized Calcium 5.7 (H) 4.5 - 5.6 mg/dL   Phosphorus   Result Value Ref Range    Phosphorus 3.6 2.5 - 4.5 mg/dL   PTH, Intact   Result Value Ref Range    PTH, Intact 31 15 - 65 pg/mL   Calcitonin   Result Value Ref Range    Calcitonin <2.0 0.0 - 5.0 pg/mL           Assessment:   Pratibha was seen today for follow-up.    Diagnoses and all orders for this visit:    Uncontrolled type 2 diabetes mellitus with hyperglycemia (CMS/HCC)  -     XIGDUO XR 5-1000 MG tablet; Take 2 tablets by mouth Daily.  -     TRULICITY 1.5 MG/0.5ML solution pen-injector; Inject 1.5 mg under the skin into the appropriate area as directed 1 (One) Time Per Week.  -     Comprehensive Metabolic Panel; Future  -     C-Peptide; Future  -     Hemoglobin A1c; Future  -     Insulin, Total; Future  -     Lipid Panel; Future  -     Microalbumin / Creatinine Urine Ratio - Urine, Clean Catch; Future  -     Uric Acid; Future  -     Vitamin D 25 Hydroxy; Future  -     TSH; Future  -     T4, Free; Future  -     Thyroid Antibodies; Future  -     T3, Free; Future    Mixed hyperlipidemia  -     atorvastatin (LIPITOR) 10 MG tablet; Take 0.5 tablets by mouth Daily.  -      Comprehensive Metabolic Panel; Future  -     Lipid Panel; Future    Hypothyroidism due to Hashimoto's thyroiditis  -     levothyroxine (SYNTHROID) 125 MCG tablet; Take 1 tablet by mouth Daily.  -     Comprehensive Metabolic Panel; Future  -     TSH; Future  -     T4, Free; Future  -     Thyroid Antibodies; Future  -     T3, Free; Future    Vitamin D deficiency, unspecified  -     ergocalciferol (DRISDOL) 39687 units capsule; Take 1 capsule by mouth 3 (Three) Times a Week.  -     Comprehensive Metabolic Panel; Future  -     Vitamin D 25 Hydroxy; Future    Hypercalcemia  -     Calcium  -     Calcium, Ionized  -     Phosphorus  -     PTH, Intact  -     Calcitonin  -     Comprehensive Metabolic Panel; Future    Morbid obesity (CMS/HCC)  -     atorvastatin (LIPITOR) 10 MG tablet; Take 0.5 tablets by mouth Daily.          Plan:   In summary/ Medication changes: I met with this patient is metabolically stable and doing well at this time.  Laboratory testing was reviewed dated 2019, discussed with questions and answers completed.  Discussed and formulate a treatment plan with patient, patient verbally stated understood all instructions. Patient pleasantly argumentative reporting to this provider that no one has told her to check her blood glucose.        Uncontrolled type 2 diabetes mellitus with hyperglycemia -chronic, stable no medication changes at this time. Refills prescribed. Future labs ordered for upcoming appointment and assessment.   Re-instructed the importance of SMBG:  home blood tests -  Blood glucose testin-3 times daily, that are:  fasting- 1st thing in morning before eating or drinking  before each meal and 1 or 2 hours after meal  bedtime  anytime you feel symptoms of hyperglycemia or hypoglycemia (high or low blood sugars)    Mixed hyperlipidemia- chronic, stable no medication changes at this time. Refills prescribed. Future labs ordered for upcoming appointment and assessment.       Hypothyroidism due to Hashimoto's thyroiditis- chronic, stable no medication changes at this time. Refills prescribed. Future labs ordered for upcoming appointment and assessment.     -   Vitamin D deficiency, unspecified- chronic, stable no medication changes at this time. Refills prescribed. Future labs ordered for upcoming appointment and assessment.         Hypercalcemia- new problem to  This provider: at this time we will obtain additional lab work at treat as indicated with results.    -     Education:  interpretation of lab results, blood sugar goals, complications of diabetes mellitus, hypoglycemia prevention and treatment, exercise, illness management, self-monitoring of blood glucose skills, nutrition and carbohydrate counting        The total face to face time spent was  25 minutes  with additional education given: 14 minutes (greater than 50% of the total time) was spent with counseling and coordination of care on: SMBG with goals, Side effects profiles with medications, medication use and purposes,    Return in about 3 months (around 6/7/2019), or if symptoms worsen or fail to improve, for Recheck. 3 months with Regina-2 weeks prior for labs 6 months with Dr. oJhnson-2 weeks prior for labs      Dragon transcription disclaimer     Much of this encounter note is an electronic transcription/translation of spoken language to printed text. The electronic translation of spoken language may permit erroneous, or at times, nonsensical words or phrases to be inadvertently transcribed. Although I have reviewed the note for such errors, some may still exist.

## 2019-03-07 NOTE — PATIENT INSTRUCTIONS
home blood tests -  Blood glucose testin-3 times daily, that are:  fasting- 1st thing in morning before eating or drinking  before each meal and 1 or 2 hours after meal  bedtime  anytime you feel symptoms of hyperglycemia or hypoglycemia (high or low blood sugars)

## 2019-03-08 ENCOUNTER — TREATMENT (OUTPATIENT)
Dept: PHYSICAL THERAPY | Facility: CLINIC | Age: 59
End: 2019-03-08

## 2019-03-08 DIAGNOSIS — M25.561 PAIN IN BOTH KNEES, UNSPECIFIED CHRONICITY: Primary | ICD-10-CM

## 2019-03-08 DIAGNOSIS — M25.562 PAIN IN BOTH KNEES, UNSPECIFIED CHRONICITY: Primary | ICD-10-CM

## 2019-03-08 DIAGNOSIS — R26.2 DIFFICULTY WALKING: ICD-10-CM

## 2019-03-08 LAB
CA-I SERPL ISE-MCNC: 5.7 MG/DL (ref 4.5–5.6)
CALCIT SERPL-MCNC: <2 PG/ML (ref 0–5)
CALCIUM SERPL-MCNC: 10.8 MG/DL (ref 8.6–10.5)
PHOSPHATE SERPL-MCNC: 3.6 MG/DL (ref 2.5–4.5)
PTH-INTACT SERPL-MCNC: 31 PG/ML (ref 15–65)

## 2019-03-08 PROCEDURE — 97110 THERAPEUTIC EXERCISES: CPT | Performed by: PHYSICAL THERAPIST

## 2019-03-08 PROCEDURE — 97162 PT EVAL MOD COMPLEX 30 MIN: CPT | Performed by: PHYSICAL THERAPIST

## 2019-03-08 NOTE — PROGRESS NOTES
"Physical Therapy Initial Evaluation and Plan of Care    Patient: Pratibha ZURITA   : 1960  Diagnosis/ICD-10 Code:  Pain in both knees, unspecified chronicity [M25.561, M25.562]  Referring practitioner: KALI Figueroa  Past Medical History Reviewed: 3/8/2019    PLOF: Lives alone and uses walker for mobility     Subjective Evaluation    History of Present Illness  Date of onset: 3/8/2018  Mechanism of injury: I have had knee problems all my life. In  I was diagnosed with RA and it has gone through all the medicine and I have increasingly gotten worse. A year ago my father passed away, mother had RA and I was wearing myself down and doing a lot more walking.  Through the years I occasionally used a cane and have been using the walker for about 6 months. I have fallen frequently. I have 4 stairs to enter, but I have a rail and a grab bar. My right knee is worse than my left one. The injections lasted for 3 days.   Balance is a big issue.   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     Right Knee   Tenderness in the lateral joint line and medial joint line.     Neurological Testing     Sensation     Knee   Left Knee   Intact: light touch    Right Knee   Intact: light touch     Active Range of Motion   Left Knee   Flexion: 112 degrees   Extension: 21 degrees     Right Knee   Flexion: 106 " degrees   Extension: 24 degrees     Strength/Myotome Testing     Left Knee   Quadriceps contraction: fair    Right Knee   Quadriceps contraction: fair    Ambulation   Weight-Bearing Status   Assistive device used: front-wheeled walker    Observational Gait   Gait: antalgic   Decreased walking speed.     Comments   TU sec with RW  Unable to perform sit-stand without UE due to pain           Assessment & Plan     Assessment  Impairments: abnormal gait, activity intolerance, impaired balance, impaired physical strength, lacks appropriate home exercise program and safety issue  Assessment details: Pt presents to PT with symptoms consistent with B knee pain, BLE weakness, difficulty walking and balance deficits secondary to advanced OA and pt also has RA.  Pt would benefit from skilled PT intervention to address the deficits noted.     Barriers to therapy: advanced OA and multiple joint related pains  Prognosis: fair  Functional Limitations: walking, uncomfortable because of pain and standing  Goals  Plan Goals: SHORT TERM GOALS:   2-3 weeks  1. Pt will be compliant with HEP.  2. Pt will have TUG score of 25 sec or less with RW in order to decrease risk for falls.  3. Pt will has B knee extension 18 degrees or less  4. Pt will be able to perform 2 x 10 SLR and S/L hip ABD      LONG TERM GOALS:   6-8 weeks  1.Pt will be able to perform 5 reps sit-stand  2. Pt will report 6/10 knee pain on average  3. Pt will be able to stand on foam for 10 sec or greater with eyes open due to improved balance.  4. Pt will improve TUG score to 20 sec or less due to improved gait mechanics, strength and balance.        Plan  Therapy options: will be seen for skilled physical therapy services  Planned modality interventions: cryotherapy and thermotherapy (hydrocollator packs)  Planned therapy interventions: abdominal trunk stabilization, ADL retraining, body mechanics training, balance/weight-bearing training, flexibility, functional  ROM exercises, gait training, home exercise program, neuromuscular re-education, motor coordination training, postural training, soft tissue mobilization, spinal/joint mobilization, strengthening, stretching, therapeutic activities and transfer training  Duration in visits: 12  Treatment plan discussed with: patient        Manual Therapy:    -     mins  30008;  Therapeutic Exercise:    10     mins  84101;     Neuromuscular Bernadine:    -    mins  74889;    Therapeutic Activity:     -     mins  21283;     Gait Training:      -     mins  39692;     Ultrasound:     --     mins  10023;    Electrical Stimulation:    -     mins  64025 ( );  Dry Needling     -     mins self-pay    Timed Treatment:   10   mins   Total Treatment:     60   mins      PT SIGNATURE: Elena Valerio, PT   DATE TREATMENT INITIATED: 3/8/2019    Initial Certification  Certification Period: 6/6/2019  I certify that the therapy services are furnished while this patient is under my care.  The services outlined above are required by this patient, and will be reviewed every 90 days.     PHYSICIAN: Lauren Morgan, APRN      DATE:     Please sign and return via fax to [phone].. Thank you, [facility].

## 2019-03-10 DIAGNOSIS — E83.52 HYPERCALCEMIA: Primary | ICD-10-CM

## 2019-03-19 ENCOUNTER — TELEPHONE (OUTPATIENT)
Dept: ENDOCRINOLOGY | Age: 59
End: 2019-03-19

## 2019-03-20 RX ORDER — LANCETS 33 GAUGE
EACH MISCELLANEOUS
Qty: 100 EACH | Refills: 11 | Status: SHIPPED | OUTPATIENT
Start: 2019-03-20

## 2019-03-22 ENCOUNTER — TREATMENT (OUTPATIENT)
Dept: PHYSICAL THERAPY | Facility: CLINIC | Age: 59
End: 2019-03-22

## 2019-03-22 DIAGNOSIS — M25.561 PAIN IN BOTH KNEES, UNSPECIFIED CHRONICITY: Primary | ICD-10-CM

## 2019-03-22 DIAGNOSIS — M25.562 PAIN IN BOTH KNEES, UNSPECIFIED CHRONICITY: Primary | ICD-10-CM

## 2019-03-22 DIAGNOSIS — R26.2 DIFFICULTY WALKING: ICD-10-CM

## 2019-03-22 PROCEDURE — 97110 THERAPEUTIC EXERCISES: CPT | Performed by: PHYSICAL THERAPIST

## 2019-03-22 NOTE — PROGRESS NOTES
Physical Therapy Daily Progress Note  Visit: 2    Pratibha ZURITA reports: My whole body is sore and I am tired    Subjective     Objective   See Exercise, Manual, and Modality Logs for complete treatment.       Assessment & Plan     Assessment  Assessment details: Pt having increased pain today and only performed seated exercises today. Pt did not want to perform table exercises today    Plan  Plan details: Progress as able        Manual Therapy:    -     mins  40497;  Therapeutic Exercise:    28     mins  64018;     Neuromuscular Bernadine:    -    mins  38774;    Therapeutic Activity:     -     mins  69781;     Gait Training:      -     mins  86415;     Ultrasound:     -     mins  80352;    Electrical Stimulation:    -     mins  97482 ( );  Dry Needling     -     mins self-pay    Timed Treatment:   28   mins   Total Treatment:     45   mins    Elena Valerio PT  [place] License #: [id number]    Physical Therapist

## 2019-03-28 ENCOUNTER — TREATMENT (OUTPATIENT)
Dept: PHYSICAL THERAPY | Facility: CLINIC | Age: 59
End: 2019-03-28

## 2019-03-28 DIAGNOSIS — M25.562 PAIN IN BOTH KNEES, UNSPECIFIED CHRONICITY: Primary | ICD-10-CM

## 2019-03-28 DIAGNOSIS — R26.2 DIFFICULTY WALKING: ICD-10-CM

## 2019-03-28 DIAGNOSIS — M25.561 PAIN IN BOTH KNEES, UNSPECIFIED CHRONICITY: Primary | ICD-10-CM

## 2019-03-28 PROCEDURE — 97110 THERAPEUTIC EXERCISES: CPT | Performed by: PHYSICAL THERAPIST

## 2019-03-28 NOTE — PROGRESS NOTES
Physical Therapy Daily Progress Note  Visit: 3    Pratibha ZURITA reports: The left knee hurts when I am on the [place]. Knees have been better than normal this week    Subjective     Objective   See Exercise, Manual, and Modality Logs for complete treatment.       Assessment & Plan     Assessment  Assessment details: Pt tolerated treatment ok. She is having burning in her knees and does not tolerate much exercise on her feet. Was able to progress with 2 standing exercises. Added glut squeeze and standing hip ABD to HEP    Plan  Plan details: Progress as able        Manual Therapy:    -     mins  02818;  Therapeutic Exercise:    43     mins  22195;     Neuromuscular Bernadine:    -    mins  39626;    Therapeutic Activity:     -     mins  89401;     Gait Training:      --     mins  20810;     Ultrasound:     -     mins  90048;    Electrical Stimulation:    -     mins  88771 ( );  Dry Needling     -     mins self-pay    Timed Treatment:   43   mins   Total Treatment:     50   mins    Elena Valerio PT  [place] License #: [id number]    Physical Therapist

## 2019-04-08 ENCOUNTER — OFFICE VISIT (OUTPATIENT)
Dept: FAMILY MEDICINE CLINIC | Facility: CLINIC | Age: 59
End: 2019-04-08

## 2019-04-08 VITALS
HEART RATE: 88 BPM | BODY MASS INDEX: 39.68 KG/M2 | HEIGHT: 72 IN | DIASTOLIC BLOOD PRESSURE: 77 MMHG | TEMPERATURE: 98.1 F | WEIGHT: 293 LBS | RESPIRATION RATE: 16 BRPM | SYSTOLIC BLOOD PRESSURE: 136 MMHG

## 2019-04-08 DIAGNOSIS — Z12.2 ENCOUNTER FOR SCREENING FOR LUNG CANCER: ICD-10-CM

## 2019-04-08 DIAGNOSIS — M54.2 CERVICALGIA: ICD-10-CM

## 2019-04-08 DIAGNOSIS — F33.42 MAJOR DEPRESSIVE DISORDER, RECURRENT, IN FULL REMISSION (HCC): ICD-10-CM

## 2019-04-08 DIAGNOSIS — F41.9 ANXIETY: ICD-10-CM

## 2019-04-08 DIAGNOSIS — I10 BENIGN ESSENTIAL HYPERTENSION: Primary | ICD-10-CM

## 2019-04-08 PROCEDURE — 99214 OFFICE O/P EST MOD 30 MIN: CPT | Performed by: FAMILY MEDICINE

## 2019-04-08 RX ORDER — FLUOXETINE HYDROCHLORIDE 20 MG/1
60 CAPSULE ORAL DAILY
Qty: 270 CAPSULE | Refills: 1 | Status: SHIPPED | OUTPATIENT
Start: 2019-04-08 | End: 2019-09-15 | Stop reason: SDUPTHER

## 2019-04-08 RX ORDER — METOPROLOL SUCCINATE 100 MG/1
100 TABLET, EXTENDED RELEASE ORAL DAILY
Qty: 90 TABLET | Refills: 1 | Status: SHIPPED | OUTPATIENT
Start: 2019-04-08 | End: 2019-12-12 | Stop reason: SDUPTHER

## 2019-04-08 RX ORDER — BACLOFEN 10 MG/1
10 TABLET ORAL 3 TIMES DAILY
Qty: 270 TABLET | Refills: 1 | Status: SHIPPED | OUTPATIENT
Start: 2019-04-08 | End: 2019-09-26 | Stop reason: SDUPTHER

## 2019-04-08 RX ORDER — ALPRAZOLAM 0.5 MG/1
0.5 TABLET ORAL DAILY
Qty: 30 TABLET | Refills: 2 | Status: SHIPPED | OUTPATIENT
Start: 2019-04-08 | End: 2019-07-08 | Stop reason: SDUPTHER

## 2019-04-08 RX ORDER — TRIAMTERENE AND HYDROCHLOROTHIAZIDE 37.5; 25 MG/1; MG/1
1 TABLET ORAL DAILY
Qty: 90 TABLET | Refills: 1 | Status: SHIPPED | OUTPATIENT
Start: 2019-04-08 | End: 2019-12-12 | Stop reason: SDUPTHER

## 2019-04-08 RX ORDER — LOSARTAN POTASSIUM 50 MG/1
50 TABLET ORAL DAILY
Qty: 90 TABLET | Refills: 1 | Status: SHIPPED | OUTPATIENT
Start: 2019-04-08 | End: 2019-09-26 | Stop reason: SDUPTHER

## 2019-04-08 NOTE — PROGRESS NOTES
Subjective   Pratibha ZURITA is a 59 y.o. female.     History of Present Illness     Chief Complaint:   Chief Complaint   Patient presents with   • Depression     med refill - [facility]    • Hypertension     diabetic eye exam due    • Hyperlipidemia   • Arthritis       Pratibha ZURITA 59 y.o. female who presents today for Medical Management of the below listed issues and medication refills.  she has a problem list of   Patient Active Problem List   Diagnosis   • CAD (coronary artery disease)   • Type 2 diabetes mellitus, uncontrolled (CMS/[facility])   • HLD (hyperlipidemia)   • Benign essential hypertension   • Hypothyroidism   • Major depressive disorder, recurrent, in full remission (CMS/[facility])   • Rheumatoid arthritis (CMS/[facility])   • Tobacco abuse   • Cervicalgia   • Diabetes mellitus (CMS/[facility])   • Dyslipidemia   • BP (high blood pressure)   • Obstructive apnea   • AF (paroxysmal atrial fibrillation) (CMS/[facility])   • Proteinuria   • Burn   • Cellulitis of left leg   • Diabetes mellitus with neurological manifestation (CMS/[facility])   • MSSA (methicillin susceptible Staphylococcus aureus) infection   • Diabetic ulcer of left foot associated with diabetes mellitus due to underlying condition (CMS/[facility])   • Morbid obesity (CMS/[facility])   • Primary insomnia   • Anxiety   • Immunodeficiency, unspecified (CMS/[facility])   • Long term current use of non-steroidal anti-inflammatories (NSAID)   • Personal history of immunosupression therapy   • Primary generalized (osteo)arthritis   • Raised antibody titer   • Rheumatoid arthritis of multiple sites without organ or system involvement with positive rheumatoid factor (CMS/[facility])   • Other long term (current) drug therapy   .  Since the last visit, she has overall felt well.  she has been compliant with   Current Outpatient Medications:   •  ALPRAZolam (XANAX) 0.5 MG tablet, Take 1 tablet by mouth Daily. TAKE 1 TABLET BY MOUTH EVERY DAY AS NEEDED, Disp: 30 tablet, Rfl: 2  •  aspirin 81 MG tablet, Take by mouth  "daily., Disp: , Rfl:   •  atorvastatin (LIPITOR) 10 MG tablet, Take 0.5 tablets by mouth Daily., Disp: 45 tablet, Rfl: 1  •  baclofen (LIORESAL) 10 MG tablet, Take 1 tablet by mouth 3 (Three) Times a Day., Disp: 270 tablet, Rfl: 1  •  Baricitinib (OLUMIANT) 2 MG tablet, , Disp: , Rfl:   •  ergocalciferol (DRISDOL) 20203 units capsule, Take 1 capsule by mouth 3 (Three) Times a Week., Disp: 39 capsule, Rfl: 3  •  FLUoxetine (PROzac) 20 MG capsule, Take 3 capsules by mouth Daily., Disp: 270 capsule, Rfl: 1  •  folic acid (FOLVITE) 1 MG tablet, , Disp: , Rfl:   •  glucose blood (ONETOUCH VERIO) test strip, Test 3 times daily, Disp: 100 each, Rfl: 11  •  levothyroxine (SYNTHROID) 125 MCG tablet, Take 1 tablet by mouth Daily., Disp: 90 tablet, Rfl: 1  •  losartan (COZAAR) 50 MG tablet, Take 1 tablet by mouth Daily., Disp: 90 tablet, Rfl: 1  •  metoprolol succinate XL (TOPROL-XL) 100 MG 24 hr tablet, Take 1 tablet by mouth Daily., Disp: 90 tablet, Rfl: 1  •  ONETOUCH DELICA LANCETS 33G misc, Test 3 times daily, Disp: 100 each, Rfl: 11  •  sulfaSALAzine (AZULFIDINE) 500 MG tablet, TAKE 3 TABLETS BY MOUTH TWICE DAILY, Disp: , Rfl: 1  •  triamterene-hydrochlorothiazide (MAXZIDE-25) 37.5-25 MG per tablet, Take 1 tablet by mouth Daily., Disp: 90 tablet, Rfl: 1  •  TRULICITY 1.5 MG/0.5ML solution pen-injector, Inject 1.5 mg under the skin into the appropriate area as directed 1 (One) Time Per Week., Disp: 12 pen, Rfl: 1  •  XIGDUO XR 5-1000 MG tablet, Take 2 tablets by mouth Daily., Disp: 60 tablet, Rfl: 5.  she denies medication side effects.    All of the chronic condition(s) listed above are stable w/o issues.    /77   Pulse 88   Temp 98.1 °F (36.7 °C) (Oral)   Resp 16   Ht 182.9 cm (72\")   Wt (!) 140 kg (308 lb)   BMI 41.77 kg/m²     Results for orders placed or performed in visit on 03/07/19   Calcium   Result Value Ref Range    Calcium 10.8 (H) 8.6 - 10.5 mg/dL   Calcium, Ionized   Result Value Ref Range    " Ionized Calcium 5.7 (H) 4.5 - 5.6 mg/dL   Phosphorus   Result Value Ref Range    Phosphorus 3.6 2.5 - 4.5 mg/dL   PTH, Intact   Result Value Ref Range    PTH, Intact 31 15 - 65 pg/mL   Calcitonin   Result Value Ref Range    Calcitonin <2.0 0.0 - 5.0 pg/mL           The following portions of the patient's history were reviewed and updated as appropriate: allergies, current medications, past family history, past medical history, past social history, past surgical history and problem list.    Review of Systems   Constitutional: Negative for activity change, chills, fatigue and fever.   Respiratory: Negative for cough and chest tightness.    Cardiovascular: Negative for chest pain and palpitations.   Gastrointestinal: Negative for abdominal pain and nausea.   Endocrine: Negative for cold intolerance.   Psychiatric/Behavioral: Negative for behavioral problems and dysphoric mood.       Objective   Physical Exam   Constitutional: She appears well-developed and well-nourished.   Neck: Neck supple. No thyromegaly present.   Cardiovascular: Normal rate and regular rhythm.   No murmur heard.  Pulmonary/Chest: Effort normal and breath sounds normal.   Abdominal: Bowel sounds are normal. There is no tenderness.   Neurological: She is alert.   Psychiatric: She has a normal mood and affect. Her behavior is normal.   Nursing note and vitals reviewed.    The patient has read and signed the [facility] Controlled Substance Contract.  I will continue to see patient for regular follow up appointments.  They are well controlled on their medication.  DILLAN has been reviewed by me and is updated every 3 months. The patient is aware of the potential for addiction and dependence.    Assessment/Plan   Pratibha was seen today for depression, hypertension, hyperlipidemia and arthritis.    Diagnoses and all orders for this visit:    Benign essential hypertension  -     metoprolol succinate XL (TOPROL-XL) 100 MG 24 hr tablet; Take 1 tablet by  mouth Daily.  -     triamterene-hydrochlorothiazide (MAXZIDE-25) 37.5-25 MG per tablet; Take 1 tablet by mouth Daily.  -     losartan (COZAAR) 50 MG tablet; Take 1 tablet by mouth Daily.    Anxiety  -     ALPRAZolam (XANAX) 0.5 MG tablet; Take 1 tablet by mouth Daily. TAKE 1 TABLET BY MOUTH EVERY DAY AS NEEDED    Cervicalgia  -     baclofen (LIORESAL) 10 MG tablet; Take 1 tablet by mouth 3 (Three) Times a Day.    Major depressive disorder, recurrent, in full remission (CMS/HCC)  -     FLUoxetine (PROzac) 20 MG capsule; Take 3 capsules by mouth Daily.    Encounter for screening for lung cancer  -     CT chest low dose wo; Future    Other orders  -     Cancel: Ambulatory Referral to Ophthalmology    Pt has eye MD and is going shortly.

## 2019-04-19 ENCOUNTER — HOSPITAL ENCOUNTER (OUTPATIENT)
Dept: CT IMAGING | Facility: HOSPITAL | Age: 59
Discharge: HOME | End: 2019-04-19
Admitting: FAMILY MEDICINE

## 2019-04-19 DIAGNOSIS — Z12.2 ENCOUNTER FOR SCREENING FOR LUNG CANCER: ICD-10-CM

## 2019-04-19 PROCEDURE — G0297 LDCT FOR LUNG CA SCREEN: HCPCS

## 2019-05-29 ENCOUNTER — TELEPHONE (OUTPATIENT)
Dept: FAMILY MEDICINE CLINIC | Facility: CLINIC | Age: 59
End: 2019-05-29

## 2019-05-29 DIAGNOSIS — E83.52 HYPERCALCEMIA: Primary | ICD-10-CM

## 2019-06-10 ENCOUNTER — LAB (OUTPATIENT)
Dept: ENDOCRINOLOGY | Age: 59
End: 2019-06-10

## 2019-06-10 DIAGNOSIS — E11.65 UNCONTROLLED TYPE 2 DIABETES MELLITUS WITH HYPERGLYCEMIA (HCC): ICD-10-CM

## 2019-06-10 DIAGNOSIS — E06.3 HYPOTHYROIDISM DUE TO HASHIMOTO'S THYROIDITIS: ICD-10-CM

## 2019-06-10 DIAGNOSIS — E03.8 HYPOTHYROIDISM DUE TO HASHIMOTO'S THYROIDITIS: ICD-10-CM

## 2019-06-10 DIAGNOSIS — E78.2 MIXED HYPERLIPIDEMIA: ICD-10-CM

## 2019-06-10 DIAGNOSIS — E55.9 VITAMIN D DEFICIENCY, UNSPECIFIED: ICD-10-CM

## 2019-06-10 DIAGNOSIS — E83.52 HYPERCALCEMIA: ICD-10-CM

## 2019-06-11 LAB
25(OH)D3+25(OH)D2 SERPL-MCNC: 53.7 NG/ML (ref 30–100)
ALBUMIN SERPL-MCNC: 4.4 G/DL (ref 3.5–5.2)
ALBUMIN/CREAT UR: 553.1 MG/G CREAT (ref 0–30)
ALBUMIN/GLOB SERPL: 1.5 G/DL
ALP SERPL-CCNC: 100 U/L (ref 39–117)
ALT SERPL-CCNC: 17 U/L (ref 1–33)
AST SERPL-CCNC: 14 U/L (ref 1–32)
BILIRUB SERPL-MCNC: 0.2 MG/DL (ref 0.2–1.2)
BUN SERPL-MCNC: 17 MG/DL (ref 8–23)
BUN/CREAT SERPL: 26.6 (ref 7–25)
C PEPTIDE SERPL-MCNC: 7.6 NG/ML (ref 1.1–4.4)
CALCIUM SERPL-MCNC: 10.2 MG/DL (ref 8.6–10.5)
CHLORIDE SERPL-SCNC: 96 MMOL/L (ref 98–107)
CHOLEST SERPL-MCNC: 120 MG/DL (ref 0–200)
CO2 SERPL-SCNC: 27.2 MMOL/L (ref 22–29)
CREAT SERPL-MCNC: 0.64 MG/DL (ref 0.57–1)
CREAT UR-MCNC: 57.2 MG/DL
GLOBULIN SER CALC-MCNC: 2.9 GM/DL
GLUCOSE SERPL-MCNC: 151 MG/DL (ref 65–99)
HBA1C MFR BLD: 6.7 % (ref 4.8–5.6)
HDLC SERPL-MCNC: 38 MG/DL (ref 40–60)
INSULIN SERPL-ACNC: 28.3 UIU/ML (ref 2.6–24.9)
INTERPRETATION: NORMAL
LDLC SERPL CALC-MCNC: 27 MG/DL (ref 0–100)
Lab: NORMAL
MICROALBUMIN UR-MCNC: 316.4 UG/ML
POTASSIUM SERPL-SCNC: 4.1 MMOL/L (ref 3.5–5.2)
PROT SERPL-MCNC: 7.3 G/DL (ref 6–8.5)
SODIUM SERPL-SCNC: 140 MMOL/L (ref 136–145)
T3FREE SERPL-MCNC: 2.4 PG/ML (ref 2–4.4)
T4 FREE SERPL-MCNC: 1.31 NG/DL (ref 0.93–1.7)
THYROGLOB AB SERPL-ACNC: <1 IU/ML (ref 0–0.9)
THYROPEROXIDASE AB SERPL-ACNC: 26 IU/ML (ref 0–34)
TRIGL SERPL-MCNC: 276 MG/DL (ref 0–150)
TSH SERPL DL<=0.005 MIU/L-ACNC: 3.3 MIU/ML (ref 0.27–4.2)
URATE SERPL-MCNC: 3.3 MG/DL (ref 2.4–5.7)
VLDLC SERPL CALC-MCNC: 55.2 MG/DL

## 2019-06-24 ENCOUNTER — OFFICE VISIT (OUTPATIENT)
Dept: ENDOCRINOLOGY | Age: 59
End: 2019-06-24

## 2019-06-24 VITALS
HEIGHT: 72 IN | WEIGHT: 293 LBS | BODY MASS INDEX: 39.68 KG/M2 | DIASTOLIC BLOOD PRESSURE: 78 MMHG | SYSTOLIC BLOOD PRESSURE: 122 MMHG

## 2019-06-24 DIAGNOSIS — E06.3 HYPOTHYROIDISM DUE TO HASHIMOTO'S THYROIDITIS: ICD-10-CM

## 2019-06-24 DIAGNOSIS — E66.01 MORBID OBESITY (HCC): ICD-10-CM

## 2019-06-24 DIAGNOSIS — E78.2 MIXED HYPERLIPIDEMIA: ICD-10-CM

## 2019-06-24 DIAGNOSIS — I10 BENIGN ESSENTIAL HYPERTENSION: ICD-10-CM

## 2019-06-24 DIAGNOSIS — E55.9 VITAMIN D DEFICIENCY, UNSPECIFIED: ICD-10-CM

## 2019-06-24 DIAGNOSIS — E03.8 HYPOTHYROIDISM DUE TO HASHIMOTO'S THYROIDITIS: ICD-10-CM

## 2019-06-24 DIAGNOSIS — E11.65 UNCONTROLLED TYPE 2 DIABETES MELLITUS WITH HYPERGLYCEMIA (HCC): Primary | ICD-10-CM

## 2019-06-24 DIAGNOSIS — E83.52 HYPERCALCEMIA: ICD-10-CM

## 2019-06-24 PROCEDURE — 99214 OFFICE O/P EST MOD 30 MIN: CPT | Performed by: NURSE PRACTITIONER

## 2019-06-24 RX ORDER — DAPAGLIFLOZIN AND METFORMIN HYDROCHLORIDE 5; 1000 MG/1; MG/1
2 TABLET, FILM COATED, EXTENDED RELEASE ORAL DAILY
Qty: 60 TABLET | Refills: 5 | Status: SHIPPED | OUTPATIENT
Start: 2019-06-24 | End: 2019-08-05 | Stop reason: SDUPTHER

## 2019-06-24 RX ORDER — ERGOCALCIFEROL 1.25 MG/1
50000 CAPSULE ORAL 3 TIMES WEEKLY
Qty: 39 CAPSULE | Refills: 3 | Status: SHIPPED | OUTPATIENT
Start: 2019-06-24 | End: 2019-10-15 | Stop reason: HOSPADM

## 2019-06-24 RX ORDER — ATORVASTATIN CALCIUM 10 MG/1
5 TABLET, FILM COATED ORAL DAILY
Qty: 45 TABLET | Refills: 1 | Status: SHIPPED | OUTPATIENT
Start: 2019-06-24

## 2019-06-24 RX ORDER — DULAGLUTIDE 1.5 MG/.5ML
1.5 INJECTION, SOLUTION SUBCUTANEOUS WEEKLY
Qty: 12 PEN | Refills: 1 | Status: SHIPPED | OUTPATIENT
Start: 2019-06-24 | End: 2020-04-14 | Stop reason: SDUPTHER

## 2019-06-24 RX ORDER — LEVOTHYROXINE SODIUM 0.12 MG/1
125 TABLET ORAL DAILY
Qty: 90 TABLET | Refills: 1 | Status: SHIPPED | OUTPATIENT
Start: 2019-06-24 | End: 2019-09-03 | Stop reason: HOSPADM

## 2019-06-24 RX ORDER — ICOSAPENT ETHYL 1000 MG/1
2 CAPSULE ORAL 2 TIMES DAILY WITH MEALS
Qty: 120 CAPSULE | Refills: 3 | Status: ON HOLD | OUTPATIENT
Start: 2019-06-24 | End: 2019-09-03

## 2019-06-24 NOTE — PROGRESS NOTES
Pratibha Pascal  presents to the office  for the follow-up appointment for Type 2 diabetes mellitus.     The diabete's condition location is throughout the system with the  clinical course has fluctuated, the severity is Mild, and the modifying/allievating factors are oral medications.  Medications and  Dosages were  reviewed with Pratibha Pascal and suggested that compliance most of the time.    The patient reports associated symptoms of hyperglycemia have been none and associated symptoms of hypoglycemia have been none, with their  hypoglycemia threshold for symptoms is n/a mg/dl .     The patient is currently on oral medications .      Compliance with blood glucose monitoring: poor.     Meal panning: The patient is using no plan.    The patient is currently taking home blood tests - Blood glucose testin times daily, that are:  fasting- 1st thing in morning before eating or drinking  before each meal and 1 or 2 hours after meal  bedtime  anytime you feel symptoms of hyperglycemia or hypoglycemia (high or low blood sugars)    Last instructions given were:  Uncontrolled type 2 diabetes mellitus with hyperglycemia -chronic, stable no medication changes at this time. Refills prescribed. Future labs ordered for upcoming appointment and assessment.      Re-instructed the importance of SMBG:   home blood tests -  Blood glucose testin-3 times daily, that are:   fasting- 1st thing in morning before eating or drinking   before each meal and 1 or 2 hours after meal- bedtime   anytime you feel symptoms of hyperglycemia or hypoglycemia (high or low blood sugars)       Mixed hyperlipidemia- chronic, stable no medication changes at this time. Refills prescribed. Future labs ordered for upcoming appointment and assessment.      Hypothyroidism due to Hashimoto's thyroiditis- chronic, stable no medication changes at this time. Refills prescribed. Future labs ordered for upcoming appointment and assessment.      -   Vitamin D  deficiency, unspecified- chronic, stable no medication changes at this time. Refills prescribed. Future labs ordered for upcoming appointment and assessment.                     Hypercalcemia- new problem to  This provider: at this time we will obtain additional lab work at treat as indicated with results.      Patient presently not checking blood glucose    Last reported blood glucose readings are: None.    Patterns reported per patient are none .         The following portions of the patient's history were reviewed and updated as appropriate: allergies, current medications, past family history, past medical history, past social history, past surgical history and problem list.      Current Outpatient Medications:   •  ALPRAZolam (XANAX) 0.5 MG tablet, Take 1 tablet by mouth Daily. TAKE 1 TABLET BY MOUTH EVERY DAY AS NEEDED, Disp: 30 tablet, Rfl: 2  •  aspirin 81 MG tablet, Take by mouth daily., Disp: , Rfl:   •  atorvastatin (LIPITOR) 10 MG tablet, Take 0.5 tablets by mouth Daily., Disp: 45 tablet, Rfl: 1  •  baclofen (LIORESAL) 10 MG tablet, Take 1 tablet by mouth 3 (Three) Times a Day., Disp: 270 tablet, Rfl: 1  •  Baricitinib (OLUMIANT) 2 MG tablet, , Disp: , Rfl:   •  ergocalciferol (DRISDOL) 30709 units capsule, Take 1 capsule by mouth 3 (Three) Times a Week., Disp: 39 capsule, Rfl: 3  •  FLUoxetine (PROzac) 20 MG capsule, Take 3 capsules by mouth Daily., Disp: 270 capsule, Rfl: 1  •  folic acid (FOLVITE) 1 MG tablet, , Disp: , Rfl:   •  glucose blood (ONETOUCH VERIO) test strip, Test 3 times daily, Disp: 100 each, Rfl: 11  •  levothyroxine (SYNTHROID) 125 MCG tablet, Take 1 tablet by mouth Daily., Disp: 90 tablet, Rfl: 1  •  losartan (COZAAR) 50 MG tablet, Take 1 tablet by mouth Daily., Disp: 90 tablet, Rfl: 1  •  metoprolol succinate XL (TOPROL-XL) 100 MG 24 hr tablet, Take 1 tablet by mouth Daily., Disp: 90 tablet, Rfl: 1  •  ONETOUCH DELICA LANCETS 33G misc, Test 3 times daily, Disp: 100 each, Rfl: 11  •   sulfaSALAzine (AZULFIDINE) 500 MG tablet, TAKE 3 TABLETS BY MOUTH TWICE DAILY, Disp: , Rfl: 1  •  triamterene-hydrochlorothiazide (MAXZIDE-25) 37.5-25 MG per tablet, Take 1 tablet by mouth Daily., Disp: 90 tablet, Rfl: 1  •  TRULICITY 1.5 MG/0.5ML solution pen-injector, Inject 1.5 mg under the skin into the appropriate area as directed 1 (One) Time Per Week., Disp: 12 pen, Rfl: 1  •  XIGDUO XR 5-1000 MG tablet, Take 2 tablets by mouth Daily., Disp: 60 tablet, Rfl: 5  •  icosapent ethyl (VASCEPA) 1 g capsule capsule, Take 2 g by mouth 2 (Two) Times a Day With Meals., Disp: 120 capsule, Rfl: 3    Patient Active Problem List    Diagnosis   • Uncontrolled type 2 diabetes mellitus with hyperglycemia (CMS/[facility]) [E11.65]   • Vitamin D deficiency, unspecified [E55.9]   • Hypercalcemia [E83.52]   • Immunodeficiency, unspecified (CMS/[facility]) [D84.9]   • Long term current use of non-steroidal anti-inflammatories (NSAID) [Z79.1]   • Personal history of immunosupression therapy [Z92.25]   • Primary generalized (osteo)arthritis [M15.0]   • Raised antibody titer [R76.0]   • Rheumatoid arthritis of multiple sites without organ or system involvement with positive rheumatoid factor (CMS/[facility]) [M05.79]   • Other long term (current) drug therapy [Z79.899]   • Primary insomnia [F51.01]   • Anxiety [F41.9]   • Morbid obesity (CMS/HCC) [E66.01]   • Diabetic ulcer of left foot associated with diabetes mellitus due to underlying condition (CMS/[facility]) [E08.621, L97.529]   • MSSA (methicillin susceptible Staphylococcus aureus) infection [A49.01]   • Cellulitis of left leg [L03.116]   • Diabetes mellitus with neurological manifestation (CMS/[facility]) [E11.49]   • Burn [T30.0]   • Proteinuria [R80.9]   • Dyslipidemia [E78.5]   • BP (high blood pressure) [I10]   • Obstructive apnea [G47.33]   • AF (paroxysmal atrial fibrillation) (CMS/[facility]) [I48.0]   • Diabetes mellitus (CMS/HCC) [E11.9]   • Cervicalgia [M54.2]   • CAD (coronary artery disease) [I25.10]   •  "Type 2 diabetes mellitus, uncontrolled (CMS/[facility]) [E11.65]   • Mixed hyperlipidemia [E78.2]   • Benign essential hypertension [I10]   • Hypothyroidism [E03.9]   • Major depressive disorder, recurrent, in full remission (CMS/[facility]) [F33.42]   • Rheumatoid arthritis (CMS/[facility]) [M06.9]   • Tobacco abuse [Z72.0]       Review of Systems   A comprehensive review of systems was negative.- 14 systems reviewed.      Objective:     Wt Readings from Last 3 Encounters:   06/24/19 (!) 137 kg (301 lb)   04/08/19 (!) 140 kg (308 lb)   03/07/19 (!) 140 kg (309 lb)     Temp Readings from Last 3 Encounters:   04/08/19 98.1 °F (36.7 °C) (Oral)   02/26/19 98.4 °F (36.9 °C)   01/14/19 98.1 °F (36.7 °C) (Oral)     BP Readings from Last 3 Encounters:   06/24/19 122/78   04/08/19 136/77   03/07/19 124/82     Pulse Readings from Last 3 Encounters:   04/08/19 88   01/14/19 84   10/15/18 80        /78   Ht 182.9 cm (72.01\")   Wt (!) 137 kg (301 lb)   BMI 40.81 kg/m²        Physical Exam   Constitutional: She is oriented to person, place, and time. She appears well-developed and well-nourished. No distress.   HENT:   Head: Normocephalic and atraumatic.   Eyes: EOM are normal. Pupils are equal, round, and reactive to light.   Neck: Normal range of motion. Neck supple. No thyromegaly present.   Cardiovascular: Normal rate, regular rhythm, normal heart sounds and intact distal pulses.   No murmur heard.  Pulmonary/Chest: Effort normal and breath sounds normal.   Abdominal: Soft. Bowel sounds are normal.   Musculoskeletal: Normal range of motion.   Neurological: She is alert and oriented to person, place, and time.   Skin: Skin is warm and dry. Capillary refill takes 2 to 3 seconds. She is not diaphoretic.   Psychiatric: She has a normal mood and affect. Her behavior is normal. Judgment and thought content normal.   Nursing note and vitals reviewed.          Lab Review  Results for orders placed or performed in visit on 06/10/19   T3, Free "   Result Value Ref Range    T3, Free 2.4 2.0 - 4.4 pg/mL   Thyroid Antibodies   Result Value Ref Range    Thyroid Peroxidase Antibody 26 0 - 34 IU/mL    Thyroglobulin Ab <1.0 0.0 - 0.9 IU/mL   T4, Free   Result Value Ref Range    Free T4 1.31 0.93 - 1.70 ng/dL   TSH   Result Value Ref Range    TSH 3.300 0.270 - 4.200 mIU/mL   Vitamin D 25 Hydroxy   Result Value Ref Range    25 Hydroxy, Vitamin D 53.7 30.0 - 100.0 ng/ml   Uric Acid   Result Value Ref Range    Uric Acid 3.3 2.4 - 5.7 mg/dL   Microalbumin / Creatinine Urine Ratio - Urine, Clean Catch   Result Value Ref Range    Creatinine, Urine 57.2 Not Estab. mg/dL    Microalbumin, Urine 316.4 Not Estab. ug/mL    Microalbumin/Creatinine Ratio 553.1 (H) 0.0 - 30.0 mg/g creat   Lipid Panel   Result Value Ref Range    Total Cholesterol 120 0 - 200 mg/dL    Triglycerides 276 (H) 0 - 150 mg/dL    HDL Cholesterol 38 (L) 40 - 60 mg/dL    VLDL Cholesterol 55.2 mg/dL    LDL Cholesterol  27 0 - 100 mg/dL   Insulin, Total   Result Value Ref Range    Insulin 28.3 (H) 2.6 - 24.9 uIU/mL   C-Peptide   Result Value Ref Range    C-Peptide 7.6 (H) 1.1 - 4.4 ng/mL   Comprehensive Metabolic Panel   Result Value Ref Range    Glucose 151 (H) 65 - 99 mg/dL    BUN 17 8 - 23 mg/dL    Creatinine 0.64 0.57 - 1.00 mg/dL    eGFR Non African Am 95 >60 mL/min/1.73    eGFR African Am 115 >60 mL/min/1.73    BUN/Creatinine Ratio 26.6 (H) 7.0 - 25.0    Sodium 140 136 - 145 mmol/L    Potassium 4.1 3.5 - 5.2 mmol/L    Chloride 96 (L) 98 - 107 mmol/L    Total CO2 27.2 22.0 - 29.0 mmol/L    Calcium 10.2 8.6 - 10.5 mg/dL    Total Protein 7.3 6.0 - 8.5 g/dL    Albumin 4.40 3.50 - 5.20 g/dL    Globulin 2.9 gm/dL    A/G Ratio 1.5 g/dL    Total Bilirubin 0.2 0.2 - 1.2 mg/dL    Alkaline Phosphatase 100 39 - 117 U/L    AST (SGOT) 14 1 - 32 U/L    ALT (SGPT) 17 1 - 33 U/L   Hemoglobin A1c   Result Value Ref Range    Hemoglobin A1C 6.70 (H) 4.80 - 5.60 %   Cardiovascular Risk Assessment   Result Value Ref Range     Interpretation Note    Diabetes Patient Education   Result Value Ref Range    PDF Image Not applicable            Assessment:   Pratibha was seen today for follow-up.    Diagnoses and all orders for this visit:    Uncontrolled type 2 diabetes mellitus with hyperglycemia (CMS/HCC)  -     XIGDUO XR 5-1000 MG tablet; Take 2 tablets by mouth Daily.  -     TRULICITY 1.5 MG/0.5ML solution pen-injector; Inject 1.5 mg under the skin into the appropriate area as directed 1 (One) Time Per Week.  -     Comprehensive Metabolic Panel; Future  -     C-Peptide; Future  -     Hemoglobin A1c; Future  -     Insulin, Total; Future  -     Lipid Panel; Future  -     Microalbumin / Creatinine Urine Ratio - Urine, Clean Catch; Future  -     Uric Acid; Future  -     Vitamin D 25 Hydroxy; Future  -     TSH; Future  -     Thyroid Antibodies; Future  -     T4, Free; Future  -     T3, Free; Future    Hypothyroidism due to Hashimoto's thyroiditis  -     levothyroxine (SYNTHROID) 125 MCG tablet; Take 1 tablet by mouth Daily.  -     Comprehensive Metabolic Panel; Future    Vitamin D deficiency, unspecified  -     ergocalciferol (DRISDOL) 00916 units capsule; Take 1 capsule by mouth 3 (Three) Times a Week.  -     Comprehensive Metabolic Panel; Future  -     Vitamin D 25 Hydroxy; Future    Mixed hyperlipidemia  -     atorvastatin (LIPITOR) 10 MG tablet; Take 0.5 tablets by mouth Daily.  -     icosapent ethyl (VASCEPA) 1 g capsule capsule; Take 2 g by mouth 2 (Two) Times a Day With Meals.  -     Comprehensive Metabolic Panel; Future  -     Lipid Panel; Future    Hypercalcemia  -     Comprehensive Metabolic Panel; Future    Benign essential hypertension  -     Comprehensive Metabolic Panel; Future    Morbid obesity (CMS/HCC)  -     atorvastatin (LIPITOR) 10 MG tablet; Take 0.5 tablets by mouth Daily.  -     Comprehensive Metabolic Panel; Future          Plan:   In summary/ Medication changes: I met with this patient is metabolically stable and doing  well at this time.  Laboratory testing was reviewed dated 6.10.2019, discussed with questions and answers completed.  Discussed and formulate a treatment plan with patient, patient verbally stated understood all instructions.    1. Diagnoses and all orders for this visit:     Uncontrolled type 2 diabetes mellitus with hyperglycemia (CMS/[facility]) - chronic, stable no medication changes at this time. Refills prescribed. Future labs ordered for upcoming  appointment and assessment.        Hypothyroidism due to Hashimoto's thyroiditis - chronic, stable no medication changes at this time. Refills prescribed. Future labs ordered for upcoming appointment and assessment.          Vitamin D deficiency, unspecified- chronic, stable no medication changes at this time. Refills prescribed. Future labs ordered for upcoming appointment and assessment.           Mixed hyperlipidemia- chronic, uncontrolled.  Medication changes were as follows    start vascepa 1g - take 2 twice daily.         Benign essential hypertension- chronic, stable no medication changes at this time. Refills prescribed. Future labs ordered for upcoming appointment and assessment.          Instructions:  home blood tests -  Blood glucose testing: 3 times daily, that are:  fasting- 1st thing in morning before eating or drinking  before each meal and 1 or 2 hours after meal  bedtime  anytime you feel symptoms of hyperglycemia or hypoglycemia (high or low blood sugars)        Education:  interpretation of lab results, blood sugar goals, complications of diabetes mellitus, hypoglycemia prevention and treatment, exercise, illness management, self-monitoring of blood glucose skills, nutrition, carbohydrate counting and site rotation        The total face to face time spent was  25 minutes  with additional education given: 14 minutes (greater than 50% of the total time) was spent with counseling and coordination of care on: SMBG with goals, Side effects profiles with  medications, medication use and purposes    Return if symptoms worsen or fail to improve, for Recheck. Keep appt with Dr. Johnson in Sept/ 2 weeks prior for labs      Dragon transcription disclaimer     Much of this encounter note is an electronic transcription/translation of spoken language to printed text. The electronic translation of spoken language may permit erroneous, or at times, nonsensical words or phrases to be inadvertently transcribed. Although I have reviewed the note for such errors, some may still exist.

## 2019-07-08 ENCOUNTER — OFFICE VISIT (OUTPATIENT)
Dept: FAMILY MEDICINE CLINIC | Facility: CLINIC | Age: 59
End: 2019-07-08

## 2019-07-08 VITALS
BODY MASS INDEX: 39.68 KG/M2 | HEART RATE: 86 BPM | SYSTOLIC BLOOD PRESSURE: 116 MMHG | RESPIRATION RATE: 16 BRPM | HEIGHT: 72 IN | TEMPERATURE: 98.6 F | WEIGHT: 293 LBS | DIASTOLIC BLOOD PRESSURE: 67 MMHG

## 2019-07-08 DIAGNOSIS — I10 BENIGN ESSENTIAL HYPERTENSION: ICD-10-CM

## 2019-07-08 DIAGNOSIS — F41.9 ANXIETY: Primary | ICD-10-CM

## 2019-07-08 PROCEDURE — 99213 OFFICE O/P EST LOW 20 MIN: CPT | Performed by: FAMILY MEDICINE

## 2019-07-08 RX ORDER — ALPRAZOLAM 0.5 MG/1
0.5 TABLET ORAL DAILY
Qty: 30 TABLET | Refills: 2 | Status: SHIPPED | OUTPATIENT
Start: 2019-07-08 | End: 2019-12-12 | Stop reason: SDUPTHER

## 2019-07-08 NOTE — PROGRESS NOTES
Subjective   Pratibha Pascal is a 59 y.o. female.     History of Present Illness     Chief Complaint:   Chief Complaint   Patient presents with   • Anxiety     med refill gayle    • Hypertension       Pratibha Pascal 59 y.o. female who presents today for Medical Management of the below listed issues and medication refills.  she has a problem list of   Patient Active Problem List   Diagnosis   • CAD (coronary artery disease)   • Type 2 diabetes mellitus, uncontrolled (CMS/[facility])   • Mixed hyperlipidemia   • Benign essential hypertension   • Hypothyroidism   • Major depressive disorder, recurrent, in full remission (CMS/[facility])   • Rheumatoid arthritis (CMS/[facility])   • Tobacco abuse   • Cervicalgia   • Diabetes mellitus (CMS/[facility])   • Dyslipidemia   • BP (high blood pressure)   • Obstructive apnea   • AF (paroxysmal atrial fibrillation) (CMS/[facility])   • Proteinuria   • Burn   • Cellulitis of left leg   • Diabetes mellitus with neurological manifestation (CMS/[facility])   • MSSA (methicillin susceptible Staphylococcus aureus) infection   • Diabetic ulcer of left foot associated with diabetes mellitus due to underlying condition (CMS/[facility])   • Morbid obesity (CMS/[facility])   • Primary insomnia   • Anxiety   • Immunodeficiency, unspecified (CMS/[facility])   • Long term current use of non-steroidal anti-inflammatories (NSAID)   • Personal history of immunosupression therapy   • Primary generalized (osteo)arthritis   • Raised antibody titer   • Rheumatoid arthritis of multiple sites without organ or system involvement with positive rheumatoid factor (CMS/[facility])   • Other long term (current) drug therapy   • Uncontrolled type 2 diabetes mellitus with hyperglycemia (CMS/[facility])   • Vitamin D deficiency, unspecified   • Hypercalcemia   .  Since the last visit, she has overall felt well.  she has been compliant with   Current Outpatient Medications:   •  ALPRAZolam (XANAX) 0.5 MG tablet, Take 1 tablet by mouth Daily. TAKE 1 TABLET BY MOUTH EVERY DAY AS NEEDED, Disp:  "30 tablet, Rfl: 2  •  aspirin 81 MG tablet, Take by mouth daily., Disp: , Rfl:   •  atorvastatin (LIPITOR) 10 MG tablet, Take 0.5 tablets by mouth Daily., Disp: 45 tablet, Rfl: 1  •  baclofen (LIORESAL) 10 MG tablet, Take 1 tablet by mouth 3 (Three) Times a Day., Disp: 270 tablet, Rfl: 1  •  Baricitinib (OLUMIANT) 2 MG tablet, , Disp: , Rfl:   •  ergocalciferol (DRISDOL) 53240 units capsule, Take 1 capsule by mouth 3 (Three) Times a Week., Disp: 39 capsule, Rfl: 3  •  FLUoxetine (PROzac) 20 MG capsule, Take 3 capsules by mouth Daily., Disp: 270 capsule, Rfl: 1  •  folic acid (FOLVITE) 1 MG tablet, , Disp: , Rfl:   •  glucose blood (ONETOUCH VERIO) test strip, Test 3 times daily, Disp: 100 each, Rfl: 11  •  icosapent ethyl (VASCEPA) 1 g capsule capsule, Take 2 g by mouth 2 (Two) Times a Day With Meals., Disp: 120 capsule, Rfl: 3  •  levothyroxine (SYNTHROID) 125 MCG tablet, Take 1 tablet by mouth Daily., Disp: 90 tablet, Rfl: 1  •  losartan (COZAAR) 50 MG tablet, Take 1 tablet by mouth Daily., Disp: 90 tablet, Rfl: 1  •  metoprolol succinate XL (TOPROL-XL) 100 MG 24 hr tablet, Take 1 tablet by mouth Daily., Disp: 90 tablet, Rfl: 1  •  ONETOUCH DELICA LANCETS 33G misc, Test 3 times daily, Disp: 100 each, Rfl: 11  •  sulfaSALAzine (AZULFIDINE) 500 MG tablet, TAKE 3 TABLETS BY MOUTH TWICE DAILY, Disp: , Rfl: 1  •  triamterene-hydrochlorothiazide (MAXZIDE-25) 37.5-25 MG per tablet, Take 1 tablet by mouth Daily., Disp: 90 tablet, Rfl: 1  •  TRULICITY 1.5 MG/0.5ML solution pen-injector, Inject 1.5 mg under the skin into the appropriate area as directed 1 (One) Time Per Week., Disp: 12 pen, Rfl: 1  •  XIGDUO XR 5-1000 MG tablet, Take 2 tablets by mouth Daily., Disp: 60 tablet, Rfl: 5.  she denies medication side effects.    All of the chronic condition(s) listed above are stable w/o issues.    /67   Pulse 86   Temp 98.6 °F (37 °C) (Oral)   Resp 16   Ht 182.9 cm (72.01\")   Wt 136 kg (300 lb)   BMI 40.68 kg/m² "     Results for orders placed or performed in visit on 06/10/19   T3, Free   Result Value Ref Range    T3, Free 2.4 2.0 - 4.4 pg/mL   Thyroid Antibodies   Result Value Ref Range    Thyroid Peroxidase Antibody 26 0 - 34 IU/mL    Thyroglobulin Ab <1.0 0.0 - 0.9 IU/mL   T4, Free   Result Value Ref Range    Free T4 1.31 0.93 - 1.70 ng/dL   TSH   Result Value Ref Range    TSH 3.300 0.270 - 4.200 mIU/mL   Vitamin D 25 Hydroxy   Result Value Ref Range    25 Hydroxy, Vitamin D 53.7 30.0 - 100.0 ng/ml   Uric Acid   Result Value Ref Range    Uric Acid 3.3 2.4 - 5.7 mg/dL   Microalbumin / Creatinine Urine Ratio - Urine, Clean Catch   Result Value Ref Range    Creatinine, Urine 57.2 Not Estab. mg/dL    Microalbumin, Urine 316.4 Not Estab. ug/mL    Microalbumin/Creatinine Ratio 553.1 (H) 0.0 - 30.0 mg/g creat   Lipid Panel   Result Value Ref Range    Total Cholesterol 120 0 - 200 mg/dL    Triglycerides 276 (H) 0 - 150 mg/dL    HDL Cholesterol 38 (L) 40 - 60 mg/dL    VLDL Cholesterol 55.2 mg/dL    LDL Cholesterol  27 0 - 100 mg/dL   Insulin, Total   Result Value Ref Range    Insulin 28.3 (H) 2.6 - 24.9 uIU/mL   C-Peptide   Result Value Ref Range    C-Peptide 7.6 (H) 1.1 - 4.4 ng/mL   Comprehensive Metabolic Panel   Result Value Ref Range    Glucose 151 (H) 65 - 99 mg/dL    BUN 17 8 - 23 mg/dL    Creatinine 0.64 0.57 - 1.00 mg/dL    eGFR Non African Am 95 >60 mL/min/1.73    eGFR African Am 115 >60 mL/min/1.73    BUN/Creatinine Ratio 26.6 (H) 7.0 - 25.0    Sodium 140 136 - 145 mmol/L    Potassium 4.1 3.5 - 5.2 mmol/L    Chloride 96 (L) 98 - 107 mmol/L    Total CO2 27.2 22.0 - 29.0 mmol/L    Calcium 10.2 8.6 - 10.5 mg/dL    Total Protein 7.3 6.0 - 8.5 g/dL    Albumin 4.40 3.50 - 5.20 g/dL    Globulin 2.9 gm/dL    A/G Ratio 1.5 g/dL    Total Bilirubin 0.2 0.2 - 1.2 mg/dL    Alkaline Phosphatase 100 39 - 117 U/L    AST (SGOT) 14 1 - 32 U/L    ALT (SGPT) 17 1 - 33 U/L   Hemoglobin A1c   Result Value Ref Range    Hemoglobin A1C 6.70 (H)  4.80 - 5.60 %   Cardiovascular Risk Assessment   Result Value Ref Range    Interpretation Note    Diabetes Patient Education   Result Value Ref Range    PDF Image Not applicable            The following portions of the patient's history were reviewed and updated as appropriate: allergies, current medications, past family history, past medical history, past social history, past surgical history and problem list.    Review of Systems   Constitutional: Negative for activity change, chills, fatigue and fever.   Respiratory: Negative for cough and chest tightness.    Cardiovascular: Negative for chest pain and palpitations.   Gastrointestinal: Negative for abdominal pain and nausea.   Endocrine: Negative for cold intolerance.   Psychiatric/Behavioral: Negative for behavioral problems and dysphoric mood.       Objective   Physical Exam   Constitutional: She appears well-developed and well-nourished.   Neck: Neck supple. No thyromegaly present.   Cardiovascular: Normal rate and regular rhythm.   No murmur heard.  Pulmonary/Chest: Effort normal and breath sounds normal.   Abdominal: Bowel sounds are normal. There is no tenderness.   Neurological: She is alert.   Psychiatric: She has a normal mood and affect. Her behavior is normal.   Nursing note and vitals reviewed.  Labs reviewed with pt today during visit. All questions answered.    The patient has read and signed the [facility] Controlled Substance Contract.  I will continue to see patient for regular follow up appointments.  They are well controlled on their medication.  DILLAN has been reviewed by me and is updated every 3 months. The patient is aware of the potential for addiction and dependence.    Assessment/Plan   Pratibha was seen today for anxiety and hypertension.    Diagnoses and all orders for this visit:    Anxiety  -     ALPRAZolam (XANAX) 0.5 MG tablet; Take 1 tablet by mouth Daily. TAKE 1 TABLET BY MOUTH EVERY DAY AS NEEDED    Benign essential  hypertension    BP doing well. Continue with current medication/diet/exercise.

## 2019-07-18 ENCOUNTER — TELEPHONE (OUTPATIENT)
Dept: ORTHOPEDIC SURGERY | Facility: CLINIC | Age: 59
End: 2019-07-18

## 2019-07-18 NOTE — TELEPHONE ENCOUNTER
Regina per RBB can you please call the Patient and schedule with RBB      Benitez Wu MD Brown, Reid B, MD             How's it going! This common pt of ours is desperate to get her knees done as she's in constant pain. She has lost weight, has her A1C under control, and her BP is excellent. If you could have your staff contact her about either getting these surgeries going or, at worse, getting her in for more shots, I would really appreciate it.

## 2019-08-05 RX ORDER — DAPAGLIFLOZIN AND METFORMIN HYDROCHLORIDE 5; 1000 MG/1; MG/1
TABLET, FILM COATED, EXTENDED RELEASE ORAL
Qty: 60 TABLET | Refills: 5 | Status: ON HOLD | OUTPATIENT
Start: 2019-08-05 | End: 2019-09-03 | Stop reason: SDUPTHER

## 2019-08-13 ENCOUNTER — OFFICE VISIT (OUTPATIENT)
Dept: ORTHOPEDIC SURGERY | Facility: CLINIC | Age: 59
End: 2019-08-13

## 2019-08-13 VITALS — HEIGHT: 72 IN | TEMPERATURE: 97 F | WEIGHT: 293 LBS | BODY MASS INDEX: 39.68 KG/M2

## 2019-08-13 DIAGNOSIS — M17.12 PRIMARY OSTEOARTHRITIS OF LEFT KNEE: Primary | ICD-10-CM

## 2019-08-13 PROCEDURE — 99214 OFFICE O/P EST MOD 30 MIN: CPT | Performed by: ORTHOPAEDIC SURGERY

## 2019-08-13 RX ORDER — MELOXICAM 15 MG/1
15 TABLET ORAL ONCE
Status: CANCELLED | OUTPATIENT
Start: 2019-10-14 | End: 2019-08-13

## 2019-08-13 RX ORDER — PREGABALIN 75 MG/1
150 CAPSULE ORAL ONCE
Status: CANCELLED | OUTPATIENT
Start: 2019-10-14 | End: 2019-08-13

## 2019-08-13 NOTE — PROGRESS NOTES
Patient: Pratibha Pascal  YOB: 1960 59 y.o. female  Medical Record Number: [id number]    Chief Complaints:   Chief Complaint   Patient presents with   • Left Knee - Follow-up   • Right Knee - Follow-up       History of Present Illness:Pratibha Pascal is a 59 y.o. female who presents with l > right knee pain - severe stabbing worse with activity-  Uses walker. Injections and PT have not helped. Limits ADL's.     Allergies:   Allergies   Allergen Reactions   • Cefuroxime Diarrhea   • Clindamycin/Lincomycin Diarrhea   • Dicloxacillin Unknown (See Comments)   • Erythromycin    • Talwin [Pentazocine]        Medications:   Current Outpatient Medications   Medication Sig Dispense Refill   • ALPRAZolam (XANAX) 0.5 MG tablet Take 1 tablet by mouth Daily. TAKE 1 TABLET BY MOUTH EVERY DAY AS NEEDED 30 tablet 2   • aspirin 81 MG tablet Take by mouth daily.     • atorvastatin (LIPITOR) 10 MG tablet Take 0.5 tablets by mouth Daily. 45 tablet 1   • baclofen (LIORESAL) 10 MG tablet Take 1 tablet by mouth 3 (Three) Times a Day. 270 tablet 1   • Baricitinib (OLUMIANT) 2 MG tablet      • ergocalciferol (DRISDOL) 75006 units capsule Take 1 capsule by mouth 3 (Three) Times a Week. 39 capsule 3   • FLUoxetine (PROzac) 20 MG capsule Take 3 capsules by mouth Daily. 270 capsule 1   • folic acid (FOLVITE) 1 MG tablet      • levothyroxine (SYNTHROID) 125 MCG tablet Take 1 tablet by mouth Daily. 90 tablet 1   • losartan (COZAAR) 50 MG tablet Take 1 tablet by mouth Daily. 90 tablet 1   • metoprolol succinate XL (TOPROL-XL) 100 MG 24 hr tablet Take 1 tablet by mouth Daily. 90 tablet 1   • triamterene-hydrochlorothiazide (MAXZIDE-25) 37.5-25 MG per tablet Take 1 tablet by mouth Daily. 90 tablet 1   • TRULICITY 1.5 MG/0.5ML solution pen-injector Inject 1.5 mg under the skin into the appropriate area as directed 1 (One) Time Per Week. 12 pen 1   • XIGDUO XR 5-1000 MG tablet TAKE 2 TABLETS BY MOUTH EVERY DAY 60 tablet 5   • glucose blood  "(ONETOUCH VERIO) test strip Test 3 times daily 100 each 11   • icosapent ethyl (VASCEPA) 1 g capsule capsule Take 2 g by mouth 2 (Two) Times a Day With Meals. 120 capsule 3   • ONETOUCH DELICA LANCETS 33G misc Test 3 times daily 100 each 11   • sulfaSALAzine (AZULFIDINE) 500 MG tablet TAKE 3 TABLETS BY MOUTH TWICE DAILY  1     No current facility-administered medications for this visit.          The following portions of the patient's history were reviewed and updated as appropriate: allergies, current medications, past family history, past medical history, past social history, past surgical history and problem list.    Review of Systems:   A 14 point review of systems was performed. All systems negative except pertinent positives/negative listed in HPI above    Physical Exam:   Vitals:    08/13/19 1527   Temp: 97 °F (36.1 °C)   TempSrc: Temporal   Weight: 136 kg (300 lb)   Height: 182.9 cm (72\")       General: A and O x 3, ASA, NAD    SCLERA:    Normal    DENTITION:   Normal   Knee:  bilateral    ALIGNMENT:     Varus  ,   Patella  tracks  midline    GAIT:    Antalgic    SKIN:    No abnormality    RANGE OF MOTION:   10  -  90   DEG    STRENGTH:   4  / 5    LIGAMENTS:    No varus / valgus instability.   Negative  Lachman.    MENISCUS:     Negative   Luz Marina       DISTAL PULSES:    Paplable    DISTAL SENSATION :   Intact    LYMPHATICS:     No   lymphadenopathy    OTHER:          - Positive   effusion      - Crepitance with ROM         Radiology:  Xrays 3views both knees (ap,lateral, sunrise) taken previously demonstrating advanced varus osteoarthritis with bone on bone articulation, subchondral cysts, and periarticular osteophytes    Assessment/Plan: Left knee > right knee end stage oa-  Failed conservative measures.  Continuation of conservative management vs. TKA discussed.  The patient wishes to proceed with total knee replacement.  At this point the patient has failed the full compliment of conservative treatment " and stating complete understanding of the risks/benefits/ anternatives wishes to proceed with surgical treatment.    Risk and benefits of surgery were reviewed.  Including, but not limited to, blood clots or pulmonary embolism, anesthesia risk, infection, fracture, skin/leg numbness, persistent pain/crepitance/popping/catching, failure of the implant, need for future surgeries, hematoma, possible nerve or blood vessel injury, need for transfusion, and potential risk of stroke,heart attack or death, among others.  The patient understands and wishes to proceed.     It was explained that if tissue has been repaired or reconstructed, there is also an increased chance of failure which may require further management.  Following the completion of the discussion, the patient expressed understanding of this planned course of care, all their questions were answered and consent will be obtained preoperatively.    Operative Plan: left [facility] Total Knee Replacement an overnight staywith home health rehab    Need cardiac clearance from Dr Rivera. Continue prehab exercises.    Hb A1c 6.7    Body mass index is 40.69 kg/m².      Nabor Correia MD  8/13/2019

## 2019-08-16 PROBLEM — M17.12 PRIMARY OSTEOARTHRITIS OF LEFT KNEE: Status: ACTIVE | Noted: 2019-08-16

## 2019-08-20 ENCOUNTER — OFFICE VISIT (OUTPATIENT)
Dept: CARDIOLOGY | Facility: CLINIC | Age: 59
End: 2019-08-20

## 2019-08-20 VITALS
BODY MASS INDEX: 39.68 KG/M2 | HEIGHT: 72 IN | DIASTOLIC BLOOD PRESSURE: 82 MMHG | SYSTOLIC BLOOD PRESSURE: 130 MMHG | WEIGHT: 293 LBS | HEART RATE: 76 BPM

## 2019-08-20 DIAGNOSIS — E78.2 MIXED HYPERLIPIDEMIA: ICD-10-CM

## 2019-08-20 DIAGNOSIS — G47.33 OBSTRUCTIVE APNEA: ICD-10-CM

## 2019-08-20 DIAGNOSIS — R26.2 INABILITY TO AMBULATE DUE TO KNEE: ICD-10-CM

## 2019-08-20 DIAGNOSIS — Z01.810 PREOP CARDIOVASCULAR EXAM: Primary | ICD-10-CM

## 2019-08-20 DIAGNOSIS — I10 ESSENTIAL HYPERTENSION: ICD-10-CM

## 2019-08-20 DIAGNOSIS — I25.118 CORONARY ARTERY DISEASE INVOLVING NATIVE CORONARY ARTERY OF NATIVE HEART WITH OTHER FORM OF ANGINA PECTORIS (HCC): ICD-10-CM

## 2019-08-20 DIAGNOSIS — E66.01 MORBID OBESITY (HCC): ICD-10-CM

## 2019-08-20 DIAGNOSIS — R07.2 PRECORDIAL CHEST PAIN: ICD-10-CM

## 2019-08-20 DIAGNOSIS — E11.65 UNCONTROLLED TYPE 2 DIABETES MELLITUS WITH HYPERGLYCEMIA (HCC): ICD-10-CM

## 2019-08-20 DIAGNOSIS — R06.09 DOE (DYSPNEA ON EXERTION): ICD-10-CM

## 2019-08-20 PROCEDURE — 99203 OFFICE O/P NEW LOW 30 MIN: CPT | Performed by: INTERNAL MEDICINE

## 2019-08-20 PROCEDURE — 93000 ELECTROCARDIOGRAM COMPLETE: CPT | Performed by: INTERNAL MEDICINE

## 2019-08-20 NOTE — PROGRESS NOTES
Subjective:     Encounter Date:08/20/2019      Patient ID: Pratibha Pascal is a 59 y.o. female.    Chief Complaint:  History of Present Illness    Dear Dr. Correia,    I had the pleasure seeing this patient in the office today for initial evaluation consultation.  She comes in for assessment of cardiac risk prior to knee replacement surgery which is scheduled for October 14.  I appreciate that you sent her to see us.    The patient is a 59-year-old female who was previously cared for by Dr. Rivera in 2014 and 2015.  She has a history of hypertension, rheumatoid arthritis, dyslipidemia, paroxysmal atrial fibrillation, obstructive sleep apnea, coronary artery disease status post anterior ST-elevation myocardial infarction and drug-eluting stent placement of the left anterior descending.  The patient initially when she presented emergently in 06/2014 for evolving anterior myocardial infarction. When she arrived to the emergency room, she did have an episode of ventricular fibrillation requiring defibrillation. She was immediately resuscitated. She was taken to the catheterization laboratory where she was found to have 100% thrombotic occlusion of her mid left anterior descending and underwent thrombectomy and drug-eluting stent placement of the mid left anterior descending. She had nonobstructive disease in her other coronary arteries.  She had a brief episode of atrial fibrillation as well.  She was treated with amiodarone for short period time and this was discontinued.  She was last seen by Dr. Rivera in 2015.  She did not come back for her scheduled appointment.    Patient states now she is barely able to move because of the severe problems she has with both knees.  She really has not been able to walk at all for over a year.  She does walk with a walker and very slowly.  She has difficulty standing up when she sitting down because the pain in her knees.  She has not had any chest pain or chest discomfort she does  sometimes have some indigestion discomfort.  She has not noted anything that particular brings it on.  It is not necessarily associated with any activity.  She does get dyspneic if she is trying to do some more walking with her walker.  No shortness of breath at rest.  No wheezing.  She does note that she fatigues very easily.    The following portions of the patient's history were reviewed and updated as appropriate: allergies, current medications, past family history, past medical history, past social history, past surgical history and problem list.    Past Medical History:   Diagnosis Date   • Allergic     Seasonal   • Allergy to mold    • Anxiety    • Arthritis    • Benign essential hypertension    • CAD (coronary artery disease)    • Cataract    • Coronary artery disease    • Depression    • Diabetes mellitus (CMS/HCC)    • Encounter for urine test 07/2015    microalbumin; 461   • Fibromyalgia, primary    • H/O bone density study unclear   • H/O complete eye exam 2 -3 years   • Headache    • History of myocardial infarction    • HL (hearing loss)    • HLD (hyperlipidemia)    • Hyperlipidemia    • Hypertension    • Hypothyroidism    • Hypothyroidism    • Injury of back    • Major depressive disorder, recurrent, in full remission (CMS/HCC)    • Myocardial infarction (CMS/HCC)    • Obesity    • BRANNON (obstructive sleep apnea)    • Rheumatoid arthritis (CMS/HCC)    • Seasonal allergies    • Tobacco abuse    • Type 2 diabetes mellitus (CMS/HCC)        Past Surgical History:   Procedure Laterality Date   • BACK SURGERY  10/1995    also 2/02; L5-S1; Dr. Carroll Avitia   • COLONOSCOPY     • CORONARY STENT PLACEMENT  06/2014   • KNEE ARTHROSCOPY Right 2010   • MAMMO BILATERAL  due    delcines   • PAP SMEAR  due   • TONSILLECTOMY      age 26       Social History     Socioeconomic History   • Marital status:      Spouse name: Not on file   • Number of children: Not on file   • Years of education: Not on file   •  Highest education level: Not on file   Tobacco Use   • Smoking status: Former Smoker     Packs/day: 1.00     Years: 30.00     Pack years: 30.00     Last attempt to quit: 2014     Years since quittin.1   • Smokeless tobacco: Never Used   • Tobacco comment: quit 2014   Substance and Sexual Activity   • Alcohol use: No   • Drug use: No   • Sexual activity: Yes     Partners: Male       Review of Systems   Constitution: Positive for malaise/fatigue. Negative for chills, decreased appetite, fever and night sweats.   HENT: Positive for hearing loss. Negative for ear discharge, ear pain, nosebleeds and sore throat.    Eyes: Negative for blurred vision, double vision and pain.   Cardiovascular: Negative for cyanosis.   Respiratory: Negative for hemoptysis and sputum production.    Endocrine: Positive for heat intolerance. Negative for cold intolerance.   Hematologic/Lymphatic: Negative for adenopathy.   Skin: Negative for dry skin, itching, nail changes, rash and suspicious lesions.   Musculoskeletal: Positive for joint pain, joint swelling and myalgias. Negative for arthritis, gout, muscle cramps, muscle weakness and neck pain.   Gastrointestinal: Negative for anorexia, bowel incontinence, constipation, diarrhea, dysphagia, hematemesis and jaundice.   Genitourinary: Negative for bladder incontinence, dysuria, flank pain, frequency, hematuria and nocturia.   Neurological: Positive for numbness. Negative for focal weakness, paresthesias and seizures.   Psychiatric/Behavioral: Negative for altered mental status, hallucinations, hypervigilance, suicidal ideas and thoughts of violence.   Allergic/Immunologic: Negative for persistent infections.         ECG 12 Lead  Date/Time: 2019 9:17 AM  Performed by: Norman Coto III, MD  Authorized by: Norman Coto III, MD   Comparison: compared with previous ECG   Similar to previous ECG  Rhythm: sinus rhythm  Rate: normal  Conduction: conduction normal  ST  "Segments: ST segments normal  T Waves: T waves normal  QRS axis: normal  Other findings: poor R wave progression    Clinical impression: normal ECG               Objective:     Vitals:    08/20/19 0835   BP: 130/82   Pulse: 76   Weight: (!) 137 kg (301 lb)   Height: 182.9 cm (72\")         Physical Exam   Constitutional: She is oriented to person, place, and time. She appears well-developed and well-nourished. No distress.   HENT:   Head: Normocephalic and atraumatic.   Nose: Nose normal.   Mouth/Throat: Oropharynx is clear and moist.   Eyes: Conjunctivae and EOM are normal. Pupils are equal, round, and reactive to light. Right eye exhibits no discharge. Left eye exhibits no discharge.   Neck: Normal range of motion. Neck supple. No tracheal deviation present. No thyromegaly present.   Cardiovascular: Normal rate, regular rhythm, S1 normal, S2 normal, normal heart sounds and normal pulses. Exam reveals no S3.   Pulmonary/Chest: Effort normal and breath sounds normal. No stridor. No respiratory distress. She exhibits no tenderness.   Abdominal: Soft. Bowel sounds are normal. She exhibits no distension and no mass. There is no tenderness. There is no rebound and no guarding.   Musculoskeletal: Normal range of motion. She exhibits no tenderness or deformity.   Lymphadenopathy:     She has no cervical adenopathy.   Neurological: She is alert and oriented to person, place, and time. She has normal reflexes.   Skin: Skin is warm and dry. No rash noted. She is not diaphoretic. No erythema.   Psychiatric: She has a normal mood and affect. Thought content normal.       Lab Review:             Performed        Assessment:          Diagnosis Plan   1. Preop cardiovascular exam  ECG 12 Lead    Stress Test With Pet Myocardial Perfusion (MULTI STUDY, REST AND STRESS)   2. Coronary artery disease involving native coronary artery of native heart with other form of angina pectoris (CMS/HCC)  ECG 12 Lead    Stress Test With Pet " Myocardial Perfusion (MULTI STUDY, REST AND STRESS)   3. Essential hypertension  ECG 12 Lead    Stress Test With Pet Myocardial Perfusion (MULTI STUDY, REST AND STRESS)   4. Mixed hyperlipidemia  ECG 12 Lead    Stress Test With Pet Myocardial Perfusion (MULTI STUDY, REST AND STRESS)   5. Morbid obesity (CMS/HCC)  ECG 12 Lead    Stress Test With Pet Myocardial Perfusion (MULTI STUDY, REST AND STRESS)   6. Obstructive apnea  ECG 12 Lead    Stress Test With Pet Myocardial Perfusion (MULTI STUDY, REST AND STRESS)   7. Uncontrolled type 2 diabetes mellitus with hyperglycemia (CMS/HCC)  ECG 12 Lead    Stress Test With Pet Myocardial Perfusion (MULTI STUDY, REST AND STRESS)   8. Inability to ambulate due to knee  ECG 12 Lead    Stress Test With Pet Myocardial Perfusion (MULTI STUDY, REST AND STRESS)   9. Precordial chest pain  ECG 12 Lead    Stress Test With Pet Myocardial Perfusion (MULTI STUDY, REST AND STRESS)   10. ROBERTS (dyspnea on exertion)  ECG 12 Lead    Stress Test With Pet Myocardial Perfusion (MULTI STUDY, REST AND STRESS)          Plan:       1.  Preoperative cardiovascular exam-patient has a history of prior anterior microinfarction.  No assessment since 2014.  He is very limited at this point and fatigues easily.  She is having some intermittent discomfort as well as some dyspnea and she is unable to ambulate on treadmill.  She is morbidly obese, so we will arrange for a Lexiscan PET study  2. Coronary Artery Disease  Plan  • Lifestyle modifications discussed include adhering to a heart healthy diet, avoidance of tobacco products, maintenance of a healthy weight, medication compliance, regular exercise and regular monitoring of cholesterol and blood pressure    Subjective - Objective  • Current antiplatelet therapy includes aspirin 81 mg    3.  Tobacco abuse-she stopped smoking when she had a heart attack  4.  Hypertensive heart disease without heart failure-continue current medical therapy  5.  Mixed  hyperlipidemia on lipid-lowering therapy  6.  Diabetes mellitus with circulatory complication-continue risk factor modification    Thank you very much for allowing us to participate in the care of this pleasant patient.  Please don't hesitate to call if I can be of assistance in any way.      Current Outpatient Medications:   •  ALPRAZolam (XANAX) 0.5 MG tablet, Take 1 tablet by mouth Daily. TAKE 1 TABLET BY MOUTH EVERY DAY AS NEEDED, Disp: 30 tablet, Rfl: 2  •  aspirin 81 MG tablet, Take 81 mg by mouth Daily., Disp: , Rfl:   •  atorvastatin (LIPITOR) 10 MG tablet, Take 0.5 tablets by mouth Daily., Disp: 45 tablet, Rfl: 1  •  baclofen (LIORESAL) 10 MG tablet, Take 1 tablet by mouth 3 (Three) Times a Day., Disp: 270 tablet, Rfl: 1  •  Baricitinib (OLUMIANT) 2 MG tablet, , Disp: , Rfl:   •  ergocalciferol (DRISDOL) 88587 units capsule, Take 1 capsule by mouth 3 (Three) Times a Week., Disp: 39 capsule, Rfl: 3  •  FLUoxetine (PROzac) 20 MG capsule, Take 3 capsules by mouth Daily., Disp: 270 capsule, Rfl: 1  •  folic acid (FOLVITE) 1 MG tablet, , Disp: , Rfl:   •  glucose blood (ONETOUCH VERIO) test strip, Test 3 times daily, Disp: 100 each, Rfl: 11  •  icosapent ethyl (VASCEPA) 1 g capsule capsule, Take 2 g by mouth 2 (Two) Times a Day With Meals., Disp: 120 capsule, Rfl: 3  •  levothyroxine (SYNTHROID) 125 MCG tablet, Take 1 tablet by mouth Daily., Disp: 90 tablet, Rfl: 1  •  losartan (COZAAR) 50 MG tablet, Take 1 tablet by mouth Daily., Disp: 90 tablet, Rfl: 1  •  metoprolol succinate XL (TOPROL-XL) 100 MG 24 hr tablet, Take 1 tablet by mouth Daily., Disp: 90 tablet, Rfl: 1  •  ONETOUCH DELICA LANCETS 33G misc, Test 3 times daily, Disp: 100 each, Rfl: 11  •  sulfaSALAzine (AZULFIDINE) 500 MG tablet, TAKE 3 TABLETS BY MOUTH TWICE DAILY, Disp: , Rfl: 1  •  triamterene-hydrochlorothiazide (MAXZIDE-25) 37.5-25 MG per tablet, Take 1 tablet by mouth Daily., Disp: 90 tablet, Rfl: 1  •  TRULICITY 1.5 MG/0.5ML solution  pen-injector, Inject 1.5 mg under the skin into the appropriate area as directed 1 (One) Time Per Week., Disp: 12 pen, Rfl: 1  •  XIGDUO XR 5-1000 MG tablet, TAKE 2 TABLETS BY MOUTH EVERY DAY, Disp: 60 tablet, Rfl: 5

## 2019-08-29 ENCOUNTER — LAB (OUTPATIENT)
Dept: LAB | Facility: HOSPITAL | Age: 59
End: 2019-08-29

## 2019-08-29 ENCOUNTER — TRANSCRIBE ORDERS (OUTPATIENT)
Dept: CARDIOLOGY | Facility: CLINIC | Age: 59
End: 2019-08-29

## 2019-08-29 ENCOUNTER — HOSPITAL ENCOUNTER (OUTPATIENT)
Dept: CARDIOLOGY | Facility: HOSPITAL | Age: 59
Discharge: HOME | End: 2019-08-29
Admitting: INTERNAL MEDICINE

## 2019-08-29 VITALS — HEIGHT: 72 IN | BODY MASS INDEX: 39.68 KG/M2 | WEIGHT: 293 LBS

## 2019-08-29 DIAGNOSIS — Z01.810 PRE-OPERATIVE CARDIOVASCULAR EXAMINATION: Primary | ICD-10-CM

## 2019-08-29 DIAGNOSIS — R94.39 ABNORMAL NUCLEAR STRESS TEST: Primary | ICD-10-CM

## 2019-08-29 DIAGNOSIS — R06.09 DOE (DYSPNEA ON EXERTION): ICD-10-CM

## 2019-08-29 DIAGNOSIS — R07.2 PRECORDIAL CHEST PAIN: ICD-10-CM

## 2019-08-29 DIAGNOSIS — R94.39 ABNORMAL BALLISTOCARDIOGRAM: ICD-10-CM

## 2019-08-29 DIAGNOSIS — Z13.6 SCREENING FOR ISCHEMIC HEART DISEASE: ICD-10-CM

## 2019-08-29 DIAGNOSIS — R26.2 INABILITY TO AMBULATE DUE TO KNEE: ICD-10-CM

## 2019-08-29 DIAGNOSIS — Z01.810 PREOP CARDIOVASCULAR EXAM: ICD-10-CM

## 2019-08-29 DIAGNOSIS — E66.01 MORBID OBESITY (HCC): ICD-10-CM

## 2019-08-29 DIAGNOSIS — E78.2 MIXED HYPERLIPIDEMIA: ICD-10-CM

## 2019-08-29 DIAGNOSIS — E11.65 UNCONTROLLED TYPE 2 DIABETES MELLITUS WITH HYPERGLYCEMIA (HCC): ICD-10-CM

## 2019-08-29 DIAGNOSIS — I25.118 CORONARY ARTERY DISEASE INVOLVING NATIVE CORONARY ARTERY OF NATIVE HEART WITH OTHER FORM OF ANGINA PECTORIS (HCC): ICD-10-CM

## 2019-08-29 DIAGNOSIS — I10 ESSENTIAL HYPERTENSION: ICD-10-CM

## 2019-08-29 DIAGNOSIS — G47.33 OBSTRUCTIVE APNEA: ICD-10-CM

## 2019-08-29 DIAGNOSIS — Z01.810 PRE-OPERATIVE CARDIOVASCULAR EXAMINATION: ICD-10-CM

## 2019-08-29 LAB
ANION GAP SERPL CALCULATED.3IONS-SCNC: 14.4 MMOL/L (ref 5–15)
BASOPHILS # BLD AUTO: 0.08 10*3/MM3 (ref 0–0.2)
BASOPHILS NFR BLD AUTO: 0.6 % (ref 0–1.5)
BH CV NUCLEAR PRIOR STUDY: 3
BH CV STRESS BP STAGE 1: NORMAL
BH CV STRESS COMMENTS STAGE 1: NORMAL
BH CV STRESS DOSE REGADENOSON STAGE 1: 0.4
BH CV STRESS DURATION MIN STAGE 1: 0
BH CV STRESS DURATION SEC STAGE 1: 10
BH CV STRESS HR STAGE 1: 94
BH CV STRESS PROTOCOL 1: NORMAL
BH CV STRESS RECOVERY BP: NORMAL MMHG
BH CV STRESS RECOVERY HR: 92 BPM
BH CV STRESS STAGE 1: 1
BUN BLD-MCNC: 17 MG/DL (ref 8–23)
BUN/CREAT SERPL: 23.6 (ref 7–25)
CALCIUM SPEC-SCNC: 10.1 MG/DL (ref 8.6–10.5)
CHLORIDE SERPL-SCNC: 98 MMOL/L (ref 98–107)
CO2 SERPL-SCNC: 25.6 MMOL/L (ref 22–29)
CREAT BLD-MCNC: 0.72 MG/DL (ref 0.57–1)
DEPRECATED RDW RBC AUTO: 45.8 FL (ref 37–54)
EOSINOPHIL # BLD AUTO: 0.11 10*3/MM3 (ref 0–0.4)
EOSINOPHIL NFR BLD AUTO: 0.8 % (ref 0.3–6.2)
ERYTHROCYTE [DISTWIDTH] IN BLOOD BY AUTOMATED COUNT: 14.5 % (ref 12.3–15.4)
GFR SERPL CREATININE-BSD FRML MDRD: 83 ML/MIN/1.73
GLUCOSE BLD-MCNC: 133 MG/DL (ref 65–99)
HCT VFR BLD AUTO: 45.1 % (ref 34–46.6)
HGB BLD-MCNC: 13.9 G/DL (ref 12–15.9)
IMM GRANULOCYTES # BLD AUTO: 0.09 10*3/MM3 (ref 0–0.05)
IMM GRANULOCYTES NFR BLD AUTO: 0.7 % (ref 0–0.5)
LV EF NUC BP: 55 %
LYMPHOCYTES # BLD AUTO: 2.26 10*3/MM3 (ref 0.7–3.1)
LYMPHOCYTES NFR BLD AUTO: 17.3 % (ref 19.6–45.3)
MAXIMAL PREDICTED HEART RATE: 160 BPM
MCH RBC QN AUTO: 26.7 PG (ref 26.6–33)
MCHC RBC AUTO-ENTMCNC: 30.8 G/DL (ref 31.5–35.7)
MCV RBC AUTO: 86.6 FL (ref 79–97)
MONOCYTES # BLD AUTO: 1.56 10*3/MM3 (ref 0.1–0.9)
MONOCYTES NFR BLD AUTO: 11.9 % (ref 5–12)
NEUTROPHILS # BLD AUTO: 8.99 10*3/MM3 (ref 1.7–7)
NEUTROPHILS NFR BLD AUTO: 68.7 % (ref 42.7–76)
NRBC BLD AUTO-RTO: 0 /100 WBC (ref 0–0.2)
PERCENT MAX PREDICTED HR: 58.75 %
PLATELET # BLD AUTO: 434 10*3/MM3 (ref 140–450)
PMV BLD AUTO: 9.1 FL (ref 6–12)
POTASSIUM BLD-SCNC: 4.1 MMOL/L (ref 3.5–5.2)
RBC # BLD AUTO: 5.21 10*6/MM3 (ref 3.77–5.28)
SODIUM BLD-SCNC: 138 MMOL/L (ref 136–145)
STRESS BASELINE BP: NORMAL MMHG
STRESS BASELINE HR: 57 BPM
STRESS PERCENT HR: 69 %
STRESS POST EXERCISE DUR SEC: 10 SEC
STRESS POST PEAK BP: NORMAL MMHG
STRESS POST PEAK HR: 94 BPM
STRESS TARGET HR: 136 BPM
WBC NRBC COR # BLD: 13.09 10*3/MM3 (ref 3.4–10.8)

## 2019-08-29 PROCEDURE — 93017 CV STRESS TEST TRACING ONLY: CPT

## 2019-08-29 PROCEDURE — 25010000002 REGADENOSON 0.4 MG/5ML SOLUTION: Performed by: INTERNAL MEDICINE

## 2019-08-29 PROCEDURE — 85025 COMPLETE CBC W/AUTO DIFF WBC: CPT

## 2019-08-29 PROCEDURE — A9555 RB82 RUBIDIUM: HCPCS | Performed by: INTERNAL MEDICINE

## 2019-08-29 PROCEDURE — 78492 MYOCRD IMG PET MLT RST&STRS: CPT | Performed by: INTERNAL MEDICINE

## 2019-08-29 PROCEDURE — 36415 COLL VENOUS BLD VENIPUNCTURE: CPT

## 2019-08-29 PROCEDURE — 93018 CV STRESS TEST I&R ONLY: CPT | Performed by: INTERNAL MEDICINE

## 2019-08-29 PROCEDURE — 78492 MYOCRD IMG PET MLT RST&STRS: CPT

## 2019-08-29 PROCEDURE — 0 RUBIDIUM CHLORIDE: Performed by: INTERNAL MEDICINE

## 2019-08-29 PROCEDURE — 80048 BASIC METABOLIC PNL TOTAL CA: CPT

## 2019-08-29 PROCEDURE — 93016 CV STRESS TEST SUPVJ ONLY: CPT | Performed by: INTERNAL MEDICINE

## 2019-08-29 RX ADMIN — RUBIDIUM CHLORIDE RB-82 1 DOSE: 150 INJECTION, SOLUTION INTRAVENOUS at 08:55

## 2019-08-29 RX ADMIN — REGADENOSON 0.4 MG: 0.08 INJECTION, SOLUTION INTRAVENOUS at 09:17

## 2019-08-29 RX ADMIN — RUBIDIUM CHLORIDE RB-82 1 DOSE: 150 INJECTION, SOLUTION INTRAVENOUS at 09:17

## 2019-09-03 ENCOUNTER — HOSPITAL ENCOUNTER (OUTPATIENT)
Facility: HOSPITAL | Age: 59
Setting detail: HOSPITAL OUTPATIENT SURGERY
Discharge: HOME | End: 2019-09-03
Attending: INTERNAL MEDICINE | Admitting: INTERNAL MEDICINE

## 2019-09-03 VITALS
HEIGHT: 72 IN | OXYGEN SATURATION: 95 % | TEMPERATURE: 97.4 F | RESPIRATION RATE: 18 BRPM | DIASTOLIC BLOOD PRESSURE: 72 MMHG | SYSTOLIC BLOOD PRESSURE: 117 MMHG | HEART RATE: 74 BPM | WEIGHT: 293 LBS | BODY MASS INDEX: 39.68 KG/M2

## 2019-09-03 DIAGNOSIS — R94.39 ABNORMAL NUCLEAR STRESS TEST: ICD-10-CM

## 2019-09-03 LAB — GLUCOSE BLDC GLUCOMTR-MCNC: 155 MG/DL (ref 70–130)

## 2019-09-03 PROCEDURE — C1769 GUIDE WIRE: HCPCS | Performed by: INTERNAL MEDICINE

## 2019-09-03 PROCEDURE — 25010000002 HEPARIN (PORCINE) PER 1000 UNITS: Performed by: INTERNAL MEDICINE

## 2019-09-03 PROCEDURE — 93458 L HRT ARTERY/VENTRICLE ANGIO: CPT | Performed by: INTERNAL MEDICINE

## 2019-09-03 PROCEDURE — 0 IOPAMIDOL PER 1 ML: Performed by: INTERNAL MEDICINE

## 2019-09-03 PROCEDURE — 25010000002 MIDAZOLAM PER 1 MG: Performed by: INTERNAL MEDICINE

## 2019-09-03 PROCEDURE — C1894 INTRO/SHEATH, NON-LASER: HCPCS | Performed by: INTERNAL MEDICINE

## 2019-09-03 PROCEDURE — 82962 GLUCOSE BLOOD TEST: CPT

## 2019-09-03 PROCEDURE — 25010000002 FENTANYL CITRATE (PF) 100 MCG/2ML SOLUTION: Performed by: INTERNAL MEDICINE

## 2019-09-03 RX ORDER — SODIUM CHLORIDE 9 MG/ML
75 INJECTION, SOLUTION INTRAVENOUS CONTINUOUS
Status: DISCONTINUED | OUTPATIENT
Start: 2019-09-03 | End: 2019-09-03 | Stop reason: HOSPADM

## 2019-09-03 RX ORDER — SODIUM CHLORIDE 0.9 % (FLUSH) 0.9 %
3 SYRINGE (ML) INJECTION EVERY 12 HOURS SCHEDULED
Status: DISCONTINUED | OUTPATIENT
Start: 2019-09-03 | End: 2019-09-03 | Stop reason: HOSPADM

## 2019-09-03 RX ORDER — LIDOCAINE HYDROCHLORIDE 20 MG/ML
INJECTION, SOLUTION INFILTRATION; PERINEURAL AS NEEDED
Status: DISCONTINUED | OUTPATIENT
Start: 2019-09-03 | End: 2019-09-03 | Stop reason: HOSPADM

## 2019-09-03 RX ORDER — SODIUM CHLORIDE 0.9 % (FLUSH) 0.9 %
10 SYRINGE (ML) INJECTION AS NEEDED
Status: DISCONTINUED | OUTPATIENT
Start: 2019-09-03 | End: 2019-09-03 | Stop reason: HOSPADM

## 2019-09-03 RX ORDER — SODIUM CHLORIDE 9 MG/ML
100 INJECTION, SOLUTION INTRAVENOUS CONTINUOUS
Status: DISCONTINUED | OUTPATIENT
Start: 2019-09-03 | End: 2019-09-03 | Stop reason: HOSPADM

## 2019-09-03 RX ORDER — LEVOTHYROXINE SODIUM 137 UG/1
137 TABLET ORAL DAILY
COMMUNITY
End: 2020-12-17

## 2019-09-03 RX ORDER — LIDOCAINE HYDROCHLORIDE 10 MG/ML
0.1 INJECTION, SOLUTION EPIDURAL; INFILTRATION; INTRACAUDAL; PERINEURAL ONCE AS NEEDED
Status: DISCONTINUED | OUTPATIENT
Start: 2019-09-03 | End: 2019-09-03 | Stop reason: HOSPADM

## 2019-09-03 RX ORDER — FENTANYL CITRATE 50 UG/ML
INJECTION, SOLUTION INTRAMUSCULAR; INTRAVENOUS AS NEEDED
Status: DISCONTINUED | OUTPATIENT
Start: 2019-09-03 | End: 2019-09-03 | Stop reason: HOSPADM

## 2019-09-03 RX ORDER — MIDAZOLAM HYDROCHLORIDE 1 MG/ML
INJECTION INTRAMUSCULAR; INTRAVENOUS AS NEEDED
Status: DISCONTINUED | OUTPATIENT
Start: 2019-09-03 | End: 2019-09-03 | Stop reason: HOSPADM

## 2019-09-03 RX ADMIN — SODIUM CHLORIDE 75 ML/HR: 9 INJECTION, SOLUTION INTRAVENOUS at 07:22

## 2019-09-03 NOTE — INTERVAL H&P NOTE
H&P updated. The patient was examined and the following changes are noted:  stress test with anterior ischemia.  for cardiac catheterization today.

## 2019-09-03 NOTE — DISCHARGE INSTRUCTIONS
[facility]  [address] [place] [address]    Coronary Angiogram (Radial/Ulnar Approach) After Care    Refer to this sheet in the next few weeks. These instructions provide you with information on caring for yourself after your procedure. Your caregiver may also give you more specific instructions. Your treatment has been planned according to current medical practices, but problems sometimes occur. Call your caregiver if you have any problems or questions after your procedure.    Home Care Instructions:  · You may shower the day after the procedure. Remove the bandage (dressing) and gently wash the site with plain soap and water. Gently pat the site dry. You may apply a band aid daily for 2 days if desired.    · Do not apply powder or lotion to the site.  · Do not submerge the affected site in water for 3 to 5 days or until the site is completely healed.   · Do not lift, push or pull anything over 10 pounds for 2 days after your procedure.  · Inspect the site at least twice daily. You may notice some bruising at the site and it may be tender for 1 to 2 weeks.     · Increase your fluid intake for the next 2 days.    · Keep arm elevated for 24 hours. For the remainder of the day, keep your arm in “Pledge of Allegiance” position when up and about.     · You may drive 24 hours after the procedure unless otherwise instructed by your caregiver.  · Do not operate machinery or power tools for 24 hours.  · A responsible adult should be with you for the first 24 hours after you arrive home. Do not make any important legal decisions or sign legal papers for 24 hours.  Do not drink alcohol for 24 hours.    · Metformin or any medications containing Metformin should not be taken for 48 hours after your procedure.      Call Your Doctor if:   · You have unusual pain at the radial/ulnar (wrist) site.  · You have redness, warmth, swelling, or pain at the radial/ulnar (wrist) site.  · You have drainage (other  than a small amount of blood on the dressing).  · You have chills or a fever > 101.  · Your arm becomes pale or dark, cool, tingly, or numb.  · You have heavy bleeding from the site, hold pressure on the site for 20 minutes.  If the bleeding stops, apply a fresh bandage and call your cardiologist.  However, if you continue to have bleeding, call 911.

## 2019-09-15 DIAGNOSIS — F33.42 MAJOR DEPRESSIVE DISORDER, RECURRENT, IN FULL REMISSION (HCC): ICD-10-CM

## 2019-09-16 RX ORDER — FLUOXETINE HYDROCHLORIDE 20 MG/1
CAPSULE ORAL
Qty: 270 CAPSULE | Refills: 1 | Status: SHIPPED | OUTPATIENT
Start: 2019-09-16 | End: 2019-10-03

## 2019-09-24 ENCOUNTER — RESULTS ENCOUNTER (OUTPATIENT)
Dept: ENDOCRINOLOGY | Age: 59
End: 2019-09-24

## 2019-09-24 DIAGNOSIS — E11.65 UNCONTROLLED TYPE 2 DIABETES MELLITUS WITH HYPERGLYCEMIA (HCC): ICD-10-CM

## 2019-09-24 DIAGNOSIS — E06.3 HYPOTHYROIDISM DUE TO HASHIMOTO'S THYROIDITIS: ICD-10-CM

## 2019-09-24 DIAGNOSIS — E83.52 HYPERCALCEMIA: ICD-10-CM

## 2019-09-24 DIAGNOSIS — E78.2 MIXED HYPERLIPIDEMIA: ICD-10-CM

## 2019-09-24 DIAGNOSIS — I10 BENIGN ESSENTIAL HYPERTENSION: ICD-10-CM

## 2019-09-24 DIAGNOSIS — E55.9 VITAMIN D DEFICIENCY, UNSPECIFIED: ICD-10-CM

## 2019-09-24 DIAGNOSIS — E66.01 MORBID OBESITY (HCC): ICD-10-CM

## 2019-09-24 DIAGNOSIS — E03.8 HYPOTHYROIDISM DUE TO HASHIMOTO'S THYROIDITIS: ICD-10-CM

## 2019-09-26 DIAGNOSIS — M54.2 CERVICALGIA: ICD-10-CM

## 2019-09-26 DIAGNOSIS — I10 BENIGN ESSENTIAL HYPERTENSION: ICD-10-CM

## 2019-09-26 RX ORDER — LOSARTAN POTASSIUM 50 MG/1
TABLET ORAL
Qty: 90 TABLET | Refills: 0 | Status: SHIPPED | OUTPATIENT
Start: 2019-09-26 | End: 2019-10-03

## 2019-09-26 RX ORDER — BACLOFEN 10 MG/1
TABLET ORAL
Qty: 270 TABLET | Refills: 0 | Status: SHIPPED | OUTPATIENT
Start: 2019-09-26 | End: 2019-10-03

## 2019-09-30 ENCOUNTER — TELEPHONE (OUTPATIENT)
Dept: CARDIOLOGY | Facility: CLINIC | Age: 59
End: 2019-09-30

## 2019-09-30 NOTE — TELEPHONE ENCOUNTER
Notified pt of JA's recommendations. She verbalized understanding.    Leta Wilson, RN  Triage RN MG

## 2019-09-30 NOTE — TELEPHONE ENCOUNTER
Regarding: Visit Follow-Up Question  Contact: [phone]  ----- Message from Mychart, Generic sent at 9/30/2019 10:33 AM EDT -----    Do I need to stop taking the low dosage aspirin prior to surgery on Oct. 14.?

## 2019-10-03 ENCOUNTER — APPOINTMENT (OUTPATIENT)
Dept: PREADMISSION TESTING | Facility: HOSPITAL | Age: 59
End: 2019-10-03

## 2019-10-03 VITALS
DIASTOLIC BLOOD PRESSURE: 76 MMHG | OXYGEN SATURATION: 96 % | TEMPERATURE: 96.9 F | HEART RATE: 88 BPM | BODY MASS INDEX: 39.68 KG/M2 | WEIGHT: 293 LBS | SYSTOLIC BLOOD PRESSURE: 122 MMHG | RESPIRATION RATE: 16 BRPM | HEIGHT: 72 IN

## 2019-10-03 DIAGNOSIS — M17.12 PRIMARY OSTEOARTHRITIS OF LEFT KNEE: ICD-10-CM

## 2019-10-03 LAB
ANION GAP SERPL CALCULATED.3IONS-SCNC: 16.4 MMOL/L (ref 5–15)
BILIRUB UR QL STRIP: NEGATIVE
BUN BLD-MCNC: 19 MG/DL (ref 8–23)
BUN/CREAT SERPL: 26 (ref 7–25)
CALCIUM SPEC-SCNC: 10.1 MG/DL (ref 8.6–10.5)
CHLORIDE SERPL-SCNC: 99 MMOL/L (ref 98–107)
CLARITY UR: CLEAR
CO2 SERPL-SCNC: 25.6 MMOL/L (ref 22–29)
COLOR UR: YELLOW
CREAT BLD-MCNC: 0.73 MG/DL (ref 0.57–1)
DEPRECATED RDW RBC AUTO: 43.8 FL (ref 37–54)
ERYTHROCYTE [DISTWIDTH] IN BLOOD BY AUTOMATED COUNT: 14.4 % (ref 12.3–15.4)
GFR SERPL CREATININE-BSD FRML MDRD: 81 ML/MIN/1.73
GLUCOSE BLD-MCNC: 179 MG/DL (ref 65–99)
GLUCOSE UR STRIP-MCNC: ABNORMAL MG/DL
HCT VFR BLD AUTO: 41.7 % (ref 34–46.6)
HGB BLD-MCNC: 13.1 G/DL (ref 12–15.9)
HGB UR QL STRIP.AUTO: NEGATIVE
KETONES UR QL STRIP: NEGATIVE
LEUKOCYTE ESTERASE UR QL STRIP.AUTO: NEGATIVE
MCH RBC QN AUTO: 26.4 PG (ref 26.6–33)
MCHC RBC AUTO-ENTMCNC: 31.4 G/DL (ref 31.5–35.7)
MCV RBC AUTO: 84.1 FL (ref 79–97)
NITRITE UR QL STRIP: NEGATIVE
PH UR STRIP.AUTO: 6.5 [PH] (ref 5–8)
PLATELET # BLD AUTO: 432 10*3/MM3 (ref 140–450)
PMV BLD AUTO: 8.6 FL (ref 6–12)
POTASSIUM BLD-SCNC: 3.9 MMOL/L (ref 3.5–5.2)
PROT UR QL STRIP: NEGATIVE
RBC # BLD AUTO: 4.96 10*6/MM3 (ref 3.77–5.28)
SODIUM BLD-SCNC: 141 MMOL/L (ref 136–145)
SP GR UR STRIP: 1.03 (ref 1–1.03)
UROBILINOGEN UR QL STRIP: ABNORMAL
WBC NRBC COR # BLD: 11.82 10*3/MM3 (ref 3.4–10.8)

## 2019-10-03 PROCEDURE — 80048 BASIC METABOLIC PNL TOTAL CA: CPT | Performed by: ORTHOPAEDIC SURGERY

## 2019-10-03 PROCEDURE — 36415 COLL VENOUS BLD VENIPUNCTURE: CPT

## 2019-10-03 PROCEDURE — 85027 COMPLETE CBC AUTOMATED: CPT | Performed by: ORTHOPAEDIC SURGERY

## 2019-10-03 PROCEDURE — 81003 URINALYSIS AUTO W/O SCOPE: CPT | Performed by: ORTHOPAEDIC SURGERY

## 2019-10-03 RX ORDER — BACLOFEN 10 MG/1
10 TABLET ORAL 3 TIMES DAILY PRN
COMMUNITY
End: 2020-11-17 | Stop reason: SDUPTHER

## 2019-10-03 RX ORDER — FLUOXETINE HYDROCHLORIDE 20 MG/1
60 CAPSULE ORAL NIGHTLY
COMMUNITY
End: 2020-11-17 | Stop reason: SDUPTHER

## 2019-10-03 RX ORDER — PREDNISONE 1 MG/1
5 TABLET ORAL DAILY
COMMUNITY
End: 2020-05-20

## 2019-10-03 RX ORDER — LOSARTAN POTASSIUM 50 MG/1
50 TABLET ORAL DAILY
COMMUNITY
End: 2020-06-12 | Stop reason: SDUPTHER

## 2019-10-03 ASSESSMENT — KOOS JR
KOOS JR SCORE: 0
KOOS JR SCORE: 28

## 2019-10-03 NOTE — DISCHARGE INSTRUCTIONS
Take the following medications the morning of surgery with a small sip of water:  LOSARTAN AND METOPROLOL      General Instructions:  • Do not eat solid food after midnight the night before surgery.  • You may drink clear liquids day of surgery but must stop at least one hour before your hospital arrival time.  • It is beneficial for you to have a clear drink that contains carbohydrates the day of surgery.  We suggest a 12 to 20 ounce bottle of Gatorade or Powerade for non-diabetic patients or a 12 to 20 ounce bottle of G2 or Powerade Zero for diabetic patients. (Pediatric patients, are not advised to drink a 12 to 20 ounce carbohydrate drink)    Clear liquids are liquids you can see through.  Nothing red in color.     Plain water                               Sports drinks  Sodas                                   Gelatin (Jell-O)  Fruit juices without pulp such as white grape juice and apple juice  Popsicles that contain no fruit or yogurt  Tea or coffee (no cream or milk added)  Gatorade / Powerade  G2 / Powerade Zero    • Infants may have breast milk up to four hours before surgery.  • Infants drinking formula may drink formula up to six hours before surgery.   • Patients who avoid smoking, chewing tobacco and alcohol for 4 weeks prior to surgery have a reduced risk of post-operative complications.  Quit smoking as many days before surgery as you can.  • Do not smoke, use chewing tobacco or drink alcohol the day of surgery.   • If applicable bring your C-PAP/ BI-PAP machine.  • Bring any papers given to you in the doctor’s office.  • Wear clean comfortable clothes.  • Do not wear contact lenses, false eyelashes or make-up.  Bring a case for your glasses.   • Bring crutches or walker if applicable.  • Remove all piercings.  Leave jewelry and any other valuables at home.  • Hair extensions with metal clips must be removed prior to surgery.  • The Pre-Admission Testing nurse will instruct you to bring medications if  unable to obtain an accurate list in Pre-Admission Testing.        If you were given a blood bank ID arm band remember to bring it with you the day of surgery.    Preventing a Surgical Site Infection:  • For 2 to 3 days before surgery, avoid shaving with a razor because the razor can irritate skin and make it easier to develop an infection.    • Any areas of open skin can increase the risk of a post-operative wound infection by allowing bacteria to enter and travel throughout the body.  Notify your surgeon if you have any skin wounds / rashes even if it is not near the expected surgical site.  The area will need assessed to determine if surgery should be delayed until it is healed.  • The night prior to surgery sleep in a clean bed with clean clothing.  Do not allow pets to sleep with you.  • Shower on the morning of surgery using a fresh bar of anti-bacterial soap (such as Dial) and clean washcloth.  Dry with a clean towel and dress in clean clothing.  • Ask your surgeon if you will be receiving antibiotics prior to surgery.  • Make sure you, your family, and all healthcare providers clean their hands with soap and water or an alcohol based hand  before caring for you or your wound.    Day of surgery: 10/14/2019 PT TO BE NOTIIFED OF ARRIVAL TIME  Your arrival time is approximately two hours before your scheduled surgery time.  Upon arrival, a Pre-op nurse and Anesthesiologist will review your health history, obtain vital signs, and answer questions you may have.  The only belongings needed at this time will be a list of your home medications and if applicable your C-PAP/BI-PAP machine.  If you are staying overnight your family can leave the rest of your belongings in the car and bring them to your room later.  A Pre-op nurse will start an IV and you may receive medication in preparation for surgery, including something to help you relax.  Your family will be able to see you in the Pre-op area.  Two  visitors at a time will be allowed in the Pre-op room.  While you are in surgery your family should notify the waiting room  if they leave the waiting room area and provide a contact phone number.    Please be aware that surgery does come with discomfort.  We want to make every effort to control your discomfort so please discuss any uncontrolled symptoms with your nurse.   Your doctor will most likely have prescribed pain medications.      If you are going home after surgery you will receive individualized written care instructions before being discharged.  A responsible adult must drive you to and from the hospital on the day of your surgery and stay with you for 24 hours.    If you are staying overnight following surgery, you will be transported to your hospital room following the recovery period.  [facility] has all private rooms.    You have received a list of surgical assistants for your reference.  If you have any questions please call Pre-Admission Testing at [phone].  Deductibles and co-payments are collected on the day of service. Please be prepared to pay the required co-pay, deductible or deposit on the day of service as defined by your plan.    2% CHLORAHEXIDINE GLUCONATE* CLOTH  Preparing or “prepping” skin before surgery can reduce the risk of infection at the surgical site. To make the process easier, [facility] has chosen disposable cloths moistened with a rinse-free, 2% Chlorhexidine Gluconate (CHG) antiseptic solution. The steps below outline the prepping process and should be carefully followed.        Use the prep cloth on the area that is circled in the diagram             Directions Night before Surgery  1) Shower using a fresh bar of anti-bacterial soap (such as Dial) and clean washcloth.  Use a clean towel to completely dry your skin.  2) Do not use any lotions, oils or creams on your skin.  3) Open the package and remove 1 cloth, wipe your skin for  30 seconds in a circular motion.  Allow to dry for 3 minutes.  4) Repeat #3 with second cloth.  5) Do not touch your eyes, ears, or mouth with the prep cloth.  6) Allow the wet prep solution to air dry.  7) Discard the prep cloth and wash your hands with soap and water.   8) Dress in clean bed clothes and sleep on fresh clean bed sheets.   9) You may experience some temporary itching after the prep.    Directions Day of Surgery  1) Repeat steps 1,2,3,4,5,6,7, and 9.   2) Dress in clean clothes before coming to the hospital.    BACTROBAN NASAL OINTMENT  There are many germs normally in your nose. Bactroban is an ointment that will help reduce these germs. Please follow these instructions for Bactroban use:      ____The day before surgery in the morning  Date________    ____The day before surgery in the evening              Date________    ____The day of surgery in the morning    Date________    **Squirt ½ package of Bactroban Ointment onto a cotton applicator and apply to inside of 1st nostril.  Squirt the remaining Bactroban and apply to the inside of the other nostril.

## 2019-10-10 ENCOUNTER — OFFICE VISIT (OUTPATIENT)
Dept: ORTHOPEDIC SURGERY | Facility: CLINIC | Age: 59
End: 2019-10-10

## 2019-10-10 VITALS
SYSTOLIC BLOOD PRESSURE: 147 MMHG | WEIGHT: 293 LBS | HEIGHT: 72 IN | TEMPERATURE: 98.3 F | BODY MASS INDEX: 39.68 KG/M2 | DIASTOLIC BLOOD PRESSURE: 80 MMHG

## 2019-10-10 DIAGNOSIS — G89.29 CHRONIC PAIN OF LEFT KNEE: Primary | ICD-10-CM

## 2019-10-10 DIAGNOSIS — M25.562 CHRONIC PAIN OF LEFT KNEE: Primary | ICD-10-CM

## 2019-10-10 PROCEDURE — 77077 JOINT SURVEY SINGLE VIEW: CPT | Performed by: ORTHOPAEDIC SURGERY

## 2019-10-10 PROCEDURE — S0260 H&P FOR SURGERY: HCPCS | Performed by: NURSE PRACTITIONER

## 2019-10-10 PROCEDURE — 73562 X-RAY EXAM OF KNEE 3: CPT | Performed by: NURSE PRACTITIONER

## 2019-10-10 NOTE — H&P
Patient: Pratibha Pascal    Date of Admission: 10/14/19    YOB: 1960    Medical Record Number: [id number]    Admitting Physician: Dr. Nabor Correia    Reason for Admission: End Stage Left Knee OA    History of Present Illness: 59 y.o. female presents with severe end stage knee osteoarthritis which has not been responsive to the full compliment of conservative measures. Despite conservative attempts, there is still severe, constant activity limiting pain. Given the severity of the pain, the patient has elected to proceed with knee replacement.    Allergies:   Allergies   Allergen Reactions   • Cefuroxime Diarrhea   • Clindamycin/Lincomycin Diarrhea   • Dicloxacillin Diarrhea   • Erythromycin Swelling   • Talwin [Pentazocine] Itching         Current Medications:  Home Medications:    Current Outpatient Medications on File Prior to Visit   Medication Sig   • ALPRAZolam (XANAX) 0.5 MG tablet Take 1 tablet by mouth Daily. TAKE 1 TABLET BY MOUTH EVERY DAY AS NEEDED   • aspirin 81 MG tablet Take 81 mg by mouth Daily. INSTRUCTED PT TO FOLLOW MD INSTRUCTIONS REGARDING HOLDING   • atorvastatin (LIPITOR) 10 MG tablet Take 0.5 tablets by mouth Daily.   • baclofen (LIORESAL) 10 MG tablet Take 10 mg by mouth 3 (Three) Times a Day As Needed for Muscle Spasms.   • Baricitinib (OLUMIANT) 2 MG tablet Take 2 mg by mouth Daily.   • CHLORHEXIDINE GLUCONATE CLOTH EX Apply  topically. AS DIRECTED PREOP   • dapagliflozin-metformin HCl ER (XIGDUO XR) 5-1000 MG tablet Take 2 tablets by mouth Daily.   • ergocalciferol (DRISDOL) 26618 units capsule Take 1 capsule by mouth 3 (Three) Times a Week. (Patient taking differently: Take 50,000 Units by mouth 2 (Two) Times a Week.)   • FLUoxetine (PROzac) 20 MG capsule Take 60 mg by mouth Daily.   • folic acid (FOLVITE) 1 MG tablet Take 1 mg by mouth Daily.   • glucose blood (ONETOUCH VERIO) test strip Test 3 times daily   • levothyroxine (SYNTHROID, LEVOTHROID) 137 MCG tablet Take 137 mcg  by mouth Daily.   • losartan (COZAAR) 50 MG tablet Take 50 mg by mouth Daily.   • metoprolol succinate XL (TOPROL-XL) 100 MG 24 hr tablet Take 1 tablet by mouth Daily.   • mupirocin (BACTROBAN) 2 % nasal ointment into the nostril(s) as directed by provider. AS DIRECTED PREOP   • ONETOUCH DELICA LANCETS 33G misc Test 3 times daily   • predniSONE (DELTASONE) 5 MG tablet Take 5 mg by mouth Daily.   • sulfaSALAzine (AZULFIDINE) 500 MG tablet Take 1,500 mg by mouth 2 (Two) Times a Day. TAKE 3 TABLETS BY MOUTH TWICE DAILY   • triamterene-hydrochlorothiazide (MAXZIDE-25) 37.5-25 MG per tablet Take 1 tablet by mouth Daily.   • TRULICITY 1.5 MG/0.5ML solution pen-injector Inject 1.5 mg under the skin into the appropriate area as directed 1 (One) Time Per Week.     Current Facility-Administered Medications on File Prior to Visit   Medication   • mupirocin (BACTROBAN) 2 % nasal ointment     PRN Meds:.    PMH:     Past Medical History:   Diagnosis Date   • Allergy to mold    • Anxiety    • Arthritis    • Benign essential hypertension    • CAD (coronary artery disease)    • Cataract    • Coronary artery disease    • Depression    • Diabetes mellitus (CMS/HCC)    • Fibromyalgia, primary    • H/O bone density study unclear   • H/O complete eye exam 2 -3 years   • Headache    • History of myocardial infarction    • History of staph infection     HISTORY OF MSSA IN LEFT FOOT FROM DIABETIC ULCER HEALED   • HL (hearing loss)     RIGHT EAR  SEVERE HEARING LOSS   • HLD (hyperlipidemia)    • Hyperlipidemia    • Hypertension    • Hypothyroidism    • Hypothyroidism    • Left knee pain    • Major depressive disorder, recurrent, in full remission (CMS/HCC)    • Obesity    • BRANNON (obstructive sleep apnea)     USE CPAP   • Rheumatoid arthritis (CMS/HCC)    • Seasonal allergies    • Type 2 diabetes mellitus (CMS/HCC)        PF/Surg/Soc Hx:     Past Surgical History:   Procedure Laterality Date   • BACK SURGERY  10/1995    also 2/02; L5-S1;   [facility]   • CARDIAC CATHETERIZATION     • CARDIAC CATHETERIZATION N/A 9/3/2019    Procedure: Coronary angiography  ;  Surgeon: Magy Rivera MD;  Location:  [facility];  Service: Cardiovascular   • CARDIAC CATHETERIZATION N/A 9/3/2019    Procedure: Left Heart Cath;  Surgeon: Magy Rivera MD;  Location:  [facility];  Service: Cardiovascular   • CARDIAC CATHETERIZATION N/A 9/3/2019    Procedure: Left ventriculography;  Surgeon: Magy Rivera MD;  Location:  [facility];  Service: Cardiovascular   • COLONOSCOPY     • CORONARY STENT PLACEMENT  2014   • KNEE ARTHROSCOPY Right    • MAMMO BILATERAL  due    delcines   • PAP SMEAR  due   • TONSILLECTOMY      age 26        Social History     Occupational History   • Not on file   Tobacco Use   • Smoking status: Former Smoker     Packs/day: 1.00     Years: 30.00     Pack years: 30.00     Last attempt to quit: 2014     Years since quittin.2   • Smokeless tobacco: Never Used   • Tobacco comment: quit 2014   Substance and Sexual Activity   • Alcohol use: No   • Drug use: No   • Sexual activity: Not on file      Social History     Social History Narrative   • Not on file        Family History   Problem Relation Age of Onset   • Cancer Mother         breast   • Hypertension Mother    • Rheum arthritis Mother    • Thyroid disease Mother    • Arthritis Mother    • Hypertension Father    • Thyroid disease Father    • COPD Father    • Depression Father    • Hearing loss Father    • Heart failure Maternal Grandmother    • Rheum arthritis Other         aunt   • Depression Daughter    • Drug abuse Daughter    • Malig Hyperthermia Neg Hx          Review of Systems:   A 14 point review of systems was performed, pertinent positives discussed above, all other systems are negative    Physical Exam: 59 y.o. female  Vital Signs :   Vitals:    10/10/19 1416   BP: 147/80   Temp: 98.3 °F (36.8 °C)   Weight:  "136 kg (300 lb)   Height: 182.9 cm (72\")     General: Alert and Oriented x 3, No acute distress.  Psych: mood and affect appropriate; recent and remote memory intact  Eyes: conjunctiva clear; pupils equally round and reactive, sclera nonicteric  CV: no peripheral edema  Resp: normal respiratory effort  Skin: no rashes or wounds; normal turgor  Musculosketetal; pain and crepitance with knee range of motion  Vascular: palpable distal pulses    Xrays:  -3 views (AP, lateral, and sunrise) were ordered and reviewed demonstrating end-stage OA with bone on bone articulation.  -A full length AP xray was ordered and reviewed today for purposes of operative alignment demonstrating end stage arthritic findings. There are no previous full length films for review    Assessment:  End-stage Left knee osteoarthritis. Conservative measures have failed.      Plan:  The plan is to proceed with Left Total Knee Replacement. The patient voiced understanding of the risks, benefits, and alternative forms of treatment that were discussed with Dr Correia at the time of scheduling.  Patient's plan on going home with home health next day    Lauren Morgan, APRN  10/10/2019        "

## 2019-10-10 NOTE — H&P (VIEW-ONLY)
Patient: Pratibha Pascal    Date of Admission: 10/14/19    YOB: 1960    Medical Record Number: [id number]    Admitting Physician: Dr. Nabor Correia    Reason for Admission: End Stage Left Knee OA    History of Present Illness: 59 y.o. female presents with severe end stage knee osteoarthritis which has not been responsive to the full compliment of conservative measures. Despite conservative attempts, there is still severe, constant activity limiting pain. Given the severity of the pain, the patient has elected to proceed with knee replacement.    Allergies:   Allergies   Allergen Reactions   • Cefuroxime Diarrhea   • Clindamycin/Lincomycin Diarrhea   • Dicloxacillin Diarrhea   • Erythromycin Swelling   • Talwin [Pentazocine] Itching         Current Medications:  Home Medications:    Current Outpatient Medications on File Prior to Visit   Medication Sig   • ALPRAZolam (XANAX) 0.5 MG tablet Take 1 tablet by mouth Daily. TAKE 1 TABLET BY MOUTH EVERY DAY AS NEEDED   • aspirin 81 MG tablet Take 81 mg by mouth Daily. INSTRUCTED PT TO FOLLOW MD INSTRUCTIONS REGARDING HOLDING   • atorvastatin (LIPITOR) 10 MG tablet Take 0.5 tablets by mouth Daily.   • baclofen (LIORESAL) 10 MG tablet Take 10 mg by mouth 3 (Three) Times a Day As Needed for Muscle Spasms.   • Baricitinib (OLUMIANT) 2 MG tablet Take 2 mg by mouth Daily.   • CHLORHEXIDINE GLUCONATE CLOTH EX Apply  topically. AS DIRECTED PREOP   • dapagliflozin-metformin HCl ER (XIGDUO XR) 5-1000 MG tablet Take 2 tablets by mouth Daily.   • ergocalciferol (DRISDOL) 11831 units capsule Take 1 capsule by mouth 3 (Three) Times a Week. (Patient taking differently: Take 50,000 Units by mouth 2 (Two) Times a Week.)   • FLUoxetine (PROzac) 20 MG capsule Take 60 mg by mouth Daily.   • folic acid (FOLVITE) 1 MG tablet Take 1 mg by mouth Daily.   • glucose blood (ONETOUCH VERIO) test strip Test 3 times daily   • levothyroxine (SYNTHROID, LEVOTHROID) 137 MCG tablet Take 137 mcg  by mouth Daily.   • losartan (COZAAR) 50 MG tablet Take 50 mg by mouth Daily.   • metoprolol succinate XL (TOPROL-XL) 100 MG 24 hr tablet Take 1 tablet by mouth Daily.   • mupirocin (BACTROBAN) 2 % nasal ointment into the nostril(s) as directed by provider. AS DIRECTED PREOP   • ONETOUCH DELICA LANCETS 33G misc Test 3 times daily   • predniSONE (DELTASONE) 5 MG tablet Take 5 mg by mouth Daily.   • sulfaSALAzine (AZULFIDINE) 500 MG tablet Take 1,500 mg by mouth 2 (Two) Times a Day. TAKE 3 TABLETS BY MOUTH TWICE DAILY   • triamterene-hydrochlorothiazide (MAXZIDE-25) 37.5-25 MG per tablet Take 1 tablet by mouth Daily.   • TRULICITY 1.5 MG/0.5ML solution pen-injector Inject 1.5 mg under the skin into the appropriate area as directed 1 (One) Time Per Week.     Current Facility-Administered Medications on File Prior to Visit   Medication   • mupirocin (BACTROBAN) 2 % nasal ointment     PRN Meds:.    PMH:     Past Medical History:   Diagnosis Date   • Allergy to mold    • Anxiety    • Arthritis    • Benign essential hypertension    • CAD (coronary artery disease)    • Cataract    • Coronary artery disease    • Depression    • Diabetes mellitus (CMS/HCC)    • Fibromyalgia, primary    • H/O bone density study unclear   • H/O complete eye exam 2 -3 years   • Headache    • History of myocardial infarction    • History of staph infection     HISTORY OF MSSA IN LEFT FOOT FROM DIABETIC ULCER HEALED   • HL (hearing loss)     RIGHT EAR  SEVERE HEARING LOSS   • HLD (hyperlipidemia)    • Hyperlipidemia    • Hypertension    • Hypothyroidism    • Hypothyroidism    • Left knee pain    • Major depressive disorder, recurrent, in full remission (CMS/HCC)    • Obesity    • BRANNON (obstructive sleep apnea)     USE CPAP   • Rheumatoid arthritis (CMS/HCC)    • Seasonal allergies    • Type 2 diabetes mellitus (CMS/HCC)        PF/Surg/Soc Hx:     Past Surgical History:   Procedure Laterality Date   • BACK SURGERY  10/1995    also 2/02; L5-S1;   [facility]   • CARDIAC CATHETERIZATION     • CARDIAC CATHETERIZATION N/A 9/3/2019    Procedure: Coronary angiography  ;  Surgeon: Magy Rivera MD;  Location:  [facility];  Service: Cardiovascular   • CARDIAC CATHETERIZATION N/A 9/3/2019    Procedure: Left Heart Cath;  Surgeon: Magy Rivera MD;  Location:  [facility];  Service: Cardiovascular   • CARDIAC CATHETERIZATION N/A 9/3/2019    Procedure: Left ventriculography;  Surgeon: Magy Rivera MD;  Location:  [facility];  Service: Cardiovascular   • COLONOSCOPY     • CORONARY STENT PLACEMENT  2014   • KNEE ARTHROSCOPY Right    • MAMMO BILATERAL  due    [facility]cines   • PAP SMEAR  due   • TONSILLECTOMY      age 26        Social History     Occupational History   • Not on file   Tobacco Use   • Smoking status: Former Smoker     Packs/day: 1.00     Years: 30.00     Pack years: 30.00     Last attempt to quit: 2014     Years since quittin.2   • Smokeless tobacco: Never Used   • Tobacco comment: quit 2014   Substance and Sexual Activity   • Alcohol use: No   • Drug use: No   • Sexual activity: Not on file      Social History     Social History Narrative   • Not on file        Family History   Problem Relation Age of Onset   • Cancer Mother         breast   • Hypertension Mother    • Rheum arthritis Mother    • Thyroid disease Mother    • Arthritis Mother    • Hypertension Father    • Thyroid disease Father    • COPD Father    • Depression Father    • Hearing loss Father    • Heart failure Maternal Grandmother    • Rheum arthritis Other         aunt   • Depression Daughter    • Drug abuse Daughter    • Malig Hyperthermia Neg Hx          Review of Systems:   A 14 point review of systems was performed, pertinent positives discussed above, all other systems are negative    Physical Exam: 59 y.o. female  Vital Signs :   Vitals:    10/10/19 1416   BP: 147/80   Temp: 98.3 °F (36.8 °C)   Weight:  "136 kg (300 lb)   Height: 182.9 cm (72\")     General: Alert and Oriented x 3, No acute distress.  Psych: mood and affect appropriate; recent and remote memory intact  Eyes: conjunctiva clear; pupils equally round and reactive, sclera nonicteric  CV: no peripheral edema  Resp: normal respiratory effort  Skin: no rashes or wounds; normal turgor  Musculosketetal; pain and crepitance with knee range of motion  Vascular: palpable distal pulses    Xrays:  -3 views (AP, lateral, and sunrise) were ordered and reviewed demonstrating end-stage OA with bone on bone articulation.  -A full length AP xray was ordered and reviewed today for purposes of operative alignment demonstrating end stage arthritic findings. There are no previous full length films for review    Assessment:  End-stage Left knee osteoarthritis. Conservative measures have failed.      Plan:  The plan is to proceed with Left Total Knee Replacement. The patient voiced understanding of the risks, benefits, and alternative forms of treatment that were discussed with Dr Correia at the time of scheduling.  Patient's plan on going home with home health next day    Lauren Morgan, APRN  10/10/2019        "

## 2019-10-14 ENCOUNTER — ANESTHESIA (OUTPATIENT)
Dept: PERIOP | Facility: HOSPITAL | Age: 59
End: 2019-10-14

## 2019-10-14 ENCOUNTER — ANESTHESIA EVENT (OUTPATIENT)
Dept: PERIOP | Facility: HOSPITAL | Age: 59
End: 2019-10-14

## 2019-10-14 ENCOUNTER — HOSPITAL ENCOUNTER (INPATIENT)
Facility: HOSPITAL | Age: 59
LOS: 1 days | Discharge: HOME HEALTH | End: 2019-10-15
Attending: ORTHOPAEDIC SURGERY | Admitting: ORTHOPAEDIC SURGERY

## 2019-10-14 ENCOUNTER — APPOINTMENT (OUTPATIENT)
Dept: GENERAL RADIOLOGY | Facility: HOSPITAL | Age: 59
End: 2019-10-14

## 2019-10-14 DIAGNOSIS — M17.12 PRIMARY OSTEOARTHRITIS OF LEFT KNEE: ICD-10-CM

## 2019-10-14 PROBLEM — M17.9 OA (OSTEOARTHRITIS) OF KNEE: Status: ACTIVE | Noted: 2019-10-14

## 2019-10-14 LAB
GLUCOSE BLDC GLUCOMTR-MCNC: 144 MG/DL (ref 70–130)
GLUCOSE BLDC GLUCOMTR-MCNC: 162 MG/DL (ref 70–130)

## 2019-10-14 PROCEDURE — 25010000002 MORPHINE PER 10 MG: Performed by: ANESTHESIOLOGY

## 2019-10-14 PROCEDURE — C9290 INJ, BUPIVACAINE LIPOSOME: HCPCS | Performed by: ORTHOPAEDIC SURGERY

## 2019-10-14 PROCEDURE — 25010000003 CEFAZOLIN IN DEXTROSE 2-4 GM/100ML-% SOLUTION: Performed by: ANESTHESIOLOGY

## 2019-10-14 PROCEDURE — C1713 ANCHOR/SCREW BN/BN,TIS/BN: HCPCS | Performed by: ORTHOPAEDIC SURGERY

## 2019-10-14 PROCEDURE — 25010000002 FENTANYL CITRATE (PF) 100 MCG/2ML SOLUTION: Performed by: ANESTHESIOLOGY

## 2019-10-14 PROCEDURE — 25010000002 HYDROCORTISONE SODIUM SUCCINATE 100 MG RECONSTITUTED SOLUTION: Performed by: ANESTHESIOLOGY

## 2019-10-14 PROCEDURE — 73560 X-RAY EXAM OF KNEE 1 OR 2: CPT

## 2019-10-14 PROCEDURE — 27447 TOTAL KNEE ARTHROPLASTY: CPT | Performed by: ORTHOPAEDIC SURGERY

## 2019-10-14 PROCEDURE — 25010000003 BUPIVACAINE LIPOSOME 1.3 % SUSPENSION 20 ML VIAL: Performed by: ORTHOPAEDIC SURGERY

## 2019-10-14 PROCEDURE — 63710000001 PREDNISONE PER 5 MG: Performed by: ORTHOPAEDIC SURGERY

## 2019-10-14 PROCEDURE — 25010000002 KETOROLAC TROMETHAMINE PER 15 MG: Performed by: ANESTHESIOLOGY

## 2019-10-14 PROCEDURE — 82962 GLUCOSE BLOOD TEST: CPT

## 2019-10-14 PROCEDURE — 25010000002 ONDANSETRON PER 1 MG: Performed by: ANESTHESIOLOGY

## 2019-10-14 PROCEDURE — 25010000002 PROPOFOL 10 MG/ML EMULSION: Performed by: ANESTHESIOLOGY

## 2019-10-14 PROCEDURE — 25010000002 MIDAZOLAM PER 1 MG: Performed by: ANESTHESIOLOGY

## 2019-10-14 PROCEDURE — C1776 JOINT DEVICE (IMPLANTABLE): HCPCS | Performed by: ORTHOPAEDIC SURGERY

## 2019-10-14 PROCEDURE — 25010000002 VANCOMYCIN 10 G RECONSTITUTED SOLUTION: Performed by: ORTHOPAEDIC SURGERY

## 2019-10-14 PROCEDURE — 25010000002 KETOROLAC TROMETHAMINE PER 15 MG: Performed by: ORTHOPAEDIC SURGERY

## 2019-10-14 PROCEDURE — 0SRD069 REPLACEMENT OF LEFT KNEE JOINT WITH OXIDIZED ZIRCONIUM ON POLYETHYLENE SYNTHETIC SUBSTITUTE, CEMENTED, OPEN APPROACH: ICD-10-PCS | Performed by: ORTHOPAEDIC SURGERY

## 2019-10-14 DEVICE — GENESIS II NON-POROUS TIBIAL                                    BASEPLATE SIZE 6 LT
Type: IMPLANTABLE DEVICE | Site: KNEE | Status: FUNCTIONAL
Brand: GENESIS II

## 2019-10-14 DEVICE — LEGION POSTERIOR STABILIZED                                    OXINIUM FEMORAL SIZE 6 LEFT
Type: IMPLANTABLE DEVICE | Site: KNEE | Status: FUNCTIONAL
Brand: LEGION

## 2019-10-14 DEVICE — CMT BONE PALACOS R HI/VISC 1X40: Type: IMPLANTABLE DEVICE | Site: KNEE | Status: FUNCTIONAL

## 2019-10-14 DEVICE — IMPLANTABLE DEVICE: Type: IMPLANTABLE DEVICE | Site: KNEE | Status: FUNCTIONAL

## 2019-10-14 DEVICE — GENESIS II BICONVEX PATELLA 29MM
Type: IMPLANTABLE DEVICE | Site: KNEE | Status: FUNCTIONAL
Brand: GENESIS II

## 2019-10-14 DEVICE — GENESIS II POSTERIOR STABILIZED                                    HIGH FLEXION INSERT SIZE 5-6 11MM
Type: IMPLANTABLE DEVICE | Site: KNEE | Status: FUNCTIONAL
Brand: GENESIS II

## 2019-10-14 RX ORDER — LOSARTAN POTASSIUM 50 MG/1
50 TABLET ORAL DAILY
Status: DISCONTINUED | OUTPATIENT
Start: 2019-10-15 | End: 2019-10-15 | Stop reason: HOSPADM

## 2019-10-14 RX ORDER — SODIUM CHLORIDE, SODIUM LACTATE, POTASSIUM CHLORIDE, CALCIUM CHLORIDE 600; 310; 30; 20 MG/100ML; MG/100ML; MG/100ML; MG/100ML
9 INJECTION, SOLUTION INTRAVENOUS CONTINUOUS
Status: DISCONTINUED | OUTPATIENT
Start: 2019-10-14 | End: 2019-10-14 | Stop reason: HOSPADM

## 2019-10-14 RX ORDER — SODIUM CHLORIDE 0.9 % (FLUSH) 0.9 %
3-10 SYRINGE (ML) INJECTION AS NEEDED
Status: DISCONTINUED | OUTPATIENT
Start: 2019-10-14 | End: 2019-10-14 | Stop reason: HOSPADM

## 2019-10-14 RX ORDER — LEVOTHYROXINE SODIUM 137 UG/1
137 TABLET ORAL
Status: DISCONTINUED | OUTPATIENT
Start: 2019-10-15 | End: 2019-10-15 | Stop reason: HOSPADM

## 2019-10-14 RX ORDER — UREA 10 %
3 LOTION (ML) TOPICAL NIGHTLY PRN
Status: DISCONTINUED | OUTPATIENT
Start: 2019-10-14 | End: 2019-10-15 | Stop reason: HOSPADM

## 2019-10-14 RX ORDER — TRANEXAMIC ACID 100 MG/ML
INJECTION, SOLUTION INTRAVENOUS AS NEEDED
Status: DISCONTINUED | OUTPATIENT
Start: 2019-10-14 | End: 2019-10-14 | Stop reason: SURG

## 2019-10-14 RX ORDER — NALOXONE HCL 0.4 MG/ML
0.2 VIAL (ML) INJECTION AS NEEDED
Status: DISCONTINUED | OUTPATIENT
Start: 2019-10-14 | End: 2019-10-14 | Stop reason: HOSPADM

## 2019-10-14 RX ORDER — PREDNISONE 1 MG/1
5 TABLET ORAL DAILY
Status: DISCONTINUED | OUTPATIENT
Start: 2019-10-14 | End: 2019-10-15 | Stop reason: HOSPADM

## 2019-10-14 RX ORDER — METOPROLOL SUCCINATE 100 MG/1
100 TABLET, EXTENDED RELEASE ORAL DAILY
Status: DISCONTINUED | OUTPATIENT
Start: 2019-10-15 | End: 2019-10-15 | Stop reason: HOSPADM

## 2019-10-14 RX ORDER — ONDANSETRON 2 MG/ML
4 INJECTION INTRAMUSCULAR; INTRAVENOUS ONCE AS NEEDED
Status: DISCONTINUED | OUTPATIENT
Start: 2019-10-14 | End: 2019-10-14 | Stop reason: HOSPADM

## 2019-10-14 RX ORDER — LIDOCAINE HYDROCHLORIDE 10 MG/ML
0.5 INJECTION, SOLUTION EPIDURAL; INFILTRATION; INTRACAUDAL; PERINEURAL ONCE AS NEEDED
Status: DISCONTINUED | OUTPATIENT
Start: 2019-10-14 | End: 2019-10-14 | Stop reason: HOSPADM

## 2019-10-14 RX ORDER — FAMOTIDINE 10 MG/ML
20 INJECTION, SOLUTION INTRAVENOUS ONCE
Status: COMPLETED | OUTPATIENT
Start: 2019-10-14 | End: 2019-10-14

## 2019-10-14 RX ORDER — HYDRALAZINE HYDROCHLORIDE 20 MG/ML
5 INJECTION INTRAMUSCULAR; INTRAVENOUS
Status: DISCONTINUED | OUTPATIENT
Start: 2019-10-14 | End: 2019-10-14 | Stop reason: HOSPADM

## 2019-10-14 RX ORDER — MELOXICAM 15 MG/1
15 TABLET ORAL ONCE
Status: COMPLETED | OUTPATIENT
Start: 2019-10-14 | End: 2019-10-14

## 2019-10-14 RX ORDER — MAGNESIUM HYDROXIDE 1200 MG/15ML
LIQUID ORAL AS NEEDED
Status: DISCONTINUED | OUTPATIENT
Start: 2019-10-14 | End: 2019-10-14 | Stop reason: HOSPADM

## 2019-10-14 RX ORDER — ALPRAZOLAM 0.5 MG/1
0.5 TABLET ORAL DAILY
Status: DISCONTINUED | OUTPATIENT
Start: 2019-10-14 | End: 2019-10-15 | Stop reason: HOSPADM

## 2019-10-14 RX ORDER — HYDROMORPHONE HYDROCHLORIDE 1 MG/ML
0.5 INJECTION, SOLUTION INTRAMUSCULAR; INTRAVENOUS; SUBCUTANEOUS
Status: DISCONTINUED | OUTPATIENT
Start: 2019-10-14 | End: 2019-10-14 | Stop reason: HOSPADM

## 2019-10-14 RX ORDER — ACETAMINOPHEN 10 MG/ML
1000 INJECTION, SOLUTION INTRAVENOUS ONCE
Status: DISCONTINUED | OUTPATIENT
Start: 2019-10-14 | End: 2019-10-14 | Stop reason: HOSPADM

## 2019-10-14 RX ORDER — FLUOXETINE HYDROCHLORIDE 20 MG/1
60 CAPSULE ORAL NIGHTLY
Status: DISCONTINUED | OUTPATIENT
Start: 2019-10-14 | End: 2019-10-15 | Stop reason: HOSPADM

## 2019-10-14 RX ORDER — ONDANSETRON 2 MG/ML
4 INJECTION INTRAMUSCULAR; INTRAVENOUS EVERY 6 HOURS PRN
Status: DISCONTINUED | OUTPATIENT
Start: 2019-10-14 | End: 2019-10-15 | Stop reason: HOSPADM

## 2019-10-14 RX ORDER — FENTANYL CITRATE 50 UG/ML
50 INJECTION, SOLUTION INTRAMUSCULAR; INTRAVENOUS
Status: DISCONTINUED | OUTPATIENT
Start: 2019-10-14 | End: 2019-10-14 | Stop reason: HOSPADM

## 2019-10-14 RX ORDER — BUPIVACAINE HYDROCHLORIDE 2.5 MG/ML
INJECTION, SOLUTION EPIDURAL; INFILTRATION; INTRACAUDAL
Status: COMPLETED | OUTPATIENT
Start: 2019-10-14 | End: 2019-10-14

## 2019-10-14 RX ORDER — PROMETHAZINE HYDROCHLORIDE 25 MG/ML
6.25 INJECTION, SOLUTION INTRAMUSCULAR; INTRAVENOUS
Status: DISCONTINUED | OUTPATIENT
Start: 2019-10-14 | End: 2019-10-14 | Stop reason: HOSPADM

## 2019-10-14 RX ORDER — PROMETHAZINE HYDROCHLORIDE 25 MG/1
25 SUPPOSITORY RECTAL ONCE AS NEEDED
Status: DISCONTINUED | OUTPATIENT
Start: 2019-10-14 | End: 2019-10-14 | Stop reason: HOSPADM

## 2019-10-14 RX ORDER — PANTOPRAZOLE SODIUM 40 MG/1
40 TABLET, DELAYED RELEASE ORAL
Status: DISCONTINUED | OUTPATIENT
Start: 2019-10-15 | End: 2019-10-15 | Stop reason: HOSPADM

## 2019-10-14 RX ORDER — CEFAZOLIN SODIUM 2 G/100ML
2 INJECTION, SOLUTION INTRAVENOUS
Status: DISCONTINUED | OUTPATIENT
Start: 2019-10-14 | End: 2019-10-14 | Stop reason: HOSPADM

## 2019-10-14 RX ORDER — LIDOCAINE HYDROCHLORIDE 20 MG/ML
INJECTION, SOLUTION INFILTRATION; PERINEURAL AS NEEDED
Status: DISCONTINUED | OUTPATIENT
Start: 2019-10-14 | End: 2019-10-14 | Stop reason: SURG

## 2019-10-14 RX ORDER — ONDANSETRON 4 MG/1
4 TABLET, FILM COATED ORAL EVERY 6 HOURS PRN
Status: DISCONTINUED | OUTPATIENT
Start: 2019-10-14 | End: 2019-10-15 | Stop reason: HOSPADM

## 2019-10-14 RX ORDER — SODIUM CHLORIDE 0.9 % (FLUSH) 0.9 %
3 SYRINGE (ML) INJECTION EVERY 12 HOURS SCHEDULED
Status: DISCONTINUED | OUTPATIENT
Start: 2019-10-14 | End: 2019-10-14 | Stop reason: HOSPADM

## 2019-10-14 RX ORDER — LABETALOL HYDROCHLORIDE 5 MG/ML
5 INJECTION, SOLUTION INTRAVENOUS
Status: DISCONTINUED | OUTPATIENT
Start: 2019-10-14 | End: 2019-10-14 | Stop reason: HOSPADM

## 2019-10-14 RX ORDER — DIPHENHYDRAMINE HYDROCHLORIDE 50 MG/ML
12.5 INJECTION INTRAMUSCULAR; INTRAVENOUS
Status: DISCONTINUED | OUTPATIENT
Start: 2019-10-14 | End: 2019-10-14 | Stop reason: HOSPADM

## 2019-10-14 RX ORDER — DIPHENHYDRAMINE HCL 25 MG
25 CAPSULE ORAL
Status: DISCONTINUED | OUTPATIENT
Start: 2019-10-14 | End: 2019-10-14 | Stop reason: HOSPADM

## 2019-10-14 RX ORDER — ASPIRIN 325 MG
325 TABLET, DELAYED RELEASE (ENTERIC COATED) ORAL EVERY 12 HOURS SCHEDULED
Status: DISCONTINUED | OUTPATIENT
Start: 2019-10-15 | End: 2019-10-15 | Stop reason: HOSPADM

## 2019-10-14 RX ORDER — ACETAMINOPHEN 325 MG/1
325 TABLET ORAL EVERY 4 HOURS PRN
Status: DISCONTINUED | OUTPATIENT
Start: 2019-10-14 | End: 2019-10-15 | Stop reason: HOSPADM

## 2019-10-14 RX ORDER — FLUMAZENIL 0.1 MG/ML
0.2 INJECTION INTRAVENOUS AS NEEDED
Status: DISCONTINUED | OUTPATIENT
Start: 2019-10-14 | End: 2019-10-14 | Stop reason: HOSPADM

## 2019-10-14 RX ORDER — EPHEDRINE SULFATE 50 MG/ML
5 INJECTION, SOLUTION INTRAVENOUS ONCE AS NEEDED
Status: DISCONTINUED | OUTPATIENT
Start: 2019-10-14 | End: 2019-10-14 | Stop reason: HOSPADM

## 2019-10-14 RX ORDER — MORPHINE SULFATE 1 MG/ML
INJECTION, SOLUTION EPIDURAL; INTRATHECAL; INTRAVENOUS AS NEEDED
Status: DISCONTINUED | OUTPATIENT
Start: 2019-10-14 | End: 2019-10-14 | Stop reason: SURG

## 2019-10-14 RX ORDER — KETOROLAC TROMETHAMINE 30 MG/ML
30 INJECTION, SOLUTION INTRAMUSCULAR; INTRAVENOUS EVERY 8 HOURS
Status: DISCONTINUED | OUTPATIENT
Start: 2019-10-14 | End: 2019-10-15 | Stop reason: HOSPADM

## 2019-10-14 RX ORDER — FENTANYL CITRATE 50 UG/ML
INJECTION, SOLUTION INTRAMUSCULAR; INTRAVENOUS AS NEEDED
Status: DISCONTINUED | OUTPATIENT
Start: 2019-10-14 | End: 2019-10-14 | Stop reason: SURG

## 2019-10-14 RX ORDER — BACLOFEN 10 MG/1
10 TABLET ORAL 3 TIMES DAILY PRN
Status: DISCONTINUED | OUTPATIENT
Start: 2019-10-14 | End: 2019-10-15 | Stop reason: HOSPADM

## 2019-10-14 RX ORDER — HYDROCODONE BITARTRATE AND ACETAMINOPHEN 7.5; 325 MG/1; MG/1
2 TABLET ORAL EVERY 4 HOURS PRN
Status: DISCONTINUED | OUTPATIENT
Start: 2019-10-14 | End: 2019-10-15 | Stop reason: HOSPADM

## 2019-10-14 RX ORDER — MIDAZOLAM HYDROCHLORIDE 1 MG/ML
2 INJECTION INTRAMUSCULAR; INTRAVENOUS
Status: DISCONTINUED | OUTPATIENT
Start: 2019-10-14 | End: 2019-10-14 | Stop reason: HOSPADM

## 2019-10-14 RX ORDER — WOUND DRESSING ADHESIVE - LIQUID
LIQUID MISCELLANEOUS AS NEEDED
Status: DISCONTINUED | OUTPATIENT
Start: 2019-10-14 | End: 2019-10-14 | Stop reason: HOSPADM

## 2019-10-14 RX ORDER — FOLIC ACID 1 MG/1
1 TABLET ORAL DAILY
Status: DISCONTINUED | OUTPATIENT
Start: 2019-10-14 | End: 2019-10-15 | Stop reason: HOSPADM

## 2019-10-14 RX ORDER — HYDROCODONE BITARTRATE AND ACETAMINOPHEN 7.5; 325 MG/1; MG/1
1 TABLET ORAL ONCE AS NEEDED
Status: DISCONTINUED | OUTPATIENT
Start: 2019-10-14 | End: 2019-10-14 | Stop reason: HOSPADM

## 2019-10-14 RX ORDER — ONDANSETRON 2 MG/ML
INJECTION INTRAMUSCULAR; INTRAVENOUS AS NEEDED
Status: DISCONTINUED | OUTPATIENT
Start: 2019-10-14 | End: 2019-10-14 | Stop reason: SURG

## 2019-10-14 RX ORDER — ACETAMINOPHEN 325 MG/1
650 TABLET ORAL ONCE AS NEEDED
Status: DISCONTINUED | OUTPATIENT
Start: 2019-10-14 | End: 2019-10-14 | Stop reason: HOSPADM

## 2019-10-14 RX ORDER — PROMETHAZINE HYDROCHLORIDE 25 MG/1
25 TABLET ORAL ONCE AS NEEDED
Status: DISCONTINUED | OUTPATIENT
Start: 2019-10-14 | End: 2019-10-14 | Stop reason: HOSPADM

## 2019-10-14 RX ORDER — DOCUSATE SODIUM 100 MG/1
100 CAPSULE, LIQUID FILLED ORAL 2 TIMES DAILY
Status: DISCONTINUED | OUTPATIENT
Start: 2019-10-14 | End: 2019-10-15 | Stop reason: HOSPADM

## 2019-10-14 RX ORDER — HYDROCODONE BITARTRATE AND ACETAMINOPHEN 7.5; 325 MG/1; MG/1
1 TABLET ORAL EVERY 4 HOURS PRN
Status: DISCONTINUED | OUTPATIENT
Start: 2019-10-14 | End: 2019-10-15 | Stop reason: HOSPADM

## 2019-10-14 RX ORDER — TRIAMTERENE AND HYDROCHLOROTHIAZIDE 37.5; 25 MG/1; MG/1
1 TABLET ORAL DAILY
Status: DISCONTINUED | OUTPATIENT
Start: 2019-10-14 | End: 2019-10-15 | Stop reason: HOSPADM

## 2019-10-14 RX ORDER — KETOROLAC TROMETHAMINE 30 MG/ML
INJECTION, SOLUTION INTRAMUSCULAR; INTRAVENOUS AS NEEDED
Status: DISCONTINUED | OUTPATIENT
Start: 2019-10-14 | End: 2019-10-14 | Stop reason: SURG

## 2019-10-14 RX ORDER — PREGABALIN 75 MG/1
150 CAPSULE ORAL ONCE
Status: COMPLETED | OUTPATIENT
Start: 2019-10-14 | End: 2019-10-14

## 2019-10-14 RX ORDER — MIDAZOLAM HYDROCHLORIDE 1 MG/ML
1 INJECTION INTRAMUSCULAR; INTRAVENOUS
Status: DISCONTINUED | OUTPATIENT
Start: 2019-10-14 | End: 2019-10-14 | Stop reason: HOSPADM

## 2019-10-14 RX ORDER — MELOXICAM 15 MG/1
15 TABLET ORAL DAILY
Status: DISCONTINUED | OUTPATIENT
Start: 2019-10-14 | End: 2019-10-15 | Stop reason: HOSPADM

## 2019-10-14 RX ORDER — PROPOFOL 10 MG/ML
VIAL (ML) INTRAVENOUS AS NEEDED
Status: DISCONTINUED | OUTPATIENT
Start: 2019-10-14 | End: 2019-10-14 | Stop reason: SURG

## 2019-10-14 RX ORDER — PROMETHAZINE HYDROCHLORIDE 25 MG/ML
12.5 INJECTION, SOLUTION INTRAMUSCULAR; INTRAVENOUS ONCE AS NEEDED
Status: DISCONTINUED | OUTPATIENT
Start: 2019-10-14 | End: 2019-10-14 | Stop reason: HOSPADM

## 2019-10-14 RX ORDER — CEFAZOLIN SODIUM 2 G/100ML
INJECTION, SOLUTION INTRAVENOUS AS NEEDED
Status: DISCONTINUED | OUTPATIENT
Start: 2019-10-14 | End: 2019-10-14 | Stop reason: SURG

## 2019-10-14 RX ORDER — OXYCODONE AND ACETAMINOPHEN 7.5; 325 MG/1; MG/1
1 TABLET ORAL ONCE AS NEEDED
Status: DISCONTINUED | OUTPATIENT
Start: 2019-10-14 | End: 2019-10-14 | Stop reason: HOSPADM

## 2019-10-14 RX ADMIN — ONDANSETRON 4 MG: 2 INJECTION INTRAMUSCULAR; INTRAVENOUS at 15:20

## 2019-10-14 RX ADMIN — KETOROLAC TROMETHAMINE 30 MG: 30 INJECTION, SOLUTION INTRAMUSCULAR; INTRAVENOUS at 15:20

## 2019-10-14 RX ADMIN — MORPHINE SULFATE 2 MG: 1 INJECTION EPIDURAL; INTRATHECAL; INTRAVENOUS at 15:50

## 2019-10-14 RX ADMIN — MELOXICAM 15 MG: 15 TABLET ORAL at 13:34

## 2019-10-14 RX ADMIN — FENTANYL CITRATE 25 MCG: 50 INJECTION INTRAMUSCULAR; INTRAVENOUS at 16:15

## 2019-10-14 RX ADMIN — FLUOXETINE HYDROCHLORIDE 60 MG: 20 CAPSULE ORAL at 22:49

## 2019-10-14 RX ADMIN — MORPHINE SULFATE 2 MG: 1 INJECTION EPIDURAL; INTRATHECAL; INTRAVENOUS at 15:45

## 2019-10-14 RX ADMIN — BUPIVACAINE HYDROCHLORIDE 30 ML: 2.5 INJECTION, SOLUTION EPIDURAL; INFILTRATION; INTRACAUDAL; PERINEURAL at 14:18

## 2019-10-14 RX ADMIN — DOCUSATE SODIUM 100 MG: 100 CAPSULE, LIQUID FILLED ORAL at 22:49

## 2019-10-14 RX ADMIN — FOLIC ACID 1 MG: 1 TABLET ORAL at 22:50

## 2019-10-14 RX ADMIN — PROPOFOL 200 MG: 10 INJECTION, EMULSION INTRAVENOUS at 15:20

## 2019-10-14 RX ADMIN — ALPRAZOLAM 0.5 MG: 0.5 TABLET ORAL at 22:50

## 2019-10-14 RX ADMIN — FENTANYL CITRATE 25 MCG: 50 INJECTION INTRAMUSCULAR; INTRAVENOUS at 16:32

## 2019-10-14 RX ADMIN — HYDROCORTISONE SODIUM SUCCINATE 100 MG: 100 INJECTION, POWDER, FOR SOLUTION INTRAMUSCULAR; INTRAVENOUS at 14:00

## 2019-10-14 RX ADMIN — POLYETHYLENE GLYCOL 3350 17 G: 17 POWDER, FOR SOLUTION ORAL at 22:48

## 2019-10-14 RX ADMIN — PREGABALIN 150 MG: 75 CAPSULE ORAL at 13:34

## 2019-10-14 RX ADMIN — LIDOCAINE HYDROCHLORIDE 100 MG: 20 INJECTION, SOLUTION INFILTRATION; PERINEURAL at 15:20

## 2019-10-14 RX ADMIN — FAMOTIDINE 20 MG: 10 INJECTION INTRAVENOUS at 14:00

## 2019-10-14 RX ADMIN — TRIAMTERENE AND HYDROCHLOROTHIAZIDE 1 TABLET: 37.5; 25 TABLET ORAL at 22:50

## 2019-10-14 RX ADMIN — MELOXICAM 15 MG: 15 TABLET ORAL at 22:49

## 2019-10-14 RX ADMIN — SODIUM CHLORIDE, POTASSIUM CHLORIDE, SODIUM LACTATE AND CALCIUM CHLORIDE 9 ML/HR: 600; 310; 30; 20 INJECTION, SOLUTION INTRAVENOUS at 14:02

## 2019-10-14 RX ADMIN — TRANEXAMIC ACID 1000 MG: 100 INJECTION, SOLUTION INTRAVENOUS at 16:37

## 2019-10-14 RX ADMIN — MIDAZOLAM 1 MG: 1 INJECTION INTRAMUSCULAR; INTRAVENOUS at 14:09

## 2019-10-14 RX ADMIN — PREDNISONE 5 MG: 5 TABLET ORAL at 22:50

## 2019-10-14 RX ADMIN — MIDAZOLAM 1 MG: 1 INJECTION INTRAMUSCULAR; INTRAVENOUS at 14:10

## 2019-10-14 RX ADMIN — VANCOMYCIN HYDROCHLORIDE 2000 MG: 10 INJECTION, POWDER, LYOPHILIZED, FOR SOLUTION INTRAVENOUS at 13:24

## 2019-10-14 RX ADMIN — KETOROLAC TROMETHAMINE 30 MG: 30 INJECTION, SOLUTION INTRAMUSCULAR at 23:30

## 2019-10-14 RX ADMIN — MUPIROCIN 1 APPLICATION: 20 OINTMENT TOPICAL at 22:48

## 2019-10-14 RX ADMIN — FENTANYL CITRATE 50 MCG: 50 INJECTION, SOLUTION INTRAMUSCULAR; INTRAVENOUS at 14:06

## 2019-10-14 RX ADMIN — FENTANYL CITRATE 25 MCG: 50 INJECTION INTRAMUSCULAR; INTRAVENOUS at 16:51

## 2019-10-14 RX ADMIN — MORPHINE SULFATE 2 MG: 1 INJECTION EPIDURAL; INTRATHECAL; INTRAVENOUS at 15:40

## 2019-10-14 RX ADMIN — MORPHINE SULFATE 2 MG: 1 INJECTION EPIDURAL; INTRATHECAL; INTRAVENOUS at 15:55

## 2019-10-14 RX ADMIN — CEFAZOLIN SODIUM 2 G: 2 INJECTION, SOLUTION INTRAVENOUS at 15:20

## 2019-10-14 NOTE — ANESTHESIA PROCEDURE NOTES
Airway  Urgency: elective    Date/Time: 10/14/2019 3:36 PM  Airway not difficult    General Information and Staff    Patient location during procedure: OR  Anesthesiologist: Carroll Leslie MD    Indications and Patient Condition  Indications for airway management: airway protection    Preoxygenated: yes  Mask difficulty assessment: 1 - vent by mask    Final Airway Details  Final airway type: supraglottic airway      Successful airway: classic  Size 4    Number of attempts at approach: 1

## 2019-10-14 NOTE — PERIOPERATIVE NURSING NOTE
Dr. Correia notified of WBC 11.82 on 10/3/19 and scab Rt lower extremity.  Ok for surgery today per Md.

## 2019-10-14 NOTE — ANESTHESIA POSTPROCEDURE EVALUATION
"Patient: Pratibha Pascal    Procedure Summary     Date:  10/14/19 Room / Location:  [facility] OR [address] [place] [address] MAIN OR    Anesthesia Start:  1514 Anesthesia Stop:  1718    Procedure:  TOTAL KNEE ARTHROPLASTY (Left Knee) Diagnosis:       Primary osteoarthritis of left knee      (Primary osteoarthritis of left knee [M17.12])    Surgeon:  Nabor Correia MD Provider:  Carroll Leslie MD    Anesthesia Type:  general with block ASA Status:  3          Anesthesia Type: general with block  Last vitals  BP   119/66 (10/14/19 1750)   Temp   36.9 °C (98.5 °F) (10/14/19 1713)   Pulse   85 (10/14/19 1750)   Resp   16 (10/14/19 1750)     SpO2   93 % (10/14/19 1750)     Post Anesthesia Care and Evaluation    Patient location during evaluation: bedside  Patient participation: complete - patient participated  Level of consciousness: awake and alert  Pain management: adequate  Airway patency: patent  Anesthetic complications: No anesthetic complications    Cardiovascular status: acceptable  Respiratory status: acceptable  Hydration status: acceptable    Comments: /66   Pulse 85   Temp 36.9 °C (98.5 °F) (Oral)   Resp 16   Ht 182.9 cm (72\")   Wt (!) 137 kg (301 lb 8 oz)   SpO2 93%   BMI 40.89 kg/m²           "

## 2019-10-14 NOTE — OP NOTE
Name: Pratibha Pascal  YOB: 1960    DATE OF SURGERY: 10/14/2019    PREOPERATIVE DIAGNOSIS: Left knee end-stage osteoarthritis    POSTOPERATIVE DIAGNOSIS: Left knee end-stage osteoarthritis    PROCEDURE PERFORMED: Left total knee replacement    SURGEON: Nabor Correia M.D.    ASSISTANT: MARCI BHANDARI    IMPLANTS: [employer]:     Implant Name Type Inv. Item Serial No.  Lot No. LRB No. Used   CMT BONE PALACOS R HI/VISC 1X40 - [id number] Implant CMT BONE PALACOS R HI/VISC 1X40  [facility] [id number] Left 2   BASE TIB/KN GEN2 NONPOR TI SZ6 LT - [id number] Implant BASE TIB/KN GEN2 NONPOR TI SZ6 LT  [employer] [id number] Left 1   COMP FEM LEGION OXINIUM PS SZ6 LT - [id number] Implant COMP FEM LEGION OXINIUM PS SZ6 LT  [employer] [id number] Left 1   PAT GEN2 BICONVEX 15G02OY - [id number] Implant PAT GEN2 BICONVEX [id number]  [employer] [id number] Left 1   INSRT TIB FLX HI GEN2 PS SZ5TO6 11MM - [id number] Implant INSRT TIB FLX HI GEN2 PS SZ5TO6 11MM  [employer] [id number] Left 1   TOTL KN ROMINA SMITH NEPHEW - [id number] Implant TOTL KN ROMINA PASCAL NEPHEW  [employer]  Left 1       Estimated Blood Loss: 200cc  Specimens : none  Complications: none    DESCRIPTION OF PROCEDURE: The patient was taken to the operating room and placed in the supine position. A sequential compression device was carefully placed on the non-operative leg. Preoperative antibiotics were administered. Surgical time out was performed. After adequate induction of anesthesia, the leg was prepped and draped in the usual sterile fashion, exsanguinated with an Esmarch bandage and the tourniquet inflated to 250 mmHg. A midline incision was performed followed by a medial parapatellar arthrotomy. The patella was subluxed laterally.  A portion of the fat pad, ACL, and anterior horns of the meniscus were excised. The drill hole was placed in the distal femur and the canal was the irrigated and  suctioned. The IM anson was placed and a 5 degree distal valgus cut was performed on the femur. The femur was then sized with a sizing guide. The femoral cutting block was placed and all femoral cuts were performed. The proximal tibia was exposed. We used the extramedullary tibial cutting guide set for removal of 9mm of bone off the high side. The tibial cut was performed. The posterior horns of the menisci were excised. The posterior osteophytes were removed. Flexion extension blocks were then used to balance the knee. The tibial cut surface was then sized with the sizing templates and the tibial and femoral trial were then placed. The knee was placed in full extension and then the tibial tray rotation was then matched to the femoral rotation and marked.    Attention was then placed to the patella. The patella was noted to track centrally through range of motion. The patella was then sized with the trials. The thickness of the patella was then measured. The patella was resurfaced and the surrounding osteophytes were removed. The preoperative thickness was reproduced. The patella tracked centrally through range of motion.   At this point all trial components were removed, the knee was copiously irrigated with pulsed lavage, and the knee was injected with anesthetic cocktail solution. The cut surfaces were then dried with clean lap sponges, and the components were cemented tibia, followed by femur, then patella. The knee was held in full extension and all excess cement was removed. The knee was held still until the cement had completely hardened. We then placed the trial polyethylene spacer which resulted in full extension and excellent flexion-extension balance. We placed the final polyethylene spacer.   The knee was then copiously irrigated. The tourniquet was then released. There was excellent hemostasis. We placed a one-eighth inch Hemovac drain. We closed the knee in multiple layers in standard fashion. Sterile  dressing were applied. At the end of the case, the sponge and needle counts were reported as being correct. There were no known complications. The patient was then transported to the recovery room.      Nabor Correia M.D.

## 2019-10-14 NOTE — ANESTHESIA PROCEDURE NOTES
Peripheral Block      Patient location during procedure: holding area  Start time: 10/14/2019 2:08 PM  Stop time: 10/14/2019 2:15 PM  Reason for block: at surgeon's request and post-op pain management  Performed by  Anesthesiologist: Nadia Smith MD  Preanesthetic Checklist  Completed: patient identified, site marked, surgical consent, pre-op evaluation, timeout performed, IV checked, risks and benefits discussed and monitors and equipment checked  Prep:  Pt Position: supine  Prep: ChloraPrep  Patient monitoring: continuous pulse oximetry  Procedure  Sedation:yes  Performed under: PNB  Guidance:ultrasound guided  ULTRASOUND INTERPRETATION.  Using ultrasound guidance a 20 G gauge needle was placed in close proximity to the femoral nerve, at which point, under ultrasound guidance anesthetic was injected in the area of the nerve and spread of the anesthesia was seen on ultrasound in close proximity thereto.  There were no abnormalities seen on ultrasound; a digital image was taken; and the patient tolerated the procedure with no complications. Images:still images obtained, printed/placed on chart    Laterality:left  Block Type:adductor canal block  Injection Technique:single-shot  Needle Type:echogenic  Needle Gauge:20 G    Catheter Size:20 G    Medications Used: bupivacaine PF (MARCAINE) 0.25 % injection, 30 mL      Post Assessment  Injection Assessment: negative aspiration for heme, no paresthesia on injection and incremental injection  Patient Tolerance:comfortable throughout block  Complications:no

## 2019-10-14 NOTE — ANESTHESIA PREPROCEDURE EVALUATION
Anesthesia Evaluation     NPO Solid Status: > 8 hours             Airway   Mallampati: III  TM distance: >3 FB  Neck ROM: full  No difficulty expected  Dental - normal exam     Pulmonary - normal exam   (+) sleep apnea,   Cardiovascular - normal exam    Patient on routine beta blocker    (+) hypertension 2 medications or greater, past MI , CAD, cardiac stents more than 12 months ago dysrhythmias Atrial Fib,       Neuro/Psych  (+) psychiatric history Depression,     GI/Hepatic/Renal/Endo    (+) obesity, morbid obesity,  diabetes mellitus type 2, hypothyroidism,     Musculoskeletal     (+) myalgias, neck pain,   Abdominal    Substance History      OB/GYN          Other   (+) arthritis                     Anesthesia Plan    ASA 3     general with block     Anesthetic plan, all risks, benefits, and alternatives have been provided, discussed and informed consent has been obtained with: patient.

## 2019-10-15 VITALS
HEART RATE: 90 BPM | RESPIRATION RATE: 16 BRPM | HEIGHT: 72 IN | TEMPERATURE: 97.6 F | SYSTOLIC BLOOD PRESSURE: 134 MMHG | BODY MASS INDEX: 39.68 KG/M2 | DIASTOLIC BLOOD PRESSURE: 70 MMHG | OXYGEN SATURATION: 93 % | WEIGHT: 293 LBS

## 2019-10-15 LAB
HCT VFR BLD AUTO: 36.1 % (ref 34–46.6)
HGB BLD-MCNC: 11.6 G/DL (ref 12–15.9)

## 2019-10-15 PROCEDURE — 85018 HEMOGLOBIN: CPT | Performed by: ORTHOPAEDIC SURGERY

## 2019-10-15 PROCEDURE — 25010000002 KETOROLAC TROMETHAMINE PER 15 MG: Performed by: ORTHOPAEDIC SURGERY

## 2019-10-15 PROCEDURE — 97161 PT EVAL LOW COMPLEX 20 MIN: CPT

## 2019-10-15 PROCEDURE — 97110 THERAPEUTIC EXERCISES: CPT

## 2019-10-15 PROCEDURE — 63710000001 PREDNISONE PER 5 MG: Performed by: ORTHOPAEDIC SURGERY

## 2019-10-15 PROCEDURE — 25010000002 VANCOMYCIN: Performed by: ORTHOPAEDIC SURGERY

## 2019-10-15 PROCEDURE — 85014 HEMATOCRIT: CPT | Performed by: ORTHOPAEDIC SURGERY

## 2019-10-15 RX ORDER — HYDROCODONE BITARTRATE AND ACETAMINOPHEN 7.5; 325 MG/1; MG/1
TABLET ORAL
Qty: 84 TABLET | Refills: 0 | Status: SHIPPED | OUTPATIENT
Start: 2019-10-15 | End: 2019-11-26

## 2019-10-15 RX ORDER — DOCUSATE SODIUM 100 MG/1
100 CAPSULE, LIQUID FILLED ORAL 2 TIMES DAILY
Qty: 27 CAPSULE | Refills: 0 | Status: SHIPPED | OUTPATIENT
Start: 2019-10-15 | End: 2019-10-29

## 2019-10-15 RX ORDER — MELOXICAM 15 MG/1
15 TABLET ORAL DAILY
Qty: 29 TABLET | Refills: 0 | Status: SHIPPED | OUTPATIENT
Start: 2019-10-15 | End: 2019-11-13

## 2019-10-15 RX ORDER — PANTOPRAZOLE SODIUM 40 MG/1
40 TABLET, DELAYED RELEASE ORAL DAILY
Qty: 14 TABLET | Refills: 0 | Status: SHIPPED | OUTPATIENT
Start: 2019-10-15 | End: 2019-10-29

## 2019-10-15 RX ORDER — ONDANSETRON 4 MG/1
4 TABLET, FILM COATED ORAL EVERY 6 HOURS PRN
Qty: 10 TABLET | Refills: 0 | Status: SHIPPED | OUTPATIENT
Start: 2019-10-15 | End: 2019-11-26

## 2019-10-15 RX ORDER — POLYETHYLENE GLYCOL 3350 17 G/17G
17 POWDER, FOR SOLUTION ORAL 2 TIMES DAILY
Qty: 510 G | Refills: 0 | Status: SHIPPED | OUTPATIENT
Start: 2019-10-15 | End: 2019-10-30

## 2019-10-15 RX ADMIN — ALPRAZOLAM 0.5 MG: 0.5 TABLET ORAL at 08:19

## 2019-10-15 RX ADMIN — METOPROLOL SUCCINATE 100 MG: 100 TABLET, FILM COATED, EXTENDED RELEASE ORAL at 08:19

## 2019-10-15 RX ADMIN — ASPIRIN 325 MG: 325 TABLET, COATED ORAL at 08:19

## 2019-10-15 RX ADMIN — DOCUSATE SODIUM 100 MG: 100 CAPSULE, LIQUID FILLED ORAL at 08:19

## 2019-10-15 RX ADMIN — MELOXICAM 15 MG: 15 TABLET ORAL at 08:19

## 2019-10-15 RX ADMIN — PANTOPRAZOLE SODIUM 40 MG: 40 TABLET, DELAYED RELEASE ORAL at 06:08

## 2019-10-15 RX ADMIN — FOLIC ACID 1 MG: 1 TABLET ORAL at 08:19

## 2019-10-15 RX ADMIN — HYDROCODONE BITARTRATE AND ACETAMINOPHEN 1 TABLET: 7.5; 325 TABLET ORAL at 06:08

## 2019-10-15 RX ADMIN — LEVOTHYROXINE SODIUM 137 MCG: 137 TABLET ORAL at 06:08

## 2019-10-15 RX ADMIN — PREDNISONE 5 MG: 5 TABLET ORAL at 08:19

## 2019-10-15 RX ADMIN — VANCOMYCIN HYDROCHLORIDE 2000 MG: 1 INJECTION, POWDER, LYOPHILIZED, FOR SOLUTION INTRAVENOUS at 01:00

## 2019-10-15 RX ADMIN — TRIAMTERENE AND HYDROCHLOROTHIAZIDE 1 TABLET: 37.5; 25 TABLET ORAL at 08:19

## 2019-10-15 RX ADMIN — HYDROCODONE BITARTRATE AND ACETAMINOPHEN 2 TABLET: 7.5; 325 TABLET ORAL at 14:06

## 2019-10-15 RX ADMIN — LOSARTAN POTASSIUM 50 MG: 50 TABLET, FILM COATED ORAL at 08:19

## 2019-10-15 RX ADMIN — KETOROLAC TROMETHAMINE 30 MG: 30 INJECTION, SOLUTION INTRAMUSCULAR at 08:18

## 2019-10-15 NOTE — PLAN OF CARE
Problem: Patient Care Overview  Goal: Plan of Care Review  Outcome: Ongoing (interventions implemented as appropriate)   10/15/19 1140   Coping/Psychosocial   Plan of Care Reviewed With patient   Plan of Care Review   Progress improving   OTHER   Outcome Summary 59/F POD#1 left TKA. ALOx4, RA, lungs clear, BS hypoactive but passing gas, voiding independently. Up x1 BRP w/ walker, gait belt. 2+ pedal pulses noted bilaterally, dressing CDI, no c/o numbness/tingling in operative extremity. Pain well-controlled with PO pain meds only. D/C home with home health today.

## 2019-10-15 NOTE — PROGRESS NOTES
Discharge Planning Assessment  [facility]     Patient Name: Pratibha Pascal  MRN: [id number]  Today's Date: 10/15/2019    Admit Date: 10/14/2019    Discharge Needs Assessment     Row Name 10/15/19 1007       Living Environment    Lives With  child(jorge alberto), adult    Current Living Arrangements  home/apartment/condo    Potentially Unsafe Housing Conditions  other (see comments) no concerns     Primary Care Provided by  self    Provides Primary Care For  no one    Family Caregiver if Needed  child(jorge alberto), adult    Quality of Family Relationships  helpful;involved;supportive    Able to Return to Prior Arrangements  yes       Resource/Environmental Concerns    Resource/Environmental Concerns  none    Transportation Concerns  car, none       Transition Planning    Patient/Family Anticipates Transition to  home with family    Patient/Family Anticipated Services at Transition  home health care    Transportation Anticipated  family or friend will provide       Discharge Needs Assessment    Readmission Within the Last 30 Days  no previous admission in last 30 days    Concerns to be Addressed  no discharge needs identified;denies needs/concerns at this time    Equipment Currently Used at Home  cane, straight;walker, rolling    Anticipated Changes Related to Illness  none    Outpatient/Agency/Support Group Needs  homecare agency    Discharge Facility/Level of Care Needs  home with home health        Discharge Plan     Row Name 10/15/19 1007       Plan    Plan  Home with [facility]     Patient/Family in Agreement with Plan  yes    Plan Comments  CCP met with patient at bedside. CCP role explained and discharge planning discussed. Face sheet verified. Patient's PCP is Dr. Wu. Patient lives with her 29 year old daughter, with 4 steps to the entrance of her single story home. Patient has not used home health or been to SNF but requested to use [facility]. Patient agreeable to meds to beds. Patient plans to return home with daughter and [facility].  Lucila Smallwood CSW           Destination      No service coordination in this encounter.      Durable Medical Equipment      No service coordination in this encounter.      Dialysis/Infusion      No service coordination in this encounter.      Home Medical Care      Service Provider Request Status Selected Services Address Phone Number Fax Number    [facility] Pending - Request Sent N/A [address] [address] [phone]      Therapy      No service coordination in this encounter.      Community Resources      No service coordination in this encounter.        Expected Discharge Date and Time     Expected Discharge Date Expected Discharge Time    Oct 15, 2019         Demographic Summary     Row Name 10/15/19 1006       General Information    Admission Type  inpatient    Referral Source  admission list    Reason for Consult  discharge planning    Preferred Language  English     Used During This Interaction  no        Functional Status     Row Name 10/15/19 1007       Functional Status    Usual Activity Tolerance  good    Current Activity Tolerance  good       Functional Status, IADL    Medications  independent    Meal Preparation  independent    Housekeeping  independent    Laundry  independent    Shopping  independent       Mental Status    General Appearance WDL  WDL       Mental Status Summary    Recent Changes in Mental Status/Cognitive Functioning  no changes        Psychosocial    No documentation.       Abuse/Neglect    No documentation.       Legal    No documentation.       Substance Abuse    No documentation.       Patient Forms    No documentation.           EVANGELINA Alvarenga

## 2019-10-15 NOTE — PROGRESS NOTES
Case Management Discharge Note    [facility]     Destination      No service has been selected for the patient.      Durable Medical Equipment      No service has been selected for the patient.      Dialysis/Infusion      No service has been selected for the patient.      Home Medical Care - Selection Complete      Service Provider Request Status Selected Services Address Phone Number Fax Number    [facility] Selected Home Health Services [address] [address] [phone]      Therapy      No service has been selected for the patient.      Community Resources      No service has been selected for the patient.             Final Discharge Disposition Code: 06 - home with home health care

## 2019-10-15 NOTE — PLAN OF CARE
Problem: Patient Care Overview  Goal: Plan of Care Review  Outcome: Ongoing (interventions implemented as appropriate)   10/15/19 4569   Coping/Psychosocial   Plan of Care Reviewed With patient   Plan of Care Review   Progress improving   OTHER   Outcome Summary Pt is a post op day 1 of a left total knee. Dressing is clean dry and intact. Pt continues with the YANDEL, HV and ACE wrap. Pt has ambulated to the bathroom with an assist x1. Voidind function intact. Pt has minimal complaints of pain. Pt educated on the importance of monitoring blood pressures related to comorbidity of HTN. Pt voiced understanding. Pt is resting at this time, will continue to monitor.     Goal: Individualization and Mutuality  Outcome: Ongoing (interventions implemented as appropriate)    Goal: Discharge Needs Assessment  Outcome: Ongoing (interventions implemented as appropriate)    Goal: Interprofessional Rounds/Family Conf  Outcome: Ongoing (interventions implemented as appropriate)      Problem: Pain, Chronic (Adult)  Goal: Identify Related Risk Factors and Signs and Symptoms  Outcome: Ongoing (interventions implemented as appropriate)    Goal: Acceptable Pain/Comfort Level and Functional Ability  Outcome: Ongoing (interventions implemented as appropriate)      Problem: Fall Risk (Adult)  Goal: Identify Related Risk Factors and Signs and Symptoms  Outcome: Ongoing (interventions implemented as appropriate)    Goal: Absence of Fall  Outcome: Ongoing (interventions implemented as appropriate)

## 2019-10-15 NOTE — THERAPY EVALUATION
Patient Name: Pratibha Pascal  : 1960    MRN: [id number]                              Today's Date: 10/15/2019       Admit Date: 10/14/2019    Visit Dx:     ICD-10-CM ICD-9-CM   1. Primary osteoarthritis of left knee M17.12 715.16     Patient Active Problem List   Diagnosis   • CAD (coronary artery disease)   • Type 2 diabetes mellitus, uncontrolled (CMS/[facility])   • Mixed hyperlipidemia   • Benign essential hypertension   • Hypothyroidism   • Major depressive disorder, recurrent, in full remission (CMS/[facility])   • Rheumatoid arthritis (CMS/[facility])   • Tobacco abuse   • Cervicalgia   • Diabetes mellitus (CMS/[facility])   • Dyslipidemia   • BP (high blood pressure)   • Obstructive apnea   • AF (paroxysmal atrial fibrillation) (CMS/[facility])   • Proteinuria   • Burn   • Cellulitis of left leg   • Diabetes mellitus with neurological manifestation (CMS/[facility])   • MSSA (methicillin susceptible Staphylococcus aureus) infection   • Diabetic ulcer of left foot associated with diabetes mellitus due to underlying condition (CMS/[facility])   • Morbid obesity (CMS/[facility])   • Primary insomnia   • Anxiety   • Immunodeficiency, unspecified (CMS/[facility])   • Long term current use of non-steroidal anti-inflammatories (NSAID)   • Personal history of immunosupression therapy   • Primary generalized (osteo)arthritis   • Raised antibody titer   • Rheumatoid arthritis of multiple sites without organ or system involvement with positive rheumatoid factor (CMS/[facility])   • Other long term (current) drug therapy   • Uncontrolled type 2 diabetes mellitus with hyperglycemia (CMS/[facility])   • Vitamin D deficiency, unspecified   • Hypercalcemia   • Primary osteoarthritis of left knee   • Abnormal nuclear stress test   • OA (osteoarthritis) of knee     Past Medical History:   Diagnosis Date   • Allergy to mold    • Anxiety    • Arthritis    • Benign essential hypertension    • CAD (coronary artery disease)    • Cataract    • Coronary artery disease    • Depression    • Diabetes  mellitus (CMS/[facility])    • Fibromyalgia, primary    • H/O bone density study unclear   • H/O complete eye exam 2 -3 years   • Headache    • History of myocardial infarction    • History of staph infection     HISTORY OF MSSA IN LEFT FOOT FROM DIABETIC ULCER HEALED   • HL (hearing loss)     RIGHT EAR  SEVERE HEARING LOSS   • HLD (hyperlipidemia)    • Hyperlipidemia    • Hypertension    • Hypothyroidism    • Hypothyroidism    • Left knee pain    • Major depressive disorder, recurrent, in full remission (CMS/[facility])    • Obesity    • BRANNON (obstructive sleep apnea)     USE CPAP   • Rheumatoid arthritis (CMS/[facility])    • Seasonal allergies    • Type 2 diabetes mellitus (CMS/[facility])      Past Surgical History:   Procedure Laterality Date   • BACK SURGERY  10/1995    also 2/02; L5-S1; Dr. Carroll Avitia   • CARDIAC CATHETERIZATION     • CARDIAC CATHETERIZATION N/A 9/3/2019    Procedure: Coronary angiography  ;  Surgeon: Magy Rivera MD;  Location:  [facility] CATH INVASIVE LOCATION;  Service: Cardiovascular   • CARDIAC CATHETERIZATION N/A 9/3/2019    Procedure: Left Heart Cath;  Surgeon: Magy Rivera MD;  [facility];  Service: Cardiovascular   • CARDIAC CATHETERIZATION N/A 9/3/2019    Procedure: Left ventriculography;  Surgeon: Magy Rivera MD;  Location:  [facility] CATH INVASIVE LOCATION;  Service: Cardiovascular   • COLONOSCOPY     • CORONARY STENT PLACEMENT  06/2014   • KNEE ARTHROSCOPY Right 2010   • MAMMO BILATERAL  due    delcines   • PAP SMEAR  due   • TONSILLECTOMY      age 26   • TOTAL KNEE ARTHROPLASTY Left 10/14/2019    Procedure: TOTAL KNEE ARTHROPLASTY;  Surgeon: Nabor Correia MD;  Location: [facility] MAIN OR;  Service: Orthopedics     General Information     Row Name 10/15/19 1004          PT Evaluation Time/Intention    Document Type  evaluation  -MD     Mode of Treatment  physical therapy  -MD     Row Name 10/15/19 1004          General Information    Patient Profile Reviewed?  yes  -MD      Prior Level of Function  independent:  -MD     Existing Precautions/Restrictions  fall  -MD     Barriers to Rehab  none identified  -MD     Row Name 10/15/19 1004          Home Main Entrance    Number of Stairs, Main Entrance  four  -MD     Stair Railings, Main Entrance  railings on both sides of stairs  -MD     Row Name 10/15/19 1004          Stairs Within Home, Primary    Number of Stairs, Within Home, Primary  none  -MD     Row Name 10/15/19 1004          Cognitive Assessment/Intervention- PT/OT    Orientation Status (Cognition)  oriented x 3  -MD       User Key  (r) = Recorded By, (t) = Taken By, (c) = Cosigned By    Initials Name Provider Type    Dafne Ortiz, PT Physical Therapist        Mobility     Row Name 10/15/19 1032          Bed Mobility Assessment/Treatment    Bed Mobility Assessment/Treatment  supine-sit  -MD     Supine-Sit [place] (Bed Mobility)  conditional independence  -MD     Assistive Device (Bed Mobility)  bed rails;head of bed elevated  -MD     Row Name 10/15/19 1032          Sit-Stand Transfer    Sit-Stand [place] (Transfers)  contact guard  -MD     Assistive Device (Sit-Stand Transfers)  walker, front-wheeled  -MD     Row Name 10/15/19 1032          Gait/Stairs Assessment/Training    [place] Level (Gait)  contact guard;stand by assist  -MD     Assistive Device (Gait)  walker, front-wheeled  -MD     Distance in Feet (Gait)  120  -MD     Pattern (Gait)  step-through  -MD     Deviations/Abnormal Patterns (Gait)  antalgic;gait speed decreased  -MD     [place] Level (Stairs)  verbal cues;contact guard  -MD     Handrail Location (Stairs)  both sides  -MD     Number of Steps (Stairs)  4  -MD     Ascending Technique (Stairs)  step-to-step  -MD     Descending Technique (Stairs)  step-to-step  -MD     Row Name 10/15/19 1032          Mobility Assessment/Intervention    Extremity Weight-bearing Status  left lower extremity  -MD     Left Lower Extremity (Weight-bearing Status)   weight-bearing as tolerated (WBAT)  -MD       User Key  (r) = Recorded By, (t) = Taken By, (c) = Cosigned By    Initials Name Provider Type    Dafne Ortiz, PT Physical Therapist        Obj/Interventions     Row Name 10/15/19 1005          General ROM    GENERAL ROM COMMENTS  AROM WFL except L knee: 5-60 grossly  -MD     Row Name 10/15/19 1005          MMT (Manual Muscle Testing)    General MMT Comments  WFL except L knee  -MD     Row Name 10/15/19 1005          Therapeutic Exercise    Comment (Therapeutic Exercise)  TKA protocol x10 reps  -MD       User Key  (r) = Recorded By, (t) = Taken By, (c) = Cosigned By    Initials Name Provider Type    Dafne Ortiz, PT Physical Therapist        Goals/Plan    No documentation.       Clinical Impression     Row Name 10/15/19 1006          Pain Assessment    Additional Documentation  Pain Scale: Numbers Pre/Post-Treatment (Group)  -MD     Row Name 10/15/19 1006          Pain Scale: Numbers Pre/Post-Treatment    Pain Scale: Numbers, Pretreatment  4/10  -MD     Pain Location - Side  Left  -MD     Pain Location  knee  -MD     Pain Intervention(s)  Repositioned;Ambulation/increased activity  -MD     Row Name 10/15/19 1006          Plan of Care Review    Plan of Care Reviewed With  patient  -MD     Row Name 10/15/19 1006          Positioning and Restraints    Pre-Treatment Position  in bed  -MD     Post Treatment Position  chair  -MD     In Chair  reclined;sitting;call light within reach;encouraged to call for assist  -MD       User Key  (r) = Recorded By, (t) = Taken By, (c) = Cosigned By    Initials Name Provider Type    Dafne Ortiz, PT Physical Therapist        Outcome Measures     Row Name 10/15/19 1036          How much help from another person do you currently need...    Turning from your back to your side while in flat bed without using bedrails?  4  -MD     Moving from lying on back to sitting on the side of a flat bed without bedrails?  4  -MD     Moving to and from a  bed to a chair (including a wheelchair)?  3  -MD     Standing up from a chair using your arms (e.g., wheelchair, bedside chair)?  3  -MD     Climbing 3-5 steps with a railing?  3  -MD     To walk in hospital room?  3  -MD     AM-PAC 6 Clicks Score (PT)  20  -MD     Row Name 10/15/19 1036          Functional Assessment    Outcome Measure Options  AM-PAC 6 Clicks Basic Mobility (PT)  -MD       User Key  (r) = Recorded By, (t) = Taken By, (c) = Cosigned By    Initials Name Provider Type    Dafne Ortiz, PT Physical Therapist        Physical Therapy Education     Title: PT OT SLP Therapies (In Progress)     Topic: Physical Therapy (In Progress)     Point: Precautions (Done)     Learning Progress Summary           Patient Acceptance, E, VU by MD at 10/15/2019 10:07 AM                               User Key     Initials Effective Dates Name Provider Type Discipline    MD 04/03/18 -  Dafne Gudino PT Physical Therapist PT              PT Recommendation and Plan     Outcome Summary/Treatment Plan (PT)  Anticipated Discharge Disposition (PT): home with assist, home with home health  Plan of Care Reviewed With: patient  Outcome Summary: Pt presents s/p L TKA leading to decreased indepndence and compromsied safety w functional mobility.  Pt completed steps this am and PT evalution.  Pt to d/c home this pm w HHPT and assist from family.     Time Calculation:   PT Charges     Row Name 10/15/19 1004             Time Calculation    Start Time  1002  -MD      Stop Time  1032  -MD      Time Calculation (min)  30 min  -MD      PT Received On  10/15/19  -MD        User Key  (r) = Recorded By, (t) = Taken By, (c) = Cosigned By    Initials Name Provider Type    Dafne Ortiz, PT Physical Therapist        Therapy Charges for Today     Code Description Service Date Service Provider Modifiers Qty    05524750369 HC PT EVAL LOW COMPLEXITY 1 10/15/2019 Dafne Gudino, PT GP 1    31780040816 HC PT THER PROC EA 15 MIN 10/15/2019 Dafne Gudino, PT GP 1           PT G-Codes  Outcome Measure Options: AM-PAC 6 Clicks Basic Mobility (PT)  AM-PAC 6 Clicks Score (PT): 20    Dafne Gudino, PT  10/15/2019

## 2019-10-15 NOTE — DISCHARGE SUMMARY
Patient Name: Pratibha Pascal  Patient YOB: 1960    Date of Admission:  10/14/2019  Date of Discharge:  10/15/2019  Discharge Diagnosis: TOTAL KNEE ARTHROPLASTY  Presenting Problem/History of Present Illness: Primary osteoarthritis of left knee [M17.12]  OA (osteoarthritis) of knee [M17.10]  Admitting Physician: Dr Nabor Correia  Consults:   Consults     Date and Time Order Name Status Description    10/14/2019 1843 Inpatient Internal Medicine Consult            DETAILS OF HOSPITAL STAY:  Patient is a 59 y.o. female was admitted to the floor following the above procedure and underwent an uncomplicated hospital stay.  Patient did well with physical therapy and was ambulating well without problems.  On the day of discharge the wound was clean, dry and intact and calf was soft and non tender and Homans sign was negative.  Patient was tolerating  without problems.  Patient will be discharged home.    Condition on Discharge:  Stable    Vital Signs  Temp:  [96.7 °F (35.9 °C)-98.9 °F (37.2 °C)] 97.6 °F (36.4 °C)  Heart Rate:  [80-90] 90  Resp:  [14-20] 16  BP: (105-134)/(65-80) 134/70    LABS:      Admission on 10/14/2019   Component Date Value Ref Range Status   • Glucose 10/14/2019 144* 70 - 130 mg/dL Final   • Glucose 10/14/2019 162* 70 - 130 mg/dL Final   • Hemoglobin 10/15/2019 11.6* 12.0 - 15.9 g/dL Final   • Hematocrit 10/15/2019 36.1  34.0 - 46.6 % Final       No results found.    Discharge Medications     Discharge Medications      New Medications      Instructions Start Date   aspirin 325 MG EC tablet  Replaces:  aspirin 81 MG tablet   325 mg, Oral, Every 12 Hours Scheduled      docusate sodium 100 MG capsule  Commonly known as:  COLACE   100 mg, Oral, 2 Times Daily      HYDROcodone-acetaminophen 7.5-325 MG per tablet  Commonly known as:  NORCO   1-2 poq 4-6 hr prn pain      meloxicam 15 MG tablet  Commonly known as:  MOBIC   15 mg, Oral, Daily      ondansetron 4 MG tablet  Commonly known as:  ZOFRAN    4 mg, Oral, Every 6 Hours PRN      pantoprazole 40 MG EC tablet  Commonly known as:  PROTONIX   40 mg, Oral, Daily      polyethylene glycol pack packet  Commonly known as:  MIRALAX   17 g, Oral, 2 Times Daily         Changes to Medications      Instructions Start Date   ALPRAZolam 0.5 MG tablet  Commonly known as:  XANAX  What changed:    · when to take this  · additional instructions   0.5 mg, Oral, Daily, TAKE 1 TABLET BY MOUTH EVERY DAY AS NEEDED         Continue These Medications      Instructions Start Date   atorvastatin 10 MG tablet  Commonly known as:  LIPITOR   5 mg, Oral, Daily      baclofen 10 MG tablet  Commonly known as:  LIORESAL   10 mg, Oral, 3 Times Daily PRN      dapagliflozin-metformin HCl ER 5-1000 MG tablet  Commonly known as:  XIGDUO XR   2 tablets, Oral, Daily      FLUoxetine 20 MG capsule  Commonly known as:  PROzac   60 mg, Oral, Nightly      folic acid 1 MG tablet  Commonly known as:  FOLVITE   1 mg, Oral, Daily      levothyroxine 137 MCG tablet  Commonly known as:  SYNTHROID, LEVOTHROID   137 mcg, Oral, Daily      losartan 50 MG tablet  Commonly known as:  COZAAR   50 mg, Oral, Daily      metoprolol succinate  MG 24 hr tablet  Commonly known as:  TOPROL-XL   100 mg, Oral, Daily      ONETOUCH DELICA LANCETS 33G misc   Test 3 times daily      predniSONE 5 MG tablet  Commonly known as:  DELTASONE   5 mg, Oral, Daily      sulfaSALAzine 500 MG tablet  Commonly known as:  AZULFIDINE   1,500 mg, Oral, 2 Times Daily, TAKE 3 TABLETS BY MOUTH TWICE DAILY      triamterene-hydrochlorothiazide 37.5-25 MG per tablet  Commonly known as:  MAXZIDE-25   1 tablet, Oral, Daily      TRULICITY 1.5 MG/0.5ML solution pen-injector  Generic drug:  Dulaglutide   1.5 mg, Subcutaneous, Weekly         Stop These Medications    aspirin 81 MG tablet  Replaced by:  aspirin 325 MG EC tablet     CHLORHEXIDINE GLUCONATE CLOTH EX     ergocalciferol 29632 units capsule  Commonly known as:  DRISDOL     glucose blood test  strip  Commonly known as:  ONETOUCH VERIO     mupirocin 2 % nasal ointment  Commonly known as:  BACTROBAN     OLUMIANT 2 MG tablet  Generic drug:  Baricitinib            Activity at Discharge:     Discharge Instructions: Patient is to continue with physical therapy exercises daily and continue working with the physical therapist as ordered. Patient may weight bear as tolerated. Apply ice regularly. Patient may ice for long periods of time as long as ice is not directly on the skin. Patient instructed on frequent calf pumping exercises.  Patient also instructed on incentive spirometer during hospitalization and encouraged to continue to use at home regularly.    Dressing: The dressing is designed to be left in place until you return to the office in 2 weeks.  The suction unit should stop functioning at 7 days and the green light will switch to yellow.  At that point the suction unit and tubing can be disconnected at the port closest to the dressing.  The suction unit and tubing may be discarded.  You may shower immediately upon return home, you will need to turn the pump off by depressing the orange button once and then you may disconnect the pump and tubing at the connection port.  After showering, shake off the excess water and reattach the tubing.  Restart the pump by depressing the orange button one time and you will notice the green light flashing again.  If the dressing becomes disloged or saturated it should be changed. Please refer to the YANDEL information sheet if you have any questions about the dressing.  If you have a home health nurse or therapist they can be contacted to assist with dressing change or repair. You may also call the YANDEL dressing hotline for questions related to the dressing (1-190.130.2135). If there still other problems or questions related to the dressing despite these measures then you can contact Kelsi at our office [phone].  If for some reason the YANDEL dressing is removed, after 7  days the wound can be gently cleaned with antibacterial soap then allowed to dry and covered with a dry sterile dressing. The wound should be covered at all times except while showering.  Patient may change dressings daily and prn using sterile 4x4 and paper tape, and should call if any unusual drainage, redness or swelling.*  Follow up appointment in 2 weeks - patient to call the office at [phone] to schedule.  Patient will be discharged on aspirin 325mg BID x 2weeks, then daily x 4weeks    Discharge Diagnosis:    Patient Active Problem List   Diagnosis   • CAD (coronary artery disease)   • Type 2 diabetes mellitus, uncontrolled (CMS/[facility])   • Mixed hyperlipidemia   • Benign essential hypertension   • Hypothyroidism   • Major depressive disorder, recurrent, in full remission (CMS/[facility])   • Rheumatoid arthritis (CMS/[facility])   • Tobacco abuse   • Cervicalgia   • Diabetes mellitus (CMS/[facility])   • Dyslipidemia   • BP (high blood pressure)   • Obstructive apnea   • AF (paroxysmal atrial fibrillation) (CMS/[facility])   • Proteinuria   • Burn   • Cellulitis of left leg   • Diabetes mellitus with neurological manifestation (CMS/[facility])   • MSSA (methicillin susceptible Staphylococcus aureus) infection   • Diabetic ulcer of left foot associated with diabetes mellitus due to underlying condition (CMS/[facility])   • Morbid obesity (CMS/[facility])   • Primary insomnia   • Anxiety   • Immunodeficiency, unspecified (CMS/[facility])   • Long term current use of non-steroidal anti-inflammatories (NSAID)   • Personal history of immunosupression therapy   • Primary generalized (osteo)arthritis   • Raised antibody titer   • Rheumatoid arthritis of multiple sites without organ or system involvement with positive rheumatoid factor (CMS/[facility])   • Other long term (current) drug therapy   • Uncontrolled type 2 diabetes mellitus with hyperglycemia (CMS/[facility])   • Vitamin D deficiency, unspecified   • Hypercalcemia   • Primary osteoarthritis of left knee   • Abnormal nuclear  stress test   • OA (osteoarthritis) of knee       Follow-up Appointments  Future Appointments   Date Time Provider Department Center   10/29/2019  2:50 PM Lauren Morgan, APRN [facility] None     Additional Instructions for the Follow-ups that You Need to Schedule     Referral to home health   As directed      PT to see for Home PT protocol. Daily for first week then 3x/ wk for second week. Staples to be removed at f/u appointment in 2 weeks.    Order Comments:  PT to see for Home PT protocol. Daily for first week then 3x/ wk for second week. Staples to be removed at f/u appointment in 2 weeks.     Face to Face Visit Date:  10/15/2019    Follow-up provider for Plan of Care?:  I will be treating the patient on an ongoing basis.  Please send me the Plan of Care for signature.    Follow-up provider:  ADDY CORREIA [[id number]]    Reason/Clinical Findings:  post op knee replacement    Describe mobility limitations that make leaving home difficult:  pain, swelling, weakness, limited mobility, difficulty ambulating without assistive device    Nursing/Therapeutic Services Requested:  Physicial Therapy                  Addy Correia MD  10/15/19  7:51 AM

## 2019-10-15 NOTE — DISCHARGE PLACEMENT REQUEST
"Pratibha Pascal (59 y.o. Female)     Date of Birth Social Security Number Address Home Phone MRN    1960  [id number] [id number]    [employer] Marital Status          [employer]        Admission Date Admission Type Admitting Provider Attending Provider Department, Room/Bed    10/14/19 Elective Nabor Correia MD Brown, Reid B, MD [address], P799/1    Discharge Date Discharge Disposition Discharge Destination         [place] [facility] [place]ending Provider:  Nabor Correia MD    Allergies:  Cefuroxime, Clindamycin/lincomycin, Dicloxacillin, Erythromycin, Talwin [Pentazocine]    Isolation:  None   Infection:  None   Code Status:  CPR    Ht:  182.9 cm (72\")   Wt:  137 kg (301 lb 8 oz)    Admission Cmt:  None   Principal Problem:  Primary osteoarthritis of left knee [M17.12] More...                 Active Insurance as of 10/14/2019     Primary Coverage     Payor Plan Insurance Group Employer/Plan Group    [employer] [facility] [employer] [id number]     Payor Plan Address Payor Plan Phone Number Payor Plan Fax Number Effective Dates    [address] [phone]  1/1/2015 - None Entered    [address] [place] [address]       Subscriber Name Subscriber Birth Date Member ID       PRATIBHA PASCAL 1960 [id number]                 Emergency Contacts      (Rel.) Home Phone Work Phone Mobile Phone    Hannah Wallace (Mother) [phone] -- [phone]              "

## 2019-10-15 NOTE — PLAN OF CARE
Problem: Patient Care Overview  Goal: Plan of Care Review   10/15/19 1037   Coping/Psychosocial   Plan of Care Reviewed With patient   OTHER   Outcome Summary Pt presents s/p L TKA leading to decreased indepndence and compromsied safety w functional mobility. Pt completed steps this am and PT evalution. Pt to d/c home this pm w HHPT and assist from family.

## 2019-10-16 ENCOUNTER — TRANSITIONAL CARE MANAGEMENT TELEPHONE ENCOUNTER (OUTPATIENT)
Dept: FAMILY MEDICINE CLINIC | Facility: CLINIC | Age: 59
End: 2019-10-16

## 2019-10-16 ENCOUNTER — READMISSION MANAGEMENT (OUTPATIENT)
Dept: CALL CENTER | Facility: HOSPITAL | Age: 59
End: 2019-10-16

## 2019-10-16 NOTE — OUTREACH NOTE
Prep Survey      Responses   Facility patient discharged from?  [facility]   Is patient eligible?  Yes   Discharge diagnosis  TOTAL KNEE ARTHROPLASTY   Does the patient have one of the following disease processes/diagnoses(primary or secondary)?  Total Joint Replacement   Does the patient have Home health ordered?  Yes   What is the Home health agency?   [facility]   Is there a DME ordered?  Yes   What DME was ordered?  Aleta Melchor   Comments regarding appointments  Oct 29 @ 2:50 with Lauren Morgan   Medication alerts for this patient  See AVS   Prep survey completed?  Yes          Regina Matamoros LPN

## 2019-10-18 ENCOUNTER — READMISSION MANAGEMENT (OUTPATIENT)
Dept: CALL CENTER | Facility: HOSPITAL | Age: 59
End: 2019-10-18

## 2019-10-18 NOTE — OUTREACH NOTE
Total Joint Week 1 Survey      Responses   Facility patient discharged from?  [facility]   Does the patient have one of the following disease processes/diagnoses(primary or secondary)?  Total Joint Replacement   Is there a successful TCM telephone encounter documented?  No   Joint surgery performed?  Knee   Week 1 attempt successful?  Yes   Call start time  1409   Call end time  1417   Has the patient been back in either the hospital or Emergency Department since discharge?  No   Discharge diagnosis  TOTAL KNEE ARTHROPLASTY   Does the patient have all medications related to this admission filled (includes all antibiotics, pain medications, etc.)  Yes   Is the patient taking all medications as directed (includes completed medication regime)?  Yes   Is the patient able to teach back alternate methods of pain control?  Ice, Knee-elevation/no pillow under knee, Shoulder-elevate above heart/ keep in sling as advised, Reposition, Correct alignment, Short, frequent activity   Comments regarding appointments  Oct 29 @ 2:50 with Lauren Morgan   Does the patient have a follow up appointment with their surgeon?  Yes   Has the patient kept scheduled appointments due by today?  N/A   What is the Home health agency?   [facility]   Has [facility] visited the patient within 72 hours of discharge?  Yes   What DME was ordered?  Rolling Walker   Has all DME been delivered?  Yes   Psychosocial issues?  No   When is the first therapy visit scheduled (PO Day) including how many days per week   Therapy at home through  2 times a week.    Has the patient began therapy sessions (either in the home or as an out patient)?  Yes   Does the patient have a wound vac in place?  No   Has the patient fallen since discharge?  No   Did the patient receive a copy of their discharge instructions?  Yes   Nursing interventions  Reviewed instructions with patient   What is the patient's perception of their functional status since discharge?   Improving   Is the patient able to teach back signs and symptoms of infection?  Temp >100.4 for 24h or longer, Incisional drainage, Blisters around incision, Increased swelling or redness around incision (not associated with surgical edema), Severe discomfort or pain, Changes in mobility, Shortness of breath or chest pain   Is the patient able to teach back how to prevent infection?  Check incision daily, Wash hands before and after touching incision, Keep incision covered if drainage, No tub baths, hot tub or swimming, Shower only as directed by surgeon, Keep incision covered if pets in house, Eat well-balanced diet   Is the patient able to teach back signs and symptoms of DVT?  Redness in calf, Area hot to touch, Shortness of breath or chest pain, Severe pain in calf, Swelling in calf   Is the patient able to teach back home safety measures?  Ability to shower   Is the patient/caregiver able to teach back the hierarchy of who to call/visit for symptoms/problems? PCP, Specialist, Home health nurse, Urgent Care, ED, 911  Yes   Week 1 call completed?  Yes   Revoked  No further contact(revokes)-requires comment   Graduated/Revoked comments  Patient declined further calls.           Dany Gutierrez RN

## 2019-10-18 NOTE — OUTREACH NOTE
Unable to connect x 3 attempts to complete TCM Encounter. Pt is s/p TKR LT. Pt is not currently sched for TCM [facility].

## 2019-10-24 ENCOUNTER — TELEPHONE (OUTPATIENT)
Dept: ORTHOPEDIC SURGERY | Facility: CLINIC | Age: 59
End: 2019-10-24

## 2019-10-24 NOTE — TELEPHONE ENCOUNTER
Regarding: Visit Follow-Up Question  Contact: [phone]  ----- Message from Mychart, Generic sent at 10/24/2019  4:13 PM EDT -----    When is the soonest I can get my right knee replaced?

## 2019-10-29 ENCOUNTER — OFFICE VISIT (OUTPATIENT)
Dept: ORTHOPEDIC SURGERY | Facility: CLINIC | Age: 59
End: 2019-10-29

## 2019-10-29 VITALS — BODY MASS INDEX: 39.68 KG/M2 | TEMPERATURE: 98.6 F | HEIGHT: 72 IN | WEIGHT: 293 LBS

## 2019-10-29 DIAGNOSIS — Z98.890 H/O: KNEE SURGERY: Primary | ICD-10-CM

## 2019-10-29 PROCEDURE — 99024 POSTOP FOLLOW-UP VISIT: CPT | Performed by: NURSE PRACTITIONER

## 2019-10-29 NOTE — PROGRESS NOTES
Pratibha Pascal : 1960 MRN: [id number] DATE: 10/29/2019    DIAGNOSIS: 2 week follow up left total knee      SUBJECTIVE:Patient returns today for 2 week follow up of left total knee replacement. Patient reports doing well with no unusual complaints. Appears to be progressing appropriately.    OBJECTIVE:   Exam:. The incision is healing appropriately. No sign of infection. Range of motion is progressing as expected. The calf is soft and nontender with a negative Homans sign.    ASSESSMENT: 2 week status post left knee replacement.    PLAN: 1) Staples removed and steri strips applied   2) Order given for PT   3) Discontinue RENETTA hose   4) Continue ice PRN   5) aspirin 325 mg orally every day for 1 month   6) Follow up in 6 weeks with repeat Xrays of left knee (3views)    Lauren Morgan, APRN  10/29/2019

## 2019-10-30 ENCOUNTER — TREATMENT (OUTPATIENT)
Dept: PHYSICAL THERAPY | Facility: CLINIC | Age: 59
End: 2019-10-30

## 2019-10-30 DIAGNOSIS — Z96.652 HX OF TOTAL KNEE ARTHROPLASTY, LEFT: Primary | ICD-10-CM

## 2019-10-30 DIAGNOSIS — R53.1 WEAKNESS: ICD-10-CM

## 2019-10-30 DIAGNOSIS — M25.562 ACUTE PAIN OF LEFT KNEE: ICD-10-CM

## 2019-10-30 DIAGNOSIS — R26.2 DIFFICULTY WALKING: ICD-10-CM

## 2019-10-30 DIAGNOSIS — M25.662 DECREASED RANGE OF MOTION (ROM) OF LEFT KNEE: ICD-10-CM

## 2019-10-30 PROCEDURE — 97110 THERAPEUTIC EXERCISES: CPT | Performed by: PHYSICAL THERAPIST

## 2019-10-30 PROCEDURE — 97161 PT EVAL LOW COMPLEX 20 MIN: CPT | Performed by: PHYSICAL THERAPIST

## 2019-10-30 NOTE — PROGRESS NOTES
Physical Therapy Initial Evaluation and Plan of Care    Patient: Pratibha Pascal   : 1960  Diagnosis/ICD-10 Code:  Hx of total knee arthroplasty, left [Z96.652]  Referring practitioner: Nabor Correia MD    Subjective Evaluation    History of Present Illness  Date of surgery: 10/14/2019  Mechanism of injury: Pt had L TKA on 10/14/19. Pt reports her R knee still is painful and needs to be replaced.     Quality of life: good    Pain  Current pain ratin  At worst pain ratin  Location: L knee   Quality: throbbing  Relieving factors: ice, medications, change in position and rest  Aggravating factors: stairs and ambulation  Progression: improved    Social Support  Lives with: adult children    Hand dominance: right    Treatments  Previous treatment: physical therapy  Current treatment: medication and physical therapy  Discharged from (in last 30 days): home health care  Patient Goals  Patient goals for therapy: decreased edema, decreased pain, improved balance, increased motion, increased strength and independence with ADLs/IADLs             Objective       Active Range of Motion   Left Knee   Flexion: 115 degrees   Extensor lag: 15 degrees     Strength/Myotome Testing     Left Knee   Flexion: 3-  Extension: 3-  Quadriceps contraction: poor    Ambulation   Weight-Bearing Status   Weight-Bearing Status (Left): weight-bearing as tolerated   Weight-Bearing Status (Right): weight-bearing as tolerated    Assistive device used: front-wheeled walker    Ambulation: Level Surfaces   Ambulation with assistive device: independent    Observational Gait   Gait: antalgic   Decreased walking speed, stride length, left stance time and right stance time.   Left foot contact pattern: foot flat  Right foot contact pattern: foot flat         Assessment & Plan     Assessment  Impairments: abnormal or restricted ROM, activity intolerance, impaired physical strength, lacks appropriate home exercise program, pain with function and  weight-bearing intolerance  Assessment details: Pt presents to PT with symptoms consistent with decreased ROM, pain, weakness, and difficulty walking s/p L TKA.  Pt would benefit from skilled PT intervention to address the deficits noted.   Prognosis: good  Prognosis details:       Functional Limitations: carrying objects, walking, sitting, standing and unable to perform repetitive tasks  Goals  Plan Goals: SHORT TERM GOALS: Time for Goal Achievement: 12 visits    1.  Patient to be compliant with HEP.   2.  Pt able to ascend/descend steps and transfer with less L knee pain < 4/10        LONG TERM GOALS: Time for Goal Achievement: 2 months   1.  Pt to score < 30% perceived disability on WOMAC / LEFS  2.  Patient able to ascend/descend steps and prolonged standing & cooking with L knee  pain < 2/10  3.  Pt to exhibit L knee AROM 0-125 degrees to tolerate ADL's, IADL's and household activities.   4.  Pt to exhibit LE endurance/strength to 5/5 to allow for walking, steps and transfers to occur safely  5.  Pt to demonstrate increased stability of the knee to balance on foam as needed to traverse uneven terrain .          Plan  Therapy options: will be seen for skilled physical therapy services  Planned modality interventions: ultrasound, electrical stimulation/Russian stimulation, thermotherapy (hydrocollator packs) and cryotherapy  Planned therapy interventions: balance/weight-bearing training, body mechanics training, functional ROM exercises, flexibility, home exercise program, joint mobilization, stretching, strengthening, soft tissue mobilization, neuromuscular re-education and manual therapy  Duration in visits: 20  Treatment plan discussed with: patient        Manual Therapy:          mins  28923;  Therapeutic Exercise:     10     mins  78253;     Neuromuscular Bernadine:         mins  97136;    Therapeutic Activity:           mins  64818;     Gait Training:            mins  71483;     Ultrasound:           mins   15929;    Electrical Stimulation:          mins  85436 ( );  Dry Needling           mins self-pay  Traction           mins 51209  Canalith Repositioning         mins 56302      Timed Treatment:    10  mins   Total Treatment:    55    mins    PT SIGNATURE: Kerry Hernandez PT   [place] Lic #[id number]    DATE TREATMENT INITIATED: 10/30/2019    Initial Certification  Certification Period: 1/28/2020  I certify that the therapy services are furnished while this patient is under my care.  The services outlined above are required by this patient, and will be reviewed every 90 days.     PHYSICIAN: Nabor Correia MD      DATE:     Please sign and return via fax to [phone].. Thank you, [facility].

## 2019-11-04 ENCOUNTER — TREATMENT (OUTPATIENT)
Dept: PHYSICAL THERAPY | Facility: CLINIC | Age: 59
End: 2019-11-04

## 2019-11-04 DIAGNOSIS — R26.2 DIFFICULTY WALKING: ICD-10-CM

## 2019-11-04 DIAGNOSIS — M25.662 DECREASED RANGE OF MOTION (ROM) OF LEFT KNEE: ICD-10-CM

## 2019-11-04 DIAGNOSIS — M25.562 ACUTE PAIN OF LEFT KNEE: ICD-10-CM

## 2019-11-04 DIAGNOSIS — R53.1 WEAKNESS: ICD-10-CM

## 2019-11-04 DIAGNOSIS — Z96.652 HX OF TOTAL KNEE ARTHROPLASTY, LEFT: Primary | ICD-10-CM

## 2019-11-04 PROCEDURE — 97110 THERAPEUTIC EXERCISES: CPT | Performed by: PHYSICAL THERAPIST

## 2019-11-04 NOTE — PROGRESS NOTES
Physical Therapy Daily Progress Note  Visit: 2    Pratibha Pascal reports: compliance with HEP    Subjective     Objective   See Exercise, Manual, and Modality Logs for complete treatment.       Assessment/Plan Compliant/cooperative with current rehab efforts.  Benefits from verbal/tactile cues to ensure correct exercise performance/technique, hold time and position. Plan details: Progress ROM / strengthening / stabilization / functional activity as tolerated     Manual Therapy:          mins  23229;  Therapeutic Exercise:     40     mins  58636;     Neuromuscular Bernadine:         mins  14491;    Therapeutic Activity:           mins  45269;     Gait Training:            mins  98379;     Ultrasound:           mins  33944;    Electrical Stimulation:          mins  97761 ( );  Dry Needling           mins self-pay  Traction           mins 64292  Canalith Repositioning         mins 65843      Timed Treatment:   40   mins   Total Treatment:    50    mins    RUIZ [place] License #: [id number]    Physical Therapist

## 2019-11-06 ENCOUNTER — TREATMENT (OUTPATIENT)
Dept: PHYSICAL THERAPY | Facility: CLINIC | Age: 59
End: 2019-11-06

## 2019-11-06 DIAGNOSIS — R26.2 DIFFICULTY WALKING: ICD-10-CM

## 2019-11-06 DIAGNOSIS — M25.562 ACUTE PAIN OF LEFT KNEE: ICD-10-CM

## 2019-11-06 DIAGNOSIS — Z96.652 HX OF TOTAL KNEE ARTHROPLASTY, LEFT: Primary | ICD-10-CM

## 2019-11-06 DIAGNOSIS — M25.662 DECREASED RANGE OF MOTION (ROM) OF LEFT KNEE: ICD-10-CM

## 2019-11-06 DIAGNOSIS — R53.1 WEAKNESS: ICD-10-CM

## 2019-11-06 PROCEDURE — 97110 THERAPEUTIC EXERCISES: CPT | Performed by: PHYSICAL THERAPIST

## 2019-11-06 NOTE — PROGRESS NOTES
Physical Therapy Daily Progress Note  Visit: 3    Pratibha Pascal reports: she is overall tired today. Reports no issues with her knee.     Subjective     Objective   See Exercise, Manual, and Modality Logs for complete treatment. Decreased quadricep contraction. Incision site healing with stiri strips in place.       Assessment/Plan Compliant/cooperative with current rehab efforts.  Benefits from verbal/tactile cues to ensure correct exercise performance/technique, hold time and position. Plan details: Progress ROM / strengthening / stabilization / functional activity as tolerated     Manual Therapy:          mins  68253;  Therapeutic Exercise:   38       mins  72315;     Neuromuscular Bernadine:         mins  80169;    Therapeutic Activity:           mins  03687;     Gait Training:            mins  46631;     Ultrasound:           mins  05626;    Electrical Stimulation:          mins  57114 ( );  Dry Needling           mins self-pay  Traction           mins 76753  Canalith Repositioning         mins 94124      Timed Treatment: 38     mins   Total Treatment:      50  mins    RUIZ [place] License #: [id number]    Physical Therapist

## 2019-11-08 ENCOUNTER — TREATMENT (OUTPATIENT)
Dept: PHYSICAL THERAPY | Facility: CLINIC | Age: 59
End: 2019-11-08

## 2019-11-08 DIAGNOSIS — M25.562 ACUTE PAIN OF LEFT KNEE: ICD-10-CM

## 2019-11-08 DIAGNOSIS — M25.662 DECREASED RANGE OF MOTION (ROM) OF LEFT KNEE: ICD-10-CM

## 2019-11-08 DIAGNOSIS — R53.1 WEAKNESS: ICD-10-CM

## 2019-11-08 DIAGNOSIS — Z96.652 HX OF TOTAL KNEE ARTHROPLASTY, LEFT: Primary | ICD-10-CM

## 2019-11-08 DIAGNOSIS — R26.2 DIFFICULTY WALKING: ICD-10-CM

## 2019-11-08 PROCEDURE — 97110 THERAPEUTIC EXERCISES: CPT | Performed by: PHYSICAL THERAPIST

## 2019-11-08 NOTE — PROGRESS NOTES
Physical Therapy Daily Progress Note  Visit: 4    Pratibha Pascal reports: the lateral part of her L knee is sore and tender to touch.    Subjective     Objective   See Exercise, Manual, and Modality Logs for complete treatment.        Assessment/Plan Compliant/cooperative with current rehab efforts.  Benefits from verbal/tactile cues to ensure correct exercise performance/technique, hold time and position.     Manual Therapy:          mins  27403;  Therapeutic Exercise:     30     mins  12657;     Neuromuscular Bernadine:         mins  31609;    Therapeutic Activity:           mins  59951;     Gait Training:            mins  95178;     Ultrasound:           mins  92259;    Electrical Stimulation:          mins  50360 ( );  Dry Needling           mins self-pay  Traction           mins 94371  Canalith Repositioning         mins 74725      Timed Treatment:   30   mins   Total Treatment:      40  mins    RUIZ Kern License #: [id number]    Physical Therapist

## 2019-11-11 ENCOUNTER — TELEPHONE (OUTPATIENT)
Dept: ORTHOPEDIC SURGERY | Facility: CLINIC | Age: 59
End: 2019-11-11

## 2019-11-12 NOTE — TELEPHONE ENCOUNTER
PT NOTIFIED BY [employer]  
Regarding: Visit Follow-Up Question  Contact: [phone]  ----- Message from Mychart, Generic sent at 11/10/2019  6:22 PM EST -----    How long do I have to do physical therapy after knee replacement surgery?   
Typical is 4 to 6 weeks although it may very from individual to individual  
Back Pain

## 2019-11-13 ENCOUNTER — TREATMENT (OUTPATIENT)
Dept: PHYSICAL THERAPY | Facility: CLINIC | Age: 59
End: 2019-11-13

## 2019-11-13 DIAGNOSIS — R26.2 DIFFICULTY WALKING: ICD-10-CM

## 2019-11-13 DIAGNOSIS — M25.562 ACUTE PAIN OF LEFT KNEE: ICD-10-CM

## 2019-11-13 DIAGNOSIS — R53.1 WEAKNESS: ICD-10-CM

## 2019-11-13 DIAGNOSIS — M25.662 DECREASED RANGE OF MOTION (ROM) OF LEFT KNEE: ICD-10-CM

## 2019-11-13 DIAGNOSIS — Z96.652 HX OF TOTAL KNEE ARTHROPLASTY, LEFT: Primary | ICD-10-CM

## 2019-11-13 PROCEDURE — 97110 THERAPEUTIC EXERCISES: CPT | Performed by: PHYSICAL THERAPIST

## 2019-11-13 NOTE — PROGRESS NOTES
Physical Therapy Daily Progress Note  Visit: 5    Pratibha Pascal reports: her surgical knee (L) is feeling good and most of the stiri strips have come off., reports pain in R knee pain continues and she's ready to have it replaced.     Subjective     Objective   See Exercise, Manual, and Modality Logs for complete treatment. Incision site well healed. Showing better quad contraction.       Assessment/Plan Compliant/cooperative with current rehab efforts.  Benefits from verbal/tactile cues to ensure correct exercise performance/technique, hold time and position. Plan details: Progress ROM / strengthening / stabilization / functional activity as tolerated     Manual Therapy:          mins  88093;  Therapeutic Exercise:   40       mins  98142;     Neuromuscular Bernadine:         mins  56358;    Therapeutic Activity:           mins  62923;     Gait Training:            mins  58433;     Ultrasound:           mins  80580;    Electrical Stimulation:          mins  10170 ( );  Dry Needling           mins self-pay  Traction           mins 42113  Canalith Repositioning         mins 30468      Timed Treatment: 40     mins   Total Treatment:    50    mins    RUIZ Kern License #: [id number]    Physical Therapist

## 2019-11-20 ENCOUNTER — TRANSCRIBE ORDERS (OUTPATIENT)
Dept: ADMINISTRATIVE | Facility: HOSPITAL | Age: 59
End: 2019-11-20

## 2019-11-20 DIAGNOSIS — E04.2 MULTIPLE THYROID NODULES: Primary | ICD-10-CM

## 2019-11-26 ENCOUNTER — OFFICE VISIT (OUTPATIENT)
Dept: ORTHOPEDIC SURGERY | Facility: CLINIC | Age: 59
End: 2019-11-26

## 2019-11-26 VITALS — BODY MASS INDEX: 39.68 KG/M2 | HEIGHT: 72 IN | WEIGHT: 293 LBS | TEMPERATURE: 99.5 F

## 2019-11-26 DIAGNOSIS — G89.29 CHRONIC PAIN OF LEFT KNEE: Primary | ICD-10-CM

## 2019-11-26 DIAGNOSIS — M25.562 CHRONIC PAIN OF LEFT KNEE: Primary | ICD-10-CM

## 2019-11-26 DIAGNOSIS — M17.11 PRIMARY OSTEOARTHRITIS OF RIGHT KNEE: ICD-10-CM

## 2019-11-26 PROCEDURE — 99214 OFFICE O/P EST MOD 30 MIN: CPT | Performed by: ORTHOPAEDIC SURGERY

## 2019-11-26 PROCEDURE — 73562 X-RAY EXAM OF KNEE 3: CPT | Performed by: ORTHOPAEDIC SURGERY

## 2019-11-26 RX ORDER — MELOXICAM 15 MG/1
15 TABLET ORAL ONCE
Status: CANCELLED | OUTPATIENT
Start: 2020-02-12 | End: 2019-11-26

## 2019-11-26 RX ORDER — PREGABALIN 75 MG/1
150 CAPSULE ORAL ONCE
Status: CANCELLED | OUTPATIENT
Start: 2020-02-12 | End: 2019-11-26

## 2019-11-26 NOTE — PROGRESS NOTES
Patient: Pratibha Pascal  YOB: 1960 59 y.o. female  Medical Record Number: [id number]    Chief Complaints:   Chief Complaint   Patient presents with   • Left Knee - Post-op       History of Present Illness:Pratibha Pascal is a 59 y.o. female who presents for follow-up of bilateral knees 6 weeks out from left total knee replacement overall doing very well progressing ahead of schedule.  Major issue is severe stabbing pain in the right knee medial joint she has known arthritis symptoms have progressed she is limited in her basic activities of daily living.  She is using a walker.  She is done therapy injections without control of symptoms.    Allergies:   Allergies   Allergen Reactions   • Cefuroxime Diarrhea   • Clindamycin/Lincomycin Diarrhea   • Dicloxacillin Diarrhea   • Erythromycin Swelling   • Talwin [Pentazocine] Itching       Medications:   Current Outpatient Medications   Medication Sig Dispense Refill   • ALPRAZolam (XANAX) 0.5 MG tablet Take 1 tablet by mouth Daily. TAKE 1 TABLET BY MOUTH EVERY DAY AS NEEDED (Patient taking differently: Take 0.5 mg by mouth Every Night. TAKE 1 TABLET BY MOUTH EVERY DAY AS NEEDED) 30 tablet 2   • atorvastatin (LIPITOR) 10 MG tablet Take 0.5 tablets by mouth Daily. 45 tablet 1   • baclofen (LIORESAL) 10 MG tablet Take 10 mg by mouth 3 (Three) Times a Day As Needed for Muscle Spasms.     • Baricitinib (OLUMIANT) 2 MG tablet      • dapagliflozin-metformin HCl ER (XIGDUO XR) 5-1000 MG tablet Take 2 tablets by mouth Daily. 60 tablet 5   • FLUoxetine (PROzac) 20 MG capsule Take 60 mg by mouth Every Night.     • folic acid (FOLVITE) 1 MG tablet Take 1 mg by mouth Daily.     • levothyroxine (SYNTHROID, LEVOTHROID) 137 MCG tablet Take 137 mcg by mouth Daily.     • losartan (COZAAR) 50 MG tablet Take 50 mg by mouth Daily.     • metoprolol succinate XL (TOPROL-XL) 100 MG 24 hr tablet Take 1 tablet by mouth Daily. 90 tablet 1   • ONETOUCH DELICA LANCETS 33G misc Test 3  "times daily 100 each 11   • predniSONE (DELTASONE) 5 MG tablet Take 5 mg by mouth Daily.     • sulfaSALAzine (AZULFIDINE) 500 MG tablet Take 1,500 mg by mouth 2 (Two) Times a Day. TAKE 3 TABLETS BY MOUTH TWICE DAILY  1   • triamterene-hydrochlorothiazide (MAXZIDE-25) 37.5-25 MG per tablet Take 1 tablet by mouth Daily. 90 tablet 1   • TRULICITY 1.5 MG/0.5ML solution pen-injector Inject 1.5 mg under the skin into the appropriate area as directed 1 (One) Time Per Week. 12 pen 1     No current facility-administered medications for this visit.      Facility-Administered Medications Ordered in Other Visits   Medication Dose Route Frequency Provider Last Rate Last Dose   • mupirocin (BACTROBAN) 2 % nasal ointment   Nasal BID Nabor Correia MD             The following portions of the patient's history were reviewed and updated as appropriate: allergies, current medications, past family history, past medical history, past social history, past surgical history and problem list.    Review of Systems:   A 14 point review of systems was performed. All systems negative except pertinent positives/negative listed in HPI above    Physical Exam:   Vitals:    11/26/19 1556   Temp: 99.5 °F (37.5 °C)   TempSrc: Temporal   Weight: 134 kg (295 lb)   Height: 182.9 cm (72\")       General: A and O x 3, ASA, NAD    SCLERA:    Normal    DENTITION:   Normal      Radiology:  Xrays 3views left knee  (ap,lateral, sunrise) were ordered and reviewed for evaluation of knee pain demonstrating right knee advanced varus osteoarthritis with bone on bone articulation, subchondral cysts, and periarticular osteophytes and a well positioned left knee replacement without evidence of wear, loosening or osteolysis  todays xrays were compared to previous xrays and demonstrate no change    Assessment/Plan:  Left total knee replacement 6 weeks out doing very well she will continue on therapy exercises    Advanced end-stage right knee osteoarthritis the right knee " is more limiting at this point in the left she has failed conservative measures and wants to proceed with right total knee replacement.  Continuation of conservative management vs. TKA discussed.  The patient wishes to proceed with total knee replacement.  At this point the patient has failed the full compliment of conservative treatment and stating complete understanding of the risks/benefits/ anternatives wishes to proceed with surgical treatment.    Risk and benefits of surgery were reviewed.  Including, but not limited to, blood clots or pulmonary embolism, anesthesia risk, infection, fracture, skin/leg numbness, persistent pain/crepitance/popping/catching, failure of the implant, need for future surgeries, hematoma, possible nerve or blood vessel injury, need for transfusion, and potential risk of stroke,heart attack or death, among others.  The patient understands and wishes to proceed.     It was explained that if tissue has been repaired or reconstructed, there is also an increased chance of failure which may require further management.  Following the completion of the discussion, the patient expressed understanding of this planned course of care, all their questions were answered and consent will be obtained preoperatively.    Operative Plan: right [employer] Total Knee Replacement an overnight staywith home health rehab

## 2019-12-02 DIAGNOSIS — I10 BENIGN ESSENTIAL HYPERTENSION: ICD-10-CM

## 2019-12-02 RX ORDER — METOPROLOL SUCCINATE 100 MG/1
TABLET, EXTENDED RELEASE ORAL
Qty: 90 TABLET | Refills: 1 | OUTPATIENT
Start: 2019-12-02

## 2019-12-06 ENCOUNTER — HOSPITAL ENCOUNTER (OUTPATIENT)
Dept: ULTRASOUND IMAGING | Facility: HOSPITAL | Age: 59
Discharge: HOME | End: 2019-12-06
Admitting: RADIOLOGY

## 2019-12-06 DIAGNOSIS — E04.2 MULTIPLE THYROID NODULES: ICD-10-CM

## 2019-12-06 PROCEDURE — 88173 CYTOPATH EVAL FNA REPORT: CPT | Performed by: OTOLARYNGOLOGY

## 2019-12-06 PROCEDURE — 25010000003 LIDOCAINE 1 % SOLUTION: Performed by: OTOLARYNGOLOGY

## 2019-12-06 PROCEDURE — 88172 CYTP DX EVAL FNA 1ST EA SITE: CPT | Performed by: OTOLARYNGOLOGY

## 2019-12-06 PROCEDURE — 88305 TISSUE EXAM BY PATHOLOGIST: CPT | Performed by: OTOLARYNGOLOGY

## 2019-12-06 RX ORDER — LIDOCAINE HYDROCHLORIDE 10 MG/ML
10 INJECTION, SOLUTION INFILTRATION; PERINEURAL ONCE
Status: COMPLETED | OUTPATIENT
Start: 2019-12-06 | End: 2019-12-06

## 2019-12-06 RX ADMIN — LIDOCAINE HYDROCHLORIDE 10 ML: 10 INJECTION, SOLUTION INFILTRATION; PERINEURAL at 11:53

## 2019-12-09 PROBLEM — M17.11 PRIMARY OSTEOARTHRITIS OF RIGHT KNEE: Status: ACTIVE | Noted: 2019-12-09

## 2019-12-09 LAB
CYTO UR: NORMAL
LAB AP CASE REPORT: NORMAL
LAB AP DIAGNOSIS COMMENT: NORMAL
LAB AP NON-GYN SPECIMEN ADEQUACY: NORMAL
PATH REPORT.FINAL DX SPEC: NORMAL
PATH REPORT.GROSS SPEC: NORMAL

## 2019-12-12 ENCOUNTER — OFFICE VISIT (OUTPATIENT)
Dept: FAMILY MEDICINE CLINIC | Facility: CLINIC | Age: 59
End: 2019-12-12

## 2019-12-12 VITALS
HEIGHT: 72 IN | SYSTOLIC BLOOD PRESSURE: 138 MMHG | TEMPERATURE: 98.7 F | HEART RATE: 88 BPM | WEIGHT: 293 LBS | BODY MASS INDEX: 39.68 KG/M2 | DIASTOLIC BLOOD PRESSURE: 72 MMHG | RESPIRATION RATE: 16 BRPM

## 2019-12-12 DIAGNOSIS — Z23 IMMUNIZATION DUE: ICD-10-CM

## 2019-12-12 DIAGNOSIS — I10 BENIGN ESSENTIAL HYPERTENSION: Primary | ICD-10-CM

## 2019-12-12 DIAGNOSIS — F41.9 ANXIETY: ICD-10-CM

## 2019-12-12 PROBLEM — R53.82 CHRONIC FATIGUE: Status: ACTIVE | Noted: 2019-08-10

## 2019-12-12 PROCEDURE — 90471 IMMUNIZATION ADMIN: CPT | Performed by: FAMILY MEDICINE

## 2019-12-12 PROCEDURE — 90674 CCIIV4 VAC NO PRSV 0.5 ML IM: CPT | Performed by: FAMILY MEDICINE

## 2019-12-12 PROCEDURE — 99213 OFFICE O/P EST LOW 20 MIN: CPT | Performed by: FAMILY MEDICINE

## 2019-12-12 RX ORDER — ALPRAZOLAM 0.5 MG/1
0.5 TABLET ORAL DAILY
Qty: 30 TABLET | Refills: 2 | Status: SHIPPED | OUTPATIENT
Start: 2019-12-12 | End: 2020-05-20 | Stop reason: SDUPTHER

## 2019-12-12 RX ORDER — METOPROLOL SUCCINATE 100 MG/1
100 TABLET, EXTENDED RELEASE ORAL DAILY
Qty: 90 TABLET | Refills: 1 | Status: SHIPPED | OUTPATIENT
Start: 2019-12-12 | End: 2020-05-20 | Stop reason: SDUPTHER

## 2019-12-12 RX ORDER — TRIAMTERENE AND HYDROCHLOROTHIAZIDE 37.5; 25 MG/1; MG/1
1 TABLET ORAL DAILY
Qty: 90 TABLET | Refills: 1 | Status: SHIPPED | OUTPATIENT
Start: 2019-12-12 | End: 2020-05-20 | Stop reason: SDUPTHER

## 2019-12-12 NOTE — PROGRESS NOTES
Subjective   Pratibha Pascal is a 59 y.o. female.     History of Present Illness     Chief Complaint:   Chief Complaint   Patient presents with   • Hypertension     med refill gayle    • Anxiety   • Immunizations     FLU VACCINE RT DELTOID TODAY        Pratibha Pascal 59 y.o. female who presents today for Medical Management of the below listed issues and medication refills.  she has a problem list of   Patient Active Problem List   Diagnosis   • CAD (coronary artery disease)   • Mixed hyperlipidemia   • Benign essential hypertension   • Hypothyroidism   • Major depressive disorder, recurrent, in full remission (CMS/[facility])   • Rheumatoid arthritis (CMS/[facility])   • Cervicalgia   • Diabetes mellitus (CMS/HCC)   • Dyslipidemia   • BP (high blood pressure)   • Obstructive apnea   • AF (paroxysmal atrial fibrillation) (CMS/[facility])   • Proteinuria   • Burn   • Cellulitis of left leg   • Diabetes mellitus with neurological manifestation (CMS/[facility])   • MSSA (methicillin susceptible Staphylococcus aureus) infection   • Diabetic ulcer of left foot associated with diabetes mellitus due to underlying condition (CMS/HCC)   • Morbid obesity (CMS/[facility])   • Primary insomnia   • Anxiety   • Immunodeficiency, unspecified (CMS/[facility])   • Long term current use of non-steroidal anti-inflammatories (NSAID)   • Personal history of immunosupression therapy   • Primary generalized (osteo)arthritis   • Raised antibody titer   • Rheumatoid arthritis of multiple sites without organ or system involvement with positive rheumatoid factor (CMS/[facility])   • Other long term (current) drug therapy   • Uncontrolled type 2 diabetes mellitus with hyperglycemia (CMS/[facility])   • Vitamin D deficiency, unspecified   • Hypercalcemia   • Primary osteoarthritis of left knee   • Abnormal nuclear stress test   • OA (osteoarthritis) of knee   • Primary osteoarthritis of right knee   • Chronic fatigue   .  Since the last visit, she has overall felt well.  she has been compliant with    Current Outpatient Medications:   •  ALPRAZolam (XANAX) 0.5 MG tablet, Take 1 tablet by mouth Daily. TAKE 1 TABLET BY MOUTH EVERY DAY AS NEEDED, Disp: 30 tablet, Rfl: 2  •  atorvastatin (LIPITOR) 10 MG tablet, Take 0.5 tablets by mouth Daily., Disp: 45 tablet, Rfl: 1  •  baclofen (LIORESAL) 10 MG tablet, Take 10 mg by mouth 3 (Three) Times a Day As Needed for Muscle Spasms., Disp: , Rfl:   •  Baricitinib (OLUMIANT) 2 MG tablet, , Disp: , Rfl:   •  dapagliflozin-metformin HCl ER (XIGDUO XR) 5-1000 MG tablet, Take 2 tablets by mouth Daily., Disp: 60 tablet, Rfl: 5  •  FLUoxetine (PROzac) 20 MG capsule, Take 60 mg by mouth Every Night., Disp: , Rfl:   •  folic acid (FOLVITE) 1 MG tablet, Take 1 mg by mouth Daily., Disp: , Rfl:   •  levothyroxine (SYNTHROID, LEVOTHROID) 137 MCG tablet, Take 137 mcg by mouth Daily., Disp: , Rfl:   •  losartan (COZAAR) 50 MG tablet, Take 50 mg by mouth Daily., Disp: , Rfl:   •  metoprolol succinate XL (TOPROL-XL) 100 MG 24 hr tablet, Take 1 tablet by mouth Daily., Disp: 90 tablet, Rfl: 1  •  ONETOUCH DELICA LANCETS 33G misc, Test 3 times daily, Disp: 100 each, Rfl: 11  •  predniSONE (DELTASONE) 5 MG tablet, Take 5 mg by mouth Daily., Disp: , Rfl:   •  sulfaSALAzine (AZULFIDINE) 500 MG tablet, Take 1,500 mg by mouth 2 (Two) Times a Day. TAKE 3 TABLETS BY MOUTH TWICE DAILY, Disp: , Rfl: 1  •  triamterene-hydrochlorothiazide (MAXZIDE-25) 37.5-25 MG per tablet, Take 1 tablet by mouth Daily., Disp: 90 tablet, Rfl: 1  •  TRULICITY 1.5 MG/0.5ML solution pen-injector, Inject 1.5 mg under the skin into the appropriate area as directed 1 (One) Time Per Week., Disp: 12 pen, Rfl: 1  No current facility-administered medications for this visit.     Facility-Administered Medications Ordered in Other Visits:   •  mupirocin (BACTROBAN) 2 % nasal ointment, , Nasal, BID, Nabor Correia MD.  she denies medication side effects.    All of the other chronic condition(s) listed above are stable w/o  "issues.    /72   Pulse 88   Temp 98.7 °F (37.1 °C) (Oral)   Resp 16   Ht 182.9 cm (72\")   Wt 134 kg (296 lb)   BMI 40.14 kg/m²     Results for orders placed or performed during the hospital encounter of 12/06/19   Fine Needle Aspiration   Result Value Ref Range    Case Report       [facility] Fine Needle Aspirate                       Case: [id number]                                 Authorizing Provider:  Kimo Carter MD     Collected:           12/06/2019 12:40 PM          Ordering Location:     [facility]   Received:            12/06/2019 12:40 PM                                 ULTRASOUND                                                                   Pathologist:           Mireille Sosa MD                                                          Specimens:   1) - Thyroid, FNA-Rt thyroid                                                                        2) - Thyroid, FNA-Lt thyroid                                                               Final Diagnosis       1. Thyroid, Right, U/S-Guided Fine Needle Aspiration (6 Smears and 1 Cell Block):   A. Scattered groups of Hurthle cells, suspicious for Hurthle cell neoplasm (see Comment).   B. [place] Category IV.    2. Thyroid, Left, U/S-Guided Fine Needle Aspiration (10 Smears):   A. Rare follicular cells (non-diagnostic).   B. [place] Category I.     The [place] System for Reporting Thyroid Cytopathology: Implied Risk of malignancy and recommended Clinical Management    Diagnostic Category               %Risk of Malignancy               Usual Management*    I. Non-diagnostic                             1 - 4                                 Repeat FNA, ultrasound guided    II. Benign                                         0 - 3                                 Clinical Follow-up    III. Atypia of Undetermined significance         or Follicular lesion of Undetermined         significance                               5 - 15* " "*                           Repeat FNA    IV. Follicular Neoplasm or suspicious         For a Follicular Neoplasm               15 - 30                       Surgical lobectomy    V. Suspicious for Malignancy                 60 - 75                      Near total thyroidectomy                                                                                                     Or surgical lobectomy * * *     VI. Malignant                                           97-99                       Near total thyroidectomy       FNA = fine-needle aspiration.  *Actual management may depend on other factors (eg. Clinical, sonographic, molecular) besides the FNA interpretation.  **Estimate extrapolated from histopathologic data derived from patients with repeated \"atypicals\".  * * * In the case of a \"Suspicious for metastatic tumor\" or a \"malignant\" interpretation indicating metastatic tumor rather than a primary thyroid malignancy, surgery may not be indicated.  PLEASE NOTE: The above should be interpreted along with clinical findings. The literature has suggested that molecular studies may be helpful in some cases.                             Reference: The [place] System for Reporting Thyroid Cytopathology, MARCELINO Cyr et al. AmJClinPathol 2009; 132: 658-665.        Comment       Smear preparations from part 1 show a predominance of Hurthle cells with abundant granular cytoplasm and prominent nucleoli. A number of hemosiderin-laden macrophages are noted. No other significant cell population nor significant background inflammation is present. No definitive pseudoinclusions are seen.    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    Select slides were shared in internal consultation with Dr. Arana, who concurs.         Specimen Adequacy Other, see comment     Gross Description       #1: 6 slides and ThinPrep  vial for cell block     #2: 10 slides and ThinPrep vial for cell block. Cell block " material disintegrated after fixation.    Cell block in formalin @ 1450 on 12-6-19). No remaining fluids.      Microscopic Description       Hypocellular- MATTHEW Arana MD (#2)    #1; Adequate cellularity- Dr. Arana             The following portions of the patient's history were reviewed and updated as appropriate: allergies, current medications, past family history, past medical history, past social history, past surgical history and problem list.    Review of Systems   Constitutional: Negative for activity change, chills, fatigue and fever.   Respiratory: Negative for cough and chest tightness.    Cardiovascular: Negative for chest pain and palpitations.   Gastrointestinal: Negative for abdominal pain and nausea.   Endocrine: Negative for cold intolerance.   Psychiatric/Behavioral: Negative for behavioral problems and dysphoric mood.       Objective   Physical Exam   Constitutional: She appears well-developed and well-nourished.   Neck: Neck supple. No thyromegaly present.   Cardiovascular: Normal rate and regular rhythm.   No murmur heard.  Pulmonary/Chest: Effort normal and breath sounds normal.   Abdominal: Bowel sounds are normal. There is no tenderness.   Neurological: She is alert.   Psychiatric: She has a normal mood and affect. Her behavior is normal.   Nursing note and vitals reviewed.    The patient has read and signed the [facility] Controlled Substance Contract.  I will continue to see patient for regular follow up appointments.  They are well controlled on their medication.  DILLAN has been reviewed by me and is updated every 3 months. The patient is aware of the potential for addiction and dependence.    Assessment/Plan   Pratibha was seen today for hypertension, anxiety and immunizations.    Diagnoses and all orders for this visit:    Benign essential hypertension  -     metoprolol succinate XL (TOPROL-XL) 100 MG 24 hr tablet; Take 1 tablet by mouth Daily.  -     triamterene-hydrochlorothiazide  (MAXZIDE-25) 37.5-25 MG per tablet; Take 1 tablet by mouth Daily.    Anxiety  -     ALPRAZolam (XANAX) 0.5 MG tablet; Take 1 tablet by mouth Daily. TAKE 1 TABLET BY MOUTH EVERY DAY AS NEEDED    Immunization due  -     Flucelvax Quad=>4Years (PFS)

## 2019-12-18 DIAGNOSIS — I10 BENIGN ESSENTIAL HYPERTENSION: ICD-10-CM

## 2019-12-18 RX ORDER — LOSARTAN POTASSIUM 50 MG/1
TABLET ORAL
Qty: 90 TABLET | Refills: 0 | Status: SHIPPED | OUTPATIENT
Start: 2019-12-18 | End: 2020-01-31

## 2019-12-19 RX ORDER — BACLOFEN 10 MG/1
TABLET ORAL
Qty: 270 TABLET | Refills: 0 | OUTPATIENT
Start: 2019-12-19

## 2020-01-29 RX ORDER — BACLOFEN 10 MG/1
TABLET ORAL
Qty: 270 TABLET | Refills: 0 | OUTPATIENT
Start: 2020-01-29

## 2020-01-31 ENCOUNTER — APPOINTMENT (OUTPATIENT)
Dept: PREADMISSION TESTING | Facility: HOSPITAL | Age: 60
End: 2020-01-31

## 2020-01-31 VITALS
OXYGEN SATURATION: 96 % | HEART RATE: 85 BPM | RESPIRATION RATE: 18 BRPM | WEIGHT: 293 LBS | HEIGHT: 72 IN | DIASTOLIC BLOOD PRESSURE: 72 MMHG | BODY MASS INDEX: 39.68 KG/M2 | SYSTOLIC BLOOD PRESSURE: 128 MMHG | TEMPERATURE: 97.5 F

## 2020-01-31 DIAGNOSIS — M17.11 PRIMARY OSTEOARTHRITIS OF RIGHT KNEE: ICD-10-CM

## 2020-01-31 LAB
ALBUMIN SERPL-MCNC: 4 G/DL (ref 3.5–5.2)
ALBUMIN/GLOB SERPL: 1.4 G/DL
ALP SERPL-CCNC: 106 U/L (ref 39–117)
ALT SERPL W P-5'-P-CCNC: 17 U/L (ref 1–33)
ANION GAP SERPL CALCULATED.3IONS-SCNC: 18 MMOL/L (ref 5–15)
AST SERPL-CCNC: 17 U/L (ref 1–32)
BILIRUB SERPL-MCNC: 0.2 MG/DL (ref 0.2–1.2)
BILIRUB UR QL STRIP: NEGATIVE
BUN BLD-MCNC: 18 MG/DL (ref 8–23)
BUN/CREAT SERPL: 23.4 (ref 7–25)
CALCIUM SPEC-SCNC: 9.6 MG/DL (ref 8.6–10.5)
CHLORIDE SERPL-SCNC: 98 MMOL/L (ref 98–107)
CLARITY UR: CLEAR
CO2 SERPL-SCNC: 22 MMOL/L (ref 22–29)
COLOR UR: YELLOW
CREAT BLD-MCNC: 0.77 MG/DL (ref 0.57–1)
DEPRECATED RDW RBC AUTO: 44 FL (ref 37–54)
ERYTHROCYTE [DISTWIDTH] IN BLOOD BY AUTOMATED COUNT: 14.8 % (ref 12.3–15.4)
GFR SERPL CREATININE-BSD FRML MDRD: 76 ML/MIN/1.73
GLOBULIN UR ELPH-MCNC: 2.8 GM/DL
GLUCOSE BLD-MCNC: 219 MG/DL (ref 65–99)
GLUCOSE UR STRIP-MCNC: ABNORMAL MG/DL
HCT VFR BLD AUTO: 41.6 % (ref 34–46.6)
HGB BLD-MCNC: 13.4 G/DL (ref 12–15.9)
HGB UR QL STRIP.AUTO: NEGATIVE
KETONES UR QL STRIP: NEGATIVE
LEUKOCYTE ESTERASE UR QL STRIP.AUTO: NEGATIVE
MCH RBC QN AUTO: 26.5 PG (ref 26.6–33)
MCHC RBC AUTO-ENTMCNC: 32.2 G/DL (ref 31.5–35.7)
MCV RBC AUTO: 82.2 FL (ref 79–97)
NITRITE UR QL STRIP: NEGATIVE
PH UR STRIP.AUTO: 6 [PH] (ref 5–8)
PLATELET # BLD AUTO: 414 10*3/MM3 (ref 140–450)
PMV BLD AUTO: 9 FL (ref 6–12)
POTASSIUM BLD-SCNC: 4.2 MMOL/L (ref 3.5–5.2)
PROT SERPL-MCNC: 6.8 G/DL (ref 6–8.5)
PROT UR QL STRIP: ABNORMAL
RBC # BLD AUTO: 5.06 10*6/MM3 (ref 3.77–5.28)
SODIUM BLD-SCNC: 138 MMOL/L (ref 136–145)
SP GR UR STRIP: >=1.03 (ref 1–1.03)
UROBILINOGEN UR QL STRIP: ABNORMAL
WBC NRBC COR # BLD: 11.88 10*3/MM3 (ref 3.4–10.8)

## 2020-01-31 PROCEDURE — 81003 URINALYSIS AUTO W/O SCOPE: CPT | Performed by: ORTHOPAEDIC SURGERY

## 2020-01-31 PROCEDURE — 36415 COLL VENOUS BLD VENIPUNCTURE: CPT

## 2020-01-31 PROCEDURE — 85027 COMPLETE CBC AUTOMATED: CPT | Performed by: ORTHOPAEDIC SURGERY

## 2020-01-31 PROCEDURE — 80053 COMPREHEN METABOLIC PANEL: CPT | Performed by: ORTHOPAEDIC SURGERY

## 2020-01-31 RX ORDER — CHLORHEXIDINE GLUCONATE 500 MG/1
1 CLOTH TOPICAL
COMMUNITY
End: 2020-02-13 | Stop reason: HOSPADM

## 2020-01-31 ASSESSMENT — KOOS JR
KOOS JR SCORE: 28
KOOS JR SCORE: 0

## 2020-02-06 ENCOUNTER — OFFICE VISIT (OUTPATIENT)
Dept: ORTHOPEDIC SURGERY | Facility: CLINIC | Age: 60
End: 2020-02-06

## 2020-02-06 VITALS — WEIGHT: 293 LBS | HEIGHT: 55 IN | BODY MASS INDEX: 67.81 KG/M2 | TEMPERATURE: 98.6 F

## 2020-02-06 DIAGNOSIS — M17.11 PRIMARY OSTEOARTHRITIS OF RIGHT KNEE: Primary | ICD-10-CM

## 2020-02-06 PROCEDURE — S0260 H&P FOR SURGERY: HCPCS | Performed by: NURSE PRACTITIONER

## 2020-02-06 PROCEDURE — 77077 JOINT SURVEY SINGLE VIEW: CPT | Performed by: ORTHOPAEDIC SURGERY

## 2020-02-06 PROCEDURE — 73562 X-RAY EXAM OF KNEE 3: CPT | Performed by: NURSE PRACTITIONER

## 2020-02-06 NOTE — H&P
Patient: Pratibha Pascal    Date of Admission: 2/12/20    YOB: 1960    Medical Record Number: [id number]    Admitting Physician: Dr. Nabor Correia    Reason for Admission: End Stage Right Knee OA    History of Present Illness: 59 y.o. female presents with severe end stage knee osteoarthritis which has not been responsive to the full compliment of conservative measures. Despite conservative attempts, there is still severe, constant activity limiting pain. Given the severity of the pain, the patient has elected to proceed with knee replacement.    Allergies:   Allergies   Allergen Reactions   • Cefuroxime Diarrhea     Patient has tolerated ceftriaxone (Jan 2018), cefazolin (Oct 2018) and piperacillin-tazobactam (July 2017).   • Clindamycin/Lincomycin Diarrhea   • Dicloxacillin Diarrhea   • Erythromycin Swelling   • Talwin [Pentazocine] Itching         Current Medications:  Home Medications:    Current Outpatient Medications on File Prior to Visit   Medication Sig   • ALPRAZolam (XANAX) 0.5 MG tablet Take 1 tablet by mouth Daily. TAKE 1 TABLET BY MOUTH EVERY DAY AS NEEDED   • atorvastatin (LIPITOR) 10 MG tablet Take 0.5 tablets by mouth Daily.   • baclofen (LIORESAL) 10 MG tablet Take 10 mg by mouth 3 (Three) Times a Day As Needed for Muscle Spasms.   • Chlorhexidine Gluconate Cloth 2 % pads Apply  topically.   • dapagliflozin-metformin HCl ER (XIGDUO XR) 5-1000 MG tablet Take 2 tablets by mouth Daily.   • FLUoxetine (PROzac) 20 MG capsule Take 60 mg by mouth Every Night.   • folic acid (FOLVITE) 1 MG tablet Take 1 mg by mouth Daily.   • levothyroxine (SYNTHROID, LEVOTHROID) 137 MCG tablet Take 137 mcg by mouth Daily.   • losartan (COZAAR) 50 MG tablet Take 50 mg by mouth Daily.   • metoprolol succinate XL (TOPROL-XL) 100 MG 24 hr tablet Take 1 tablet by mouth Daily.   • mupirocin (BACTROBAN) 2 % nasal ointment into the nostril(s) as directed by provider 2 (Two) Times a Day.   • ONETOUCH DELICA LANCETS 33G  misc Test 3 times daily   • predniSONE (DELTASONE) 5 MG tablet Take 5 mg by mouth Daily.   • sulfaSALAzine (AZULFIDINE) 500 MG tablet Take 1,500 mg by mouth 2 (Two) Times a Day. TAKE 3 TABLETS BY MOUTH TWICE DAILY   • triamterene-hydrochlorothiazide (MAXZIDE-25) 37.5-25 MG per tablet Take 1 tablet by mouth Daily.   • TRULICITY 1.5 MG/0.5ML solution pen-injector Inject 1.5 mg under the skin into the appropriate area as directed 1 (One) Time Per Week.   • Upadacitinib ER (RINVOQ) 15 MG tablet sustained-release 24 hour Take 15 mg by mouth Daily.     Current Facility-Administered Medications on File Prior to Visit   Medication   • mupirocin (BACTROBAN) 2 % nasal ointment     PRN Meds:.    PMH:     Past Medical History:   Diagnosis Date   • Allergic    • Allergy to mold    • Anxiety    • Arthritis    • Benign essential hypertension    • CAD (coronary artery disease)    • Cataract    • Coronary artery disease    • Depression    • Diabetes mellitus (CMS/HCC)    • Fibromyalgia, primary    • H/O bone density study unclear   • H/O complete eye exam 2 -3 years   • Headache    • History of myocardial infarction    • History of staph infection     HISTORY OF MSSA IN LEFT FOOT FROM DIABETIC ULCER HEALED   • HL (hearing loss)     RIGHT EAR  SEVERE HEARING LOSS   • HLD (hyperlipidemia)    • Hyperlipidemia    • Hypertension    • Hypothyroidism    • Hypothyroidism    • Injury of back    • Left knee pain    • Major depressive disorder, recurrent, in full remission (CMS/HCC)    • Obesity    • BRANNON (obstructive sleep apnea)     USE CPAP   • Rheumatoid arthritis (CMS/HCC)    • Seasonal allergies    • Type 2 diabetes mellitus (CMS/HCC)        PF/Surg/Soc Hx:     Past Surgical History:   Procedure Laterality Date   • BACK SURGERY  10/1995    also 2/02; L5-S1; Dr. Carroll Avitia   • CARDIAC CATHETERIZATION     • CARDIAC CATHETERIZATION N/A 9/3/2019    Procedure: Coronary angiography  ;  Surgeon: Magy Rivera MD;  Location: [facility]  INVASIVE LOCATION;  Service: Cardiovascular   • CARDIAC CATHETERIZATION N/A 9/3/2019    Procedure: Left Heart Cath;  Surgeon: Magy Rivera MD;  Location:  [facility];  Service: Cardiovascular   • CARDIAC CATHETERIZATION N/A 9/3/2019    Procedure: Left ventriculography;  Surgeon: Magy Rivera MD;  Location:  [facility] IN[facility];  Service: Cardiovascular   • COLONOSCOPY     • CORONARY STENT PLACEMENT  2014   • JOINT REPLACEMENT     • KNEE ARTHROSCOPY Right    • MAMMO BILATERAL  due    delcines   • PAP SMEAR  due   • TONSILLECTOMY      age 26   • TOTAL KNEE ARTHROPLASTY Left 10/14/2019    Procedure: TOTAL KNEE ARTHROPLASTY;  Surgeon: Nabor Correia MD;  Location:  [facility];  Service: Orthopedics   • US GUIDED FINE NEEDLE ASPIRATION  2019        Social History     Occupational History   • Not on file   Tobacco Use   • Smoking status: Former Smoker     Packs/day: 1.00     Years: 30.00     Pack years: 30.00     Last attempt to quit: 2014     Years since quittin.6   • Smokeless tobacco: Never Used   • Tobacco comment: quit 2014   Substance and Sexual Activity   • Alcohol use: No   • Drug use: No   • Sexual activity: Defer      Social History     Social History Narrative   • Not on file        Family History   Problem Relation Age of Onset   • Cancer Mother         breast   • Hypertension Mother    • Rheum arthritis Mother    • Thyroid disease Mother    • Arthritis Mother    • Hypertension Father    • Thyroid disease Father    • COPD Father    • Depression Father    • Hearing loss Father    • Heart failure Maternal Grandmother    • Rheum arthritis Other         aunt   • Depression Daughter    • Drug abuse Daughter    • Malig Hyperthermia Neg Hx          Review of Systems:   A 14 point review of systems was performed, pertinent positives discussed above, all other systems are negative    Physical Exam: 59 y.o. female  Vital Signs :   Vitals:    20 1456  "  Temp: 98.6 °F (37 °C)   Weight: (!) 137 kg (302 lb)   Height: 72 cm (28.35\")     General: Alert and Oriented x 3, No acute distress.  Psych: mood and affect appropriate; recent and remote memory intact  Eyes: conjunctiva clear; pupils equally round and reactive, sclera nonicteric  CV: no peripheral edema  Resp: normal respiratory effort  Skin: no rashes or wounds; normal turgor  Musculosketetal; pain and crepitance with knee range of motion  Vascular: palpable distal pulses    Xrays:  -3 views (AP, lateral, and sunrise) were ordered and reviewed demonstrating end-stage OA with bone on bone articulation.  -A full length AP xray was ordered and reviewed today for purposes of operative alignment demonstrating end stage arthritic findings. There are no previous full length films for review    Assessment:  End-stage Right knee osteoarthritis. Conservative measures have failed.      Plan:  The plan is to proceed with Right Total Knee Replacement. The patient voiced understanding of the risks, benefits, and alternative forms of treatment that were discussed with Dr Correia at the time of scheduling.  Patient is planning on going home with home health next day.  No juana Morgan, APRN  2/6/2020        "

## 2020-02-06 NOTE — H&P (VIEW-ONLY)
Patient: Pratibha aPscal    Date of Admission: 2/12/20    YOB: 1960    Medical Record Number: [id number]    Admitting Physician: Dr. Nabor Correia    Reason for Admission: End Stage Right Knee OA    History of Present Illness: 59 y.o. female presents with severe end stage knee osteoarthritis which has not been responsive to the full compliment of conservative measures. Despite conservative attempts, there is still severe, constant activity limiting pain. Given the severity of the pain, the patient has elected to proceed with knee replacement.    Allergies:   Allergies   Allergen Reactions   • Cefuroxime Diarrhea     Patient has tolerated ceftriaxone (Jan 2018), cefazolin (Oct 2018) and piperacillin-tazobactam (July 2017).   • Clindamycin/Lincomycin Diarrhea   • Dicloxacillin Diarrhea   • Erythromycin Swelling   • Talwin [Pentazocine] Itching         Current Medications:  Home Medications:    Current Outpatient Medications on File Prior to Visit   Medication Sig   • ALPRAZolam (XANAX) 0.5 MG tablet Take 1 tablet by mouth Daily. TAKE 1 TABLET BY MOUTH EVERY DAY AS NEEDED   • atorvastatin (LIPITOR) 10 MG tablet Take 0.5 tablets by mouth Daily.   • baclofen (LIORESAL) 10 MG tablet Take 10 mg by mouth 3 (Three) Times a Day As Needed for Muscle Spasms.   • Chlorhexidine Gluconate Cloth 2 % pads Apply  topically.   • dapagliflozin-metformin HCl ER (XIGDUO XR) 5-1000 MG tablet Take 2 tablets by mouth Daily.   • FLUoxetine (PROzac) 20 MG capsule Take 60 mg by mouth Every Night.   • folic acid (FOLVITE) 1 MG tablet Take 1 mg by mouth Daily.   • levothyroxine (SYNTHROID, LEVOTHROID) 137 MCG tablet Take 137 mcg by mouth Daily.   • losartan (COZAAR) 50 MG tablet Take 50 mg by mouth Daily.   • metoprolol succinate XL (TOPROL-XL) 100 MG 24 hr tablet Take 1 tablet by mouth Daily.   • mupirocin (BACTROBAN) 2 % nasal ointment into the nostril(s) as directed by provider 2 (Two) Times a Day.   • ONETOUCH DELICA LANCETS 33G  misc Test 3 times daily   • predniSONE (DELTASONE) 5 MG tablet Take 5 mg by mouth Daily.   • sulfaSALAzine (AZULFIDINE) 500 MG tablet Take 1,500 mg by mouth 2 (Two) Times a Day. TAKE 3 TABLETS BY MOUTH TWICE DAILY   • triamterene-hydrochlorothiazide (MAXZIDE-25) 37.5-25 MG per tablet Take 1 tablet by mouth Daily.   • TRULICITY 1.5 MG/0.5ML solution pen-injector Inject 1.5 mg under the skin into the appropriate area as directed 1 (One) Time Per Week.   • Upadacitinib ER (RINVOQ) 15 MG tablet sustained-release 24 hour Take 15 mg by mouth Daily.     Current Facility-Administered Medications on File Prior to Visit   Medication   • mupirocin (BACTROBAN) 2 % nasal ointment     PRN Meds:.    PMH:     Past Medical History:   Diagnosis Date   • Allergic    • Allergy to mold    • Anxiety    • Arthritis    • Benign essential hypertension    • CAD (coronary artery disease)    • Cataract    • Coronary artery disease    • Depression    • Diabetes mellitus (CMS/HCC)    • Fibromyalgia, primary    • H/O bone density study unclear   • H/O complete eye exam 2 -3 years   • Headache    • History of myocardial infarction    • History of staph infection     HISTORY OF MSSA IN LEFT FOOT FROM DIABETIC ULCER HEALED   • HL (hearing loss)     RIGHT EAR  SEVERE HEARING LOSS   • HLD (hyperlipidemia)    • Hyperlipidemia    • Hypertension    • Hypothyroidism    • Hypothyroidism    • Injury of back    • Left knee pain    • Major depressive disorder, recurrent, in full remission (CMS/HCC)    • Obesity    • BRANNON (obstructive sleep apnea)     USE CPAP   • Rheumatoid arthritis (CMS/HCC)    • Seasonal allergies    • Type 2 diabetes mellitus (CMS/HCC)        PF/Surg/Soc Hx:     Past Surgical History:   Procedure Laterality Date   • BACK SURGERY  10/1995    also 2/02; L5-S1; Dr. Carroll Avitia   • CARDIAC CATHETERIZATION     • CARDIAC CATHETERIZATION N/A 9/3/2019    Procedure: Coronary angiography  ;  Surgeon: Magy Rivera MD;  Location: [facility]  INVASIVE LOCATION;  Service: Cardiovascular   • CARDIAC CATHETERIZATION N/A 9/3/2019    Procedure: Left Heart Cath;  Surgeon: Magy Rivera MD;  Location:  [facility];  Service: Cardiovascular   • CARDIAC CATHETERIZATION N/A 9/3/2019    Procedure: Left ventriculography;  Surgeon: Magy Rivera MD;  Location:  [facility];  Service: Cardiovascular   • COLONOSCOPY     • CORONARY STENT PLACEMENT  2014   • JOINT REPLACEMENT     • KNEE ARTHROSCOPY Right    • MAMMO BILATERAL  due    delcines   • PAP SMEAR  due   • TONSILLECTOMY      age 26   • TOTAL KNEE ARTHROPLASTY Left 10/14/2019    Procedure: TOTAL KNEE ARTHROPLASTY;  Surgeon: Nabor Correia MD;  Location:  [facility];  Service: Orthopedics   • US GUIDED FINE NEEDLE ASPIRATION  2019        Social History     Occupational History   • Not on file   Tobacco Use   • Smoking status: Former Smoker     Packs/day: 1.00     Years: 30.00     Pack years: 30.00     Last attempt to quit: 2014     Years since quittin.6   • Smokeless tobacco: Never Used   • Tobacco comment: quit 2014   Substance and Sexual Activity   • Alcohol use: No   • Drug use: No   • Sexual activity: Defer      Social History     Social History Narrative   • Not on file        Family History   Problem Relation Age of Onset   • Cancer Mother         breast   • Hypertension Mother    • Rheum arthritis Mother    • Thyroid disease Mother    • Arthritis Mother    • Hypertension Father    • Thyroid disease Father    • COPD Father    • Depression Father    • Hearing loss Father    • Heart failure Maternal Grandmother    • Rheum arthritis Other         aunt   • Depression Daughter    • Drug abuse Daughter    • Malig Hyperthermia Neg Hx          Review of Systems:   A 14 point review of systems was performed, pertinent positives discussed above, all other systems are negative    Physical Exam: 59 y.o. female  Vital Signs :   Vitals:    20 1456  "  Temp: 98.6 °F (37 °C)   Weight: (!) 137 kg (302 lb)   Height: 72 cm (28.35\")     General: Alert and Oriented x 3, No acute distress.  Psych: mood and affect appropriate; recent and remote memory intact  Eyes: conjunctiva clear; pupils equally round and reactive, sclera nonicteric  CV: no peripheral edema  Resp: normal respiratory effort  Skin: no rashes or wounds; normal turgor  Musculosketetal; pain and crepitance with knee range of motion  Vascular: palpable distal pulses    Xrays:  -3 views (AP, lateral, and sunrise) were ordered and reviewed demonstrating end-stage OA with bone on bone articulation.  -A full length AP xray was ordered and reviewed today for purposes of operative alignment demonstrating end stage arthritic findings. There are no previous full length films for review    Assessment:  End-stage Right knee osteoarthritis. Conservative measures have failed.      Plan:  The plan is to proceed with Right Total Knee Replacement. The patient voiced understanding of the risks, benefits, and alternative forms of treatment that were discussed with Dr Correia at the time of scheduling.  Patient is planning on going home with home health next day.  No juaan Morgan, APRN  2/6/2020        "

## 2020-02-12 ENCOUNTER — HOSPITAL ENCOUNTER (OUTPATIENT)
Facility: HOSPITAL | Age: 60
Discharge: HOME HEALTH | End: 2020-02-13
Attending: ORTHOPAEDIC SURGERY | Admitting: ORTHOPAEDIC SURGERY

## 2020-02-12 ENCOUNTER — ANESTHESIA EVENT (OUTPATIENT)
Dept: PERIOP | Facility: HOSPITAL | Age: 60
End: 2020-02-12

## 2020-02-12 ENCOUNTER — ANESTHESIA (OUTPATIENT)
Dept: PERIOP | Facility: HOSPITAL | Age: 60
End: 2020-02-12

## 2020-02-12 ENCOUNTER — APPOINTMENT (OUTPATIENT)
Dept: GENERAL RADIOLOGY | Facility: HOSPITAL | Age: 60
End: 2020-02-12

## 2020-02-12 DIAGNOSIS — M17.11 PRIMARY OSTEOARTHRITIS OF RIGHT KNEE: ICD-10-CM

## 2020-02-12 DIAGNOSIS — Z98.890 S/P KNEE SURGERY: Primary | ICD-10-CM

## 2020-02-12 PROBLEM — M25.562 CHRONIC PAIN OF LEFT KNEE: Status: ACTIVE | Noted: 2020-02-12

## 2020-02-12 PROBLEM — G89.29 CHRONIC PAIN OF LEFT KNEE: Status: ACTIVE | Noted: 2020-02-12

## 2020-02-12 LAB
CREAT BLD-MCNC: 0.74 MG/DL (ref 0.57–1)
GFR SERPL CREATININE-BSD FRML MDRD: 80 ML/MIN/1.73
GLUCOSE BLDC GLUCOMTR-MCNC: 133 MG/DL (ref 70–130)
GLUCOSE BLDC GLUCOMTR-MCNC: 213 MG/DL (ref 70–130)

## 2020-02-12 PROCEDURE — C9290 INJ, BUPIVACAINE LIPOSOME: HCPCS | Performed by: ORTHOPAEDIC SURGERY

## 2020-02-12 PROCEDURE — 27447 TOTAL KNEE ARTHROPLASTY: CPT | Performed by: ORTHOPAEDIC SURGERY

## 2020-02-12 PROCEDURE — G0378 HOSPITAL OBSERVATION PER HR: HCPCS

## 2020-02-12 PROCEDURE — 25010000002 MIDAZOLAM PER 1 MG: Performed by: ANESTHESIOLOGY

## 2020-02-12 PROCEDURE — 82962 GLUCOSE BLOOD TEST: CPT

## 2020-02-12 PROCEDURE — 25010000002 FENTANYL CITRATE (PF) 100 MCG/2ML SOLUTION: Performed by: ANESTHESIOLOGY

## 2020-02-12 PROCEDURE — 97110 THERAPEUTIC EXERCISES: CPT

## 2020-02-12 PROCEDURE — 25010000002 KETOROLAC TROMETHAMINE PER 15 MG: Performed by: ORTHOPAEDIC SURGERY

## 2020-02-12 PROCEDURE — 25010000002 HYDROMORPHONE PER 4 MG: Performed by: NURSE ANESTHETIST, CERTIFIED REGISTERED

## 2020-02-12 PROCEDURE — 87015 SPECIMEN INFECT AGNT CONCNTJ: CPT | Performed by: ORTHOPAEDIC SURGERY

## 2020-02-12 PROCEDURE — 87205 SMEAR GRAM STAIN: CPT | Performed by: ORTHOPAEDIC SURGERY

## 2020-02-12 PROCEDURE — 25010000002 PROPOFOL 10 MG/ML EMULSION: Performed by: NURSE ANESTHETIST, CERTIFIED REGISTERED

## 2020-02-12 PROCEDURE — 87075 CULTR BACTERIA EXCEPT BLOOD: CPT | Performed by: ORTHOPAEDIC SURGERY

## 2020-02-12 PROCEDURE — 73560 X-RAY EXAM OF KNEE 1 OR 2: CPT

## 2020-02-12 PROCEDURE — 82565 ASSAY OF CREATININE: CPT | Performed by: ORTHOPAEDIC SURGERY

## 2020-02-12 PROCEDURE — 25010000002 ONDANSETRON PER 1 MG: Performed by: NURSE ANESTHETIST, CERTIFIED REGISTERED

## 2020-02-12 PROCEDURE — 25010000003 BUPIVACAINE LIPOSOME 1.3 % SUSPENSION 20 ML VIAL: Performed by: ORTHOPAEDIC SURGERY

## 2020-02-12 PROCEDURE — C1776 JOINT DEVICE (IMPLANTABLE): HCPCS | Performed by: ORTHOPAEDIC SURGERY

## 2020-02-12 PROCEDURE — 25010000002 DEXAMETHASONE PER 1 MG: Performed by: NURSE ANESTHETIST, CERTIFIED REGISTERED

## 2020-02-12 PROCEDURE — C1713 ANCHOR/SCREW BN/BN,TIS/BN: HCPCS | Performed by: ORTHOPAEDIC SURGERY

## 2020-02-12 PROCEDURE — 25010000002 FENTANYL CITRATE (PF) 100 MCG/2ML SOLUTION: Performed by: NURSE ANESTHETIST, CERTIFIED REGISTERED

## 2020-02-12 PROCEDURE — 97162 PT EVAL MOD COMPLEX 30 MIN: CPT

## 2020-02-12 PROCEDURE — 87070 CULTURE OTHR SPECIMN AEROBIC: CPT | Performed by: ORTHOPAEDIC SURGERY

## 2020-02-12 PROCEDURE — 25010000002 NEOSTIGMINE PER 0.5 MG: Performed by: NURSE ANESTHETIST, CERTIFIED REGISTERED

## 2020-02-12 PROCEDURE — 25010000002 ROPIVACAINE PER 1 MG: Performed by: ANESTHESIOLOGY

## 2020-02-12 PROCEDURE — 25010000002 VANCOMYCIN 10 G RECONSTITUTED SOLUTION: Performed by: ORTHOPAEDIC SURGERY

## 2020-02-12 DEVICE — LEGION POSTERIOR STABILIZED                                    OXINIUM FEMORAL SIZE 6 RIGHT
Type: IMPLANTABLE DEVICE | Site: KNEE | Status: FUNCTIONAL
Brand: LEGION

## 2020-02-12 DEVICE — GENESIS II NON-POROUS TIBIAL                                    BASEPLATE SIZE 5 RIGHT
Type: IMPLANTABLE DEVICE | Site: KNEE | Status: FUNCTIONAL
Brand: GENESIS II

## 2020-02-12 DEVICE — LEGION POSTERIOR STABILIZED HIGH                                    FLEX HIGHLY CROSS LINKED                                    POLYETHYLENE SIZE 5-6 9MM
Type: IMPLANTABLE DEVICE | Site: KNEE | Status: FUNCTIONAL
Brand: LEGION

## 2020-02-12 DEVICE — CMT BONE PALACOS R HI/VISC 1X40: Type: IMPLANTABLE DEVICE | Site: KNEE | Status: FUNCTIONAL

## 2020-02-12 DEVICE — CMT BONE PALACOS RPLSG W/GENT HI/VISC 1X40: Type: IMPLANTABLE DEVICE | Site: KNEE | Status: FUNCTIONAL

## 2020-02-12 DEVICE — IMPLANTABLE DEVICE: Type: IMPLANTABLE DEVICE | Site: KNEE | Status: FUNCTIONAL

## 2020-02-12 DEVICE — GEN II 7.5MM RESUR PAT 35MM
Type: IMPLANTABLE DEVICE | Site: KNEE | Status: FUNCTIONAL
Brand: GENESIS II

## 2020-02-12 RX ORDER — SODIUM CHLORIDE 0.9 % (FLUSH) 0.9 %
3 SYRINGE (ML) INJECTION EVERY 12 HOURS SCHEDULED
Status: DISCONTINUED | OUTPATIENT
Start: 2020-02-12 | End: 2020-02-12 | Stop reason: HOSPADM

## 2020-02-12 RX ORDER — PROMETHAZINE HYDROCHLORIDE 25 MG/1
25 TABLET ORAL ONCE AS NEEDED
Status: DISCONTINUED | OUTPATIENT
Start: 2020-02-12 | End: 2020-02-12 | Stop reason: HOSPADM

## 2020-02-12 RX ORDER — HYDROCODONE BITARTRATE AND ACETAMINOPHEN 7.5; 325 MG/1; MG/1
1 TABLET ORAL ONCE AS NEEDED
Status: DISCONTINUED | OUTPATIENT
Start: 2020-02-12 | End: 2020-02-12 | Stop reason: HOSPADM

## 2020-02-12 RX ORDER — GLYCOPYRROLATE 0.2 MG/ML
INJECTION INTRAMUSCULAR; INTRAVENOUS AS NEEDED
Status: DISCONTINUED | OUTPATIENT
Start: 2020-02-12 | End: 2020-02-12 | Stop reason: SURG

## 2020-02-12 RX ORDER — OXYCODONE AND ACETAMINOPHEN 7.5; 325 MG/1; MG/1
1 TABLET ORAL ONCE AS NEEDED
Status: DISCONTINUED | OUTPATIENT
Start: 2020-02-12 | End: 2020-02-12 | Stop reason: HOSPADM

## 2020-02-12 RX ORDER — PROMETHAZINE HYDROCHLORIDE 25 MG/1
25 SUPPOSITORY RECTAL ONCE AS NEEDED
Status: DISCONTINUED | OUTPATIENT
Start: 2020-02-12 | End: 2020-02-12 | Stop reason: HOSPADM

## 2020-02-12 RX ORDER — HYDROCODONE BITARTRATE AND ACETAMINOPHEN 7.5; 325 MG/1; MG/1
1 TABLET ORAL EVERY 4 HOURS PRN
Status: DISCONTINUED | OUTPATIENT
Start: 2020-02-12 | End: 2020-02-13 | Stop reason: HOSPADM

## 2020-02-12 RX ORDER — FENTANYL CITRATE 50 UG/ML
50 INJECTION, SOLUTION INTRAMUSCULAR; INTRAVENOUS
Status: DISCONTINUED | OUTPATIENT
Start: 2020-02-12 | End: 2020-02-12 | Stop reason: HOSPADM

## 2020-02-12 RX ORDER — SULFASALAZINE 500 MG/1
1500 TABLET ORAL 2 TIMES DAILY
Status: DISCONTINUED | OUTPATIENT
Start: 2020-02-12 | End: 2020-02-13 | Stop reason: HOSPADM

## 2020-02-12 RX ORDER — HYDROMORPHONE HCL 110MG/55ML
PATIENT CONTROLLED ANALGESIA SYRINGE INTRAVENOUS AS NEEDED
Status: DISCONTINUED | OUTPATIENT
Start: 2020-02-12 | End: 2020-02-12 | Stop reason: SURG

## 2020-02-12 RX ORDER — MAGNESIUM HYDROXIDE 1200 MG/15ML
LIQUID ORAL AS NEEDED
Status: DISCONTINUED | OUTPATIENT
Start: 2020-02-12 | End: 2020-02-12 | Stop reason: HOSPADM

## 2020-02-12 RX ORDER — PROMETHAZINE HYDROCHLORIDE 25 MG/ML
12.5 INJECTION, SOLUTION INTRAMUSCULAR; INTRAVENOUS ONCE AS NEEDED
Status: DISCONTINUED | OUTPATIENT
Start: 2020-02-12 | End: 2020-02-12 | Stop reason: HOSPADM

## 2020-02-12 RX ORDER — TRANEXAMIC ACID 100 MG/ML
INJECTION, SOLUTION INTRAVENOUS AS NEEDED
Status: DISCONTINUED | OUTPATIENT
Start: 2020-02-12 | End: 2020-02-12 | Stop reason: SURG

## 2020-02-12 RX ORDER — TRIAMTERENE AND HYDROCHLOROTHIAZIDE 37.5; 25 MG/1; MG/1
1 TABLET ORAL DAILY
Status: DISCONTINUED | OUTPATIENT
Start: 2020-02-12 | End: 2020-02-13 | Stop reason: HOSPADM

## 2020-02-12 RX ORDER — LOSARTAN POTASSIUM 50 MG/1
50 TABLET ORAL DAILY
Status: DISCONTINUED | OUTPATIENT
Start: 2020-02-13 | End: 2020-02-13 | Stop reason: HOSPADM

## 2020-02-12 RX ORDER — DIPHENHYDRAMINE HCL 25 MG
25 CAPSULE ORAL
Status: DISCONTINUED | OUTPATIENT
Start: 2020-02-12 | End: 2020-02-12 | Stop reason: HOSPADM

## 2020-02-12 RX ORDER — PROPOFOL 10 MG/ML
VIAL (ML) INTRAVENOUS AS NEEDED
Status: DISCONTINUED | OUTPATIENT
Start: 2020-02-12 | End: 2020-02-12 | Stop reason: SURG

## 2020-02-12 RX ORDER — LEVOTHYROXINE SODIUM 137 UG/1
137 TABLET ORAL DAILY
Status: DISCONTINUED | OUTPATIENT
Start: 2020-02-12 | End: 2020-02-13 | Stop reason: HOSPADM

## 2020-02-12 RX ORDER — HYDROCODONE BITARTRATE AND ACETAMINOPHEN 7.5; 325 MG/1; MG/1
2 TABLET ORAL EVERY 4 HOURS PRN
Status: DISCONTINUED | OUTPATIENT
Start: 2020-02-12 | End: 2020-02-13 | Stop reason: HOSPADM

## 2020-02-12 RX ORDER — MELOXICAM 15 MG/1
15 TABLET ORAL DAILY
Status: DISCONTINUED | OUTPATIENT
Start: 2020-02-13 | End: 2020-02-13 | Stop reason: HOSPADM

## 2020-02-12 RX ORDER — DEXAMETHASONE SODIUM PHOSPHATE 10 MG/ML
INJECTION INTRAMUSCULAR; INTRAVENOUS AS NEEDED
Status: DISCONTINUED | OUTPATIENT
Start: 2020-02-12 | End: 2020-02-12 | Stop reason: SURG

## 2020-02-12 RX ORDER — UREA 10 %
3 LOTION (ML) TOPICAL NIGHTLY PRN
Status: DISCONTINUED | OUTPATIENT
Start: 2020-02-12 | End: 2020-02-13 | Stop reason: HOSPADM

## 2020-02-12 RX ORDER — LIDOCAINE HYDROCHLORIDE 10 MG/ML
0.5 INJECTION, SOLUTION EPIDURAL; INFILTRATION; INTRACAUDAL; PERINEURAL ONCE AS NEEDED
Status: DISCONTINUED | OUTPATIENT
Start: 2020-02-12 | End: 2020-02-12 | Stop reason: HOSPADM

## 2020-02-12 RX ORDER — ONDANSETRON 2 MG/ML
4 INJECTION INTRAMUSCULAR; INTRAVENOUS EVERY 6 HOURS PRN
Status: DISCONTINUED | OUTPATIENT
Start: 2020-02-12 | End: 2020-02-13 | Stop reason: HOSPADM

## 2020-02-12 RX ORDER — ACETAMINOPHEN 325 MG/1
650 TABLET ORAL ONCE AS NEEDED
Status: DISCONTINUED | OUTPATIENT
Start: 2020-02-12 | End: 2020-02-12 | Stop reason: HOSPADM

## 2020-02-12 RX ORDER — ONDANSETRON 2 MG/ML
4 INJECTION INTRAMUSCULAR; INTRAVENOUS ONCE AS NEEDED
Status: DISCONTINUED | OUTPATIENT
Start: 2020-02-12 | End: 2020-02-12 | Stop reason: HOSPADM

## 2020-02-12 RX ORDER — MIDAZOLAM HYDROCHLORIDE 1 MG/ML
1 INJECTION INTRAMUSCULAR; INTRAVENOUS
Status: DISCONTINUED | OUTPATIENT
Start: 2020-02-12 | End: 2020-02-12 | Stop reason: HOSPADM

## 2020-02-12 RX ORDER — METOPROLOL SUCCINATE 100 MG/1
100 TABLET, EXTENDED RELEASE ORAL DAILY
Status: DISCONTINUED | OUTPATIENT
Start: 2020-02-13 | End: 2020-02-13 | Stop reason: HOSPADM

## 2020-02-12 RX ORDER — EPHEDRINE SULFATE 50 MG/ML
5 INJECTION, SOLUTION INTRAVENOUS ONCE AS NEEDED
Status: DISCONTINUED | OUTPATIENT
Start: 2020-02-12 | End: 2020-02-12 | Stop reason: HOSPADM

## 2020-02-12 RX ORDER — HYDRALAZINE HYDROCHLORIDE 20 MG/ML
5 INJECTION INTRAMUSCULAR; INTRAVENOUS
Status: DISCONTINUED | OUTPATIENT
Start: 2020-02-12 | End: 2020-02-12 | Stop reason: HOSPADM

## 2020-02-12 RX ORDER — ROCURONIUM BROMIDE 10 MG/ML
INJECTION, SOLUTION INTRAVENOUS AS NEEDED
Status: DISCONTINUED | OUTPATIENT
Start: 2020-02-12 | End: 2020-02-12 | Stop reason: SURG

## 2020-02-12 RX ORDER — LIDOCAINE HYDROCHLORIDE 20 MG/ML
INJECTION, SOLUTION INFILTRATION; PERINEURAL AS NEEDED
Status: DISCONTINUED | OUTPATIENT
Start: 2020-02-12 | End: 2020-02-12 | Stop reason: SURG

## 2020-02-12 RX ORDER — FLUOXETINE HYDROCHLORIDE 20 MG/1
60 CAPSULE ORAL NIGHTLY
Status: DISCONTINUED | OUTPATIENT
Start: 2020-02-12 | End: 2020-02-13 | Stop reason: HOSPADM

## 2020-02-12 RX ORDER — MIDAZOLAM HYDROCHLORIDE 1 MG/ML
2 INJECTION INTRAMUSCULAR; INTRAVENOUS
Status: DISCONTINUED | OUTPATIENT
Start: 2020-02-12 | End: 2020-02-12 | Stop reason: HOSPADM

## 2020-02-12 RX ORDER — ROPIVACAINE HYDROCHLORIDE 5 MG/ML
INJECTION, SOLUTION EPIDURAL; INFILTRATION; PERINEURAL
Status: COMPLETED | OUTPATIENT
Start: 2020-02-12 | End: 2020-02-12

## 2020-02-12 RX ORDER — DIPHENHYDRAMINE HYDROCHLORIDE 50 MG/ML
12.5 INJECTION INTRAMUSCULAR; INTRAVENOUS
Status: DISCONTINUED | OUTPATIENT
Start: 2020-02-12 | End: 2020-02-12 | Stop reason: HOSPADM

## 2020-02-12 RX ORDER — PROMETHAZINE HYDROCHLORIDE 25 MG/ML
6.25 INJECTION, SOLUTION INTRAMUSCULAR; INTRAVENOUS
Status: DISCONTINUED | OUTPATIENT
Start: 2020-02-12 | End: 2020-02-12 | Stop reason: HOSPADM

## 2020-02-12 RX ORDER — FLUMAZENIL 0.1 MG/ML
0.2 INJECTION INTRAVENOUS AS NEEDED
Status: DISCONTINUED | OUTPATIENT
Start: 2020-02-12 | End: 2020-02-12 | Stop reason: HOSPADM

## 2020-02-12 RX ORDER — NALOXONE HCL 0.4 MG/ML
0.2 VIAL (ML) INJECTION AS NEEDED
Status: DISCONTINUED | OUTPATIENT
Start: 2020-02-12 | End: 2020-02-12 | Stop reason: HOSPADM

## 2020-02-12 RX ORDER — SODIUM CHLORIDE, SODIUM LACTATE, POTASSIUM CHLORIDE, CALCIUM CHLORIDE 600; 310; 30; 20 MG/100ML; MG/100ML; MG/100ML; MG/100ML
9 INJECTION, SOLUTION INTRAVENOUS CONTINUOUS
Status: DISCONTINUED | OUTPATIENT
Start: 2020-02-12 | End: 2020-02-13 | Stop reason: HOSPADM

## 2020-02-12 RX ORDER — ONDANSETRON 2 MG/ML
INJECTION INTRAMUSCULAR; INTRAVENOUS AS NEEDED
Status: DISCONTINUED | OUTPATIENT
Start: 2020-02-12 | End: 2020-02-12 | Stop reason: SURG

## 2020-02-12 RX ORDER — PREGABALIN 75 MG/1
150 CAPSULE ORAL ONCE
Status: COMPLETED | OUTPATIENT
Start: 2020-02-12 | End: 2020-02-12

## 2020-02-12 RX ORDER — BACLOFEN 10 MG/1
10 TABLET ORAL 3 TIMES DAILY PRN
Status: DISCONTINUED | OUTPATIENT
Start: 2020-02-12 | End: 2020-02-13 | Stop reason: HOSPADM

## 2020-02-12 RX ORDER — KETOROLAC TROMETHAMINE 30 MG/ML
30 INJECTION, SOLUTION INTRAMUSCULAR; INTRAVENOUS EVERY 8 HOURS
Status: DISCONTINUED | OUTPATIENT
Start: 2020-02-12 | End: 2020-02-13 | Stop reason: HOSPADM

## 2020-02-12 RX ORDER — FENTANYL CITRATE 50 UG/ML
INJECTION, SOLUTION INTRAMUSCULAR; INTRAVENOUS AS NEEDED
Status: DISCONTINUED | OUTPATIENT
Start: 2020-02-12 | End: 2020-02-12 | Stop reason: SURG

## 2020-02-12 RX ORDER — ALPRAZOLAM 0.5 MG/1
0.5 TABLET ORAL DAILY
Status: DISCONTINUED | OUTPATIENT
Start: 2020-02-12 | End: 2020-02-13 | Stop reason: HOSPADM

## 2020-02-12 RX ORDER — SODIUM CHLORIDE 0.9 % (FLUSH) 0.9 %
3-10 SYRINGE (ML) INJECTION AS NEEDED
Status: DISCONTINUED | OUTPATIENT
Start: 2020-02-12 | End: 2020-02-12 | Stop reason: HOSPADM

## 2020-02-12 RX ORDER — HYDROMORPHONE HYDROCHLORIDE 1 MG/ML
0.5 INJECTION, SOLUTION INTRAMUSCULAR; INTRAVENOUS; SUBCUTANEOUS
Status: DISCONTINUED | OUTPATIENT
Start: 2020-02-12 | End: 2020-02-12 | Stop reason: HOSPADM

## 2020-02-12 RX ORDER — MELOXICAM 15 MG/1
15 TABLET ORAL ONCE
Status: COMPLETED | OUTPATIENT
Start: 2020-02-12 | End: 2020-02-12

## 2020-02-12 RX ORDER — ASPIRIN 325 MG
325 TABLET, DELAYED RELEASE (ENTERIC COATED) ORAL EVERY 12 HOURS SCHEDULED
Status: DISCONTINUED | OUTPATIENT
Start: 2020-02-13 | End: 2020-02-13 | Stop reason: HOSPADM

## 2020-02-12 RX ORDER — IPRATROPIUM BROMIDE AND ALBUTEROL SULFATE 2.5; .5 MG/3ML; MG/3ML
3 SOLUTION RESPIRATORY (INHALATION) ONCE AS NEEDED
Status: DISCONTINUED | OUTPATIENT
Start: 2020-02-12 | End: 2020-02-12 | Stop reason: HOSPADM

## 2020-02-12 RX ORDER — KETOROLAC TROMETHAMINE 30 MG/ML
30 INJECTION, SOLUTION INTRAMUSCULAR; INTRAVENOUS EVERY 8 HOURS
Status: DISCONTINUED | OUTPATIENT
Start: 2020-02-12 | End: 2020-02-12

## 2020-02-12 RX ORDER — ONDANSETRON 4 MG/1
4 TABLET, FILM COATED ORAL EVERY 6 HOURS PRN
Status: DISCONTINUED | OUTPATIENT
Start: 2020-02-12 | End: 2020-02-13 | Stop reason: HOSPADM

## 2020-02-12 RX ORDER — PANTOPRAZOLE SODIUM 40 MG/1
40 TABLET, DELAYED RELEASE ORAL
Status: DISCONTINUED | OUTPATIENT
Start: 2020-02-13 | End: 2020-02-13 | Stop reason: HOSPADM

## 2020-02-12 RX ORDER — ACETAMINOPHEN 10 MG/ML
1000 INJECTION, SOLUTION INTRAVENOUS ONCE
Status: COMPLETED | OUTPATIENT
Start: 2020-02-12 | End: 2020-02-12

## 2020-02-12 RX ORDER — DOCUSATE SODIUM 100 MG/1
100 CAPSULE, LIQUID FILLED ORAL 2 TIMES DAILY
Status: DISCONTINUED | OUTPATIENT
Start: 2020-02-12 | End: 2020-02-13 | Stop reason: HOSPADM

## 2020-02-12 RX ORDER — BISACODYL 10 MG
10 SUPPOSITORY, RECTAL RECTAL DAILY PRN
Status: DISCONTINUED | OUTPATIENT
Start: 2020-02-12 | End: 2020-02-13 | Stop reason: HOSPADM

## 2020-02-12 RX ORDER — FAMOTIDINE 10 MG/ML
20 INJECTION, SOLUTION INTRAVENOUS ONCE
Status: COMPLETED | OUTPATIENT
Start: 2020-02-12 | End: 2020-02-12

## 2020-02-12 RX ORDER — PREDNISONE 1 MG/1
5 TABLET ORAL DAILY
Status: DISCONTINUED | OUTPATIENT
Start: 2020-02-12 | End: 2020-02-13 | Stop reason: HOSPADM

## 2020-02-12 RX ADMIN — SODIUM CHLORIDE 2000 MG: 9 INJECTION, SOLUTION INTRAVENOUS at 22:32

## 2020-02-12 RX ADMIN — LIDOCAINE HYDROCHLORIDE 100 MG: 20 INJECTION, SOLUTION INFILTRATION; PERINEURAL at 11:07

## 2020-02-12 RX ADMIN — MUPIROCIN 1 APPLICATION: 20 OINTMENT TOPICAL at 21:34

## 2020-02-12 RX ADMIN — SODIUM CHLORIDE, POTASSIUM CHLORIDE, SODIUM LACTATE AND CALCIUM CHLORIDE 9 ML/HR: 600; 310; 30; 20 INJECTION, SOLUTION INTRAVENOUS at 10:43

## 2020-02-12 RX ADMIN — SULFASALAZINE 1500 MG: 500 TABLET ORAL at 21:34

## 2020-02-12 RX ADMIN — ROPIVACAINE HYDROCHLORIDE 30 ML: 5 INJECTION, SOLUTION EPIDURAL; INFILTRATION; PERINEURAL at 10:51

## 2020-02-12 RX ADMIN — FENTANYL CITRATE 50 MCG: 50 INJECTION, SOLUTION INTRAMUSCULAR; INTRAVENOUS at 14:18

## 2020-02-12 RX ADMIN — HYDROCODONE BITARTRATE AND ACETAMINOPHEN 1 TABLET: 7.5; 325 TABLET ORAL at 21:40

## 2020-02-12 RX ADMIN — MIDAZOLAM 2 MG: 1 INJECTION INTRAMUSCULAR; INTRAVENOUS at 10:44

## 2020-02-12 RX ADMIN — HYDROMORPHONE HYDROCHLORIDE 0.5 MG: 2 INJECTION, SOLUTION INTRAMUSCULAR; INTRAVENOUS; SUBCUTANEOUS at 13:00

## 2020-02-12 RX ADMIN — POLYETHYLENE GLYCOL 3350 17 G: 17 POWDER, FOR SOLUTION ORAL at 21:34

## 2020-02-12 RX ADMIN — FENTANYL CITRATE 50 MCG: 50 INJECTION INTRAMUSCULAR; INTRAVENOUS at 10:43

## 2020-02-12 RX ADMIN — DOCUSATE SODIUM 100 MG: 100 CAPSULE, LIQUID FILLED ORAL at 21:34

## 2020-02-12 RX ADMIN — MELOXICAM 15 MG: 15 TABLET ORAL at 10:33

## 2020-02-12 RX ADMIN — FENTANYL CITRATE 50 MCG: 50 INJECTION INTRAMUSCULAR; INTRAVENOUS at 11:58

## 2020-02-12 RX ADMIN — KETOROLAC TROMETHAMINE 30 MG: 30 INJECTION, SOLUTION INTRAMUSCULAR at 18:23

## 2020-02-12 RX ADMIN — PROPOFOL 200 MG: 10 INJECTION, EMULSION INTRAVENOUS at 11:07

## 2020-02-12 RX ADMIN — FENTANYL CITRATE 100 MCG: 50 INJECTION INTRAMUSCULAR; INTRAVENOUS at 11:03

## 2020-02-12 RX ADMIN — DEXAMETHASONE SODIUM PHOSPHATE 10 MG: 10 INJECTION INTRAMUSCULAR; INTRAVENOUS at 11:13

## 2020-02-12 RX ADMIN — FAMOTIDINE 20 MG: 10 INJECTION INTRAVENOUS at 10:46

## 2020-02-12 RX ADMIN — SODIUM CHLORIDE, POTASSIUM CHLORIDE, SODIUM LACTATE AND CALCIUM CHLORIDE: 600; 310; 30; 20 INJECTION, SOLUTION INTRAVENOUS at 12:07

## 2020-02-12 RX ADMIN — ROCURONIUM BROMIDE 50 MG: 10 INJECTION, SOLUTION INTRAVENOUS at 11:07

## 2020-02-12 RX ADMIN — FENTANYL CITRATE 50 MCG: 50 INJECTION INTRAMUSCULAR; INTRAVENOUS at 11:43

## 2020-02-12 RX ADMIN — FENTANYL CITRATE 50 MCG: 50 INJECTION INTRAMUSCULAR; INTRAVENOUS at 12:15

## 2020-02-12 RX ADMIN — FENTANYL CITRATE 50 MCG: 50 INJECTION, SOLUTION INTRAMUSCULAR; INTRAVENOUS at 14:27

## 2020-02-12 RX ADMIN — ACETAMINOPHEN 1000 MG: 10 INJECTION, SOLUTION INTRAVENOUS at 11:13

## 2020-02-12 RX ADMIN — PREGABALIN 150 MG: 75 CAPSULE ORAL at 10:34

## 2020-02-12 RX ADMIN — ONDANSETRON HYDROCHLORIDE 4 MG: 2 SOLUTION INTRAMUSCULAR; INTRAVENOUS at 12:53

## 2020-02-12 RX ADMIN — FENTANYL CITRATE 50 MCG: 50 INJECTION INTRAMUSCULAR; INTRAVENOUS at 11:47

## 2020-02-12 RX ADMIN — SODIUM CHLORIDE 2000 MG: 9 INJECTION, SOLUTION INTRAVENOUS at 10:34

## 2020-02-12 RX ADMIN — HYDROCODONE BITARTRATE AND ACETAMINOPHEN 1 TABLET: 7.5; 325 TABLET ORAL at 16:17

## 2020-02-12 RX ADMIN — HYDROMORPHONE HYDROCHLORIDE 0.5 MG: 2 INJECTION, SOLUTION INTRAMUSCULAR; INTRAVENOUS; SUBCUTANEOUS at 13:03

## 2020-02-12 RX ADMIN — FENTANYL CITRATE 50 MCG: 50 INJECTION INTRAMUSCULAR; INTRAVENOUS at 12:19

## 2020-02-12 RX ADMIN — TRANEXAMIC ACID 1000 MG: 100 INJECTION, SOLUTION INTRAVENOUS at 12:37

## 2020-02-12 RX ADMIN — HYDROMORPHONE HYDROCHLORIDE 0.5 MG: 2 INJECTION, SOLUTION INTRAMUSCULAR; INTRAVENOUS; SUBCUTANEOUS at 12:57

## 2020-02-12 RX ADMIN — NEOSTIGMINE METHYLSULFATE 4 MG: 1 INJECTION INTRAMUSCULAR; INTRAVENOUS; SUBCUTANEOUS at 12:53

## 2020-02-12 RX ADMIN — GLYCOPYRROLATE 0.3 MG: 0.2 INJECTION INTRAMUSCULAR; INTRAVENOUS at 12:53

## 2020-02-12 RX ADMIN — FLUOXETINE HYDROCHLORIDE 60 MG: 20 CAPSULE ORAL at 21:34

## 2020-02-12 NOTE — OP NOTE
Name: Pratibha Pascal  YOB: 1960    DATE OF SURGERY: 2/12/2020    PREOPERATIVE DIAGNOSIS: Right knee end-stage osteoarthritis    POSTOPERATIVE DIAGNOSIS: Right knee end-stage osteoarthritis    PROCEDURE PERFORMED: Right total knee replacement    SURGEON: Nabor Correia M.D.    ASSISTANT: CAITLYN GATICA    IMPLANTS: [employer]ann[employer] Legion:     Implant Name Type Inv. Item Serial No.  Lot No. LRB No. Used   CMT BONE PALACOS R HI/VISC 1X40 - [id number] Implant CMT BONE PALACOS R HI/VISC 1X40  [facility] [id number] Right 1   CMT BONE PALACOS RPLSG W/GENT HI/VISC 1X40 - [id number] Implant CMT BONE PALACOS RPLSG W/GENT HI/VISC 1X40  [employer] [id number] Right 1   COMP FEM/KN LEGION OXINIUM PS SZ6 RT - [id number] Implant COMP FEM/KN LEGION OXINIUM PS SZ6 RT  [employer] [id number] Right 1   BASE TIB/KN GEN2 NONPOR TI SZ5 RT - [id number] Implant BASE TIB/KN GEN2 NONPOR TI SZ5 RT  [employer] [id number] Right 1   PATELLA RESRF GEN2 7.5X35MM - [id number] Implant PATELLA RESRF GEN2 7.5X35MM  [employer] [id number] Right 1   INSRT ART LEGION PS HF XLPE SZ5TO6 9MM - [id number] Implant INSRT ART LEGION PS HF XLPE SZ5TO6 9MM  [employer] [id number] Right 1       Estimated Blood Loss: 200cc  Specimens : none  Complications: none    DESCRIPTION OF PROCEDURE: The patient was taken to the operating room and placed in the supine position. A sequential compression device was carefully placed on the non-operative leg. Preoperative antibiotics were administered. Surgical time out was performed. After adequate induction of anesthesia, the leg was prepped and draped in the usual sterile fashion, exsanguinated with an Esmarch bandage and the tourniquet inflated to 250 mmHg. A midline incision was performed followed by a medial parapatellar arthrotomy. The patella was subluxed laterally.  A portion of the fat pad, ACL, and anterior horns of the meniscus were excised. The drill hole was placed  in the distal femur and the canal was the irrigated and suctioned. The IM anson was placed and a 5 degree distal valgus cut was performed on the femur. The femur was then sized with a sizing guide. The femoral cutting block was placed and all femoral cuts were performed. The proximal tibia was exposed. We used the extramedullary tibial cutting guide set for removal of 9mm of bone off the high side. The tibial cut was performed. The posterior horns of the menisci were excised. The posterior osteophytes were removed. Flexion extension blocks were then used to balance the knee. The tibial cut surface was then sized with the sizing templates and the tibial and femoral trial were then placed. The knee was placed in full extension and then the tibial tray rotation was then matched to the femoral rotation and marked.    Attention was then placed to the patella. The patella was noted to track centrally through range of motion. The patella was then sized with the trials. The thickness of the patella was then measured. The patella was resurfaced and the surrounding osteophytes were removed. The preoperative thickness was reproduced. The patella tracked centrally through range of motion.   At this point all trial components were removed, the knee was copiously irrigated with pulsed lavage, and the knee was injected with anesthetic cocktail solution. The cut surfaces were then dried with clean lap sponges, and the components were cemented tibia, followed by femur, then patella. The knee was held in full extension and all excess cement was removed. The knee was held still until the cement had completely hardened. We then placed the trial polyethylene spacer which resulted in full extension and excellent flexion-extension balance. We placed the final polyethylene spacer.   The knee was then copiously irrigated. The tourniquet was then released. There was excellent hemostasis. We placed a one-eighth inch Hemovac drain. We closed the  knee in multiple layers in standard fashion. Sterile dressing were applied. At the end of the case, the sponge and needle counts were reported as being correct. There were no known complications. The patient was then transported to the recovery room.      Nabor Correia M.D.

## 2020-02-12 NOTE — PLAN OF CARE
PT IS POST TODAY FROM A RIGHT TKA. PAIN CONTROLLED WITH PO PAIN MED AT THIS TIME. AMBULATES WITH ASSIST OF ONE AND A WALKER. PT EDUCATED REGARDING CPAP USE AND COMPLIANCE.

## 2020-02-12 NOTE — ANESTHESIA PROCEDURE NOTES
Peripheral Block      Patient reassessed immediately prior to procedure    Patient location during procedure: holding area  Start time: 2/12/2020 10:45 AM  Stop time: 2/12/2020 10:51 AM  Reason for block: at surgeon's request and post-op pain management  Performed by  Anesthesiologist: Jagjit Oropeza MD  Preanesthetic Checklist  Completed: patient identified, site marked, surgical consent, pre-op evaluation, timeout performed, IV checked, risks and benefits discussed and monitors and equipment checked  Prep:  Sterile barriers:gloves and cap  Prep: ChloraPrep  Patient monitoring: blood pressure monitoring, continuous pulse oximetry and EKG  Procedure  Sedation:yes    Guidance:ultrasound guided  ULTRASOUND INTERPRETATION.  Using ultrasound guidance a 22 G gauge needle was placed in close proximity to the femoral nerve, at which point, under ultrasound guidance anesthetic was injected in the area of the nerve and spread of the anesthesia was seen on ultrasound in close proximity thereto.  There were no abnormalities seen on ultrasound; a digital image was taken; and the patient tolerated the procedure with no complications. Images:still images obtained    Laterality:right  Block Type:adductor canal block  Injection Technique:single-shot  Needle Type:echogenic  Needle Gauge:21 G      Medications Used: ropivacaine (NAROPIN) 0.5 % injection, 30 mL      Post Assessment  Injection Assessment: incremental injection, no paresthesia on injection and negative aspiration for heme  Patient Tolerance:comfortable throughout block  Complications:no

## 2020-02-12 NOTE — PLAN OF CARE
Problem: Patient Care Overview  Goal: Plan of Care Review  Outcome: Ongoing (interventions implemented as appropriate)  Flowsheets (Taken 2/12/2020 5170)  Plan of Care Reviewed With: patient  Outcome Summary: pt presents s/p R TKR with post op pain, weakness, dec ROM, impaired functional mobility and activity tolerance, she will benefit from PT to address, pt plans home at discharge with assist of daughter.  Pt did fairly well this afternoon of surgery, she was very groggy and not following directions well with difficulty sequencing so pt amb bed to chair only.

## 2020-02-12 NOTE — DISCHARGE PLACEMENT REQUEST
"Pratibha Pascal (59 y.o. Female)     Date of Birth Social Security Number Address Home Phone MRN    1960  [id number] [facility] [address] [phone] [id number]    [employer] Mar[employer] Status          Zoroastrian        Admission Date Admission Type Admitting Provider Attending Provider Department, Room/Bed    2/12/20 Elective Nabor Correia MD Brown, Reid B, MD [address], P784/1    Discharge Date Discharge Disposition Discharge Destination                       Attending Provider:  Nabor Correia MD    Allergies:  Cefuroxime, Clindamycin/lincomycin, Dicloxacillin, Erythromycin, Talwin [Pentazocine]    Isolation:  None   Infection:  None   Code Status:  CPR    Ht:  182.9 cm (72\")   Wt:  139 kg (307 lb)    Admission Cmt:  None   Principal Problem:  Primary osteoarthritis of right knee [M17.11] More...                 Active Insurance as of 2/12/2020     Primary Coverage     Payor Plan Insurance Group Employer/Plan Group    [employer] EMPLOYEE [id number]     Payor Plan Address Payor Plan Phone Number Payor Plan Fax Number Effective Dates    [address] [phone]  1/1/2015 - None Entered    [facility] [id number]       Subscriber Name Subscriber Birth Date Member ID       PRATIBHA PASCAL 1960 [id number]                 Emergency Contacts      (Rel.) Home Phone Work Phone Mobile Phone    Hannah Wallace (Mother) [phone] -- [phone]            "

## 2020-02-12 NOTE — ANESTHESIA POSTPROCEDURE EVALUATION
Patient: Pratibha Pascal    Procedure Summary     Date:  02/12/20 Room / Location:  [facility] OR [address] [place] [address] MAIN OR    Anesthesia Start:  1101 Anesthesia Stop:  1333    Procedure:  TOTAL KNEE ARTHROPLASTY (Right Knee) Diagnosis:       Primary osteoarthritis of right knee      (Primary osteoarthritis of right knee [M17.11])    Surgeon:  Nabor Correia MD Provider:  Jagjit Oropeza MD    Anesthesia Type:  general ASA Status:  3          Anesthesia Type: general    Vitals  Vitals Value Taken Time   /75 2/12/2020  2:40 PM   Temp 37.1 °C (98.8 °F) 2/12/2020  2:25 PM   Pulse 62 2/12/2020  2:43 PM   Resp 16 2/12/2020  2:40 PM   SpO2 96 % 2/12/2020  2:43 PM   Vitals shown include unvalidated device data.        Post Anesthesia Care and Evaluation    Patient location during evaluation: bedside  Patient participation: complete - patient participated  Level of consciousness: sleepy but conscious  Pain score: 0  Pain management: adequate  Airway patency: patent  Anesthetic complications: No anesthetic complications    Cardiovascular status: acceptable  Respiratory status: acceptable  Hydration status: acceptable    Comments: /75   Pulse 65   Temp 37.1 °C (98.8 °F) (Oral)   Resp 16   SpO2 97%

## 2020-02-12 NOTE — THERAPY EVALUATION
Patient Name: Pratibha Pascal  : 1960    MRN: [id number]                              Today's Date: 2020       Admit Date: 2020    Visit Dx:     ICD-10-CM ICD-9-CM   1. Primary osteoarthritis of right knee M17.11 715.16     Patient Active Problem List   Diagnosis   • CAD (coronary artery disease)   • Mixed hyperlipidemia   • Benign essential hypertension   • Hypothyroidism   • Major depressive disorder, recurrent, in full remission (CMS/[facility])   • Rheumatoid arthritis (CMS/[facility])   • Cervicalgia   • Diabetes mellitus (CMS/[facility])   • Dyslipidemia   • BP (high blood pressure)   • Obstructive apnea   • AF (paroxysmal atrial fibrillation) (CMS/[facility])   • Proteinuria   • Burn   • Cellulitis of left leg   • Diabetes mellitus with neurological manifestation (CMS/[facility])   • MSSA (methicillin susceptible Staphylococcus aureus) infection   • Diabetic ulcer of left foot associated with diabetes mellitus due to underlying condition (CMS/[facility])   • Morbid obesity (CMS/[facility])   • Primary insomnia   • Anxiety   • Immunodeficiency, unspecified (CMS/[facility])   • Long term current use of non-steroidal anti-inflammatories (NSAID)   • Personal history of immunosupression therapy   • Primary generalized (osteo)arthritis   • Raised antibody titer   • Rheumatoid arthritis of multiple sites without organ or system involvement with positive rheumatoid factor (CMS/[facility])   • Other long term (current) drug therapy   • Uncontrolled type 2 diabetes mellitus with hyperglycemia (CMS/[facility])   • Vitamin D deficiency, unspecified   • Hypercalcemia   • Primary osteoarthritis of left knee   • Abnormal nuclear stress test   • OA (osteoarthritis) of knee   • Primary osteoarthritis of right knee   • Chronic fatigue   • Chronic pain of left knee     Past Medical History:   Diagnosis Date   • Allergic    • Allergy to mold    • Anxiety    • Arthritis    • Benign essential hypertension    • CAD (coronary artery disease)    • Cataract    • Coronary artery disease     • Depression    • Diabetes mellitus (CMS/[facility])    • Fibromyalgia, primary    • H/O bone density study unclear   • H/O complete eye exam 2 -3 years   • Headache    • History of myocardial infarction    • History of staph infection     HISTORY OF MSSA IN LEFT FOOT FROM DIABETIC ULCER HEALED   • HL (hearing loss)     RIGHT EAR  SEVERE HEARING LOSS   • HLD (hyperlipidemia)    • Hyperlipidemia    • Hypertension    • Hypothyroidism    • Hypothyroidism    • Injury of back    • Left knee pain    • Major depressive disorder, recurrent, in full remission (CMS/[facility])    • Obesity    • BRANNON (obstructive sleep apnea)     USE CPAP   • Rheumatoid arthritis (CMS/[facility])    • Seasonal allergies    • Type 2 diabetes mellitus (CMS/[facility])      Past Surgical History:   Procedure Laterality Date   • BACK SURGERY  10/1995    also 2/02; L5-S1; Dr. Carroll Avitia   • CARDIAC CATHETERIZATION     • CARDIAC CATHETERIZATION N/A 9/3/2019    Procedure: Coronary angiography  ;  Surgeon: Magy Rivera MD;  Location:  [facility];  Service: Cardiovascular   • CARDIAC CATHETERIZATION N/A 9/3/2019    Procedure: Left Heart Cath;  Surgeon: Magy Rivera MD;  Location:  [facility];  Service: Cardiovascular   • CARDIAC CATHETERIZATION N/A 9/3/2019    Procedure: Left ventriculography;  Surgeon: Magy Rivera MD;  Location:  [facility];  Service: Cardiovascular   • COLONOSCOPY     • CORONARY STENT PLACEMENT  06/2014   • JOINT REPLACEMENT     • KNEE ARTHROSCOPY Right 2010   • MAMMO BILATERAL  due    delcines   • PAP SMEAR  due   • TONSILLECTOMY      age 26   • TOTAL KNEE ARTHROPLASTY Left 10/14/2019    Procedure: TOTAL KNEE ARTHROPLASTY;  Surgeon: Nabor Correia MD;  Location: [facility] MAIN OR;  Service: Orthopedics   • US GUIDED FINE NEEDLE ASPIRATION  12/6/2019     General Information     Row Name 02/12/20 1603          PT Evaluation Time/Intention    Document Type  evaluation  -PC     Mode of Treatment   physical therapy  -PC     Row Name 02/12/20 1603          General Information    Patient Profile Reviewed?  yes  -PC     Prior Level of Function  independent: ROLLATOR  -PC     Existing Precautions/Restrictions  fall  -PC     Row Name 02/12/20 1603          Relationship/Environment    Lives With  child(jorge alberto), adult  -     Row Name 02/12/20 1603          Resource/Environmental Concerns    Current Living Arrangements  home/apartment/condo  -     Row Name 02/12/20 1603          Home Main Entrance    Number of Stairs, Main Entrance  four  -PC     Stair Railings, Main Entrance  railings safe and in good condition  -PC     Row Name 02/12/20 1603          Stairs Within Home, Primary    Number of Stairs, Within Home, Primary  none  -PC     Row Name 02/12/20 1603          Cognitive Assessment/Intervention- PT/OT    Orientation Status (Cognition)  oriented x 4  -PC     Cognitive Assessment/Intervention Comment  pt groggy and falling asleep during ex, needed frequent cues  -     Row Name 02/12/20 1603          Safety Issues, Functional Mobility    Impairments Affecting Function (Mobility)  pain;range of motion (ROM);strength;endurance/activity tolerance  -PC       User Key  (r) = Recorded By, (t) = Taken By, (c) = Cosigned By    Initials Name Provider Type    PC Pearl Duran PT Physical Therapist        Mobility     Row Name 02/12/20 1623          Bed Mobility Assessment/Treatment    Bed Mobility Assessment/Treatment  supine-sit  -PC     Supine-Sit [place] (Bed Mobility)  contact guard  -     Row Name 02/12/20 1623          Sit-Stand Transfer    Sit-Stand [place] (Transfers)  contact guard  -     Assistive Device (Sit-Stand Transfers)  walker, front-wheeled  -     Row Name 02/12/20 1623          Gait/Stairs Assessment/Training    Gait/Stairs Assessment/Training  gait/ambulation independence;gait/ambulation assistive device  -     [place] Level (Gait)  minimum assist (75% patient effort);1 person  assist;1 person to manage equipment  -PC     Assistive Device (Gait)  walker, front-wheeled  -PC     Distance in Feet (Gait)  5 ft due to grogginess and delayed motor planning and difficulty sequencing with walker  -PC     Pattern (Gait)  step-to  -PC     Deviations/Abnormal Patterns (Gait)  antalgic  -PC       User Key  (r) = Recorded By, (t) = Taken By, (c) = Cosigned By    Initials Name Provider Type    PC Pearl Duran, PT Physical Therapist        Obj/Interventions     Row Name 02/12/20 1626          General ROM    GENERAL ROM COMMENTS  WFL x R knee  -PC     Row Name 02/12/20 1626          MMT (Manual Muscle Testing)    General MMT Comments  WNL x RLE   -PC     Row Name 02/12/20 1605          Therapeutic Exercise    Comment (Therapeutic Exercise)  10 reps TKR ex  -PC     Row Name 02/12/20 1605          Sensory Assessment/Intervention    Sensory General Assessment  no sensation deficits identified  -PC       User Key  (r) = Recorded By, (t) = Taken By, (c) = Cosigned By    Initials Name Provider Type    PC Pearl Duran, PT Physical Therapist        Goals/Plan     Row Name 02/12/20 1607          Transfer Goal 1 (PT)    Activity/Assistive Device (Transfer Goal 1, PT)  sit-to-stand/stand-to-sit  -PC     [place] Level/Cues Needed (Transfer Goal 1, PT)  independent  -PC     Time Frame (Transfer Goal 1, PT)  2 - 3 days  -PC     Row Name 02/12/20 1607          Gait Training Goal 1 (PT)    Activity/Assistive Device (Gait Training Goal 1, PT)  gait (walking locomotion);assistive device use;walker, rolling  -PC     [place] Level (Gait Training Goal 1, PT)  supervision required  -PC     Distance (Gait Goal 1, PT)  100 ft  -PC     Time Frame (Gait Training Goal 1, PT)  1 week  -PC     Row Name 02/12/20 1607          ROM Goal 1 (PT)    ROM Goal 1 (PT)  5-90  -PC     Time Frame (ROM Goal 1, PT)  2 - 3 days  -PC     Row Name 02/12/20 1607          Stairs Goal 1 (PT)    Activity/Assistive Device (Stairs Goal 1,  PT)  ascending stairs;descending stairs  -PC     [place] Level/Cues Needed (Stairs Goal 1, PT)  contact guard assist  -PC     Number of Stairs (Stairs Goal 1, PT)  4  -PC     Time Frame (Stairs Goal 1, PT)  3 days  -PC       User Key  (r) = Recorded By, (t) = Taken By, (c) = Cosigned By    Initials Name Provider Type    PC Pearl Duran, PT Physical Therapist        Clinical Impression     Row Name 02/12/20 1606          Pain Assessment    Additional Documentation  Pain Scale: Numbers Pre/Post-Treatment (Group)  -PC     Row Name 02/12/20 1606          Pain Scale: Numbers Pre/Post-Treatment    Pain Scale: Numbers, Pretreatment  4/10  -PC     Pain Location - Side  Right  -PC     Pain Location  knee  -PC     Pain Intervention(s)  Medication (See MAR);Cold applied;Repositioned  -PC     Row Name 02/12/20 1606          Plan of Care Review    Plan of Care Reviewed With  patient  -PC     Row Name 02/12/20 1606          Physical Therapy Clinical Impression    Criteria for Skilled Interventions Met (PT Clinical Impression)  yes;treatment indicated  -PC     Rehab Potential (PT Clinical Summary)  good, to achieve stated therapy goals  -PC     Row Name 02/12/20 1606          Positioning and Restraints    Pre-Treatment Position  in bed  -PC     Post Treatment Position  chair  -PC     In Chair  reclined;call light within reach;encouraged to call for assist;exit alarm on;with family/caregiver  -PC       User Key  (r) = Recorded By, (t) = Taken By, (c) = Cosigned By    Initials Name Provider Type    PC Pearl Duran, PT Physical Therapist        Outcome Measures     Row Name 02/12/20 1608          How much help from another person do you currently need...    Turning from your back to your side while in flat bed without using bedrails?  3  -PC     Moving from lying on back to sitting on the side of a flat bed without bedrails?  3  -PC     Moving to and from a bed to a chair (including a wheelchair)?  3  -PC     Standing up  from a chair using your arms (e.g., wheelchair, bedside chair)?  3  -PC     To walk in hospital room?  3  -PC     Row Name 02/12/20 1608          Functional Assessment    Outcome Measure Options  AM-PAC 6 Clicks Basic Mobility (PT)  -PC       User Key  (r) = Recorded By, (t) = Taken By, (c) = Cosigned By    Initials Name Provider Type    PC Pearl Duran PT Physical Therapist          PT Recommendation and Plan  Planned Therapy Interventions (PT Eval): bed mobility training, gait training, home exercise program, strengthening, stair training, ROM (range of motion), transfer training  Outcome Summary/Treatment Plan (PT)  Anticipated Discharge Disposition (PT): home with assist, home with home health  Plan of Care Reviewed With: patient  Outcome Summary: pt presents s/p R TKR with post op pain, weakness, dec ROM, impaired functional mobility and activity tolerance, she will benefit from PT to address, pt plans home at discharge with assist of daughter.  Pt did fairly well this afternoon of surgery, she was very groggy and not following directions well with difficulty sequencing so pt amb bed to chair only.     Time Calculation:   PT Charges     Row Name 02/12/20 1630             Time Calculation    Start Time  1550  -PC      Stop Time  1620  -PC      Time Calculation (min)  30 min  -PC      PT Received On  02/12/20  -PC      PT - Next Appointment  02/13/20  -PC      PT Goal Re-Cert Due Date  02/19/20  -PC         Time Calculation- PT    Total Timed Code Minutes- PT  23 minute(s)  -PC        User Key  (r) = Recorded By, (t) = Taken By, (c) = Cosigned By    Initials Name Provider Type    PC Pearl Duran PT Physical Therapist        Therapy Charges for Today     Code Description Service Date Service Provider Modifiers Qty    94663953884 HC PT EVAL MOD COMPLEXITY 2 2/12/2020 Pearl Duran, PT GP 1    59649899628 HC PT THER PROC EA 15 MIN 2/12/2020 Pearl Duran PT GP 1    39022217050 HC PT THER SUPP EA 15 MIN  2/12/2020 Pearl Duran, PT GP 1          PT G-Codes  Outcome Measure Options: AM-PAC 6 Clicks Basic Mobility (PT)    Pearl Duran, PT  2/12/2020

## 2020-02-12 NOTE — ANESTHESIA PROCEDURE NOTES
Airway  Urgency: elective    Date/Time: 2/12/2020 11:11 AM  Airway not difficult    General Information and Staff    Patient location during procedure: OR  Anesthesiologist: Jagjit Oropeza MD  CRNA: Arina Montes De Oca CRNA    Indications and Patient Condition  Indications for airway management: airway protection    Preoxygenated: yes  MILS maintained throughout  Mask difficulty assessment: 2 - vent by mask + OA or adjuvant +/- NMBA    Final Airway Details  Final airway type: endotracheal airway      Successful airway: ETT  Cuffed: yes   Successful intubation technique: video laryngoscopy  Facilitating devices/methods: intubating stylet and cricoid pressure  Endotracheal tube insertion site: oral  Blade: CMAC  Blade size: D  ETT size (mm): 7.0  Cormack-Lehane Classification: grade I - full view of glottis  Placement verified by: chest auscultation and capnometry   Cuff volume (mL): 9  Measured from: teeth  ETT/EBT  to teeth (cm): 21  Number of attempts at approach: 1  Assessment: lips, teeth, and gum same as pre-op and atraumatic intubation    Additional Comments  [facility] utilized first attempt R/T assessment of possible difficult airway

## 2020-02-12 NOTE — ANESTHESIA PREPROCEDURE EVALUATION
Anesthesia Evaluation     Patient summary reviewed and Nursing notes reviewed   NPO Solid Status: > 8 hours  NPO Liquid Status: > 2 hours           Airway   Mallampati: II  Neck ROM: full  No difficulty expected  Dental - normal exam     Pulmonary     breath sounds clear to auscultation  (+) a smoker Former, sleep apnea,   Cardiovascular     Rhythm: regular    (+) hypertension, CAD, cardiac stents within the past 12 months dysrhythmias Atrial Fib, hyperlipidemia,       Neuro/Psych  (+) headaches, psychiatric history Depression,     GI/Hepatic/Renal/Endo    (+) obesity, morbid obesity,  diabetes mellitus poorly controlled,     Musculoskeletal     (+) myalgias, neck pain,   Abdominal   (+) obese,    Substance History      OB/GYN          Other   arthritis,                      Anesthesia Plan    ASA 3     general   (Adductor canal)  intravenous induction     Anesthetic plan, all risks, benefits, and alternatives have been provided, discussed and informed consent has been obtained with: patient.

## 2020-02-13 VITALS
SYSTOLIC BLOOD PRESSURE: 92 MMHG | DIASTOLIC BLOOD PRESSURE: 56 MMHG | RESPIRATION RATE: 16 BRPM | BODY MASS INDEX: 39.68 KG/M2 | OXYGEN SATURATION: 95 % | WEIGHT: 293 LBS | HEART RATE: 75 BPM | HEIGHT: 72 IN | TEMPERATURE: 97.6 F

## 2020-02-13 LAB
HCT VFR BLD AUTO: 35 % (ref 34–46.6)
HGB BLD-MCNC: 11.4 G/DL (ref 12–15.9)

## 2020-02-13 PROCEDURE — 63710000001 PREDNISONE PER 5 MG: Performed by: ORTHOPAEDIC SURGERY

## 2020-02-13 PROCEDURE — G0378 HOSPITAL OBSERVATION PER HR: HCPCS

## 2020-02-13 PROCEDURE — 97150 GROUP THERAPEUTIC PROCEDURES: CPT

## 2020-02-13 PROCEDURE — 25010000002 KETOROLAC TROMETHAMINE PER 15 MG: Performed by: ORTHOPAEDIC SURGERY

## 2020-02-13 PROCEDURE — 97110 THERAPEUTIC EXERCISES: CPT

## 2020-02-13 PROCEDURE — 85018 HEMOGLOBIN: CPT | Performed by: ORTHOPAEDIC SURGERY

## 2020-02-13 PROCEDURE — 85014 HEMATOCRIT: CPT | Performed by: ORTHOPAEDIC SURGERY

## 2020-02-13 PROCEDURE — 99024 POSTOP FOLLOW-UP VISIT: CPT | Performed by: NURSE PRACTITIONER

## 2020-02-13 RX ORDER — PANTOPRAZOLE SODIUM 40 MG/1
40 TABLET, DELAYED RELEASE ORAL DAILY
Qty: 14 TABLET | Refills: 0 | Status: SHIPPED | OUTPATIENT
Start: 2020-02-13 | End: 2020-02-27

## 2020-02-13 RX ORDER — PSEUDOEPHEDRINE HCL 30 MG
100 TABLET ORAL 2 TIMES DAILY
Qty: 26 EACH | Refills: 0 | Status: SHIPPED | OUTPATIENT
Start: 2020-02-13 | End: 2020-02-26

## 2020-02-13 RX ORDER — HYDROCODONE BITARTRATE AND ACETAMINOPHEN 7.5; 325 MG/1; MG/1
2 TABLET ORAL EVERY 4 HOURS PRN
Qty: 60 TABLET | Refills: 0 | Status: SHIPPED | OUTPATIENT
Start: 2020-02-13 | End: 2020-02-22

## 2020-02-13 RX ORDER — MELOXICAM 15 MG/1
15 TABLET ORAL DAILY
Qty: 9 TABLET | Refills: 0 | Status: SHIPPED | OUTPATIENT
Start: 2020-02-14 | End: 2020-02-23

## 2020-02-13 RX ORDER — ONDANSETRON 4 MG/1
4 TABLET, FILM COATED ORAL EVERY 6 HOURS PRN
Qty: 14 TABLET | Refills: 0 | Status: SHIPPED | OUTPATIENT
Start: 2020-02-13 | End: 2020-02-27

## 2020-02-13 RX ADMIN — KETOROLAC TROMETHAMINE 30 MG: 30 INJECTION, SOLUTION INTRAMUSCULAR at 03:37

## 2020-02-13 RX ADMIN — ALPRAZOLAM 0.5 MG: 0.5 TABLET ORAL at 08:42

## 2020-02-13 RX ADMIN — HYDROCODONE BITARTRATE AND ACETAMINOPHEN 2 TABLET: 7.5; 325 TABLET ORAL at 10:27

## 2020-02-13 RX ADMIN — LEVOTHYROXINE SODIUM 137 MCG: 137 TABLET ORAL at 10:26

## 2020-02-13 RX ADMIN — PREDNISONE 5 MG: 5 TABLET ORAL at 08:42

## 2020-02-13 RX ADMIN — ASPIRIN 325 MG: 325 TABLET, COATED ORAL at 08:42

## 2020-02-13 RX ADMIN — PANTOPRAZOLE SODIUM 40 MG: 40 TABLET, DELAYED RELEASE ORAL at 06:38

## 2020-02-13 RX ADMIN — SULFASALAZINE 1500 MG: 500 TABLET ORAL at 08:42

## 2020-02-13 RX ADMIN — DOCUSATE SODIUM 100 MG: 100 CAPSULE, LIQUID FILLED ORAL at 08:42

## 2020-02-13 RX ADMIN — TRIAMTERENE AND HYDROCHLOROTHIAZIDE 1 TABLET: 37.5; 25 TABLET ORAL at 08:42

## 2020-02-13 RX ADMIN — KETOROLAC TROMETHAMINE 30 MG: 30 INJECTION, SOLUTION INTRAMUSCULAR at 10:27

## 2020-02-13 RX ADMIN — MELOXICAM 15 MG: 15 TABLET ORAL at 08:42

## 2020-02-13 RX ADMIN — METOPROLOL SUCCINATE 100 MG: 100 TABLET, FILM COATED, EXTENDED RELEASE ORAL at 08:42

## 2020-02-13 RX ADMIN — HYDROCODONE BITARTRATE AND ACETAMINOPHEN 2 TABLET: 7.5; 325 TABLET ORAL at 06:38

## 2020-02-13 NOTE — PLAN OF CARE
Problem: Patient Care Overview  Goal: Plan of Care Review  2/13/2020 1404 by Rona Arroyo, PT  Flowsheets (Taken 2/13/2020 1402)  Outcome Summary: Pt demonstrates excellent progress with mobility. Requiring less assistance with transfers and ambulation and able to tolerate increased ambulation distance. Pt also able to negotiate stairs with CGA. Pt completed all exercises independently and demonstrates excellent R knee ROM. Pt planning to DC home today and continue with HH PT. No problems anticipated.

## 2020-02-13 NOTE — THERAPY DISCHARGE NOTE
Patient Name: Pratibha Pascal  : 1960    MRN: [id number]                              Today's Date: 2020       Admit Date: 2020    Visit Dx:     ICD-10-CM ICD-9-CM   1. S/P knee surgery Z98.890 V45.89   2. Primary osteoarthritis of right knee M17.11 715.16     Patient Active Problem List   Diagnosis   • CAD (coronary artery disease)   • Mixed hyperlipidemia   • Benign essential hypertension   • Hypothyroidism   • Major depressive disorder, recurrent, in full remission (CMS/[facility])   • Rheumatoid arthritis (CMS/[facility])   • Cervicalgia   • Diabetes mellitus (CMS/[facility])   • Dyslipidemia   • BP (high blood pressure)   • Obstructive apnea   • AF (paroxysmal atrial fibrillation) (CMS/[facility])   • Proteinuria   • Burn   • Cellulitis of left leg   • Diabetes mellitus with neurological manifestation (CMS/[facility])   • MSSA (methicillin susceptible Staphylococcus aureus) infection   • Diabetic ulcer of left foot associated with diabetes mellitus due to underlying condition (CMS/[facility])   • Morbid obesity (CMS/[facility])   • Primary insomnia   • Anxiety   • Immunodeficiency, unspecified (CMS/[facility])   • Long term current use of non-steroidal anti-inflammatories (NSAID)   • Personal history of immunosupression therapy   • Primary generalized (osteo)arthritis   • Raised antibody titer   • Rheumatoid arthritis of multiple sites without organ or system involvement with positive rheumatoid factor (CMS/[facility])   • Other long term (current) drug therapy   • Uncontrolled type 2 diabetes mellitus with hyperglycemia (CMS/[facility])   • Vitamin D deficiency, unspecified   • Hypercalcemia   • Primary osteoarthritis of left knee   • Abnormal nuclear stress test   • OA (osteoarthritis) of knee   • Primary osteoarthritis of right knee   • Chronic fatigue   • Chronic pain of left knee     Past Medical History:   Diagnosis Date   • Allergic    • Allergy to mold    • Anxiety    • Arthritis    • Benign essential hypertension    • CAD (coronary artery disease)    •  Cataract    • Coronary artery disease    • Depression    • Diabetes mellitus (CMS/[facility])    • Fibromyalgia, primary    • H/O bone density study unclear   • H/O complete eye exam 2 -3 years   • Headache    • History of myocardial infarction    • History of staph infection     HISTORY OF MSSA IN LEFT FOOT FROM DIABETIC ULCER HEALED   • HL (hearing loss)     RIGHT EAR  SEVERE HEARING LOSS   • HLD (hyperlipidemia)    • Hyperlipidemia    • Hypertension    • Hypothyroidism    • Hypothyroidism    • Injury of back    • Left knee pain    • Major depressive disorder, recurrent, in full remission (CMS/[facility])    • Obesity    • BRANNON (obstructive sleep apnea)     USE CPAP   • Rheumatoid arthritis (CMS/[facility])    • Seasonal allergies    • Type 2 diabetes mellitus (CMS/[facility])      Past Surgical History:   Procedure Laterality Date   • BACK SURGERY  10/1995    also 2/02; L5-S1; Dr. Carroll Avitia   • CARDIAC CATHETERIZATION     • CARDIAC CATHETERIZATION N/A 9/3/2019    Procedure: Coronary angiography  ;  Surgeon: Magy Rivera MD;  Location:  [facility];  Service: Cardiovascular   • CARDIAC CATHETERIZATION N/A 9/3/2019    Procedure: Left Heart Cath;  Surgeon: Magy Rivera MD;  Location:  [facility];  Service: Cardiovascular   • CARDIAC CATHETERIZATION N/A 9/3/2019    Procedure: Left ventriculography;  Surgeon: Magy Rivera MD;  Location:  [facility];  Service: Cardiovascular   • COLONOSCOPY     • CORONARY STENT PLACEMENT  06/2014   • JOINT REPLACEMENT     • KNEE ARTHROSCOPY Right 2010   • MAMMO BILATERAL  due    delcines   • PAP SMEAR  due   • TONSILLECTOMY      age 26   • TOTAL KNEE ARTHROPLASTY Left 10/14/2019    Procedure: TOTAL KNEE ARTHROPLASTY;  Surgeon: Nabor Correia MD;  Location: [facility] MAIN OR;  Service: Orthopedics   • TOTAL KNEE ARTHROPLASTY Right 2/12/2020    Procedure: TOTAL KNEE ARTHROPLASTY;  Surgeon: Nabor Correia MD;  Location: [facility] MAIN OR;  Service:  Orthopedics;  Laterality: Right;   • US GUIDED FINE NEEDLE ASPIRATION  12/6/2019     General Information     Row Name 02/13/20 1401          PT Evaluation Time/Intention    Document Type  discharge treatment  -EE     Mode of Treatment  physical therapy  -EE     Row Name 02/13/20 1401          General Information    Existing Precautions/Restrictions  fall  -EE     Row Name 02/13/20 1401          Cognitive Assessment/Intervention- PT/OT    Orientation Status (Cognition)  oriented x 4  -EE     Row Name 02/13/20 1401          Safety Issues, Functional Mobility    Impairments Affecting Function (Mobility)  pain;strength;range of motion (ROM)  -EE       User Key  (r) = Recorded By, (t) = Taken By, (c) = Cosigned By    Initials Name Provider Type    EE Rona Arroyo PT Physical Therapist        Mobility     Row Name 02/13/20 1401          Bed Mobility Assessment/Treatment    Comment (Bed Mobility)  not tested; pt up in chair in therapy gym  -EE     Row Name 02/13/20 1401          Sit-Stand Transfer    Sit-Stand [place] (Transfers)  supervision  -EE     Assistive Device (Sit-Stand Transfers)  walker, front-wheeled  -EE     Row Name 02/13/20 1401          Gait/Stairs Assessment/Training    [place] Level (Gait)  supervision  -EE     Assistive Device (Gait)  walker, front-wheeled  -EE     Distance in Feet (Gait)  175  -EE     Deviations/Abnormal Patterns (Gait)  merari decreased;right sided deviations;antalgic  -EE     Right Sided Gait Deviations  heel strike decreased  -EE     [place] Level (Stairs)  contact guard  -EE     Handrail Location (Stairs)  both sides  -EE     Number of Steps (Stairs)  4  -EE     Ascending Technique (Stairs)  step-to-step  -EE     Descending Technique (Stairs)  step-to-step  -EE       User Key  (r) = Recorded By, (t) = Taken By, (c) = Cosigned By    Initials Name Provider Type    EE Rona Arroyo PT Physical Therapist        Obj/Interventions     Row Name 02/13/20 1402           General ROM    GENERAL ROM COMMENTS  R knee AROM 5-110  -EE     Row Name 02/13/20 1402          Therapeutic Exercise    Comment (Therapeutic Exercise)  x15 reps TKA protocol completed independently  -EE       User Key  (r) = Recorded By, (t) = Taken By, (c) = Cosigned By    Initials Name Provider Type    Rona Obrien PT Physical Therapist        Goals/Plan     Row Name 02/13/20 1403          Transfer Goal 1 (PT)    Progress/Outcome (Transfer Goal 1, PT)  goal partially met  -EE     Row Name 02/13/20 1403          Gait Training Goal 1 (PT)    Progress/Outcome (Gait Training Goal 1, PT)  goal met  -EE     Row Name 02/13/20 1403          ROM Goal 1 (PT)    Progress/Outcome (ROM Goal 1, PT)  goal met  -EE     Row Name 02/13/20 1403          Stairs Goal 1 (PT)    Progress/Outcome (Stairs Goal 1, PT)  goal met  -EE       User Key  (r) = Recorded By, (t) = Taken By, (c) = Cosigned By    Initials Name Provider Type    Rona Obrien PT Physical Therapist        Clinical Impression     Row Name 02/13/20 1402          Pain Assessment    Additional Documentation  Pain Scale: Numbers Pre/Post-Treatment (Group)  -EE     [facility] Name 02/13/20 1402          Pain Scale: Numbers Pre/Post-Treatment    Pain Scale: Numbers, Pretreatment  5/10  -EE     Pain Scale: Numbers, Post-Treatment  5/10  -EE     Pain Location - Side  Right  -EE     Pain Location - Orientation  incisional  -EE     Pain Location  knee  -EE     Pain Intervention(s)  Repositioned;Rest;Elevated  -EE     Row Name 02/13/20 1402          Plan of Care Review    Outcome Summary  Pt demonstrates excellent progress with mobility. Requiring less assistance with transfers and ambulation and able to tolerate increased ambulation distance. Pt also able to negotiate stairs with CGA. Pt completed all exercises independently and demonstrates excellent R knee ROM. Pt planning to DC home today and continue with  PT. No problems anticipated.   -EE     Row Name 02/13/20 1402           Positioning and Restraints    Pre-Treatment Position  sitting in chair/recliner  -EE     Post Treatment Position  chair  -EE     In Chair  reclined;call light within reach;encouraged to call for assist;exit alarm on;legs elevated  -EE       User Key  (r) = Recorded By, (t) = Taken By, (c) = Cosigned By    Initials Name Provider Type    Rona Obrien PT Physical Therapist        Outcome Measures     Row Name 02/13/20 8865          How much help from another person do you currently need...    Turning from your back to your side while in flat bed without using bedrails?  4  -EE     Moving from lying on back to sitting on the side of a flat bed without bedrails?  4  -EE     Moving to and from a bed to a chair (including a wheelchair)?  3  -EE     Standing up from a chair using your arms (e.g., wheelchair, bedside chair)?  3  -EE     Climbing 3-5 steps with a railing?  3  -EE     To walk in hospital room?  3  -EE     AM-PAC 6 Clicks Score (PT)  20  -EE     Row Name 02/13/20 5382          Functional Assessment    Outcome Measure Options  AM-PAC 6 Clicks Basic Mobility (PT)  -EE       User Key  (r) = Recorded By, (t) = Taken By, (c) = Cosigned By    Initials Name Provider Type    Rona Obrien PT Physical Therapist          PT Recommendation and Plan     Outcome Summary/Treatment Plan (PT)  Anticipated Discharge Disposition (PT): home with home health  Outcome Summary: Pt demonstrates excellent progress with mobility. Requiring less assistance with transfers and ambulation and able to tolerate increased ambulation distance. Pt also able to negotiate stairs with CGA. Pt completed all exercises independently and demonstrates excellent R knee ROM. Pt planning to DC home today and continue with HH PT. No problems anticipated.      Time Calculation:   PT Charges     Row Name 02/13/20 7612             Time Calculation    Start Time  1030  -EE      Stop Time  1115  -EE      Time Calculation (min)  45 min  -EE      PT  Received On  02/13/20  -EE         Time Calculation- PT    Total Timed Code Minutes- PT  20 minute(s)  -EE        User Key  (r) = Recorded By, (t) = Taken By, (c) = Cosigned By    Initials Name Provider Type    Rona Obrien, PT Physical Therapist        Therapy Charges for Today     Code Description Service Date Service Provider Modifiers Qty    01254861953 HC PT THER PROC EA 15 MIN 2/13/2020 Rona Arroyo, PT GP 1    16599150986 HC PT THER PROC GROUP 2/13/2020 Rona Arroyo, PT GP 1          PT G-Codes  Outcome Measure Options: AM-PAC 6 Clicks Basic Mobility (PT)  AM-PAC 6 Clicks Score (PT): 20    PT Discharge Summary  Anticipated Discharge Disposition (PT): home with home health    Rona Arroyo, PT  2/13/2020

## 2020-02-13 NOTE — PROGRESS NOTES
Discharge Planning Assessment  [facility]     Patient Name: Pratibha Pascal  MRN: [id number]  Today's Date: 2/13/2020    Admit Date: 2/12/2020    Discharge Needs Assessment     Row Name 02/13/20 1142       Living Environment    Lives With  child(jorge alberto), adult    Current Living Arrangements  home/apartment/condo    Family Caregiver if Needed  child(jorge alberto), adult    Quality of Family Relationships  helpful;involved       Transition Planning    Patient/Family Anticipates Transition to  home with family    Patient/Family Anticipated Services at Transition  home health care    Transportation Anticipated  family or friend will provide       Discharge Needs Assessment    Readmission Within the Last 30 Days  no previous admission in last 30 days    Equipment Currently Used at Home  bipap/cpap    Discharge Facility/Level of Care Needs  home with home health        Discharge Plan     Row Name 02/13/20 1143       Plan    Final Discharge Disposition Code  06 - home with home health care    Final Note  Facesheet and dc plan verified, pt to dc home w/ the help of her daughters. She requested [facility]; [facility] notified and accepted.         Destination      Coordination has not been started for this encounter.      Durable Medical Equipment      Coordination has not been started for this encounter.      Dialysis/Infusion      Coordination has not been started for this encounter.      Home Medical Care - Selection Complete      Service Provider Request Status Selected Services Address Phone Number Fax Number    [facility] [place] [facility] Health Services [address] [address] [phone]      Therapy      Coordination has not been started for this encounter.      Community Resources      Coordination has not been started for this encounter.        Expected Discharge Date and Time     Expected Discharge Date Expected Discharge Time    Feb 13, 2020         Demographic Summary     No documentation.       Functional Status    No documentation.       Psychosocial    No documentation.       Abuse/Neglect    No documentation.       Legal    No documentation.       Substance Abuse    No documentation.       Patient Forms    No documentation.           Rizwana Dunne RN

## 2020-02-13 NOTE — DISCHARGE SUMMARY
Patient Name: Pratibha Pascal  Patient YOB: 1960    Date of Admission:  2/12/2020  Date of Discharge:  2/13/2020  Discharge Diagnosis: TOTAL KNEE ARTHROPLASTY  Presenting Problem/History of Present Illness: Primary osteoarthritis of right knee [M17.11]  OA (osteoarthritis) of knee [M17.10]  Chronic pain of left knee [M25.562, G89.29]  Primary osteoarthritis of right knee [M17.11]  Admitting Physician: Dr Nabor Correia  Consults:   Consults     No orders found for last 30 day(s).          DETAILS OF HOSPITAL STAY:  Patient is a 59 y.o. female was admitted to the floor following the above procedure and underwent an uncomplicated hospital stay.  Patient did well with physical therapy and was ambulating well without problems.  On the day of discharge the wound was clean, dry and intact and calf was soft and non tender and Homans sign was negative.  Patient was tolerating  without problems.  Patient will be discharged home.    Condition on Discharge:  Stable    Vital Signs  Temp:  [97.1 °F (36.2 °C)-98.8 °F (37.1 °C)] 97.1 °F (36.2 °C)  Heart Rate:  [61-91] 75  Resp:  [15-20] 16  BP: ()/(52-99) 98/58    LABS:      Admission on 02/12/2020   Component Date Value Ref Range Status   • Glucose 02/12/2020 133* 70 - 130 mg/dL Final   • Gram Stain 02/12/2020 Rare (1+) WBCs per low power field   Preliminary   • Gram Stain 02/12/2020 No organisms seen   Preliminary   • Gram Stain 02/12/2020 Rare (1+) WBCs per low power field   Preliminary   • Gram Stain 02/12/2020 No organisms seen   Preliminary   • Gram Stain 02/12/2020 Rare (1+) WBCs per low power field   Preliminary   • Gram Stain 02/12/2020 No organisms seen   Preliminary   • Glucose 02/12/2020 213* 70 - 130 mg/dL Final   • Creatinine 02/12/2020 0.74  0.57 - 1.00 mg/dL Final   • eGFR Non African Amer 02/12/2020 80  >60 mL/min/1.73 Final   • Hemoglobin 02/13/2020 11.4* 12.0 - 15.9 g/dL Final   • Hematocrit 02/13/2020 35.0  34.0 - 46.6 % Final       No results  found.    Discharge Medications     Discharge Medications      New Medications      Instructions Start Date   aspirin 325 MG EC tablet   325 mg, Oral, Every 12 Hours Scheduled      docusate sodium 100 MG capsule   100 mg, Oral, 2 Times Daily      HYDROcodone-acetaminophen 7.5-325 MG per tablet  Commonly known as:  NORCO   2 tablets, Oral, Every 4 Hours PRN      meloxicam 15 MG tablet  Commonly known as:  MOBIC   15 mg, Oral, Daily   Start Date:  February 14, 2020     ondansetron 4 MG tablet  Commonly known as:  ZOFRAN   4 mg, Oral, Every 6 Hours PRN      pantoprazole 40 MG EC tablet  Commonly known as:  PROTONIX   40 mg, Oral, Daily      polyethylene glycol pack packet  Commonly known as:  MIRALAX   17 g, Oral, 2 Times Daily         Changes to Medications      Instructions Start Date   ONETOUCH DELICA LANCETS 33G misc  What changed:  how much to take   Test 3 times daily         Continue These Medications      Instructions Start Date   ALPRAZolam 0.5 MG tablet  Commonly known as:  XANAX   0.5 mg, Oral, Daily, TAKE 1 TABLET BY MOUTH EVERY DAY AS NEEDED      atorvastatin 10 MG tablet  Commonly known as:  LIPITOR   5 mg, Oral, Daily      baclofen 10 MG tablet  Commonly known as:  LIORESAL   10 mg, Oral, 3 Times Daily PRN      dapagliflozin-metformin HCl ER 5-1000 MG tablet  Commonly known as:  XIGDUO XR   2 tablets, Oral, Daily      FLUoxetine 20 MG capsule  Commonly known as:  PROzac   60 mg, Oral, Nightly      levothyroxine 137 MCG tablet  Commonly known as:  SYNTHROID, LEVOTHROID   137 mcg, Oral, Daily      losartan 50 MG tablet  Commonly known as:  COZAAR   50 mg, Oral, Daily      metoprolol succinate  MG 24 hr tablet  Commonly known as:  TOPROL-XL   100 mg, Oral, Daily      predniSONE 5 MG tablet  Commonly known as:  DELTASONE   5 mg, Oral, Daily      RINVOQ 15 MG tablet sustained-release 24 hour  Generic drug:  Upadacitinib ER   15 mg, Oral, Daily      sulfaSALAzine 500 MG tablet  Commonly known as:   AZULFIDINE   1,500 mg, Oral, 2 Times Daily, TAKE 3 TABLETS BY MOUTH TWICE DAILY      triamterene-hydrochlorothiazide 37.5-25 MG per tablet  Commonly known as:  MAXZIDE-25   1 tablet, Oral, Daily      TRULICITY 1.5 MG/0.5ML solution pen-injector  Generic drug:  Dulaglutide   1.5 mg, Subcutaneous, Weekly         Stop These Medications    Chlorhexidine Gluconate Cloth 2 % pads     folic acid 1 MG tablet  Commonly known as:  FOLVITE     mupirocin 2 % nasal ointment  Commonly known as:  BACTROBAN            Activity at Discharge:     Discharge Instructions: Patient is to continue with physical therapy exercises daily and continue working with the physical therapist as ordered. Patient may weight bear as tolerated. Apply ice regularly. Patient may ice for long periods of time as long as ice is not directly on the skin. Patient instructed on frequent calf pumping exercises.  Patient also instructed on incentive spirometer during hospitalization and encouraged to continue to use at home regularly.    Dressing: The dressing is designed to be left in place until you return to the office in 2 weeks.  The suction unit should stop functioning at 7 days and the green light will switch to yellow.  At that point the suction unit and tubing can be disconnected at the port closest to the dressing.  The suction unit and tubing may be discarded.  You may shower immediately upon return home, you will need to turn the pump off by depressing the orange button once and then you may disconnect the pump and tubing at the connection port.  After showering, shake off the excess water and reattach the tubing.  Restart the pump by depressing the orange button one time and you will notice the green light flashing again.  If the dressing becomes disloged or saturated it should be changed. Please refer to the YANDEL information sheet if you have any questions about the dressing.  If you have a home health nurse or therapist they can be contacted to  assist with dressing change or repair. You may also call the YANDEL dressing hotline for questions related to the dressing ([phone]). If there still other problems or questions related to the dressing despite these measures then you can contact Kelsi at our office [phone].  If for some reason the YANDEL dressing is removed, after 7 days the wound can be gently cleaned with antibacterial soap then allowed to dry and covered with a dry sterile dressing. The wound should be covered at all times except while showering.  Patient may change dressings daily and prn using sterile 4x4 and paper tape, and should call if any unusual drainage, redness or swelling.*  Follow up appointment in 2 weeks - patient to call the office at [phone] to schedule.  Patient will be discharged on aspirin 325mg BID x 2weeks, then daily x 4weeks    Discharge Diagnosis:    Patient Active Problem List   Diagnosis   • CAD (coronary artery disease)   • Mixed hyperlipidemia   • Benign essential hypertension   • Hypothyroidism   • Major depressive disorder, recurrent, in full remission (CMS/HCC)   • Rheumatoid arthritis (CMS/[facility])   • Cervicalgia   • Diabetes mellitus (CMS/H[facility])   • Dyslipidemia   • BP (high blood pressure)   • Obstructive apnea   • AF (paroxysmal atrial fibrillation) (CMS/HCC)   • Proteinuria   • Burn   • Cellulitis of left leg   • Diabetes mellitus with neurological manifestation (CMS/[facility])   • MSSA (methicillin susceptible Staphylococcus aureus) infection   • Diabetic ulcer of left foot associated with diabetes mellitus due to underlying condition (CMS/HCC)   • Morbid obesity (CMS/HCC)   • Primary insomnia   • Anxiety   • Immunodeficiency, unspecified (CMS/HCC)   • Long term current use of non-steroidal anti-inflammatories (NSAID)   • Personal history of immunosupression therapy   • Primary generalized (osteo)arthritis   • Raised antibody titer   • Rheumatoid arthritis of multiple sites without organ or system involvement with  positive rheumatoid factor (CMS/HCC)   • Other long term (current) drug therapy   • Uncontrolled type 2 diabetes mellitus with hyperglycemia (CMS/HCC)   • Vitamin D deficiency, unspecified   • Hypercalcemia   • Primary osteoarthritis of left knee   • Abnormal nuclear stress test   • OA (osteoarthritis) of knee   • Primary osteoarthritis of right knee   • Chronic fatigue   • Chronic pain of left knee       Follow-up Appointments  Future Appointments   Date Time Provider Department Center   2/27/2020  8:30 AM Addy Correia MD [facility] [address] None   3/25/2020  2:30 PM  [facility]     Additional Instructions for the Follow-ups that You Need to Schedule     Ambulatory Referral to Home Health   As directed      Face to Face Visit Date:  2/13/2020    Follow-up provider for Plan of Care?:  I will be treating the patient on an ongoing basis.  Please send me the Plan of Care for signature.    Follow-up provider:  ADDY CORREIA [[id number]]    Reason/Clinical Findings:  tka    Describe mobility limitations that make leaving home difficult:  postop    Nursing/Therapeutic Services Requested:  Physical Therapy    PT orders:  Total joint pathway    Frequency:  1 Week 1                  KALI Barlow  02/13/20  9:17 AM

## 2020-02-13 NOTE — DISCHARGE INSTRUCTIONS
Discharge Instructions: Patient is to continue with physical therapy exercises daily and continue working with the physical therapist as ordered. Patient may weight bear as tolerated. Apply ice regularly. Patient may ice for long periods of time as long as ice is not directly on the skin. Patient instructed on frequent calf pumping exercises.  Patient also instructed on incentive spirometer during hospitalization and encouraged to continue to use at home regularly.    Dressing: The dressing is designed to be left in place until you return to the office in 2 weeks.  The suction unit should stop functioning at 7 days and the green light will switch to yellow.  At that point the suction unit and tubing can be disconnected at the port closest to the dressing.  The suction unit and tubing may be discarded.  You may shower immediately upon return home, you will need to turn the pump off by depressing the orange button once and then you may disconnect the pump and tubing at the connection port.  After showering, shake off the excess water and reattach the tubing.  Restart the pump by depressing the orange button one time and you will notice the green light flashing again.  If the dressing becomes disloged or saturated it should be changed. Please refer to the YANDEL information sheet if you have any questions about the dressing.  If you have a home health nurse or therapist they can be contacted to assist with dressing change or repair. You may also call the YANDEL dressing hotline for questions related to the dressing ([phone]). If there still other problems or questions related to the dressing despite these measures then you can contact Kelsi at our office [phone].  If for some reason the YANDEL dressing is removed, after 7 days the wound can be gently cleaned with antibacterial soap then allowed to dry and covered with a dry sterile dressing. The wound should be covered at all times except while showering.  Patient  may change dressings daily and prn using sterile 4x4 and paper tape, and should call if any unusual drainage, redness or swelling.*  Follow up appointment in 2 weeks - patient to call the office at [phone] to schedule.  Patient will be discharged on aspirin 325mg BID x 2weeks, then daily x 4weeks

## 2020-02-15 LAB
BACTERIA SPEC AEROBE CULT: NORMAL
GRAM STN SPEC: NORMAL

## 2020-02-17 ENCOUNTER — TRANSITIONAL CARE MANAGEMENT TELEPHONE ENCOUNTER (OUTPATIENT)
Dept: FAMILY MEDICINE CLINIC | Facility: CLINIC | Age: 60
End: 2020-02-17

## 2020-02-17 LAB
BACTERIA SPEC ANAEROBE CULT: NORMAL

## 2020-02-17 NOTE — OUTREACH NOTE
Spoke with pt, feels she is doing pretty well s/p TKR RT. Pain is well controlled, her appetite is good, no bowel issues. No fever, chills, SOB. She is following ice and elevation protocols, and states swelling is at a minimum. Pain has decreased quite a bit since she got home. Confirms receipt and understanding of d/c orders and all medications. Pt does have HH doing P.T. With her right now. Pt declines to sched [facility] at this time.

## 2020-02-27 ENCOUNTER — OFFICE VISIT (OUTPATIENT)
Dept: ORTHOPEDIC SURGERY | Facility: CLINIC | Age: 60
End: 2020-02-27

## 2020-02-27 VITALS — WEIGHT: 293 LBS | BODY MASS INDEX: 39.68 KG/M2 | HEIGHT: 72 IN | TEMPERATURE: 98.8 F

## 2020-02-27 DIAGNOSIS — Z96.651 STATUS POST RIGHT KNEE REPLACEMENT: Primary | ICD-10-CM

## 2020-02-27 PROCEDURE — 99024 POSTOP FOLLOW-UP VISIT: CPT | Performed by: ORTHOPAEDIC SURGERY

## 2020-02-27 RX ORDER — HYDROCODONE BITARTRATE AND ACETAMINOPHEN 7.5; 325 MG/1; MG/1
2 TABLET ORAL EVERY 4 HOURS PRN
Qty: 42 TABLET | Refills: 0 | Status: SHIPPED | OUTPATIENT
Start: 2020-02-27 | End: 2020-06-10

## 2020-02-27 NOTE — PROGRESS NOTES
Pratibha Pascal : 1960 MRN: [id number] DATE: 2020    DIAGNOSIS: 2 week follow up right total knee      SUBJECTIVE:Patient returns today for 2 week follow up of right total knee replacement. Patient reports doing well with no unusual complaints. Appears to be progressing appropriately.    OBJECTIVE:   Exam:. The incision is healing appropriately. No sign of infection. Range of motion is progressing as expected. The calf is soft and nontender with a negative Homans sign.    ASSESSMENT: 2 week status post right knee replacement.    PLAN: 1) Staples removed and steri strips applied   2) Order given for PT   3) Discontinue RENETTA hose   4) Continue ice PRN   5) aspirin 325 mg orally every day for 1 month   6) Follow up in 6 weeks with repeat Xrays of right knee (3views)    Nabor Correia MD  2020

## 2020-03-11 RX ORDER — FLUOXETINE HYDROCHLORIDE 20 MG/1
CAPSULE ORAL
Qty: 270 CAPSULE | Refills: 1 | OUTPATIENT
Start: 2020-03-11

## 2020-03-20 ENCOUNTER — APPOINTMENT (OUTPATIENT)
Dept: PREADMISSION TESTING | Facility: HOSPITAL | Age: 60
End: 2020-03-20

## 2020-03-23 ENCOUNTER — DOCUMENTATION (OUTPATIENT)
Dept: PHYSICAL THERAPY | Facility: CLINIC | Age: 60
End: 2020-03-23

## 2020-03-23 NOTE — PROGRESS NOTES
Discharge Summary  Discharge Summary from Physical Therapy Report      Dates  PT visit: 10/30/19  Number of Visits: 5    Discharge Status of Patient: See MD Note dated 11/13/19    Goals: Not Met    Discharge Plan: Continue with current home exercise program as instructed    Comments pt cancelled remainder of appointments after last visit due to personal reasons    Date of Discharge 11/13/19        Kerry Hernandez, PT  Physical Therapist

## 2020-04-13 RX ORDER — FLUOXETINE HYDROCHLORIDE 20 MG/1
CAPSULE ORAL
Qty: 270 CAPSULE | Refills: 1 | OUTPATIENT
Start: 2020-04-13

## 2020-04-14 DIAGNOSIS — E11.65 UNCONTROLLED TYPE 2 DIABETES MELLITUS WITH HYPERGLYCEMIA (HCC): ICD-10-CM

## 2020-04-14 RX ORDER — DULAGLUTIDE 1.5 MG/.5ML
1.5 INJECTION, SOLUTION SUBCUTANEOUS WEEKLY
Qty: 12 PEN | Refills: 1 | Status: SHIPPED | OUTPATIENT
Start: 2020-04-14 | End: 2020-05-28 | Stop reason: SDUPTHER

## 2020-05-19 DIAGNOSIS — F41.9 ANXIETY: ICD-10-CM

## 2020-05-19 RX ORDER — ALPRAZOLAM 0.5 MG/1
TABLET ORAL
Qty: 30 TABLET | Refills: 2 | OUTPATIENT
Start: 2020-05-19

## 2020-05-20 ENCOUNTER — TELEMEDICINE (OUTPATIENT)
Dept: FAMILY MEDICINE CLINIC | Facility: CLINIC | Age: 60
End: 2020-05-20

## 2020-05-20 VITALS — SYSTOLIC BLOOD PRESSURE: 121 MMHG | DIASTOLIC BLOOD PRESSURE: 75 MMHG

## 2020-05-20 DIAGNOSIS — I10 BENIGN ESSENTIAL HYPERTENSION: ICD-10-CM

## 2020-05-20 DIAGNOSIS — F41.9 ANXIETY: ICD-10-CM

## 2020-05-20 PROCEDURE — 99213 OFFICE O/P EST LOW 20 MIN: CPT | Performed by: FAMILY MEDICINE

## 2020-05-20 RX ORDER — BLOOD SUGAR DIAGNOSTIC
STRIP MISCELLANEOUS
COMMUNITY
Start: 2020-05-18

## 2020-05-20 RX ORDER — METOPROLOL SUCCINATE 100 MG/1
100 TABLET, EXTENDED RELEASE ORAL DAILY
Qty: 90 TABLET | Refills: 1 | Status: SHIPPED | OUTPATIENT
Start: 2020-05-20 | End: 2020-11-17 | Stop reason: SDUPTHER

## 2020-05-20 RX ORDER — ALPRAZOLAM 0.5 MG/1
0.5 TABLET ORAL DAILY
Qty: 30 TABLET | Refills: 2 | Status: SHIPPED | OUTPATIENT
Start: 2020-05-20 | End: 2020-08-18 | Stop reason: SDUPTHER

## 2020-05-20 RX ORDER — TRIAMTERENE AND HYDROCHLOROTHIAZIDE 37.5; 25 MG/1; MG/1
1 TABLET ORAL DAILY
Qty: 90 TABLET | Refills: 1 | Status: SHIPPED | OUTPATIENT
Start: 2020-05-20 | End: 2020-11-17 | Stop reason: SDUPTHER

## 2020-05-20 RX ORDER — PREDNISONE 1 MG/1
5 TABLET ORAL DAILY
COMMUNITY
Start: 2020-04-07 | End: 2020-11-17

## 2020-05-20 RX ORDER — FOLIC ACID 1 MG/1
1000 TABLET ORAL DAILY
COMMUNITY
Start: 2020-04-16 | End: 2021-02-22 | Stop reason: SDUPTHER

## 2020-05-20 RX ORDER — ERGOCALCIFEROL 1.25 MG/1
50000 CAPSULE ORAL
COMMUNITY
Start: 2020-03-08 | End: 2022-12-22

## 2020-05-20 NOTE — PROGRESS NOTES
Subjective   Pratibha Pascal is a 60 y.o. female.     CC: Video Visit for Medical Management    History of Present Illness     Chief Complaint:   Chief Complaint   Patient presents with   • Anxiety     med refill  gayle -    • Hypertension       Pratibha Pascal 60 y.o. female who presents today for Medical Management of the below listed issues and medication refills.  she has a problem list of   Patient Active Problem List   Diagnosis   • CAD (coronary artery disease)   • Mixed hyperlipidemia   • Benign essential hypertension   • Hypothyroidism   • Major depressive disorder, recurrent, in full remission (CMS/[facility])   • Rheumatoid arthritis (CMS/HCC)   • Cervicalgia   • Diabetes mellitus (CMS/HCC)   • Dyslipidemia   • BP (high blood pressure)   • Obstructive apnea   • AF (paroxysmal atrial fibrillation) (CMS/HCC)   • Proteinuria   • Burn   • Cellulitis of left leg   • Diabetes mellitus with neurological manifestation (CMS/[facility])   • MSSA (methicillin susceptible Staphylococcus aureus) infection   • Diabetic ulcer of left foot associated with diabetes mellitus due to underlying condition (CMS/HCC)   • Morbid obesity (CMS/[facility])   • Primary insomnia   • Anxiety   • Immunodeficiency, unspecified (CMS/[facility])   • Long term current use of non-steroidal anti-inflammatories (NSAID)   • Personal history of immunosupression therapy   • Primary generalized (osteo)arthritis   • Raised antibody titer   • Rheumatoid arthritis of multiple sites without organ or system involvement with positive rheumatoid factor (CMS/[facility])   • Other long term (current) drug therapy   • Uncontrolled type 2 diabetes mellitus with hyperglycemia (CMS/[facility])   • Vitamin D deficiency, unspecified   • Hypercalcemia   • Primary osteoarthritis of left knee   • Abnormal nuclear stress test   • OA (osteoarthritis) of knee   • Primary osteoarthritis of right knee   • Chronic fatigue   • Chronic pain of left knee   .  Since the last visit, she has overall felt well.  she  has been compliant with   Current Outpatient Medications:   •  ALPRAZolam (XANAX) 0.5 MG tablet, Take 1 tablet by mouth Daily. TAKE 1 TABLET BY MOUTH EVERY DAY AS NEEDED, Disp: 30 tablet, Rfl: 2  •  Aspirin Buf,CaCarb-MgCarb-MgO, 81 MG tablet, Take  by mouth., Disp: , Rfl:   •  predniSONE (DELTASONE) 5 MG tablet, take 1 or 2  tablets by oral route  every day, Disp: , Rfl:   •  ACCU-CHEK GUIDE test strip, , Disp: , Rfl:   •  atorvastatin (LIPITOR) 10 MG tablet, Take 0.5 tablets by mouth Daily., Disp: 45 tablet, Rfl: 1  •  baclofen (LIORESAL) 10 MG tablet, Take 10 mg by mouth 3 (Three) Times a Day As Needed for Muscle Spasms., Disp: , Rfl:   •  dapagliflozin-metformin HCl ER (XIGDUO XR) 5-1000 MG tablet, Take 2 tablets by mouth Daily., Disp: 60 tablet, Rfl: 5  •  FLUoxetine (PROzac) 20 MG capsule, Take 60 mg by mouth Every Night., Disp: , Rfl:   •  folic acid (FOLVITE) 1 MG tablet, Take 1,000 mcg by mouth Daily., Disp: , Rfl:   •  HYDROcodone-acetaminophen (NORCO) 7.5-325 MG per tablet, Take 2 tablets by mouth Every 4 (Four) Hours As Needed for Moderate Pain ., Disp: 42 tablet, Rfl: 0  •  levothyroxine (SYNTHROID, LEVOTHROID) 137 MCG tablet, Take 137 mcg by mouth Daily., Disp: , Rfl:   •  losartan (COZAAR) 50 MG tablet, Take 50 mg by mouth Daily., Disp: , Rfl:   •  metoprolol succinate XL (TOPROL-XL) 100 MG 24 hr tablet, Take 1 tablet by mouth Daily., Disp: 90 tablet, Rfl: 1  •  ONETOUCH DELICA LANCETS 33G misc, Test 3 times daily (Patient taking differently: 1 each. Test 3 times daily), Disp: 100 each, Rfl: 11  •  sulfaSALAzine (AZULFIDINE) 500 MG tablet, Take 1,500 mg by mouth 2 (Two) Times a Day. TAKE 3 TABLETS BY MOUTH TWICE DAILY, Disp: , Rfl: 1  •  triamterene-hydrochlorothiazide (MAXZIDE-25) 37.5-25 MG per tablet, Take 1 tablet by mouth Daily., Disp: 90 tablet, Rfl: 1  •  TRULICITY 1.5 MG/0.5ML solution pen-injector, Inject 1.5 mg under the skin into the appropriate area as directed 1 (One) Time Per Week.,  Disp: 12 pen, Rfl: 1  •  Upadacitinib ER (RINVOQ) 15 MG tablet sustained-release 24 hour, Take 15 mg by mouth Daily., Disp: , Rfl:   •  vitamin D (ERGOCALCIFEROL) 1.25 MG (37586 UT) capsule capsule, Take 50,000 Units by mouth 3 (Three) Times a Week., Disp: , Rfl:   No current facility-administered medications for this visit.     Facility-Administered Medications Ordered in Other Visits:   •  mupirocin (BACTROBAN) 2 % nasal ointment, , Nasal, BID, Nabor Correia MD.  she denies medication side effects.    All of the other chronic condition(s) listed above are stable w/o issues.    /75     Results for orders placed or performed during the hospital encounter of 02/12/20   Anaerobic Culture - Wound, Knee, Right   Result Value Ref Range    Anaerobic Culture No anaerobes isolated at 5 days    Anaerobic Culture - Wound, Knee, Right   Result Value Ref Range    Anaerobic Culture No anaerobes isolated at 5 days    Anaerobic Culture - Wound, Knee, Right   Result Value Ref Range    Anaerobic Culture No anaerobes isolated at 5 days    Wound Culture - Wound, Knee, Right   Result Value Ref Range    Wound Culture No growth at 3 days     Gram Stain Rare (1+) WBCs per low power field     Gram Stain No organisms seen    Wound Culture - Wound, Knee, Right   Result Value Ref Range    Wound Culture No growth at 3 days     Gram Stain Rare (1+) WBCs per low power field     Gram Stain No organisms seen    Wound Culture - Wound, Knee, Right   Result Value Ref Range    Wound Culture No growth at 3 days     Gram Stain Rare (1+) WBCs per low power field     Gram Stain No organisms seen    Creatinine, Serum   Result Value Ref Range    Creatinine 0.74 0.57 - 1.00 mg/dL    eGFR Non African Amer 80 >60 mL/min/1.73   Hemoglobin & Hematocrit, Blood   Result Value Ref Range    Hemoglobin 11.4 (L) 12.0 - 15.9 g/dL    Hematocrit 35.0 34.0 - 46.6 %   POC Glucose Once   Result Value Ref Range    Glucose 133 (H) 70 - 130 mg/dL   POC Glucose Once    Result Value Ref Range    Glucose 213 (H) 70 - 130 mg/dL           The following portions of the patient's history were reviewed and updated as appropriate: allergies, current medications, past family history, past medical history, past social history, past surgical history and problem list.    Review of Systems   Constitutional: Negative for activity change, chills and fever.   Respiratory: Negative for cough.    Cardiovascular: Negative for chest pain.   Psychiatric/Behavioral: Negative for dysphoric mood.       Objective   Physical Exam   Constitutional: She appears well-developed and well-nourished. No distress.   Psychiatric: She has a normal mood and affect. Her behavior is normal. Thought content normal.   Labs from endocrine reviewed by me at today's visit.    Assessment/Plan   Pratibha was seen today for anxiety and hypertension.    Diagnoses and all orders for this visit:    Anxiety  -     ALPRAZolam (XANAX) 0.5 MG tablet; Take 1 tablet by mouth Daily. TAKE 1 TABLET BY MOUTH EVERY DAY AS NEEDED    Benign essential hypertension  -     metoprolol succinate XL (TOPROL-XL) 100 MG 24 hr tablet; Take 1 tablet by mouth Daily.  -     triamterene-hydrochlorothiazide (MAXZIDE-25) 37.5-25 MG per tablet; Take 1 tablet by mouth Daily.    Spent 15    minutes with chart and interview and consent for this encounter given by the patient.  You have chosen to receive care through a telehealth visit.  Do you consent to use a video/audio connection for your medical care today? Yes

## 2020-05-28 DIAGNOSIS — E11.65 UNCONTROLLED TYPE 2 DIABETES MELLITUS WITH HYPERGLYCEMIA (HCC): ICD-10-CM

## 2020-05-28 RX ORDER — DULAGLUTIDE 1.5 MG/.5ML
1.5 INJECTION, SOLUTION SUBCUTANEOUS WEEKLY
Qty: 12 PEN | Refills: 1 | Status: SHIPPED | OUTPATIENT
Start: 2020-05-28 | End: 2020-05-29 | Stop reason: SDUPTHER

## 2020-05-29 DIAGNOSIS — E11.65 UNCONTROLLED TYPE 2 DIABETES MELLITUS WITH HYPERGLYCEMIA (HCC): ICD-10-CM

## 2020-05-29 RX ORDER — DULAGLUTIDE 1.5 MG/.5ML
1.5 INJECTION, SOLUTION SUBCUTANEOUS WEEKLY
Qty: 12 PEN | Refills: 1 | Status: SHIPPED | OUTPATIENT
Start: 2020-05-29 | End: 2021-12-21

## 2020-06-01 ENCOUNTER — TELEPHONE (OUTPATIENT)
Dept: CARDIOLOGY | Facility: CLINIC | Age: 60
End: 2020-06-01

## 2020-06-01 NOTE — TELEPHONE ENCOUNTER
Tamia, please call patient to schedule f/u for s/c with SG or APRN.  Surgery is scheduled for 7/9/20.    Thank you!

## 2020-06-01 NOTE — TELEPHONE ENCOUNTER
Patient called asking that we fax s/c letter from 9/2019 to Dr.Chad Carter at fax # [phone].  Patient has not been seen in office since 8/20/19 by Dr. Coto for s/c, and last seen by you on 7/21/2015.    She is scheduled for right thyroid lobectomy with Dr. Carter on 7/9/20.    How would you like me to handle this?  Should be seen again for s/c?    Thanks!

## 2020-06-01 NOTE — TELEPHONE ENCOUNTER
I would like to see her in the office since she has not been good about routine follow up in the past and will be due for routine follow up anyway.

## 2020-06-02 ENCOUNTER — PATIENT MESSAGE (OUTPATIENT)
Dept: CARDIOLOGY | Facility: CLINIC | Age: 60
End: 2020-06-02

## 2020-06-02 NOTE — TELEPHONE ENCOUNTER
From: Pratibha Pascal  To: Norman Coto III, MD  Sent: 2020 11:04 AM EDT  Subject: Test Results Question    Would you please fax a copy of the results of the cardiac catheterization I had done on 9/3/2019 to Dr. Kimo Carter to [phone]. My  is 1960.

## 2020-06-03 ENCOUNTER — TELEPHONE (OUTPATIENT)
Dept: CARDIOLOGY | Facility: CLINIC | Age: 60
End: 2020-06-03

## 2020-06-03 NOTE — TELEPHONE ENCOUNTER
"Spoke to patient. She was very disagreeable about needing to come in to get surgery clearance when \"all that was requested was that Dr. Carter wanted a copy of the letter from last year\". I explained the process to her and that she needed to come in to get surgery clearance since she hadn't been seen in awhile. She didn't want to make an appointment. She said she will \"discuss it with Dr. Carter's nurse and see what she wants me to do because I had surgery before and they didn't want surgery clearance. This is only for thyroid surgery so why do I need clearance?\"  "

## 2020-06-03 NOTE — TELEPHONE ENCOUNTER
6/3/2020    Patient called back. She scheduled surgery clearance with Karis Leslie on June 16. Thanks.

## 2020-06-08 ENCOUNTER — TRANSCRIBE ORDERS (OUTPATIENT)
Dept: PREADMISSION TESTING | Facility: HOSPITAL | Age: 60
End: 2020-06-08

## 2020-06-08 DIAGNOSIS — Z01.818 OTHER SPECIFIED PRE-OPERATIVE EXAMINATION: Primary | ICD-10-CM

## 2020-06-09 RX ORDER — LOSARTAN POTASSIUM 50 MG/1
TABLET ORAL
Qty: 90 TABLET | Refills: 0 | OUTPATIENT
Start: 2020-06-09

## 2020-06-10 ENCOUNTER — APPOINTMENT (OUTPATIENT)
Dept: PREADMISSION TESTING | Facility: HOSPITAL | Age: 60
End: 2020-06-10

## 2020-06-10 VITALS
HEIGHT: 72 IN | BODY MASS INDEX: 39.68 KG/M2 | DIASTOLIC BLOOD PRESSURE: 75 MMHG | SYSTOLIC BLOOD PRESSURE: 149 MMHG | RESPIRATION RATE: 16 BRPM | TEMPERATURE: 98.2 F | WEIGHT: 293 LBS | HEART RATE: 86 BPM | OXYGEN SATURATION: 97 %

## 2020-06-10 LAB
ANION GAP SERPL CALCULATED.3IONS-SCNC: 13.5 MMOL/L (ref 5–15)
BASOPHILS # BLD AUTO: 0.08 10*3/MM3 (ref 0–0.2)
BASOPHILS NFR BLD AUTO: 0.9 % (ref 0–1.5)
BUN BLD-MCNC: 16 MG/DL (ref 8–23)
BUN/CREAT SERPL: 21.1 (ref 7–25)
CALCIUM SPEC-SCNC: 9.5 MG/DL (ref 8.6–10.5)
CHLORIDE SERPL-SCNC: 99 MMOL/L (ref 98–107)
CO2 SERPL-SCNC: 25.5 MMOL/L (ref 22–29)
CREAT BLD-MCNC: 0.76 MG/DL (ref 0.57–1)
DEPRECATED RDW RBC AUTO: 48 FL (ref 37–54)
EOSINOPHIL # BLD AUTO: 0.12 10*3/MM3 (ref 0–0.4)
EOSINOPHIL NFR BLD AUTO: 1.3 % (ref 0.3–6.2)
ERYTHROCYTE [DISTWIDTH] IN BLOOD BY AUTOMATED COUNT: 15.8 % (ref 12.3–15.4)
GFR SERPL CREATININE-BSD FRML MDRD: 77 ML/MIN/1.73
GLUCOSE BLD-MCNC: 167 MG/DL (ref 65–99)
HCT VFR BLD AUTO: 44.9 % (ref 34–46.6)
HGB BLD-MCNC: 14.3 G/DL (ref 12–15.9)
IMM GRANULOCYTES # BLD AUTO: 0.05 10*3/MM3 (ref 0–0.05)
IMM GRANULOCYTES NFR BLD AUTO: 0.5 % (ref 0–0.5)
LYMPHOCYTES # BLD AUTO: 2.14 10*3/MM3 (ref 0.7–3.1)
LYMPHOCYTES NFR BLD AUTO: 22.8 % (ref 19.6–45.3)
MCH RBC QN AUTO: 26.5 PG (ref 26.6–33)
MCHC RBC AUTO-ENTMCNC: 31.8 G/DL (ref 31.5–35.7)
MCV RBC AUTO: 83.1 FL (ref 79–97)
MONOCYTES # BLD AUTO: 1.15 10*3/MM3 (ref 0.1–0.9)
MONOCYTES NFR BLD AUTO: 12.3 % (ref 5–12)
NEUTROPHILS # BLD AUTO: 5.84 10*3/MM3 (ref 1.7–7)
NEUTROPHILS NFR BLD AUTO: 62.2 % (ref 42.7–76)
NRBC BLD AUTO-RTO: 0 /100 WBC (ref 0–0.2)
PLATELET # BLD AUTO: 356 10*3/MM3 (ref 140–450)
PMV BLD AUTO: 8.8 FL (ref 6–12)
POTASSIUM BLD-SCNC: 3.7 MMOL/L (ref 3.5–5.2)
RBC # BLD AUTO: 5.4 10*6/MM3 (ref 3.77–5.28)
SODIUM BLD-SCNC: 138 MMOL/L (ref 136–145)
WBC NRBC COR # BLD: 9.38 10*3/MM3 (ref 3.4–10.8)

## 2020-06-10 PROCEDURE — 36415 COLL VENOUS BLD VENIPUNCTURE: CPT

## 2020-06-10 PROCEDURE — 85025 COMPLETE CBC W/AUTO DIFF WBC: CPT | Performed by: OTOLARYNGOLOGY

## 2020-06-10 PROCEDURE — 93005 ELECTROCARDIOGRAM TRACING: CPT

## 2020-06-10 PROCEDURE — 93010 ELECTROCARDIOGRAM REPORT: CPT | Performed by: INTERNAL MEDICINE

## 2020-06-10 PROCEDURE — 80048 BASIC METABOLIC PNL TOTAL CA: CPT | Performed by: OTOLARYNGOLOGY

## 2020-06-10 RX ORDER — ASPIRIN 81 MG/1
81 TABLET, CHEWABLE ORAL DAILY
COMMUNITY
End: 2020-07-09 | Stop reason: HOSPADM

## 2020-06-10 NOTE — DISCHARGE INSTRUCTIONS
Take the following medications the morning of surgery:      UPADACITINIB (RINVOQ)  SULFASALAZINE (AZULFIDINE)  METOPROLOL  LEVOTHYROXINE  BACLOFEN IF NEEDED        General Instructions:  • Do not eat solid food after midnight the night before surgery.  • You may drink clear liquids day of surgery but must stop at least one hour before your hospital arrival time.  • It is beneficial for you to have a clear drink that contains carbohydrates the day of surgery.  We suggesta 12 to 20 ounce bottle of G2 or Powerade Zero for diabetic patients.     Clear liquids are liquids you can see through.  Nothing red in color.     Plain water                               Sports drinks  Sodas                                   Gelatin (Jell-O)  Fruit juices without pulp such as white grape juice and apple juice  Popsicles that contain no fruit or yogurt  Tea or coffee (no cream or milk added)  G2 / Powerade Zero  • Patients who avoid smoking, chewing tobacco and alcohol for 4 weeks prior to surgery have a reduced risk of post-operative complications.  Quit smoking as many days before surgery as you can.  • Do not smoke, use chewing tobacco or drink alcohol the day of surgery.   • Bring your C-PAP machine.  • Bring any papers given to you in the doctor’s office.  • Wear clean comfortable clothes.  • Do not wear contact lenses, false eyelashes or make-up.  Bring a case for your glasses.   • Bring crutches or walker if applicable.  • Remove all piercings.  Leave jewelry and any other valuables at home.  • Hair extensions with metal clips must be removed prior to surgery.  • The Pre-Admission Testing nurse will instruct you to bring medications if unable to obtain an accurate list in Pre-Admission Testing.          Preventing a Surgical Site Infection:  • For 2 to 3 days before surgery, avoid shaving with a razor because the razor can irritate skin and make it easier to develop an infection.    • Any areas of open skin can increase the  risk of a post-operative wound infection by allowing bacteria to enter and travel throughout the body.  Notify your surgeon if you have any skin wounds / rashes even if it is not near the expected surgical site.  The area will need assessed to determine if surgery should be delayed until it is healed.  • The night prior to surgery shower using a fresh bar of anti-bacterial soap (such as Dial) and clean washcloth.  Sleep in a clean bed with clean clothing.  Do not allow pets to sleep with you.  • Shower on the morning of surgery using a fresh bar of anti-bacterial soap (such as Dial) and clean washcloth.  Dry with a clean towel and dress in clean clothing.  • Ask your surgeon if you will be receiving antibiotics prior to surgery.  • Make sure you, your family, and all healthcare providers clean their hands with soap and water or an alcohol based hand  before caring for you or your wound.    Day of surgery:07- YOU WILL BE CALLED WITH AN ARRIVAL TIME REPORT TO THE [facility]  Your arrival time is approximately two hours before your scheduled surgery time.  Upon arrival, a Pre-op nurse and Anesthesiologist will review your health history, obtain vital signs, and answer questions you may have.  The only belongings needed at this time will be a list of your home medications and  your C-PAP machine.  If you are staying overnight your family can leave the rest of your belongings in the car and bring them to your room later.  A Pre-op nurse will start an IV and you may receive medication in preparation for surgery, including something to help you relax.  Your family will be able to see you in the Pre-op area.  Two visitors at a time will be allowed in the Pre-op room.  While you are in surgery your family should notify the waiting room  if they leave the waiting room area and provide a contact phone number.    Please be aware that surgery does come with discomfort.  We want to make every effort to  control your discomfort so please discuss any uncontrolled symptoms with your nurse.   Your doctor will most likely have prescribed pain medications.      If you are going home after surgery you will receive individualized written care instructions before being discharged.  A responsible adult must drive you to and from the hospital on the day of your surgery and stay with you for 24 hours.    If you are staying overnight following surgery, you will be transported to your hospital room following the recovery period.  [facility] has all private rooms.    If you have any questions please call Pre-Admission Testing at [phone].  Deductibles and co-payments are collected on the day of service. Please be prepared to pay the required co-pay, deductible or deposit on the day of service as defined by your plan.    Patient Education for Self-Quarantine Process    Following your COVID testing, we strongly recommend that you do not leave your home after you have been tested for COVID except to get medical care. This includes not going to work, school or to public areas.  If this is not possible for you to do please limit your activities to only required outings.  Be sure to wear a mask when you are with other people, practice social distancing and wash your hands frequently.      The following items provide additional details to keep you safe.  • Wash your hands with soap and water frequently for at least 20 seconds.   • Avoid touching your eyes, nose and mouth with unwashed hands.  • Do not share anything - utensils, towels, food from the same bowl.   • Have your own utensils, drinking glass, dishes, towels and bedding.   • Do not have visitors.   • Do use FaceTime to stay in touch with family and friends.  • You should stay in a specific room away from others if possible.   • Stay at least 6 feet away from others in the home if you cannot have a dedicated room to yourself.   • Do not snuggle with your  pet. While the CDC says there is no evidence that pets can spread COVID-19 or be infected from humans, it is probably best to avoid “petting, snuggling, being kissed or licked and sharing food (during self-quarantine)”, according to the CDC.   • Sanitize household surfaces daily. Include all high touch areas (door handles, light switches, phones, countertops, etc.)  • Do not share a bathroom with others, if possible.   Wear a mask around others in your home if you are unable to stay in a separate room or 6 feet apart. If  you are unable to wear a mask, have your family member wear a mask if they must be within 6 feet of you.     Call your surgeon immediately if you experience any of the following symptoms:  • Sore Throat  • Shortness of Breath or difficulty breathing  • Cough  • Chills  • Body soreness or muscle pain  • Headache  • Fever  • New loss of taste or smell  • Do not arrive for your surgery ill.  Your procedure will need to be rescheduled to another time.  You will need to call your physician before the day of surgery to avoid any unnecessary exposure to hospital staff as well as other patients.

## 2020-06-12 DIAGNOSIS — I10 BENIGN ESSENTIAL HYPERTENSION: Primary | ICD-10-CM

## 2020-06-12 RX ORDER — LOSARTAN POTASSIUM 50 MG/1
50 TABLET ORAL DAILY
Qty: 90 TABLET | Refills: 1 | Status: SHIPPED | OUTPATIENT
Start: 2020-06-12 | End: 2020-06-16 | Stop reason: SDUPTHER

## 2020-06-12 RX ORDER — LOSARTAN POTASSIUM 50 MG/1
TABLET ORAL
Qty: 90 TABLET | Refills: 0 | OUTPATIENT
Start: 2020-06-12

## 2020-06-15 PROBLEM — Z95.5 HISTORY OF CORONARY ARTERY STENT PLACEMENT: Status: ACTIVE | Noted: 2020-06-15

## 2020-06-15 NOTE — PROGRESS NOTES
Date of Office Visit: 2020  Encounter Provider: KALI Delacruz  Primary Cardiologist: Dr. Rivera  Place of Service: [facility] CARDIOLOGY  Patient Name: Pratibha Pascal  :1960      Subjective:     Chief Complaint:  Overdue follow up, cardiac clearance    History of Present Illness:  Pratibha Pascal is a pleasant 60 y.o. female who is new to me .  Outside records have been requested and reviewed by me if available.     This is a patient of Dr. Rivera who had follow-up with her in  in  but was lost to follow-up.  She has a history of hypertension, rheumatoid arthritis, hyperlipidemia, paroxysmal atrial fibrillation, obstructive sleep apnea, former smoker, morbid obesity, coronary artery disease prior anterior STEMI and drug-eluting stent placement to the LAD 2014 with subsequent ventricular fibrillation requiring defibrillation.    At the time of her MI she had 100% thrombotic occlusion in her mid LAD and underwent thrombectomy and drug-eluting stent placement and had nonobstructive disease in her other coronary arteries.  She had brief episode of atrial fibrillation as well and was treated with amiodarone for short period of time that was eventually discontinued.    2019 patient saw Dr. Coto for consultation and cardiac clearance prior to knee replacement.  She was really struggling with mobility secondary to her knee pain and was only using a walker.  She had easy fatigability and some indigestion type discomfort.  Prior to surgery she was set up for a stress test which showed her EF was 55% and perfusion imaging indicated small to medium sized infarct in the anterior wall with moderate jamil-infarct ischemia.  She underwent cardiac catheterization 9/3/2019 which revealed normal left main, 30% proximal LAD stenosis, 30% ostial D1 stenosis, mid LAD stent was patent with no in-stent restenosis, 30% stenosis of mid to distal LAD, no significant disease  within the circumflex system, RCA large-caliber with 30% mid stenosis.  She had some elevated filling pressures and hypokinesis of anterior wall and akinesis of the apex with a preserved EF 50 to 55%.  She was cleared to proceed with her knee replacement surgery.    She is presenting today for overdue follow-up and cardiac clearance for right thyroid lobectomy. She has no major cardiac complaints today. She is someone limited in her activity due to her joint issues with her Rheumatoid arthritis. She has not had any major changes in her activity tolerance. She can vaccuum and climb a flight of steps without chest pain or dyspnea. She denies any chest pain, dyspnea, syncope, near syncope, dizziness, lightheadedness, headaches, vision changes, PND, orthopnea, lower extremity edema.  She had 2 knee replacement surgeries since last visit one in Oct. 2019 and one in Feb 2020.       Past Medical History:   Diagnosis Date   • Allergic    • Allergy to mold    • Anxiety    • Arthritis    • Benign essential hypertension    • CAD (coronary artery disease)    • Coronary artery disease    • Depression    • Diabetes mellitus (CMS/[facility])    • Fibromyalgia, primary    • H/O bone density study unclear   • H/O complete eye exam 2 -3 years   • Headache    • History of myocardial infarction 2014   • History of staph infection     HISTORY OF MSSA IN LEFT FOOT FROM DIABETIC ULCER HEALED   • HL (hearing loss)     RIGHT EAR  SEVERE HEARING LOSS   • HLD (hyperlipidemia)    • Hyperlipidemia    • Hypertension    • Hypothyroidism    • Hypothyroidism    • Injury of back    • Left knee pain    • Major depressive disorder, recurrent, in full remission (CMS/[facility])    • Myocardial infarction (CMS/[facility]) 06/27/2014   • OA (osteoarthritis) of knee    • Obesity    • BRANNON (obstructive sleep apnea)     USE CPAP   • Primary generalized (osteo)arthritis    • Primary osteoarthritis of left knee    • Primary osteoarthritis of right knee    • Rheumatoid arthritis  (CMS/[facility])    • Rheumatoid arthritis (CMS/HCC)    • Rheumatoid arthritis of multiple sites without organ or system involvement with positive rheumatoid factor (CMS/[facility])    • Seasonal allergies    • Tobacco abuse 06/27/2014    QUIT    • Type 2 diabetes mellitus (CMS/[facility])      Past Surgical History:   Procedure Laterality Date   • BACK SURGERY  10/1995    also 2/02; L5-S1; Dr. Carroll Avitia   • CARDIAC CATHETERIZATION     • CARDIAC CATHETERIZATION N/A 9/3/2019    Procedure: Coronary angiography  ;  Surgeon: Magy Rivera MD;  Location:  [facility];  Service: Cardiovascular   • CARDIAC CATHETERIZATION N/A 9/3/2019    Procedure: Left Heart Cath;  Surgeon: Magy Rivera MD;  Location:  [facility];  Service: Cardiovascular   • CARDIAC CATHETERIZATION N/A 9/3/2019    Procedure: Left ventriculography;  Surgeon: Magy Rivera MD;  Location:  [facility];  Service: Cardiovascular   • COLONOSCOPY     • CORONARY STENT PLACEMENT  06/2014   • JOINT REPLACEMENT     • KNEE ARTHROSCOPY Right 2010   • MAMMO BILATERAL  due    delcines   • PAP SMEAR  due   • TONSILLECTOMY      age 26   • TOTAL KNEE ARTHROPLASTY Left 10/14/2019    Procedure: TOTAL KNEE ARTHROPLASTY;  Surgeon: Nabor Correia MD;  Location: [facility] MAIN OR;  Service: Orthopedics   • TOTAL KNEE ARTHROPLASTY Right 2/12/2020    Procedure: TOTAL KNEE ARTHROPLASTY;  Surgeon: Nabor Correia MD;  Location: [facility] MAIN OR;  Service: Orthopedics;  Laterality: Right;   • US GUIDED FINE NEEDLE ASPIRATION  12/6/2019     Outpatient Medications Prior to Visit   Medication Sig Dispense Refill   • ACCU-CHEK GUIDE test strip      • ALPRAZolam (XANAX) 0.5 MG tablet Take 1 tablet by mouth Daily. TAKE 1 TABLET BY MOUTH EVERY DAY AS NEEDED (Patient taking differently: Take 0.5 mg by mouth At Night As Needed. TAKE 1 TABLET BY MOUTH EVERY DAY AS NEEDED) 30 tablet 2   • aspirin 81 MG chewable tablet Chew 81 mg Daily. HOLD PRIOR TO  SURGERY  07-     • atorvastatin (LIPITOR) 10 MG tablet Take 0.5 tablets by mouth Daily. 45 tablet 1   • baclofen (LIORESAL) 10 MG tablet Take 10 mg by mouth 3 (Three) Times a Day As Needed for Muscle Spasms.     • dapagliflozin-metformin HCl ER (XIGDUO XR) 5-1000 MG tablet Take 2 tablets by mouth Daily. (Patient taking differently: Take 2 tablets by mouth Daily. PATIENT TO CONSULT WITH DR. NATALEE DELACRUZ, ENDOCRINOLOGIST REGARDING MEDICATIONS PRIOR TO SURGERY) 60 tablet 5   • FLUoxetine (PROzac) 20 MG capsule Take 60 mg by mouth Every Night.     • folic acid (FOLVITE) 1 MG tablet Take 1,000 mcg by mouth Daily.     • levothyroxine (SYNTHROID, LEVOTHROID) 137 MCG tablet Take 137 mcg by mouth Daily.     • losartan (COZAAR) 50 MG tablet Take 1 tablet by mouth Daily. 90 tablet 1   • metoprolol succinate XL (TOPROL-XL) 100 MG 24 hr tablet Take 1 tablet by mouth Daily. 90 tablet 1   • ONETOUCH DELICA LANCETS 33G misc Test 3 times daily (Patient taking differently: 1 each. Test 3 times daily) 100 each 11   • predniSONE (DELTASONE) 5 MG tablet Take 5 mg by mouth Daily.     • sulfaSALAzine (AZULFIDINE) 500 MG tablet Take 1,500 mg by mouth 2 (Two) Times a Day. TAKE 3 TABLETS BY MOUTH TWICE DAILY  1   • triamterene-hydrochlorothiazide (MAXZIDE-25) 37.5-25 MG per tablet Take 1 tablet by mouth Daily. 90 tablet 1   • TRULICITY 1.5 MG/0.5ML solution pen-injector Inject 1.5 mg under the skin into the appropriate area as directed 1 (One) Time Per Week. (Patient taking differently: Inject 1.5 mg under the skin into the appropriate area as directed 1 (One) Time Per Week. SATURDAY) 12 pen 1   • Upadacitinib ER (RINVOQ) 15 MG tablet sustained-release 24 hour Take 15 mg by mouth Daily.     • vitamin D (ERGOCALCIFEROL) 1.25 MG (49398 UT) capsule capsule Take 50,000 Units by mouth Daily (Monday-Friday).       Facility-Administered Medications Prior to Visit   Medication Dose Route Frequency Provider Last Rate Last Dose   • mupirocin  (BACTROBAN) 2 % nasal ointment   Nasal BID Nabor Correia MD           Allergies as of 2020 - Reviewed 2020   Allergen Reaction Noted   • Cefuroxime GI Intolerance    • Ciprofloxacin GI Intolerance 06/10/2020   • Clindamycin/lincomycin GI Intolerance 2018   • Dicloxacillin GI Intolerance 07/10/2018   • Erythromycin Itching and Swelling 2016   • Talwin [pentazocine] Itching 2016     Social History     Socioeconomic History   • Marital status:      Spouse name: Not on file   • Number of children: Not on file   • Years of education: Not on file   • Highest education level: Not on file   Tobacco Use   • Smoking status: Former Smoker     Packs/day: 1.00     Years: 30.00     Pack years: 30.00     Types: Cigarettes     Last attempt to quit: 2014     Years since quittin.9   • Smokeless tobacco: Never Used   • Tobacco comment: quit 2014   Substance and Sexual Activity   • Alcohol use: No   • Drug use: No   • Sexual activity: Defer     Family History   Problem Relation Age of Onset   • Cancer Mother         breast   • Hypertension Mother    • Rheum arthritis Mother    • Thyroid disease Mother    • Arthritis Mother    • Hypertension Father    • Thyroid disease Father    • COPD Father    • Depression Father    • Hearing loss Father    • Heart failure Maternal Grandmother    • Rheum arthritis Other         aunt   • Depression Daughter    • Drug abuse Daughter    • Malig Hyperthermia Neg Hx      Review of Systems   Constitution: Positive for malaise/fatigue. Negative for chills and fever.   HENT: Negative for ear pain, hearing loss, nosebleeds and sore throat.    Eyes: Negative for double vision, pain, vision loss in left eye and vision loss in right eye.   Cardiovascular: Negative for chest pain, claudication, dyspnea on exertion, irregular heartbeat, leg swelling, near-syncope, orthopnea, palpitations, paroxysmal nocturnal dyspnea and syncope.   Respiratory: Negative for  "cough, shortness of breath, snoring and wheezing.    Endocrine: Negative for cold intolerance and heat intolerance.   Hematologic/Lymphatic: Negative for bleeding problem.   Skin: Negative for color change, itching, rash and unusual hair distribution.   Musculoskeletal: Positive for joint pain (has RA, unchanged), joint swelling and myalgias.   Gastrointestinal: Negative for abdominal pain, diarrhea, hematochezia, melena, nausea and vomiting.   Genitourinary: Negative for decreased libido, frequency, hematuria, hesitancy and incomplete emptying.   Neurological: Negative for excessive daytime sleepiness, dizziness, headaches, light-headedness, loss of balance, numbness, paresthesias and seizures.   Psychiatric/Behavioral: Negative for depression.          Objective:     Vitals:    06/16/20 1312 06/16/20 1338   BP: 132/84    BP Location: Right arm    Patient Position: Sitting    Cuff Size: Large Adult    Pulse: 111 82   SpO2: 98% 98%   Weight: 135 kg (297 lb 9.6 oz)    Height: 182.9 cm (72\")      Body mass index is 40.36 kg/m².    PHYSICAL EXAM:  Physical Exam   Constitutional: She is oriented to person, place, and time. She appears well-developed and well-nourished. No distress.   Morbidly obese   HENT:   Head: Normocephalic and atraumatic.   Eyes: Pupils are equal, round, and reactive to light. Conjunctivae and EOM are normal.   Wearing glasses   Neck: No JVD present. Carotid bruit is not present.   Cardiovascular: Normal rate, regular rhythm, normal heart sounds and intact distal pulses.   No murmur heard.  Pulses:       Radial pulses are 2+ on the right side, and 2+ on the left side.        Dorsalis pedis pulses are 2+ on the right side, and 2+ on the left side.        Posterior tibial pulses are 2+ on the right side, and 2+ on the left side.   No pitting BLE edema, some discoloration red-purple right anterior lower leg but no suggestion of critical limb ischemia with palpable pulses   Pulmonary/Chest: Effort " normal and breath sounds normal. No accessory muscle usage. No tachypnea. No respiratory distress. She has no decreased breath sounds. She has no wheezes. She has no rhonchi. She has no rales. She exhibits no tenderness.   Abdominal: Soft. Bowel sounds are normal. She exhibits no distension. There is no tenderness. There is no rebound and no guarding.   Obese abdomen   Musculoskeletal: She exhibits edema, tenderness and deformity.   Neurological: She is alert and oriented to person, place, and time.   Skin: Skin is warm, dry and intact. She is not diaphoretic. No erythema.   Tattoo above left breast   Psychiatric: She has a normal mood and affect. Her speech is normal and behavior is normal. Judgment and thought content normal. Cognition and memory are normal.   Nursing note and vitals reviewed.        ECG 12 Lead  Date/Time: 6/16/2020 1:16 PM  Performed by: Karis Leslie APRN  Authorized by: Karis Leslie APRN   Comparison: compared with previous ECG from 6/10/2020  Similar to previous ECG  Rhythm: sinus rhythm  Rate: normal  BPM: 78  Q waves: V1 and V2    T flattening: aVL  QRS axis: borderline left.  Other findings: non-specific ST-T wave changes and low voltage    Clinical impression: abnormal EKG  Comments: Otherwise unchanged ECG  Indication: CAD, preop cardiac exam            Most recent lab review:  6/10/2020 BMP within normal limits except for elevated glucose 167, CBC normal H&H, white blood cell count and platelet count, RBC slightly elevated 5.40 (5.28)    1/14/2020 hemoglobin A1c 6.8%, lipid panel LDL 40, HDL 43, total cholesterol 115, triglycerides mildly elevated 162, LFTs within normal limits, TSH within normal    Assessment:       Diagnosis Plan   1. Coronary artery disease involving native coronary artery of native heart with other form of angina pectoris (CMS/HCC)  ECG 12 Lead   2. History of coronary artery stent placement  ECG 12 Lead   3. Benign essential hypertension  ECG 12 Lead   4.  Mixed hyperlipidemia  ECG 12 Lead   5. AF (paroxysmal atrial fibrillation) (CMS/[facility])  ECG 12 Lead   6. Morbid obesity (CMS/[facility])  ECG 12 Lead   7. Uncontrolled type 2 diabetes mellitus with hyperglycemia (CMS/[facility])  ECG 12 Lead   8. Rheumatoid arthritis, involving unspecified site, unspecified rheumatoid factor presence (CMS/[facility])  ECG 12 Lead   9. Obstructive apnea  ECG 12 Lead       Plan:     1. Preoperative cardiovascular exam: Plan as below.  2.  Coronary artery disease: Anterior MI 2014 with thrombectomy and drug-eluting stent placement to the mid LAD.  Repeat cath showed mild nonobstructive disease and a patent mid LAD stent September 2019 performed for an abnormal stress test  3.  Hypertension: Blood pressure is stable today. BP at home has been under decent control.   4.  Hyperlipidemia: On statin.  She probably should be on a higher intensity statin given her known disease and multiple risk factors but will revisit that in the future.  January 2020 lipids under good control.  5.  Diabetes: With circulatory complication on oral and injectable therapy.  A1c January 2020 6.8%. Sees Dr. Rona Washington prior to her surgery.   6.  Thyroid disease: On oral therapy.  Plans for right thyroid lobectomy with Dr. Carter  7.  Morbid obesity: Largely affecting all aspects of her health  8.  Former smoker: Quit in 2014.   9.  Rheumatoid arthritis: Follows with rheumatology.  10. Hx of PAF: In SR- denies palpitations  11. BRANNON: Wears Bipap.    I advised on the importance of medication compliance, good blood pressure, blood sugar and cholesterol control, as well as heart healthy diet, regular exercise and avoidance of tobacco for cardiovascular disease risk factor modification.  I encouraged her to keep routine follow-up with cardiology given her history.  She has nonmodifiable risks and  has not had any change in her cardiac symptoms, EKG and had a stable cath less than a year ago.  She is felt to be at acceptable risk proceed  with her thyroid surgery in July.  She may hold her aspirin up to 7 days or as directed by Dr. Carter and resume as soon as possible postprocedure    Follow up with Dr. Rivera in 6 months, unless otherwise needed sooner.  I advised the patient to contact our office with any questions or concerns.      It has been a pleasure to participate in this patient's care. Please feel free to contact me with any questions or concerns.     Karis Leslie, KALI  06/16/2020             Your medication list           Accurate as of June 16, 2020  1:44 PM. If you have any questions, ask your nurse or doctor.               CHANGE how you take these medications      Instructions Last Dose Given Next Dose Due   ALPRAZolam 0.5 MG tablet  Commonly known as:  XANAX  What changed:    · when to take this  · reasons to take this      Take 1 tablet by mouth Daily. TAKE 1 TABLET BY MOUTH EVERY DAY AS NEEDED       dapagliflozin-metformin HCl ER 5-1000 MG tablet  Commonly known as:  Xigduo XR  What changed:  additional instructions      Take 2 tablets by mouth Daily.       OneTouch Delica Lancets 33G misc  What changed:  how much to take      Test 3 times daily       Trulicity 1.5 MG/0.5ML solution pen-injector  Generic drug:  Dulaglutide  What changed:  additional instructions      Inject 1.5 mg under the skin into the appropriate area as directed 1 (One) Time Per Week.          CONTINUE taking these medications      Instructions Last Dose Given Next Dose Due   Accu-Chek Guide test strip  Generic drug:  glucose blood           aspirin 81 MG chewable tablet      Chew 81 mg Daily. HOLD PRIOR TO SURGERY  07-       atorvastatin 10 MG tablet  Commonly known as:  LIPITOR      Take 0.5 tablets by mouth Daily.       baclofen 10 MG tablet  Commonly known as:  LIORESAL      Take 10 mg by mouth 3 (Three) Times a Day As Needed for Muscle Spasms.       FLUoxetine 20 MG capsule  Commonly known as:  PROzac      Take 60 mg by mouth Every Night.        folic acid 1 MG tablet  Commonly known as:  FOLVITE      Take 1,000 mcg by mouth Daily.       levothyroxine 137 MCG tablet  Commonly known as:  SYNTHROID, LEVOTHROID      Take 137 mcg by mouth Daily.       losartan 50 MG tablet  Commonly known as:  COZAAR      Take 1 tablet by mouth Daily.       metoprolol succinate  MG 24 hr tablet  Commonly known as:  TOPROL-XL      Take 1 tablet by mouth Daily.       predniSONE 5 MG tablet  Commonly known as:  DELTASONE      Take 5 mg by mouth Daily.       Rinvoq 15 MG tablet sustained-release 24 hour  Generic drug:  Upadacitinib ER      Take 15 mg by mouth Daily.       sulfaSALAzine 500 MG tablet  Commonly known as:  AZULFIDINE      Take 1,500 mg by mouth 2 (Two) Times a Day. TAKE 3 TABLETS BY MOUTH TWICE DAILY       triamterene-hydrochlorothiazide 37.5-25 MG per tablet  Commonly known as:  MAXZIDE-25      Take 1 tablet by mouth Daily.       vitamin D 1.25 MG (40851 UT) capsule capsule  Commonly known as:  ERGOCALCIFEROL      Take 50,000 Units by mouth Daily (Monday-Friday).              The above medication changes may not have been made by this provider.  Medication list was updated to reflect medications patient is currently taking including medication changes and discontinuations made by other healthcare providers or the patients themselves.     Dictated utilizing Dragon Dictation System.

## 2020-06-16 ENCOUNTER — OFFICE VISIT (OUTPATIENT)
Dept: CARDIOLOGY | Facility: CLINIC | Age: 60
End: 2020-06-16

## 2020-06-16 ENCOUNTER — TELEPHONE (OUTPATIENT)
Dept: CARDIOLOGY | Facility: CLINIC | Age: 60
End: 2020-06-16

## 2020-06-16 VITALS
SYSTOLIC BLOOD PRESSURE: 132 MMHG | OXYGEN SATURATION: 98 % | HEIGHT: 72 IN | WEIGHT: 293 LBS | BODY MASS INDEX: 39.68 KG/M2 | HEART RATE: 82 BPM | DIASTOLIC BLOOD PRESSURE: 84 MMHG

## 2020-06-16 DIAGNOSIS — M06.9 RHEUMATOID ARTHRITIS, INVOLVING UNSPECIFIED SITE, UNSPECIFIED RHEUMATOID FACTOR PRESENCE: ICD-10-CM

## 2020-06-16 DIAGNOSIS — G47.33 OBSTRUCTIVE APNEA: Chronic | ICD-10-CM

## 2020-06-16 DIAGNOSIS — Z95.5 HISTORY OF CORONARY ARTERY STENT PLACEMENT: ICD-10-CM

## 2020-06-16 DIAGNOSIS — I10 BENIGN ESSENTIAL HYPERTENSION: ICD-10-CM

## 2020-06-16 DIAGNOSIS — E66.01 MORBID OBESITY (HCC): ICD-10-CM

## 2020-06-16 DIAGNOSIS — I25.118 CORONARY ARTERY DISEASE INVOLVING NATIVE CORONARY ARTERY OF NATIVE HEART WITH OTHER FORM OF ANGINA PECTORIS (HCC): Primary | Chronic | ICD-10-CM

## 2020-06-16 DIAGNOSIS — E11.65 UNCONTROLLED TYPE 2 DIABETES MELLITUS WITH HYPERGLYCEMIA (HCC): ICD-10-CM

## 2020-06-16 DIAGNOSIS — I48.0 AF (PAROXYSMAL ATRIAL FIBRILLATION) (HCC): ICD-10-CM

## 2020-06-16 DIAGNOSIS — E78.2 MIXED HYPERLIPIDEMIA: Chronic | ICD-10-CM

## 2020-06-16 PROCEDURE — 99214 OFFICE O/P EST MOD 30 MIN: CPT | Performed by: NURSE PRACTITIONER

## 2020-06-16 PROCEDURE — 93000 ELECTROCARDIOGRAM COMPLETE: CPT | Performed by: NURSE PRACTITIONER

## 2020-06-16 RX ORDER — LOSARTAN POTASSIUM 50 MG/1
50 TABLET ORAL DAILY
Qty: 90 TABLET | Refills: 1 | Status: SHIPPED | OUTPATIENT
Start: 2020-06-16 | End: 2020-11-17 | Stop reason: SDUPTHER

## 2020-07-06 ENCOUNTER — LAB (OUTPATIENT)
Dept: LAB | Facility: HOSPITAL | Age: 60
End: 2020-07-06

## 2020-07-06 DIAGNOSIS — Z01.818 OTHER SPECIFIED PRE-OPERATIVE EXAMINATION: ICD-10-CM

## 2020-07-06 PROCEDURE — U0004 COV-19 TEST NON-CDC HGH THRU: HCPCS

## 2020-07-06 PROCEDURE — C9803 HOPD COVID-19 SPEC COLLECT: HCPCS

## 2020-07-07 LAB
REF LAB TEST METHOD: NORMAL
SARS-COV-2 RNA RESP QL NAA+PROBE: NOT DETECTED

## 2020-07-09 ENCOUNTER — ANESTHESIA (OUTPATIENT)
Dept: PERIOP | Facility: HOSPITAL | Age: 60
End: 2020-07-09

## 2020-07-09 ENCOUNTER — ANESTHESIA EVENT (OUTPATIENT)
Dept: PERIOP | Facility: HOSPITAL | Age: 60
End: 2020-07-09

## 2020-07-09 ENCOUNTER — HOSPITAL ENCOUNTER (OUTPATIENT)
Facility: HOSPITAL | Age: 60
Setting detail: HOSPITAL OUTPATIENT SURGERY
Discharge: HOME | End: 2020-07-09
Attending: OTOLARYNGOLOGY | Admitting: OTOLARYNGOLOGY

## 2020-07-09 VITALS
RESPIRATION RATE: 16 BRPM | DIASTOLIC BLOOD PRESSURE: 76 MMHG | TEMPERATURE: 98.6 F | OXYGEN SATURATION: 95 % | SYSTOLIC BLOOD PRESSURE: 143 MMHG | HEART RATE: 85 BPM

## 2020-07-09 DIAGNOSIS — E04.1 THYROID NODULE: ICD-10-CM

## 2020-07-09 DIAGNOSIS — E04.2 MULTIPLE THYROID NODULES: Primary | ICD-10-CM

## 2020-07-09 LAB
CA-I BLD-MCNC: 5.3 MG/DL (ref 4.6–5.4)
CA-I SERPL ISE-MCNC: 1.33 MMOL/L (ref 1.15–1.35)
GLUCOSE BLDC GLUCOMTR-MCNC: 150 MG/DL (ref 70–130)
GLUCOSE BLDC GLUCOMTR-MCNC: 188 MG/DL (ref 70–130)
PTH-INTACT SERPL-SCNC: 56.4 PG/ML (ref 15–65)
PTH-INTACT SERPL-SCNC: 56.9 PG/ML (ref 15–65)
PTH-INTACT SERPL-SCNC: 69.9 PG/ML (ref 15–65)

## 2020-07-09 PROCEDURE — 25010000002 DEXAMETHASONE PER 1 MG: Performed by: NURSE ANESTHETIST, CERTIFIED REGISTERED

## 2020-07-09 PROCEDURE — 25010000002 ONDANSETRON PER 1 MG: Performed by: NURSE ANESTHETIST, CERTIFIED REGISTERED

## 2020-07-09 PROCEDURE — 25010000002 SUCCINYLCHOLINE PER 20 MG: Performed by: NURSE ANESTHETIST, CERTIFIED REGISTERED

## 2020-07-09 PROCEDURE — 25010000002 FENTANYL CITRATE (PF) 100 MCG/2ML SOLUTION: Performed by: NURSE ANESTHETIST, CERTIFIED REGISTERED

## 2020-07-09 PROCEDURE — 88307 TISSUE EXAM BY PATHOLOGIST: CPT | Performed by: OTOLARYNGOLOGY

## 2020-07-09 PROCEDURE — 88304 TISSUE EXAM BY PATHOLOGIST: CPT | Performed by: OTOLARYNGOLOGY

## 2020-07-09 PROCEDURE — 25010000002 PROPOFOL 10 MG/ML EMULSION: Performed by: NURSE ANESTHETIST, CERTIFIED REGISTERED

## 2020-07-09 PROCEDURE — 88331 PATH CONSLTJ SURG 1 BLK 1SPC: CPT | Performed by: OTOLARYNGOLOGY

## 2020-07-09 PROCEDURE — 25010000002 MIDAZOLAM PER 1 MG: Performed by: STUDENT IN AN ORGANIZED HEALTH CARE EDUCATION/TRAINING PROGRAM

## 2020-07-09 PROCEDURE — 88305 TISSUE EXAM BY PATHOLOGIST: CPT | Performed by: OTOLARYNGOLOGY

## 2020-07-09 PROCEDURE — 25010000002 HYDROMORPHONE PER 4 MG: Performed by: NURSE ANESTHETIST, CERTIFIED REGISTERED

## 2020-07-09 PROCEDURE — 82962 GLUCOSE BLOOD TEST: CPT

## 2020-07-09 PROCEDURE — 82330 ASSAY OF CALCIUM: CPT | Performed by: OTOLARYNGOLOGY

## 2020-07-09 PROCEDURE — 83970 ASSAY OF PARATHORMONE: CPT | Performed by: OTOLARYNGOLOGY

## 2020-07-09 RX ORDER — NALOXONE HCL 0.4 MG/ML
0.2 VIAL (ML) INJECTION AS NEEDED
Status: DISCONTINUED | OUTPATIENT
Start: 2020-07-09 | End: 2020-07-09 | Stop reason: HOSPADM

## 2020-07-09 RX ORDER — SUCCINYLCHOLINE CHLORIDE 20 MG/ML
INJECTION INTRAMUSCULAR; INTRAVENOUS AS NEEDED
Status: DISCONTINUED | OUTPATIENT
Start: 2020-07-09 | End: 2020-07-09 | Stop reason: SURG

## 2020-07-09 RX ORDER — PROMETHAZINE HYDROCHLORIDE 25 MG/1
25 TABLET ORAL EVERY 6 HOURS PRN
Qty: 20 TABLET | Refills: 0 | Status: SHIPPED | OUTPATIENT
Start: 2020-07-09 | End: 2021-04-22

## 2020-07-09 RX ORDER — DIPHENHYDRAMINE HCL 25 MG
25 CAPSULE ORAL
Status: DISCONTINUED | OUTPATIENT
Start: 2020-07-09 | End: 2020-07-09 | Stop reason: HOSPADM

## 2020-07-09 RX ORDER — PROMETHAZINE HYDROCHLORIDE 25 MG/ML
6.25 INJECTION, SOLUTION INTRAMUSCULAR; INTRAVENOUS
Status: DISCONTINUED | OUTPATIENT
Start: 2020-07-09 | End: 2020-07-09 | Stop reason: HOSPADM

## 2020-07-09 RX ORDER — ACETAMINOPHEN 500 MG
500 TABLET ORAL ONCE
Status: COMPLETED | OUTPATIENT
Start: 2020-07-09 | End: 2020-07-09

## 2020-07-09 RX ORDER — ONDANSETRON 2 MG/ML
INJECTION INTRAMUSCULAR; INTRAVENOUS AS NEEDED
Status: DISCONTINUED | OUTPATIENT
Start: 2020-07-09 | End: 2020-07-09 | Stop reason: SURG

## 2020-07-09 RX ORDER — DEXAMETHASONE SODIUM PHOSPHATE 10 MG/ML
INJECTION INTRAMUSCULAR; INTRAVENOUS AS NEEDED
Status: DISCONTINUED | OUTPATIENT
Start: 2020-07-09 | End: 2020-07-09 | Stop reason: SURG

## 2020-07-09 RX ORDER — PROPOFOL 10 MG/ML
VIAL (ML) INTRAVENOUS AS NEEDED
Status: DISCONTINUED | OUTPATIENT
Start: 2020-07-09 | End: 2020-07-09 | Stop reason: SURG

## 2020-07-09 RX ORDER — EPHEDRINE SULFATE 50 MG/ML
5 INJECTION, SOLUTION INTRAVENOUS ONCE AS NEEDED
Status: DISCONTINUED | OUTPATIENT
Start: 2020-07-09 | End: 2020-07-09 | Stop reason: HOSPADM

## 2020-07-09 RX ORDER — FENTANYL CITRATE 50 UG/ML
INJECTION, SOLUTION INTRAMUSCULAR; INTRAVENOUS AS NEEDED
Status: DISCONTINUED | OUTPATIENT
Start: 2020-07-09 | End: 2020-07-09 | Stop reason: SURG

## 2020-07-09 RX ORDER — HYDROMORPHONE HYDROCHLORIDE 1 MG/ML
0.5 INJECTION, SOLUTION INTRAMUSCULAR; INTRAVENOUS; SUBCUTANEOUS
Status: DISCONTINUED | OUTPATIENT
Start: 2020-07-09 | End: 2020-07-09 | Stop reason: HOSPADM

## 2020-07-09 RX ORDER — FLUMAZENIL 0.1 MG/ML
0.2 INJECTION INTRAVENOUS AS NEEDED
Status: DISCONTINUED | OUTPATIENT
Start: 2020-07-09 | End: 2020-07-09 | Stop reason: HOSPADM

## 2020-07-09 RX ORDER — LIDOCAINE HYDROCHLORIDE AND EPINEPHRINE 10; 10 MG/ML; UG/ML
INJECTION, SOLUTION INFILTRATION; PERINEURAL AS NEEDED
Status: DISCONTINUED | OUTPATIENT
Start: 2020-07-09 | End: 2020-07-09 | Stop reason: HOSPADM

## 2020-07-09 RX ORDER — HYDROCODONE BITARTRATE AND ACETAMINOPHEN 7.5; 325 MG/1; MG/1
1 TABLET ORAL ONCE AS NEEDED
Status: COMPLETED | OUTPATIENT
Start: 2020-07-09 | End: 2020-07-09

## 2020-07-09 RX ORDER — LIDOCAINE HYDROCHLORIDE 10 MG/ML
0.5 INJECTION, SOLUTION EPIDURAL; INFILTRATION; INTRACAUDAL; PERINEURAL ONCE AS NEEDED
Status: DISCONTINUED | OUTPATIENT
Start: 2020-07-09 | End: 2020-07-09 | Stop reason: HOSPADM

## 2020-07-09 RX ORDER — ACETAMINOPHEN 325 MG/1
650 TABLET ORAL ONCE AS NEEDED
Status: DISCONTINUED | OUTPATIENT
Start: 2020-07-09 | End: 2020-07-09 | Stop reason: HOSPADM

## 2020-07-09 RX ORDER — PROMETHAZINE HYDROCHLORIDE 25 MG/1
25 SUPPOSITORY RECTAL ONCE AS NEEDED
Status: DISCONTINUED | OUTPATIENT
Start: 2020-07-09 | End: 2020-07-09 | Stop reason: HOSPADM

## 2020-07-09 RX ORDER — PROMETHAZINE HYDROCHLORIDE 25 MG/1
25 TABLET ORAL ONCE AS NEEDED
Status: DISCONTINUED | OUTPATIENT
Start: 2020-07-09 | End: 2020-07-09 | Stop reason: HOSPADM

## 2020-07-09 RX ORDER — LIDOCAINE HYDROCHLORIDE 40 MG/ML
SOLUTION TOPICAL AS NEEDED
Status: DISCONTINUED | OUTPATIENT
Start: 2020-07-09 | End: 2020-07-09 | Stop reason: SURG

## 2020-07-09 RX ORDER — BACITRACIN ZINC 500 [USP'U]/G
OINTMENT TOPICAL AS NEEDED
Status: DISCONTINUED | OUTPATIENT
Start: 2020-07-09 | End: 2020-07-09 | Stop reason: HOSPADM

## 2020-07-09 RX ORDER — LIDOCAINE HYDROCHLORIDE 20 MG/ML
INJECTION, SOLUTION INFILTRATION; PERINEURAL AS NEEDED
Status: DISCONTINUED | OUTPATIENT
Start: 2020-07-09 | End: 2020-07-09 | Stop reason: SURG

## 2020-07-09 RX ORDER — DIPHENHYDRAMINE HYDROCHLORIDE 50 MG/ML
12.5 INJECTION INTRAMUSCULAR; INTRAVENOUS
Status: DISCONTINUED | OUTPATIENT
Start: 2020-07-09 | End: 2020-07-09 | Stop reason: HOSPADM

## 2020-07-09 RX ORDER — ACETAMINOPHEN 650 MG/1
650 SUPPOSITORY RECTAL ONCE AS NEEDED
Status: DISCONTINUED | OUTPATIENT
Start: 2020-07-09 | End: 2020-07-09 | Stop reason: HOSPADM

## 2020-07-09 RX ORDER — HYDRALAZINE HYDROCHLORIDE 20 MG/ML
5 INJECTION INTRAMUSCULAR; INTRAVENOUS
Status: DISCONTINUED | OUTPATIENT
Start: 2020-07-09 | End: 2020-07-09 | Stop reason: HOSPADM

## 2020-07-09 RX ORDER — SODIUM CHLORIDE 0.9 % (FLUSH) 0.9 %
3-10 SYRINGE (ML) INJECTION AS NEEDED
Status: DISCONTINUED | OUTPATIENT
Start: 2020-07-09 | End: 2020-07-09 | Stop reason: HOSPADM

## 2020-07-09 RX ORDER — ROCURONIUM BROMIDE 10 MG/ML
INJECTION, SOLUTION INTRAVENOUS AS NEEDED
Status: DISCONTINUED | OUTPATIENT
Start: 2020-07-09 | End: 2020-07-09 | Stop reason: SURG

## 2020-07-09 RX ORDER — HYDROCODONE BITARTRATE AND ACETAMINOPHEN 5; 325 MG/1; MG/1
1 TABLET ORAL ONCE AS NEEDED
Status: DISCONTINUED | OUTPATIENT
Start: 2020-07-09 | End: 2020-07-09 | Stop reason: HOSPADM

## 2020-07-09 RX ORDER — FENTANYL CITRATE 50 UG/ML
50 INJECTION, SOLUTION INTRAMUSCULAR; INTRAVENOUS
Status: DISCONTINUED | OUTPATIENT
Start: 2020-07-09 | End: 2020-07-09 | Stop reason: HOSPADM

## 2020-07-09 RX ORDER — OXYCODONE AND ACETAMINOPHEN 7.5; 325 MG/1; MG/1
1 TABLET ORAL ONCE AS NEEDED
Status: DISCONTINUED | OUTPATIENT
Start: 2020-07-09 | End: 2020-07-09 | Stop reason: HOSPADM

## 2020-07-09 RX ORDER — MAGNESIUM HYDROXIDE 1200 MG/15ML
LIQUID ORAL AS NEEDED
Status: DISCONTINUED | OUTPATIENT
Start: 2020-07-09 | End: 2020-07-09 | Stop reason: HOSPADM

## 2020-07-09 RX ORDER — ONDANSETRON 2 MG/ML
4 INJECTION INTRAMUSCULAR; INTRAVENOUS ONCE AS NEEDED
Status: DISCONTINUED | OUTPATIENT
Start: 2020-07-09 | End: 2020-07-09 | Stop reason: HOSPADM

## 2020-07-09 RX ORDER — PROMETHAZINE HYDROCHLORIDE 25 MG/ML
12.5 INJECTION, SOLUTION INTRAMUSCULAR; INTRAVENOUS ONCE AS NEEDED
Status: DISCONTINUED | OUTPATIENT
Start: 2020-07-09 | End: 2020-07-09 | Stop reason: HOSPADM

## 2020-07-09 RX ORDER — HYDROCODONE BITARTRATE AND ACETAMINOPHEN 5; 325 MG/1; MG/1
1-2 TABLET ORAL EVERY 4 HOURS PRN
Qty: 30 TABLET | Refills: 0 | Status: SHIPPED | OUTPATIENT
Start: 2020-07-09 | End: 2020-12-17

## 2020-07-09 RX ORDER — SODIUM CHLORIDE 0.9 % (FLUSH) 0.9 %
3 SYRINGE (ML) INJECTION EVERY 12 HOURS SCHEDULED
Status: DISCONTINUED | OUTPATIENT
Start: 2020-07-09 | End: 2020-07-09 | Stop reason: HOSPADM

## 2020-07-09 RX ORDER — SODIUM CHLORIDE, SODIUM LACTATE, POTASSIUM CHLORIDE, CALCIUM CHLORIDE 600; 310; 30; 20 MG/100ML; MG/100ML; MG/100ML; MG/100ML
9 INJECTION, SOLUTION INTRAVENOUS CONTINUOUS
Status: DISCONTINUED | OUTPATIENT
Start: 2020-07-09 | End: 2020-07-09 | Stop reason: HOSPADM

## 2020-07-09 RX ORDER — MIDAZOLAM HYDROCHLORIDE 1 MG/ML
1 INJECTION INTRAMUSCULAR; INTRAVENOUS
Status: DISCONTINUED | OUTPATIENT
Start: 2020-07-09 | End: 2020-07-09 | Stop reason: HOSPADM

## 2020-07-09 RX ADMIN — HYDROCODONE BITARTRATE AND ACETAMINOPHEN 1 TABLET: 7.5; 325 TABLET ORAL at 10:42

## 2020-07-09 RX ADMIN — HYDROMORPHONE HYDROCHLORIDE 0.5 MG: 1 INJECTION, SOLUTION INTRAMUSCULAR; INTRAVENOUS; SUBCUTANEOUS at 10:45

## 2020-07-09 RX ADMIN — SODIUM CHLORIDE, POTASSIUM CHLORIDE, SODIUM LACTATE AND CALCIUM CHLORIDE 9 ML/HR: 600; 310; 30; 20 INJECTION, SOLUTION INTRAVENOUS at 06:36

## 2020-07-09 RX ADMIN — ROCURONIUM BROMIDE 20 MG: 10 INJECTION, SOLUTION INTRAVENOUS at 07:56

## 2020-07-09 RX ADMIN — FENTANYL CITRATE 50 MCG: 50 INJECTION, SOLUTION INTRAMUSCULAR; INTRAVENOUS at 09:01

## 2020-07-09 RX ADMIN — LIDOCAINE HYDROCHLORIDE 1 EACH: 40 SOLUTION TOPICAL at 07:37

## 2020-07-09 RX ADMIN — ACETAMINOPHEN 500 MG: 500 TABLET, FILM COATED ORAL at 06:53

## 2020-07-09 RX ADMIN — FENTANYL CITRATE 50 MCG: 50 INJECTION, SOLUTION INTRAMUSCULAR; INTRAVENOUS at 11:17

## 2020-07-09 RX ADMIN — FENTANYL CITRATE 50 MCG: 50 INJECTION, SOLUTION INTRAMUSCULAR; INTRAVENOUS at 08:23

## 2020-07-09 RX ADMIN — PROPOFOL 200 MG: 10 INJECTION, EMULSION INTRAVENOUS at 07:35

## 2020-07-09 RX ADMIN — ROCURONIUM BROMIDE 20 MG: 10 INJECTION, SOLUTION INTRAVENOUS at 07:48

## 2020-07-09 RX ADMIN — MIDAZOLAM 1 MG: 1 INJECTION INTRAMUSCULAR; INTRAVENOUS at 07:22

## 2020-07-09 RX ADMIN — SUCCINYLCHOLINE CHLORIDE 120 MG: 20 INJECTION, SOLUTION INTRAMUSCULAR; INTRAVENOUS at 07:35

## 2020-07-09 RX ADMIN — ROCURONIUM BROMIDE 10 MG: 10 INJECTION, SOLUTION INTRAVENOUS at 08:48

## 2020-07-09 RX ADMIN — ROCURONIUM BROMIDE 20 MG: 10 INJECTION, SOLUTION INTRAVENOUS at 09:01

## 2020-07-09 RX ADMIN — ROCURONIUM BROMIDE 10 MG: 10 INJECTION, SOLUTION INTRAVENOUS at 07:35

## 2020-07-09 RX ADMIN — LIDOCAINE HYDROCHLORIDE 100 MG: 20 INJECTION, SOLUTION INFILTRATION; PERINEURAL at 07:35

## 2020-07-09 RX ADMIN — FENTANYL CITRATE 50 MCG: 50 INJECTION, SOLUTION INTRAMUSCULAR; INTRAVENOUS at 08:42

## 2020-07-09 RX ADMIN — SUGAMMADEX 200 MG: 100 INJECTION, SOLUTION INTRAVENOUS at 09:32

## 2020-07-09 RX ADMIN — DEXAMETHASONE SODIUM PHOSPHATE 8 MG: 10 INJECTION INTRAMUSCULAR; INTRAVENOUS at 07:48

## 2020-07-09 RX ADMIN — SODIUM CHLORIDE, POTASSIUM CHLORIDE, SODIUM LACTATE AND CALCIUM CHLORIDE: 600; 310; 30; 20 INJECTION, SOLUTION INTRAVENOUS at 08:25

## 2020-07-09 RX ADMIN — ONDANSETRON HYDROCHLORIDE 4 MG: 2 SOLUTION INTRAMUSCULAR; INTRAVENOUS at 08:58

## 2020-07-09 RX ADMIN — FENTANYL CITRATE 100 MCG: 50 INJECTION, SOLUTION INTRAMUSCULAR; INTRAVENOUS at 07:35

## 2020-07-09 NOTE — OP NOTE
THYROIDECTOMY  Progress Note    Pratibha Pascal  7/9/2020    Pre-op Diagnosis:   Right thyroid nodule an hyperparathyroidism       Post-Op Diagnosis Codes:   same    Procedure/CPT® Codes:      Procedure(s):  RIGHT THYROID LOBECTOMY AND RIGHT PARATHYROIDECTOMY    Surgeon(s):  Kimo Carter MD  Assistant: Maximilian Joiner MD    Anesthesia: General    Staff:   Circulator: Rona Sims RN; Quentin Vines RN  Scrub Person: Samia Kellie      Estimated Blood Loss: 30ml    Urine Voided: * No values recorded between 7/9/2020  7:27 AM and 7/9/2020  9:49 AM *    Specimens:                Specimens     ID Source Type Tests Collected By Collected At Frozen?      1 Blood, Venous Line Blood · PTH INTRAOPERATIVE   Kimo Carter MD 7/9/20 0855      Description: STAT PTH DRAWN AT 0900    2 Blood, Venous Line Blood · PTH INTRAOPERATIVE   Kimo Carter MD 7/9/20 0910      Description: 10MIN STAT PTH DRAWN AT 0910 ROOM#4271    A Thyroid Tissue · TISSUE PATHOLOGY EXAM   Kimo Carter MD 7/9/20 0839 Yes     Description: RIGHT THYROID LOBE AND ISTHMUS ROOM#4271    B Trachea Tissue · TISSUE PATHOLOGY EXAM   Kimo Carter MD 7/9/20 0850 No     Description: RIGHT PARATRACHEAL LIPOMA    C Parathyroid Gland Tissue · TISSUE PATHOLOGY EXAM   Kimo Carter MD 7/9/20 0852 Yes     Description: RIGHT SUPERIOR PARATHYROID ROOM#4271    D Lymph Node Tissue · TISSUE PATHOLOGY EXAM   Kimo Carter MD 7/9/20 0912 No     Description: RIGHT LEVEL 6 LYMPH NODE                Drains:   Closed/Suction Drain 1 Anterior Neck Bulb 10 Fr. (Active)       [REMOVED] Closed/Suction Drain Right;Anterior Knee Accordion 10 Fr. (Removed)       Findings: enlarged nodular thyroid lobe favored benign on frozen section. LN identified and preserved. Moderately enlarged parathyroid gland that was hypercellular on frozen section.    Complications: none    OPERATIVE REPORT: The patient was taken to the operating room placed in  the supine position and placed under general endotracheal anesthesia.  The skin incision was planned in a natural skin crease and injected with 1% lidocaine with epinephrine.  Sterile prep and drape with Hibiclens was performed.  The incision was then made with a 15 blade scalpel.  This was carried through the subcutaneous fat and platysma.  Subplatysmal flaps were elevated.  The midline raphae was then found and the strap muscles were reflected laterally over the right thyroid lobe only.  Careful dissection on the capsule of the gland was performed.  The Harmonic scalpel was used to take down the blood supply superiorly, inferiorly and laterally.  As the thyroid was reflected anteromedially, a parathyroid gland was noted superficial to the recurrent laryngeal nerve.  The attachments at Berry's ligament were divided [uing the Harmonic scalpel.  The isthmus was divided and the final attachments at the trachea were taken down using the Harmonic scalpel. Frozen section favored a benign nodule.    Examination of the surrounding area was then performed using the Pablo dissector to carefully probe the soft tissue laterally and deeper.  There was a moderately enlarged parathyroid gland that was able to be removed using the harmonic scalpel.  This returned as hypercellular on the frozen section.  Intraoperative blood work did drop initially but not with the 10-minute draw.    Irrigation of the operative site was performed and hemostasis was assured using the bipolar electrocautery.  #10 round drain was placed exiting through separate skin incision.  The midline raphae was then reapproximated with 3-0 Vicryl suture.  A 4-0 Vicryl suture was used for platysmal and dermal closure.   5-0 nylon was then used to close the epidermis.  Bacitracin was then applied.  At this point the procedure was complete.  The patient was allowed to awake from anesthesia and taken to the recovery room in satisfactory condition.        Kimo Cabello  MD Raul     Date: 7/9/2020  Time: 09:50

## 2020-07-09 NOTE — ANESTHESIA POSTPROCEDURE EVALUATION
Patient: Pratibha Pascal    Procedure Summary     Date:  07/09/20 Room / Location:   [facility]  /  [facility]    Anesthesia Start:  0728 Anesthesia Stop:  1003    Procedure:  RIGHT THYROID LOBECTOMY AND RIGHT PARATHYROID EXPLORATION (Right Neck) Diagnosis:      Surgeon:  Kimo Carter MD Provider:  Nathalie Marie MD    Anesthesia Type:  general ASA Status:  3          Anesthesia Type: general    Vitals  Vitals Value Taken Time   /68 7/9/2020 11:30 AM   Temp 37 °C (98.6 °F) 7/9/2020 11:15 AM   Pulse 87 7/9/2020 11:32 AM   Resp 16 7/9/2020 11:15 AM   SpO2 92 % 7/9/2020 11:33 AM   Vitals shown include unvalidated device data.        Post Anesthesia Care and Evaluation    Patient location during evaluation: bedside  Patient participation: complete - patient participated  Level of consciousness: awake and alert  Pain management: adequate  Airway patency: patent  Anesthetic complications: No anesthetic complications  PONV Status: controlled  Cardiovascular status: blood pressure returned to baseline and acceptable  Respiratory status: acceptable  Hydration status: acceptable

## 2020-07-09 NOTE — ANESTHESIA PROCEDURE NOTES
Airway  Urgency: elective    Date/Time: 7/9/2020 7:37 AM  Airway not difficult    General Information and Staff    Patient location during procedure: OR  Anesthesiologist: Nathalie Marie MD  CRNA: Justine Fernández CRNA    Indications and Patient Condition  Indications for airway management: airway protection    Preoxygenated: yes  MILS not maintained throughout  Mask difficulty assessment: 1 - vent by mask    Final Airway Details  Final airway type: endotracheal airway      Successful airway: ETT  Cuffed: yes   Successful intubation technique: direct laryngoscopy  Endotracheal tube insertion site: oral  Blade: Josse  ETT size (mm): 7.0  Cormack-Lehane Classification: grade I - full view of glottis  Placement verified by: chest auscultation and capnometry   Measured from: lips  ETT/EBT  to lips (cm): 20  Number of attempts at approach: 1  Assessment: lips, teeth, and gum same as pre-op and atraumatic intubation    Additional Comments  Dentition intact and unchanged. CBEBS.  +ETCO2.

## 2020-07-09 NOTE — ANESTHESIA PREPROCEDURE EVALUATION
Anesthesia Evaluation     Patient summary reviewed and Nursing notes reviewed   no history of anesthetic complications:  NPO Solid Status: > 8 hours  NPO Liquid Status: > 2 hours           Airway   Mallampati: II  TM distance: >3 FB  Neck ROM: full  No difficulty expected  Dental - normal exam     Pulmonary     breath sounds clear to auscultation  (+) a smoker Former, sleep apnea,   Cardiovascular   Exercise tolerance: good (4-7 METS)    ECG reviewed  Rhythm: regular  Rate: normal    (+) hypertension, past MI , CAD, cardiac stents dysrhythmias Paroxysmal Atrial Fib, hyperlipidemia,     ROS comment: [facility] reviewed     Neuro/Psych  (+) psychiatric history Anxiety,     GI/Hepatic/Renal/Endo    (+) morbid obesity,  diabetes mellitus type 2,     Musculoskeletal     (+) neck pain,   Abdominal     Abdomen: soft.   Substance History      OB/GYN          Other   arthritis, autoimmune disease rheumatoid arthritis, chronic steroid use        Other Comment: Patient takes 5mg of prednisone daily, will have stress dose steroid available but unlikely to be needed                  Anesthesia Plan    ASA 3     general     intravenous induction     Anesthetic plan, all risks, benefits, and alternatives have been provided, discussed and informed consent has been obtained with: patient.    Plan discussed with CRNA.

## 2020-07-13 LAB
DX PRELIMINARY: NORMAL
LAB AP CASE REPORT: NORMAL
LAB AP SYNOPTIC CHECKLIST: NORMAL
Lab: NORMAL
PATH REPORT.FINAL DX SPEC: NORMAL
PATH REPORT.GROSS SPEC: NORMAL

## 2020-08-18 ENCOUNTER — TELEMEDICINE (OUTPATIENT)
Dept: FAMILY MEDICINE CLINIC | Facility: CLINIC | Age: 60
End: 2020-08-18

## 2020-08-18 VITALS — SYSTOLIC BLOOD PRESSURE: 128 MMHG | DIASTOLIC BLOOD PRESSURE: 75 MMHG | HEART RATE: 80 BPM

## 2020-08-18 DIAGNOSIS — I10 BENIGN ESSENTIAL HYPERTENSION: ICD-10-CM

## 2020-08-18 DIAGNOSIS — F41.9 ANXIETY: ICD-10-CM

## 2020-08-18 DIAGNOSIS — F41.9 ANXIETY: Primary | ICD-10-CM

## 2020-08-18 PROCEDURE — 99213 OFFICE O/P EST LOW 20 MIN: CPT | Performed by: FAMILY MEDICINE

## 2020-08-18 RX ORDER — ALPRAZOLAM 0.5 MG/1
0.5 TABLET ORAL DAILY PRN
Qty: 30 TABLET | Refills: 2 | Status: SHIPPED | OUTPATIENT
Start: 2020-08-18 | End: 2020-11-17 | Stop reason: SDUPTHER

## 2020-08-18 RX ORDER — ALPRAZOLAM 0.5 MG/1
0.5 TABLET ORAL DAILY
Qty: 30 TABLET | Refills: 2 | OUTPATIENT
Start: 2020-08-18

## 2020-08-18 NOTE — PROGRESS NOTES
Subjective   Pratibha Pascal is a 60 y.o. female.     CC: Video Visit for Medical Management    History of Present Illness     Chief Complaint:   Chief Complaint   Patient presents with   • Anxiety     med refill gayle - dox visit    • Hypertension       Pratibha Pascal 60 y.o. female who presents today for Medical Management of the below listed issues and medication refills.  she has a problem list of   Patient Active Problem List   Diagnosis   • CAD (coronary artery disease)   • Mixed hyperlipidemia   • Benign essential hypertension   • Hypothyroidism   • Major depressive disorder, recurrent, in full remission (CMS/HCC)   • Rheumatoid arthritis (CMS/HCC)   • Cervicalgia   • Diabetes mellitus (CMS/HCC)   • Dyslipidemia   • BP (high blood pressure)   • Obstructive apnea   • AF (paroxysmal atrial fibrillation) (CMS/HCC)   • Proteinuria   • Burn   • Cellulitis of left leg   • Diabetes mellitus with neurological manifestation (CMS/[facility])   • MSSA (methicillin susceptible Staphylococcus aureus) infection   • Diabetic ulcer of left foot associated with diabetes mellitus due to underlying condition (CMS/HCC)   • Morbid obesity (CMS/[facility])   • Primary insomnia   • Anxiety   • Immunodeficiency, unspecified (CMS/[facility])   • Long term current use of non-steroidal anti-inflammatories (NSAID)   • Personal history of immunosupression therapy   • Primary generalized (osteo)arthritis   • Raised antibody titer   • Rheumatoid arthritis of multiple sites without organ or system involvement with positive rheumatoid factor (CMS/[facility])   • Other long term (current) drug therapy   • Uncontrolled type 2 diabetes mellitus with hyperglycemia (CMS/[facility])   • Vitamin D deficiency, unspecified   • Hypercalcemia   • Primary osteoarthritis of left knee   • Abnormal nuclear stress test   • OA (osteoarthritis) of knee   • Primary osteoarthritis of right knee   • Chronic fatigue   • Chronic pain of left knee   • History of coronary artery stent placement   •  Multiple thyroid nodules   .  Since the last visit, she has overall felt well.  she has been compliant with   Current Outpatient Medications:   •  ACCU-CHEK GUIDE test strip, , Disp: , Rfl:   •  ALPRAZolam (XANAX) 0.5 MG tablet, Take 1 tablet by mouth Daily As Needed for Anxiety., Disp: 30 tablet, Rfl: 2  •  atorvastatin (LIPITOR) 10 MG tablet, Take 0.5 tablets by mouth Daily., Disp: 45 tablet, Rfl: 1  •  baclofen (LIORESAL) 10 MG tablet, Take 10 mg by mouth 3 (Three) Times a Day As Needed for Muscle Spasms., Disp: , Rfl:   •  dapagliflozin-metformin HCl ER (XIGDUO XR) 5-1000 MG tablet, Take 2 tablets by mouth Daily. (Patient taking differently: Take 2 tablets by mouth Daily. PATIENT TO CONSULT WITH DR. NATALEE DELACRUZ, ENDOCRINOLOGIST REGARDING MEDICATIONS PRIOR TO SURGERY), Disp: 60 tablet, Rfl: 5  •  FLUoxetine (PROzac) 20 MG capsule, Take 60 mg by mouth Every Night., Disp: , Rfl:   •  folic acid (FOLVITE) 1 MG tablet, Take 1,000 mcg by mouth Daily., Disp: , Rfl:   •  HYDROcodone-acetaminophen (NORCO) 5-325 MG per tablet, Take 1-2 tablets by mouth Every 4 (Four) Hours As Needed (for pain)., Disp: 30 tablet, Rfl: 0  •  levothyroxine (SYNTHROID, LEVOTHROID) 137 MCG tablet, Take 137 mcg by mouth Daily., Disp: , Rfl:   •  losartan (COZAAR) 50 MG tablet, Take 1 tablet by mouth Daily., Disp: 90 tablet, Rfl: 1  •  metoprolol succinate XL (TOPROL-XL) 100 MG 24 hr tablet, Take 1 tablet by mouth Daily., Disp: 90 tablet, Rfl: 1  •  ONETOUCH DELICA LANCETS 33G misc, Test 3 times daily (Patient taking differently: 1 each. Test 3 times daily), Disp: 100 each, Rfl: 11  •  predniSONE (DELTASONE) 5 MG tablet, Take 5 mg by mouth Daily., Disp: , Rfl:   •  promethazine (PHENERGAN) 25 MG tablet, Take 1 tablet by mouth Every 6 (Six) Hours As Needed for Nausea or Vomiting., Disp: 20 tablet, Rfl: 0  •  sulfaSALAzine (AZULFIDINE) 500 MG tablet, Take 1,500 mg by mouth 2 (Two) Times a Day. TAKE 3 TABLETS BY MOUTH TWICE DAILY, Disp: , Rfl:  1  •  triamterene-hydrochlorothiazide (MAXZIDE-25) 37.5-25 MG per tablet, Take 1 tablet by mouth Daily., Disp: 90 tablet, Rfl: 1  •  TRULICITY 1.5 MG/0.5ML solution pen-injector, Inject 1.5 mg under the skin into the appropriate area as directed 1 (One) Time Per Week. (Patient taking differently: Inject 1.5 mg under the skin into the appropriate area as directed 1 (One) Time Per Week. SATURDAY), Disp: 12 pen, Rfl: 1  •  Upadacitinib ER (RINVOQ) 15 MG tablet sustained-release 24 hour, Take 15 mg by mouth Daily., Disp: , Rfl:   •  vitamin D (ERGOCALCIFEROL) 1.25 MG (41168 UT) capsule capsule, Take 50,000 Units by mouth Daily (Monday-Friday)., Disp: , Rfl:   No current facility-administered medications for this visit.     Facility-Administered Medications Ordered in Other Visits:   •  mupirocin (BACTROBAN) 2 % nasal ointment, , Nasal, BID, Nabor Correia MD.  she denies medication side effects.    All of the other chronic condition(s) listed above are stable w/o issues.    /75 Comment: dox visit  Pulse 80     Results for orders placed or performed during the hospital encounter of 07/09/20   PTH Intraoperative   Result Value Ref Range    PTH, Intraoperative 69.9 (H) 15.0 - 65.0 pg/mL   Calcium, Ionized   Result Value Ref Range    Ionized Calcium 1.33 1.15 - 1.35 mmol/L    Ionized Calcium 5.3 4.6 - 5.4 mg/dL   PTH Intraoperative   Result Value Ref Range    PTH, Intraoperative 56.9 15.0 - 65.0 pg/mL   PTH Intraoperative   Result Value Ref Range    PTH, Intraoperative 56.4 15.0 - 65.0 pg/mL   POC Glucose Once   Result Value Ref Range    Glucose 150 (H) 70 - 130 mg/dL   POC Glucose Once   Result Value Ref Range    Glucose 188 (H) 70 - 130 mg/dL   Tissue Pathology Exam   Result Value Ref Range    Case Report       Surgical Pathology Report                         Case: [id number]                                  Authorizing Provider:  Kimo Carter MD     Collected:           07/09/2020 08:39 AM           Ordering Location:     [facility]  Received:            07/09/2020 08:43 AM                                 [facility]                                                                       Pathologist:           Marlo Vazquez MD                                                         Specimens:   1) - Thyroid, RIGHT THYROID LOBE AND ISTHMUS ROOM#4271                                              2) - Parathyroid Gland, RIGHT SUPERIOR PARATHYROID ROOM#4271                                        3) - Trachea, RIGHT PARATRACHEAL LIPOMA                                                             4) - Lymph Node, RIGHT LEVEL 6 LYMPH NODE                                                  Final Diagnosis       1. Thyroid, Right Lobe and Isthmus, Lobectomy:    A.  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    B.  Please see synoptic below.     2.  Right Superior Parathyroid:     A.  Parathyroid tissue, hypercellular.    3.  Right Paratracheal Lipoma:     A.  Benign lymph node.    4. Lymph Node, Right Level 6:    A.  Multiple fragments of benign lymph node.      destiny/brb       Preliminary Diagnosis       1.  Thyroid, Right Lobe and Isthmus, Lobectomy: Thyroid with a single atypical 1.5 mm nodule coming within 2.25 mm of a margin with    A.  Multinodular goiter with prominent Hurthle cell nodule    B.  Hashimoto's parathyroiditis.    2.  Right Superior Parathyroid:     A.  Hypercellular parathyroid tissue.    3.  Right Paratracheal Lipoma:   A.  Benign lymph node.     4.  Lymph Node, Right Level 6:    A.  Multiple fragments of benign lymph node.     Comment:  The case will be sent to [facility] for their opinion on the one atypical nodule.    destiny/brb           Synoptic Checklist       THYROID GLAND  (Thyroid - All Specimens)      8th Edition - Protocol posted: 8/28/2019         :              "Clinical History:    No known radiation exposure       SPECIMEN      Procedure:    Right lobectomy and isthectomy.       TUMOR      Tumor Focality:    Unifocal       Tumor Characteristics:            Tumor Site:    Right lobe         Histologic Type:    Papillary carcinoma, follicular variant, infiltrative         Tumor Size (Centimeters):    Greatest Dimension (Centimeters): 0.16 cm        Extrathyroidal Extension:    Not identified         Angioinvasion (vascular invasion):    Not identified         Lymphatic Invasion:    Not identified         Perineural Invasion:    Not identified         Margins:    Uninvolved by carcinoma           Distance of Invasive Carcinoma from Closest Margin (Millimeters):    2.25 mm        Mitotic Rate:    0 Mitoses per 2 mm^2      LYMPH NODES      Number of Lymph Nodes Involved:    0       Number of Lymph Nodes Examined:    2         Asif Levels Examined:    Level VI       PATHOLOGIC STAGE CLASSIFICATION (pTNM, AJCC 8th Edition)    :          Primary Tumor (pT):    pT1a     Regional Lymph Nodes (pN):    pN0a       ADDITIONAL FINDINGS    Additional Findings:    Adenomatoid nodule(s) or nodular follicular disease       Intraoperative Consultation       #1 FS DX:  Two touch preps are performed and 1 block frozen.  Hurthle cell nodule, favor benign.  Reported to Dr. Carter at 8:59 a.m. per Dr. Arana.      #2  FS DX:  Hypercellular parathyroid gland weighing 0.323 grams. Reported to Dr. Carter at 9:06 a.m. per Dr. Arana.   Jap/uso/swm/kds      Gross Description       1. Received fresh for frozen section diagnosis labeled \"right thyroid lobe and isthmus\" is a 14 gram, 5.5 x 3.0 x 2.0 cm right thyroid lobectomy with 1.8 x 1.0 x 0.6 cm isthmus.  The capsule is smooth to shaggy tan-red. The isthmus margin is inked yellow, the posterior capsule is inked black and the anterior capsule is inked blue.  The lobe is serially sectioned from superior to inferior to reveal a 2.0 x 1.5 x 1.5 cm centrally " "hemorrhagic and cystic tan-pink nodule in the mid pole region which comes to within 3 cm of the isthmus margin and bulges against but does not grossly penetrate the thyroid capsule.  The remaining parenchyma displays multiple solid tan-pink nodularity in the inferior pole which range from 0.5 cm to 0.6 cm in greatest dimension.  The surrounding parenchyma is beefy red. Touch preps of the largest nodule are submitted and a representative section of the largest nodule is submitted for frozen section diagnosis.  The specimen is entirely submitted as follows:      1AFS - frozen section residue - largest mid pole nodule  1B - isthmus margin, en face  1C - superior pole apex perpendicular   1D - superior pole  1E-1G - remainder of the largest mid pole nodule  1H-1I  - inferior pole to include smaller nodules  1J - inferior pole base, perpendicular     2. Received fresh for frozen section diagnosis labeled \"right superior parathyroid\" is a 0.323 gram, 1.8 x 1.1 x 0.3 cm tan-pink and fatty soft tissue which is submitted en toto for frozen section diagnosis as 2AFS.     3. Received in formalin labeled \"paratracheal lipoma\" is an unoriented multilobulated 1.8 x 0.9 x 0.6 cm yellow fatty nodule which is inked and serially sectioned to reveal yellow fatty cut surfaces.  The specimen is entirely submitted as 3A.     Tao/uso/swm/kds    4. Received in formalin labeled \"right level 6 lymph node\" is a 1.0 x 0.6 x 0.4 cm tan-red possible lymph node which is bisected and entirely submitted as 4A.       Jap/uso/swm/kds    Jap/uso/swm/kds             The following portions of the patient's history were reviewed and updated as appropriate: allergies, current medications, past family history, past medical history, past social history, past surgical history and problem list.    Review of Systems   Constitutional: Negative for activity change, chills and fever.   Respiratory: Negative for cough.    Cardiovascular: Negative for chest pain. "   Psychiatric/Behavioral: Negative for dysphoric mood.       Objective   Physical Exam   Constitutional: She is oriented to person, place, and time. She appears well-developed and well-nourished. No distress.   Pulmonary/Chest: Effort normal.   Neurological: She is alert and oriented to person, place, and time.   Psychiatric: She has a normal mood and affect. Her behavior is normal. Thought content normal.       Assessment/Plan   Pratibha was seen today for anxiety and hypertension.    Diagnoses and all orders for this visit:    Anxiety  -     ALPRAZolam (XANAX) 0.5 MG tablet; Take 1 tablet by mouth Daily As Needed for Anxiety.    Benign essential hypertension    Pt doing very well with her BP meds. Pt to continue current rx.  Spent  15   minutes with chart and interview and consent for this encounter given by the patient.  You have chosen to receive care through a telehealth visit.  Do you consent to use a video/audio connection for your medical care today? Yes

## 2020-11-06 RX ORDER — BACLOFEN 10 MG/1
TABLET ORAL
Qty: 270 TABLET | OUTPATIENT
Start: 2020-11-06

## 2020-11-08 DIAGNOSIS — I10 BENIGN ESSENTIAL HYPERTENSION: ICD-10-CM

## 2020-11-09 RX ORDER — METOPROLOL SUCCINATE 100 MG/1
TABLET, EXTENDED RELEASE ORAL
Qty: 90 TABLET | Refills: 1 | OUTPATIENT
Start: 2020-11-09

## 2020-11-17 ENCOUNTER — TELEMEDICINE (OUTPATIENT)
Dept: FAMILY MEDICINE CLINIC | Facility: CLINIC | Age: 60
End: 2020-11-17

## 2020-11-17 DIAGNOSIS — F33.42 MAJOR DEPRESSIVE DISORDER, RECURRENT, IN FULL REMISSION (HCC): ICD-10-CM

## 2020-11-17 DIAGNOSIS — M54.2 CERVICALGIA: ICD-10-CM

## 2020-11-17 DIAGNOSIS — I10 BENIGN ESSENTIAL HYPERTENSION: Primary | ICD-10-CM

## 2020-11-17 DIAGNOSIS — F41.9 ANXIETY: ICD-10-CM

## 2020-11-17 PROCEDURE — 99214 OFFICE O/P EST MOD 30 MIN: CPT | Performed by: FAMILY MEDICINE

## 2020-11-17 RX ORDER — FLUOXETINE HYDROCHLORIDE 20 MG/1
60 CAPSULE ORAL NIGHTLY
Qty: 270 CAPSULE | Refills: 1 | Status: SHIPPED | OUTPATIENT
Start: 2020-11-17 | End: 2021-02-22 | Stop reason: SDUPTHER

## 2020-11-17 RX ORDER — TRIAMTERENE AND HYDROCHLOROTHIAZIDE 37.5; 25 MG/1; MG/1
1 TABLET ORAL DAILY
Qty: 90 TABLET | Refills: 1 | Status: SHIPPED | OUTPATIENT
Start: 2020-11-17 | End: 2021-02-22 | Stop reason: SDUPTHER

## 2020-11-17 RX ORDER — ALPRAZOLAM 0.5 MG/1
0.5 TABLET ORAL DAILY PRN
Qty: 30 TABLET | Refills: 2 | Status: SHIPPED | OUTPATIENT
Start: 2020-11-17 | End: 2021-02-21 | Stop reason: SDUPTHER

## 2020-11-17 RX ORDER — METOPROLOL SUCCINATE 100 MG/1
100 TABLET, EXTENDED RELEASE ORAL DAILY
Qty: 90 TABLET | Refills: 1 | Status: SHIPPED | OUTPATIENT
Start: 2020-11-17 | End: 2021-02-22 | Stop reason: SDUPTHER

## 2020-11-17 RX ORDER — BACLOFEN 10 MG/1
10 TABLET ORAL NIGHTLY
Qty: 90 TABLET | Refills: 1 | Status: SHIPPED | OUTPATIENT
Start: 2020-11-17 | End: 2021-02-22 | Stop reason: SDUPTHER

## 2020-11-17 RX ORDER — LEVOTHYROXINE SODIUM 175 UG/1
137 TABLET ORAL DAILY
COMMUNITY
End: 2021-04-22

## 2020-11-17 RX ORDER — GOLIMUMAB 50 MG/4ML
SOLUTION INTRAVENOUS
COMMUNITY
Start: 2020-08-25 | End: 2021-04-22

## 2020-11-17 RX ORDER — LOSARTAN POTASSIUM 50 MG/1
50 TABLET ORAL DAILY
Qty: 90 TABLET | Refills: 1 | Status: SHIPPED | OUTPATIENT
Start: 2020-11-17 | End: 2021-02-22 | Stop reason: SDUPTHER

## 2020-11-17 NOTE — PROGRESS NOTES
Subjective   Pratibha Pascal is a 60 y.o. female.     CC: Video Visit for Medical Management    History of Present Illness     Chief Complaint:   Chief Complaint   Patient presents with   • Hypertension     video call   - med refill - no labs    • Anxiety       Pratibha Pascal 60 y.o. female who presents today for Medical Management of the below listed issues and medication refills.  she has a problem list of   Patient Active Problem List   Diagnosis   • CAD (coronary artery disease)   • Mixed hyperlipidemia   • Benign essential hypertension   • Hypothyroidism   • Major depressive disorder, recurrent, in full remission (CMS/[facility])   • Rheumatoid arthritis (CMS/[facility])   • Cervicalgia   • Diabetes mellitus (CMS/HCC)   • Dyslipidemia   • BP (high blood pressure)   • Obstructive apnea   • AF (paroxysmal atrial fibrillation) (CMS/[facility])   • Proteinuria   • Burn   • Cellulitis of left leg   • Diabetes mellitus with neurological manifestation (CMS/[facility])   • MSSA (methicillin susceptible Staphylococcus aureus) infection   • Diabetic ulcer of left foot associated with diabetes mellitus due to underlying condition (CMS/[facility])   • Morbid obesity (CMS/[facility])   • Primary insomnia   • Anxiety   • Immunodeficiency, unspecified (CMS/[facility])   • Long term current use of non-steroidal anti-inflammatories (NSAID)   • Personal history of immunosupression therapy   • Primary generalized (osteo)arthritis   • Raised antibody titer   • Rheumatoid arthritis of multiple sites without organ or system involvement with positive rheumatoid factor (CMS/[facility])   • Other long term (current) drug therapy   • Uncontrolled type 2 diabetes mellitus with hyperglycemia (CMS/[facility])   • Vitamin D deficiency, unspecified   • Hypercalcemia   • Primary osteoarthritis of left knee   • Abnormal nuclear stress test   • OA (osteoarthritis) of knee   • Primary osteoarthritis of right knee   • Chronic fatigue   • Chronic pain of left knee   • History of coronary artery stent placement    • Multiple thyroid nodules   .  Since the last visit, she has overall felt well.  she has been compliant with   Current Outpatient Medications:   •  golimumab (Simponi Aria) 50 MG/4ML solution injection, Administer SIMPONI ARIA 2mg/kg IV Every 0, 4, 8 weeks, Disp: , Rfl:   •  ACCU-CHEK GUIDE test strip, , Disp: , Rfl:   •  ALPRAZolam (XANAX) 0.5 MG tablet, Take 1 tablet by mouth Daily As Needed for Anxiety., Disp: 30 tablet, Rfl: 2  •  atorvastatin (LIPITOR) 10 MG tablet, Take 0.5 tablets by mouth Daily., Disp: 45 tablet, Rfl: 1  •  baclofen (LIORESAL) 10 MG tablet, Take 1 tablet by mouth Every Night., Disp: 90 tablet, Rfl: 1  •  dapagliflozin-metformin HCl ER (XIGDUO XR) 5-1000 MG tablet, Take 2 tablets by mouth Daily. (Patient taking differently: Take 2 tablets by mouth Daily. PATIENT TO CONSULT WITH DR. NATALEE DELACRUZ, ENDOCRINOLOGIST REGARDING MEDICATIONS PRIOR TO SURGERY), Disp: 60 tablet, Rfl: 5  •  FLUoxetine (PROzac) 20 MG capsule, Take 3 capsules by mouth Every Night., Disp: 270 capsule, Rfl: 1  •  folic acid (FOLVITE) 1 MG tablet, Take 1,000 mcg by mouth Daily., Disp: , Rfl:   •  HYDROcodone-acetaminophen (NORCO) 5-325 MG per tablet, Take 1-2 tablets by mouth Every 4 (Four) Hours As Needed (for pain)., Disp: 30 tablet, Rfl: 0  •  levothyroxine (Synthroid) 137 MCG tablet, Take 1 tablet by mouth., Disp: , Rfl:   •  levothyroxine (SYNTHROID, LEVOTHROID) 137 MCG tablet, Take 137 mcg by mouth Daily., Disp: , Rfl:   •  losartan (COZAAR) 50 MG tablet, Take 1 tablet by mouth Daily., Disp: 90 tablet, Rfl: 1  •  metoprolol succinate XL (TOPROL-XL) 100 MG 24 hr tablet, Take 1 tablet by mouth Daily., Disp: 90 tablet, Rfl: 1  •  ONETOUCH DELICA LANCETS 33G misc, Test 3 times daily (Patient taking differently: 1 each. Test 3 times daily), Disp: 100 each, Rfl: 11  •  promethazine (PHENERGAN) 25 MG tablet, Take 1 tablet by mouth Every 6 (Six) Hours As Needed for Nausea or Vomiting., Disp: 20 tablet, Rfl: 0  •   sulfaSALAzine (AZULFIDINE) 500 MG tablet, Take 1,500 mg by mouth 2 (Two) Times a Day. TAKE 3 TABLETS BY MOUTH TWICE DAILY, Disp: , Rfl: 1  •  triamterene-hydrochlorothiazide (MAXZIDE-25) 37.5-25 MG per tablet, Take 1 tablet by mouth Daily., Disp: 90 tablet, Rfl: 1  •  TRULICITY 1.5 MG/0.5ML solution pen-injector, Inject 1.5 mg under the skin into the appropriate area as directed 1 (One) Time Per Week. (Patient taking differently: Inject 1.5 mg under the skin into the appropriate area as directed 1 (One) Time Per Week. SATURDAY), Disp: 12 pen, Rfl: 1  •  Upadacitinib ER (RINVOQ) 15 MG tablet sustained-release 24 hour, Take 15 mg by mouth Daily., Disp: , Rfl:   •  vitamin D (ERGOCALCIFEROL) 1.25 MG (26713 UT) capsule capsule, Take 50,000 Units by mouth Daily (Monday-Friday)., Disp: , Rfl:   No current facility-administered medications for this visit.     Facility-Administered Medications Ordered in Other Visits:   •  mupirocin (BACTROBAN) 2 % nasal ointment, , Nasal, BID, Nabor Correia MD.  she denies medication side effects.    All of the other chronic condition(s) listed above are stable w/o issues.    There were no vitals taken for this visit.    Results for orders placed or performed during the hospital encounter of 07/09/20   PTH Intraoperative    Specimen: Blood   Result Value Ref Range    PTH, Intraoperative 69.9 (H) 15.0 - 65.0 pg/mL   Calcium, Ionized    Specimen: Blood   Result Value Ref Range    Ionized Calcium 1.33 1.15 - 1.35 mmol/L    Ionized Calcium 5.3 4.6 - 5.4 mg/dL   PTH Intraoperative    Specimen: Blood, Venous Line   Result Value Ref Range    PTH, Intraoperative 56.9 15.0 - 65.0 pg/mL   PTH Intraoperative    Specimen: Blood, Venous Line   Result Value Ref Range    PTH, Intraoperative 56.4 15.0 - 65.0 pg/mL   POC Glucose Once    Specimen: Blood   Result Value Ref Range    Glucose 150 (H) 70 - 130 mg/dL   POC Glucose Once    Specimen: Blood   Result Value Ref Range    Glucose 188 (H) 70 - 130 mg/dL    Tissue Pathology Exam    Specimen: A: Thyroid; Tissue    B: Trachea; Tissue    C: Parathyroid Gland; Tissue    D: Lymph Node; Tissue   Result Value Ref Range    Case Report       Surgical Pathology Report                         Case: [id number]                                  Authorizing Provider:  Kimo Carter MD     Collected:           07/09/2020 08:39 AM          Ordering Location:     [facility]  Received:            07/09/2020 08:43 AM                                 [facility] OR                                                                       Pathologist:           Marlo Vazquez MD                                                         Specimens:   1) - Thyroid, RIGHT THYROID LOBE AND ISTHMUS ROOM#4271                                              2) - Parathyroid Gland, RIGHT SUPERIOR PARATHYROID ROOM#4271                                        3) - Trachea, RIGHT PARATRACHEAL LIPOMA                                                             4) - Lymph Node, RIGHT LEVEL 6 LYMPH NODE                                                  Final Diagnosis       1. Thyroid, Right Lobe and Isthmus, Lobectomy:    A.  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    B.  Please see synoptic below.     2.  Right Superior Parathyroid:     A.  Parathyroid tissue, hypercellular.    3.  Right Paratracheal Lipoma:     A.  Benign lymph node.    4. Lymph Node, Right Level 6:    A.  Multiple fragments of benign lymph node.      destiny/brb       Preliminary Diagnosis       1.  Thyroid, Right Lobe and Isthmus, Lobectomy: Thyroid with a single atypical 1.5 mm nodule coming within 2.25 mm of a margin with    A.  Multinodular goiter with prominent Hurthle cell nodule    B.  Hashimoto's parathyroiditis.    2.  Right Superior Parathyroid:     A.  Hypercellular parathyroid tissue.    3.  Right Paratracheal  Lipoma:   A.  Benign lymph node.     4.  Lymph Node, Right Level 6:    A.  Multiple fragments of benign lymph node.     Comment:  The case will be sent to the [facility] for their opinion on the one atypical nodule.    destiny/brb           Synoptic Checklist       THYROID GLAND  (Thyroid - All Specimens)      8th Edition - Protocol posted: 8/28/2019         :             Clinical History:    No known radiation exposure       SPECIMEN      Procedure:    Right lobectomy and isthectomy.       TUMOR      Tumor Focality:    Unifocal       Tumor Characteristics:            Tumor Site:    Right lobe         Histologic Type:    Papillary carcinoma, follicular variant, infiltrative         Tumor Size (Centimeters):    Greatest Dimension (Centimeters): 0.16 cm        Extrathyroidal Extension:    Not identified         Angioinvasion (vascular invasion):    Not identified         Lymphatic Invasion:    Not identified         Perineural Invasion:    Not identified         Margins:    Uninvolved by carcinoma           Distance of Invasive Carcinoma from Closest Margin (Millimeters):    2.25 mm        Mitotic Rate:    0 Mitoses per 2 mm^2      LYMPH NODES      Number of Lymph Nodes Involved:    0       Number of Lymph Nodes Examined:    2         Asif Levels Examined:    Level VI       PATHOLOGIC STAGE CLASSIFICATION (pTNM, AJCC 8th Edition)    :          Primary Tumor (pT):    pT1a     Regional Lymph Nodes (pN):    pN0a       ADDITIONAL FINDINGS    Additional Findings:    Adenomatoid nodule(s) or nodular follicular disease       Intraoperative Consultation       #1 FS DX:  Two touch preps are performed and 1 block frozen.  Hurthle cell nodule, favor benign.  Reported to Dr. Carter at 8:59 a.m. per Dr. Arana.      #2  FS DX:  Hypercellular parathyroid gland weighing 0.323 grams. Reported to Dr. Carter at 9:06 a.m. per Dr. Arana.   Jap/uso/swm/kds      Gross Description       1. Received fresh for frozen section diagnosis  "labeled \"right thyroid lobe and isthmus\" is a 14 gram, 5.5 x 3.0 x 2.0 cm right thyroid lobectomy with 1.8 x 1.0 x 0.6 cm isthmus.  The capsule is smooth to shaggy tan-red. The isthmus margin is inked yellow, the posterior capsule is inked black and the anterior capsule is inked blue.  The lobe is serially sectioned from superior to inferior to reveal a 2.0 x 1.5 x 1.5 cm centrally hemorrhagic and cystic tan-pink nodule in the mid pole region which comes to within 3 cm of the isthmus margin and bulges against but does not grossly penetrate the thyroid capsule.  The remaining parenchyma displays multiple solid tan-pink nodularity in the inferior pole which range from 0.5 cm to 0.6 cm in greatest dimension.  The surrounding parenchyma is beefy red. Touch preps of the largest nodule are submitted and a representative section of the largest nodule is submitted for frozen section diagnosis.  The specimen is entirely submitted as follows:      1AFS - frozen section residue - largest mid pole nodule  1B - isthmus margin, en face  1C - superior pole apex perpendicular   1D - superior pole  1E-1G - remainder of the largest mid pole nodule  1H-1I  - inferior pole to include smaller nodules  1J - inferior pole base, perpendicular     2. Received fresh for frozen section diagnosis labeled \"right superior parathyroid\" is a 0.323 gram, 1.8 x 1.1 x 0.3 cm tan-pink and fatty soft tissue which is submitted en toto for frozen section diagnosis as 2AFS.     3. Received in formalin labeled \"paratracheal lipoma\" is an unoriented multilobulated 1.8 x 0.9 x 0.6 cm yellow fatty nodule which is inked and serially sectioned to reveal yellow fatty cut surfaces.  The specimen is entirely submitted as 3A.     Jap/uso/swm/kds    4. Received in formalin labeled \"right level 6 lymph node\" is a 1.0 x 0.6 x 0.4 cm tan-red possible lymph node which is bisected and entirely submitted as 4A.       Jap/uso/swm/kds    Jap/uso/swm/kds             The " following portions of the patient's history were reviewed and updated as appropriate: allergies, current medications, past family history, past medical history, past social history, past surgical history and problem list.    Review of Systems   Constitutional: Negative for activity change, chills and fever.   Respiratory: Negative for cough.    Cardiovascular: Negative for chest pain.   Psychiatric/Behavioral: Negative for dysphoric mood.       Objective   Physical Exam  Constitutional:       General: She is not in acute distress.     Appearance: She is well-developed.   Pulmonary:      Effort: Pulmonary effort is normal.   Neurological:      Mental Status: She is alert and oriented to person, place, and time.   Psychiatric:         Behavior: Behavior normal.         Thought Content: Thought content normal.     Labs from other providers reviewed by me at today's visit.    Assessment/Plan   Diagnoses and all orders for this visit:    1. Benign essential hypertension (Primary)  -     losartan (COZAAR) 50 MG tablet; Take 1 tablet by mouth Daily.  Dispense: 90 tablet; Refill: 1  -     metoprolol succinate XL (TOPROL-XL) 100 MG 24 hr tablet; Take 1 tablet by mouth Daily.  Dispense: 90 tablet; Refill: 1  -     triamterene-hydrochlorothiazide (MAXZIDE-25) 37.5-25 MG per tablet; Take 1 tablet by mouth Daily.  Dispense: 90 tablet; Refill: 1    2. Anxiety  -     ALPRAZolam (XANAX) 0.5 MG tablet; Take 1 tablet by mouth Daily As Needed for Anxiety.  Dispense: 30 tablet; Refill: 2    3. Major depressive disorder, recurrent, in full remission (CMS/HCC)  -     FLUoxetine (PROzac) 20 MG capsule; Take 3 capsules by mouth Every Night.  Dispense: 270 capsule; Refill: 1    4. Cervicalgia  -     baclofen (LIORESAL) 10 MG tablet; Take 1 tablet by mouth Every Night.  Dispense: 90 tablet; Refill: 1      Spent  17   minutes with chart and interview and consent for this encounter given by the patient.  You have chosen to receive care through  a telehealth visit.  Do you consent to use a video/audio connection for your medical care today? Yes

## 2020-12-17 ENCOUNTER — OFFICE VISIT (OUTPATIENT)
Dept: CARDIOLOGY | Facility: CLINIC | Age: 60
End: 2020-12-17

## 2020-12-17 VITALS
DIASTOLIC BLOOD PRESSURE: 76 MMHG | HEART RATE: 78 BPM | BODY MASS INDEX: 39.68 KG/M2 | HEIGHT: 72 IN | SYSTOLIC BLOOD PRESSURE: 140 MMHG | WEIGHT: 293 LBS

## 2020-12-17 DIAGNOSIS — M17.11 PRIMARY OSTEOARTHRITIS OF RIGHT KNEE: ICD-10-CM

## 2020-12-17 DIAGNOSIS — I25.10 CORONARY ARTERY DISEASE INVOLVING NATIVE CORONARY ARTERY OF NATIVE HEART WITHOUT ANGINA PECTORIS: Primary | Chronic | ICD-10-CM

## 2020-12-17 DIAGNOSIS — E11.59 TYPE 2 DIABETES MELLITUS WITH OTHER CIRCULATORY COMPLICATION, WITHOUT LONG-TERM CURRENT USE OF INSULIN (HCC): ICD-10-CM

## 2020-12-17 DIAGNOSIS — I10 BENIGN ESSENTIAL HYPERTENSION: ICD-10-CM

## 2020-12-17 DIAGNOSIS — E78.2 MIXED HYPERLIPIDEMIA: Chronic | ICD-10-CM

## 2020-12-17 DIAGNOSIS — M05.79 RHEUMATOID ARTHRITIS OF MULTIPLE SITES WITHOUT ORGAN OR SYSTEM INVOLVEMENT WITH POSITIVE RHEUMATOID FACTOR (HCC): ICD-10-CM

## 2020-12-17 DIAGNOSIS — F41.9 ANXIETY: ICD-10-CM

## 2020-12-17 DIAGNOSIS — E06.3 HYPOTHYROIDISM DUE TO HASHIMOTO'S THYROIDITIS: ICD-10-CM

## 2020-12-17 DIAGNOSIS — G47.33 OBSTRUCTIVE APNEA: Chronic | ICD-10-CM

## 2020-12-17 DIAGNOSIS — Z95.5 HISTORY OF CORONARY ARTERY STENT PLACEMENT: ICD-10-CM

## 2020-12-17 DIAGNOSIS — E03.8 HYPOTHYROIDISM DUE TO HASHIMOTO'S THYROIDITIS: ICD-10-CM

## 2020-12-17 DIAGNOSIS — E66.01 MORBID OBESITY (HCC): ICD-10-CM

## 2020-12-17 DIAGNOSIS — I48.0 AF (PAROXYSMAL ATRIAL FIBRILLATION) (HCC): ICD-10-CM

## 2020-12-17 PROBLEM — R94.39 ABNORMAL NUCLEAR STRESS TEST: Status: RESOLVED | Noted: 2019-08-29 | Resolved: 2020-12-17

## 2020-12-17 PROCEDURE — 99214 OFFICE O/P EST MOD 30 MIN: CPT | Performed by: INTERNAL MEDICINE

## 2020-12-17 PROCEDURE — 93000 ELECTROCARDIOGRAM COMPLETE: CPT | Performed by: INTERNAL MEDICINE

## 2020-12-17 RX ORDER — ASPIRIN 81 MG/1
81 TABLET, CHEWABLE ORAL DAILY
COMMUNITY
End: 2021-04-30 | Stop reason: HOSPADM

## 2020-12-17 RX ORDER — TOCILIZUMAB 180 MG/ML
INJECTION, SOLUTION SUBCUTANEOUS
COMMUNITY
Start: 2020-11-20 | End: 2021-04-22

## 2020-12-17 RX ORDER — ALPRAZOLAM 0.5 MG/1
TABLET ORAL
Qty: 30 TABLET | Refills: 2 | OUTPATIENT
Start: 2020-12-17

## 2020-12-17 NOTE — PROGRESS NOTES
Subjective:     Encounter Date:12/17/2020      Patient ID: Pratibha Pascal is a 60 y.o. female.    Chief Complaint:  History of Present Illness    This is a 60-year-old with a history of hypertension, rheumatoid arthritis, hyperlipidemia, diabetes mellitus type 2, obstructive sleep apnea, coronary artery disease status post anterior myocardial infarction and drug-eluting stent placement of the mid LAD, obstructive sleep apnea, who presents for follow-up.    She presents today for routine 6-month follow-up.  She denies any worsening shortness of breath.  She denies any palpitations, orthopnea, near-syncope or syncope, or lower extremity edema.  She continues to have twinges of left-sided chest pain which she reports has been present for several years now both proceeding and since her myocardial infarction in 2014.  These episodes can last up to a few minutes but are not associated necessarily with exertion.  She denies any change in the frequency or the duration of the symptoms.  In fact she feels like the frequency may be improving.  Her main issue is getting her rheumatoid arthritis under control.  She reports that they have tried several different agents without much success.     Prior History:  I saw the patient initially in 6/2014 when she presented with an anterior myocardial infarction.  The patient called EMS due to chest pain at home.  EKGs performed in the field showed evidence of an anterior myocardial infarction.  Following her arrival to the emergency room she did have an episode of ventricular fibrillation requiring defibrillation and was immediately resuscitated.  She is brought emergently to the cardiac catheterization laboratory where she was found to have 100% thrombotic occlusion of her mid left anterior descending artery for which she underwent PCI and drug-eluting stent placement with a Xience expedition 3.5 x 18 mm stent which was postdilated with a 4.0 x 15 mm balloon.  Remaining coronary  arteries showed mild nonobstructive disease.  She did have an episode of atrial fibrillation with rapid ventricular rate that eventually converted to sinus rhythm.  She has had no further episodes of atrial fibrillation since then.  Her initial echocardiogram did show evidence of depressed left ventricular systolic function with apical wall motion abnormalities.  I saw the patient last in 7/2015 for routine follow-up.  At that time I stopped her ticagrelor and kept her on aspirin.    The patient was lost to follow-up and not seen back in the office until 8/2019 when she presented to the cardiac evaluation center for cardiac clearance for knee replacement.  She was having a lot of issues with easy fatigability and some indigestion type discomfort.  Stress test was recommended which showed evidence of anterior infarct with moderate jamil-infarct ischemia and an EF of 55%.  Based on those findings we proceeded with a cardiac catheterization on 9/3/2019 which showed mild nonobstructive coronary artery disease and a patent mid LAD stent and a normal-appearing left ventricular systolic function with anterior hypokinesis and an EF of 50 to 55%.    She was last seen in the office by KALI Pantoja in 6/2020 for preoperative evaluation before thyroid surgery.  At that time she was not having any new complaints and she was cleared to proceed with surgery.    Review of Systems   Constitution: Negative for malaise/fatigue.   HENT: Negative for hearing loss, hoarse voice, nosebleeds and sore throat.    Eyes: Negative for pain.   Cardiovascular: Negative for chest pain, claudication, cyanosis, dyspnea on exertion, irregular heartbeat, leg swelling, near-syncope, orthopnea, palpitations, paroxysmal nocturnal dyspnea and syncope.   Respiratory: Negative for shortness of breath and snoring.    Endocrine: Negative for cold intolerance, heat intolerance, polydipsia, polyphagia and polyuria.   Skin: Negative for itching and rash.    Musculoskeletal: Positive for joint pain and joint swelling. Negative for arthritis, falls, muscle cramps, muscle weakness and myalgias.   Gastrointestinal: Negative for constipation, diarrhea, dysphagia, heartburn, hematemesis, hematochezia, melena, nausea and vomiting.   Genitourinary: Negative for frequency, hematuria and hesitancy.   Neurological: Negative for excessive daytime sleepiness, dizziness, headaches, light-headedness, numbness and weakness.   Psychiatric/Behavioral: Negative for depression. The patient is not nervous/anxious.          Current Outpatient Medications:   •  ACCU-CHEK GUIDE test strip, , Disp: , Rfl:   •  Actemra ACTPen 162 MG/0.9ML solution auto-injector, , Disp: , Rfl:   •  ALPRAZolam (XANAX) 0.5 MG tablet, Take 1 tablet by mouth Daily As Needed for Anxiety., Disp: 30 tablet, Rfl: 2  •  aspirin 81 MG chewable tablet, Chew 81 mg Daily., Disp: , Rfl:   •  atorvastatin (LIPITOR) 10 MG tablet, Take 0.5 tablets by mouth Daily., Disp: 45 tablet, Rfl: 1  •  baclofen (LIORESAL) 10 MG tablet, Take 1 tablet by mouth Every Night., Disp: 90 tablet, Rfl: 1  •  dapagliflozin-metformin HCl ER (XIGDUO XR) 5-1000 MG tablet, Take 2 tablets by mouth Daily. (Patient taking differently: Take 2 tablets by mouth Daily. PATIENT TO CONSULT WITH DR. NATALEE DELACRUZ, ENDOCRINOLOGIST REGARDING MEDICATIONS PRIOR TO SURGERY), Disp: 60 tablet, Rfl: 5  •  FLUoxetine (PROzac) 20 MG capsule, Take 3 capsules by mouth Every Night., Disp: 270 capsule, Rfl: 1  •  folic acid (FOLVITE) 1 MG tablet, Take 1,000 mcg by mouth Daily., Disp: , Rfl:   •  levothyroxine (Synthroid) 137 MCG tablet, Take 1 tablet by mouth., Disp: , Rfl:   •  losartan (COZAAR) 50 MG tablet, Take 1 tablet by mouth Daily., Disp: 90 tablet, Rfl: 1  •  metoprolol succinate XL (TOPROL-XL) 100 MG 24 hr tablet, Take 1 tablet by mouth Daily., Disp: 90 tablet, Rfl: 1  •  ONETOUCH DELICA LANCETS 33G misc, Test 3 times daily (Patient taking differently: 1 each.  Test 3 times daily), Disp: 100 each, Rfl: 11  •  sulfaSALAzine (AZULFIDINE) 500 MG tablet, Take 1,500 mg by mouth 2 (Two) Times a Day. TAKE 3 TABLETS BY MOUTH TWICE DAILY, Disp: , Rfl: 1  •  triamterene-hydrochlorothiazide (MAXZIDE-25) 37.5-25 MG per tablet, Take 1 tablet by mouth Daily., Disp: 90 tablet, Rfl: 1  •  TRULICITY 1.5 MG/0.5ML solution pen-injector, Inject 1.5 mg under the skin into the appropriate area as directed 1 (One) Time Per Week. (Patient taking differently: Inject 1.5 mg under the skin into the appropriate area as directed 1 (One) Time Per Week. SATURDAY), Disp: 12 pen, Rfl: 1  •  vitamin D (ERGOCALCIFEROL) 1.25 MG (72550 UT) capsule capsule, Take 50,000 Units by mouth Daily (Monday-Friday)., Disp: , Rfl:   •  golimumab (Simponi Aria) 50 MG/4ML solution injection, Administer SIMPONI ARIA 2mg/kg IV Every 0, 4, 8 weeks, Disp: , Rfl:   •  promethazine (PHENERGAN) 25 MG tablet, Take 1 tablet by mouth Every 6 (Six) Hours As Needed for Nausea or Vomiting., Disp: 20 tablet, Rfl: 0  •  Upadacitinib ER (RINVOQ) 15 MG tablet sustained-release 24 hour, Take 15 mg by mouth Daily., Disp: , Rfl:   No current facility-administered medications for this visit.     Facility-Administered Medications Ordered in Other Visits:   •  mupirocin (BACTROBAN) 2 % nasal ointment, , Nasal, BID, Nabor Correia MD    Past Medical History:   Diagnosis Date   • Allergic    • Allergy to mold    • Anxiety    • Arthritis    • Benign essential hypertension    • CAD (coronary artery disease)    • Coronary artery disease    • Depression    • Diabetes mellitus (CMS/HCC)    • Fibromyalgia, primary    • H/O bone density study unclear   • H/O complete eye exam 2 -3 years   • Headache    • History of myocardial infarction 2014   • History of staph infection     HISTORY OF MSSA IN LEFT FOOT FROM DIABETIC ULCER HEALED   • HL (hearing loss)     RIGHT EAR  SEVERE HEARING LOSS   • HLD (hyperlipidemia)    • Hyperlipidemia    • Hypertension    •  Hypothyroidism    • Hypothyroidism    • Injury of back    • Left knee pain    • Major depressive disorder, recurrent, in full remission (CMS/HCC)    • Myocardial infarction (CMS/HCC) 06/27/2014   • OA (osteoarthritis) of knee    • Obesity    • BRANNON (obstructive sleep apnea)     USE CPAP   • Primary generalized (osteo)arthritis    • Primary osteoarthritis of left knee    • Primary osteoarthritis of right knee    • Rheumatoid arthritis (CMS/[facility])    • Rheumatoid arthritis (CMS/[facility])    • Rheumatoid arthritis of multiple sites without organ or system involvement with positive rheumatoid factor (CMS/[facility])    • Seasonal allergies    • Tobacco abuse 06/27/2014    QUIT    • Type 2 diabetes mellitus (CMS/[facility])        Past Surgical History:   Procedure Laterality Date   • BACK SURGERY  10/1995    also 2/02; L5-S1; Dr. Carroll Avitia   • CARDIAC CATHETERIZATION     • CARDIAC CATHETERIZATION N/A 9/3/2019    Procedure: Coronary angiography  ;  Surgeon: Magy Rivera MD;  Location:  [facility];  Service: Cardiovascular   • CARDIAC CATHETERIZATION N/A 9/3/2019    Procedure: Left Heart Cath;  Surgeon: Magy Rivera MD;  Location:  [facility];  Service: Cardiovascular   • CARDIAC CATHETERIZATION N/A 9/3/2019    Procedure: Left ventriculography;  Surgeon: Magy Rivera MD;  Location:  [facility];  Service: Cardiovascular   • COLONOSCOPY     • CORONARY STENT PLACEMENT  06/2014   • JOINT REPLACEMENT     • KNEE ARTHROSCOPY Right 2010   • MAMMO BILATERAL  due    delcines   • PAP SMEAR  due   • THYROIDECTOMY Right 7/9/2020    Procedure: RIGHT THYROID LOBECTOMY AND RIGHT PARATHYROID EXPLORATION;  Surgeon: Kimo Carter MD;  Location: [facility];  Service: ENT;  Laterality: Right;   • TONSILLECTOMY      age 26   • TOTAL KNEE ARTHROPLASTY Left 10/14/2019    Procedure: TOTAL KNEE ARTHROPLASTY;  Surgeon: Nabor Correia MD;  Location: [facility] MAIN OR;  Service: Orthopedics   •  "TOTAL KNEE ARTHROPLASTY Right 2020    Procedure: TOTAL KNEE ARTHROPLASTY;  Surgeon: Nabor Correia MD;  Location: [facility] OR;  Service: Orthopedics;  Laterality: Right;   • US GUIDED FINE NEEDLE ASPIRATION  2019       Family History   Problem Relation Age of Onset   • Cancer Mother         breast   • Hypertension Mother    • Rheum arthritis Mother    • Thyroid disease Mother    • Arthritis Mother    • Hypertension Father    • Thyroid disease Father    • COPD Father    • Depression Father    • Hearing loss Father    • Heart failure Maternal Grandmother    • Rheum arthritis Other         aunt   • Depression Daughter    • Drug abuse Daughter    • Malig Hyperthermia Neg Hx        Social History     Tobacco Use   • Smoking status: Former Smoker     Packs/day: 1.00     Years: 30.00     Pack years: 30.00     Types: Cigarettes     Quit date: 2014     Years since quittin.4   • Smokeless tobacco: Never Used   • Tobacco comment: quit 2014   Substance Use Topics   • Alcohol use: No   • Drug use: No         ECG 12 Lead    Date/Time: 2020 12:25 PM  Performed by: Magy Rivera MD  Authorized by: Magy Rivera MD   Comparison: compared with previous ECG   Similar to previous ECG  Rhythm: sinus rhythm  Q waves: V1, V2 and V3                   Objective:     Visit Vitals  /76 (BP Location: Left arm, Patient Position: Sitting, Cuff Size: Adult)   Pulse 78   Ht 182.9 cm (72\")   Wt (!) 140 kg (309 lb 3.2 oz)   BMI 41.94 kg/m²         Constitutional:       Appearance: Normal appearance. Well-developed.   Eyes:      General: Lids are normal.      Conjunctiva/sclera: Conjunctivae normal.      Pupils: Pupils are equal, round, and reactive to light.   HENT:      Head: Normocephalic and atraumatic.   Neck:      Musculoskeletal: Full passive range of motion without pain, normal range of motion and neck supple.      Vascular: No carotid bruit or JVD.      Lymphadenopathy: No cervical adenopathy. "   Pulmonary:      Effort: Pulmonary effort is normal.      Breath sounds: Normal breath sounds.   Cardiovascular:      Normal rate. Regular rhythm.      No gallop.   Pulses:     Radial: 2+ bilaterally.  Edema:     Peripheral edema absent.   Abdominal:      Palpations: Abdomen is soft.   Skin:     General: Skin is warm and dry.   Neurological:      Mental Status: Alert and oriented to person, place, and time.             Assessment:          Diagnosis Plan   1. Coronary artery disease involving native coronary artery of native heart without angina pectoris     2. History of coronary artery stent placement     3. AF (paroxysmal atrial fibrillation) (CMS/[facility])     4. Benign essential hypertension     5. Mixed hyperlipidemia     6. Obstructive apnea     7. Morbid obesity (CMS/[facility])     8. Type 2 diabetes mellitus with other circulatory complication, without long-term current use of insulin (CMS/[facility])     9. Hypothyroidism due to Hashimoto's thyroiditis     10. Rheumatoid arthritis of multiple sites without organ or system involvement with positive rheumatoid factor (CMS/[facility])     11. Primary osteoarthritis of right knee            Plan:       1.  Coronary artery disease.  She appears to be stable.  She has some brief episodes of left-sided chest pain which are chronic and unchanged.  I asked her to notify me if there is any change in the frequency or duration of the symptoms.  Otherwise we will continue current medical management.  2.  Hypertension.  Fairly well-controlled on her current regimen of medications.  3.  Hyperlipidemia.  Lipids including her LDL is at goal with a low-dose of atorvastatin.  4.  Obstructive sleep apnea.  On CPAP.  5.  Diabetes mellitus type 2  6.  Hypothyroidism  7.  Rheumatoid arthritis  8.  Morbid obesity.    We will plan on seeing the patient back again in 1 year or sooner if further issues arise.

## 2021-02-01 ENCOUNTER — TELEPHONE (OUTPATIENT)
Dept: FAMILY MEDICINE CLINIC | Facility: CLINIC | Age: 61
End: 2021-02-01

## 2021-02-01 DIAGNOSIS — M06.9 RHEUMATOID ARTHRITIS, INVOLVING UNSPECIFIED SITE, UNSPECIFIED WHETHER RHEUMATOID FACTOR PRESENT (HCC): Primary | ICD-10-CM

## 2021-02-10 ENCOUNTER — TRANSCRIBE ORDERS (OUTPATIENT)
Dept: ADMINISTRATIVE | Facility: HOSPITAL | Age: 61
End: 2021-02-10

## 2021-02-10 DIAGNOSIS — E04.1 THYROID NODULE: Primary | ICD-10-CM

## 2021-02-21 NOTE — PROGRESS NOTES
Subjective   Pratibha Pascal is a 60 y.o. female.     CC: Video Visit for Medical Management    History of Present Illness     Chief Complaint:   Chief Complaint   Patient presents with   • Anxiety     med refill - video visit  - [facility]    • Rheumatoid Arthritis   • Hypertension   • Depression       Pratibha Pascal 60 y.o. female who presents today for Medical Management of the below listed issues and medication refills.  she has a problem list of   Patient Active Problem List   Diagnosis   • CAD (coronary artery disease)   • Mixed hyperlipidemia   • Benign essential hypertension   • Hypothyroidism   • Major depressive disorder, recurrent, in full remission (CMS/HCC)   • Rheumatoid arthritis (CMS/HCC)   • Cervicalgia   • Diabetes mellitus (CMS/HCC)   • Dyslipidemia   • BP (high blood pressure)   • Obstructive apnea   • AF (paroxysmal atrial fibrillation) (CMS/[facility])   • Proteinuria   • Burn   • Cellulitis of left leg   • Diabetes mellitus with neurological manifestation (CMS/[facility])   • MSSA (methicillin susceptible Staphylococcus aureus) infection   • Diabetic ulcer of left foot associated with diabetes mellitus due to underlying condition (CMS/[facility])   • Morbid obesity (CMS/[facility])   • Primary insomnia   • Anxiety   • Immunodeficiency, unspecified (CMS/HCC)   • Long term current use of non-steroidal anti-inflammatories (NSAID)   • Personal history of immunosupression therapy   • Primary generalized (osteo)arthritis   • Raised antibody titer   • Rheumatoid arthritis of multiple sites without organ or system involvement with positive rheumatoid factor (CMS/HCC)   • Other long term (current) drug therapy   • Uncontrolled type 2 diabetes mellitus with hyperglycemia (CMS/HCC)   • Vitamin D deficiency, unspecified   • Hypercalcemia   • Primary osteoarthritis of left knee   • OA (osteoarthritis) of knee   • Primary osteoarthritis of right knee   • Chronic fatigue   • Chronic pain of left knee   • History of coronary artery  stent placement   • Multiple thyroid nodules   .  Since the last visit, she has overall felt well, although her rheumatologist is no longer taking her insurance and is out of some of her meds and no seeing her new on until July.   she has been compliant with   Current Outpatient Medications:   •  levothyroxine (SYNTHROID, LEVOTHROID) 175 MCG tablet, Take 175 mcg by mouth Daily., Disp: , Rfl:   •  ACCU-CHEK GUIDE test strip, , Disp: , Rfl:   •  Actemra ACTPen 162 MG/0.9ML solution auto-injector, , Disp: , Rfl:   •  ALPRAZolam (XANAX) 0.5 MG tablet, Take 1 tablet by mouth Daily As Needed for Anxiety., Disp: 30 tablet, Rfl: 2  •  aspirin 81 MG chewable tablet, Chew 81 mg Daily., Disp: , Rfl:   •  atorvastatin (LIPITOR) 10 MG tablet, Take 0.5 tablets by mouth Daily., Disp: 45 tablet, Rfl: 1  •  baclofen (LIORESAL) 10 MG tablet, Take 1 tablet by mouth Every Night., Disp: 90 tablet, Rfl: 1  •  dapagliflozin-metformin HCl ER (XIGDUO XR) 5-1000 MG tablet, Take 2 tablets by mouth Daily. (Patient taking differently: Take 2 tablets by mouth Daily. PATIENT TO CONSULT WITH DR. NATALEE DELACRUZ, ENDOCRINOLOGIST REGARDING MEDICATIONS PRIOR TO SURGERY), Disp: 60 tablet, Rfl: 5  •  FLUoxetine (PROzac) 20 MG capsule, Take 3 capsules by mouth Every Night., Disp: 270 capsule, Rfl: 1  •  folic acid (FOLVITE) 1 MG tablet, Take 1 tablet by mouth Daily., Disp: 90 tablet, Rfl: 1  •  golimumab (Simponi Aria) 50 MG/4ML solution injection, Administer SIMPONI ARIA 2mg/kg IV Every 0, 4, 8 weeks, Disp: , Rfl:   •  levothyroxine (Synthroid) 137 MCG tablet, Take 1 tablet by mouth., Disp: , Rfl:   •  losartan (COZAAR) 50 MG tablet, Take 1 tablet by mouth Daily., Disp: 90 tablet, Rfl: 1  •  metoprolol succinate XL (TOPROL-XL) 100 MG 24 hr tablet, Take 1 tablet by mouth Daily., Disp: 90 tablet, Rfl: 1  •  ONETOUCH DELICA LANCETS 33G misc, Test 3 times daily (Patient taking differently: 1 each. Test 3 times daily), Disp: 100 each, Rfl: 11  •   "promethazine (PHENERGAN) 25 MG tablet, Take 1 tablet by mouth Every 6 (Six) Hours As Needed for Nausea or Vomiting., Disp: 20 tablet, Rfl: 0  •  sulfaSALAzine (AZULFIDINE) 500 MG tablet, Take 3 tablets by mouth 2 (Two) Times a Day., Disp: 540 tablet, Rfl: 1  •  triamterene-hydrochlorothiazide (MAXZIDE-25) 37.5-25 MG per tablet, Take 1 tablet by mouth Daily., Disp: 90 tablet, Rfl: 1  •  TRULICITY 1.5 MG/0.5ML solution pen-injector, Inject 1.5 mg under the skin into the appropriate area as directed 1 (One) Time Per Week. (Patient taking differently: Inject 1.5 mg under the skin into the appropriate area as directed 1 (One) Time Per Week. SATURDAY), Disp: 12 pen, Rfl: 1  •  Upadacitinib ER (RINVOQ) 15 MG tablet sustained-release 24 hour, Take 15 mg by mouth Daily., Disp: , Rfl:   •  vitamin D (ERGOCALCIFEROL) 1.25 MG (70109 UT) capsule capsule, Take 50,000 Units by mouth Daily (Monday-Friday)., Disp: , Rfl:   No current facility-administered medications for this visit.     Facility-Administered Medications Ordered in Other Visits:   •  mupirocin (BACTROBAN) 2 % nasal ointment, , Nasal, BID, Nabor Correia MD.  she denies medication side effects.    All of the other chronic condition(s) listed above are stable w/o issues.    /77 Comment: video  Pulse 84   Ht 182.9 cm (72\")   Wt (!) 141 kg (310 lb)   BMI 42.04 kg/m²     Results for orders placed or performed during the hospital encounter of 07/09/20   PTH Intraoperative    Specimen: Blood   Result Value Ref Range    PTH, Intraoperative 69.9 (H) 15.0 - 65.0 pg/mL   Calcium, Ionized    Specimen: Blood   Result Value Ref Range    Ionized Calcium 1.33 1.15 - 1.35 mmol/L    Ionized Calcium 5.3 4.6 - 5.4 mg/dL   PTH Intraoperative    Specimen: Blood, Venous Line   Result Value Ref Range    PTH, Intraoperative 56.9 15.0 - 65.0 pg/mL   PTH Intraoperative    Specimen: Blood, Venous Line   Result Value Ref Range    PTH, Intraoperative 56.4 15.0 - 65.0 pg/mL   POC Glucose " Once    Specimen: Blood   Result Value Ref Range    Glucose 150 (H) 70 - 130 mg/dL   POC Glucose Once    Specimen: Blood   Result Value Ref Range    Glucose 188 (H) 70 - 130 mg/dL   Tissue Pathology Exam    Specimen: A: Thyroid; Tissue    B: Trachea; Tissue    C: Parathyroid Gland; Tissue    D: Lymph Node; Tissue   Result Value Ref Range    Case Report       Surgical Pathology Report                         Case: [id number]                                  Authorizing Provider:  Kimo Carter MD     Collected:           07/09/2020 08:39 AM          [facility]  Received:            07/09/2020 08:43 AM                                 [facility] OR                                                                       Pathologist:           Marlo Vazquez MD                                                         Specimens:   1) - Thyroid, RIGHT THYROID LOBE AND ISTHMUS ROOM#4271                                              2) - Parathyroid Gland, RIGHT SUPERIOR PARATHYROID ROOM#4271                                        3) - Trachea, RIGHT PARATRACHEAL LIPOMA                                                             4) - Lymph Node, RIGHT LEVEL 6 LYMPH NODE                                                  Final Diagnosis       1. Thyroid, Right Lobe and Isthmus, Lobectomy:    A.  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    B.  Please see synoptic below.     2.  Right Superior Parathyroid:     A.  Parathyroid tissue, hypercellular.    3.  Right Paratracheal Lipoma:     A.  Benign lymph node.    4. Lymph Node, Right Level 6:    A.  Multiple fragments of benign lymph node.      destiny/brb       Preliminary Diagnosis       1.  Thyroid, Right Lobe and Isthmus, Lobectomy: Thyroid with a single atypical 1.5 mm nodule coming within 2.25 mm of a margin with    A.  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  Hashimoto's parathyroiditis.    2.  Right Superior Parathyroid:     A.  Hypercellular parathyroid tissue.    3.  Right Paratracheal Lipoma:   A.  Benign lymph node.     4.  Lymph Node, Right Level 6:    A.  Multiple fragments of benign lymph node.     Comment:  The case will be sent to the [facility] for their opinion on the one atypical nodule.    destiny/brb           Synoptic Checklist       THYROID GLAND  (Thyroid - All Specimens)      8th Edition - Protocol posted: 8/28/2019         :             Clinical History:    No known radiation exposure       SPECIMEN      Procedure:    Right lobectomy and isthectomy.       TUMOR      Tumor Focality:    Unifocal       Tumor Characteristics:            Tumor Site:    Right lobe         Histologic Type:    Papillary carcinoma, follicular variant, infiltrative         Tumor Size (Centimeters):    Greatest Dimension (Centimeters): 0.16 cm        Extrathyroidal Extension:    Not identified         Angioinvasion (vascular invasion):    Not identified         Lymphatic Invasion:    Not identified         Perineural Invasion:    Not identified         Margins:    Uninvolved by carcinoma           Distance of Invasive Carcinoma from Closest Margin (Millimeters):    2.25 mm        Mitotic Rate:    0 Mitoses per 2 mm^2      LYMPH NODES      Number of Lymph Nodes Involved:    0       Number of Lymph Nodes Examined:    2         Asif Levels Examined:    Level VI       PATHOLOGIC STAGE CLASSIFICATION (pTNM, AJCC 8th Edition)    :          Primary Tumor (pT):    pT1a     Regional Lymph Nodes (pN):    pN0a       ADDITIONAL FINDINGS    Additional Findings:    Adenomatoid nodule(s) or nodular follicular disease       Intraoperative Consultation       #1 FS DX:  Two touch preps are performed and 1 block frozen.  Hurthle cell nodule, favor benign.  Reported to Dr. Carter at 8:59 a.m. per Dr. Arana.      #2  FS DX:  Hypercellular  "parathyroid gland weighing 0.323 grams. Reported to Dr. Carter at 9:06 a.m. per Dr. Arana.   Tao/uso/swjamison/kds      Gross Description       1. Received fresh for frozen section diagnosis labeled \"right thyroid lobe and isthmus\" is a 14 gram, 5.5 x 3.0 x 2.0 cm right thyroid lobectomy with 1.8 x 1.0 x 0.6 cm isthmus.  The capsule is smooth to shaggy tan-red. The isthmus margin is inked yellow, the posterior capsule is inked black and the anterior capsule is inked blue.  The lobe is serially sectioned from superior to inferior to reveal a 2.0 x 1.5 x 1.5 cm centrally hemorrhagic and cystic tan-pink nodule in the mid pole region which comes to within 3 cm of the isthmus margin and bulges against but does not grossly penetrate the thyroid capsule.  The remaining parenchyma displays multiple solid tan-pink nodularity in the inferior pole which range from 0.5 cm to 0.6 cm in greatest dimension.  The surrounding parenchyma is beefy red. Touch preps of the largest nodule are submitted and a representative section of the largest nodule is submitted for frozen section diagnosis.  The specimen is entirely submitted as follows:      1AFS - frozen section residue - largest mid pole nodule  1B - isthmus margin, en face  1C - superior pole apex perpendicular   1D - superior pole  1E-1G - remainder of the largest mid pole nodule  1H-1I  - inferior pole to include smaller nodules  1J - inferior pole base, perpendicular     2. Received fresh for frozen section diagnosis labeled \"right superior parathyroid\" is a 0.323 gram, 1.8 x 1.1 x 0.3 cm tan-pink and fatty soft tissue which is submitted en toto for frozen section diagnosis as 2AFS.     3. Received in formalin labeled \"paratracheal lipoma\" is an unoriented multilobulated 1.8 x 0.9 x 0.6 cm yellow fatty nodule which is inked and serially sectioned to reveal yellow fatty cut surfaces.  The specimen is entirely submitted as 3A.     Tao/o/swm/kds    4. Received in formalin labeled " "\"right level 6 lymph node\" is a 1.0 x 0.6 x 0.4 cm tan-red possible lymph node which is bisected and entirely submitted as 4A.       Jap/uso/swm/kds    Jap/uso/swm/kds             The following portions of the patient's history were reviewed and updated as appropriate: allergies, current medications, past family history, past medical history, past social history, past surgical history and problem list.    Review of Systems   Constitutional: Negative for activity change, chills and fever.   Respiratory: Negative for cough.    Cardiovascular: Negative for chest pain.   Psychiatric/Behavioral: Negative for dysphoric mood.       Objective   Physical Exam  Constitutional:       General: She is not in acute distress.     Appearance: She is well-developed.   Pulmonary:      Effort: Pulmonary effort is normal.   Neurological:      Mental Status: She is alert and oriented to person, place, and time.   Psychiatric:         Behavior: Behavior normal.         Thought Content: Thought content normal.     Pt sees Endocrine at [place] and is due appt and labs. Pt reports she has one in March.    Assessment/Plan   Diagnoses and all orders for this visit:    1. Anxiety (Primary)  -     ALPRAZolam (XANAX) 0.5 MG tablet; Take 1 tablet by mouth Daily As Needed for Anxiety.  Dispense: 30 tablet; Refill: 2    2. Rheumatoid arthritis of multiple sites without organ or system involvement with positive rheumatoid factor (CMS/[facility])  -     sulfaSALAzine (AZULFIDINE) 500 MG tablet; Take 3 tablets by mouth 2 (Two) Times a Day.  Dispense: 540 tablet; Refill: 1  -     folic acid (FOLVITE) 1 MG tablet; Take 1 tablet by mouth Daily.  Dispense: 90 tablet; Refill: 1    3. Benign essential hypertension  -     triamterene-hydrochlorothiazide (MAXZIDE-25) 37.5-25 MG per tablet; Take 1 tablet by mouth Daily.  Dispense: 90 tablet; Refill: 1  -     metoprolol succinate XL (TOPROL-XL) 100 MG 24 hr tablet; Take 1 tablet by mouth Daily.  Dispense: 90 tablet; " Refill: 1  -     losartan (COZAAR) 50 MG tablet; Take 1 tablet by mouth Daily.  Dispense: 90 tablet; Refill: 1    4. Major depressive disorder, recurrent, in full remission (CMS/HCC)  -     FLUoxetine (PROzac) 20 MG capsule; Take 3 capsules by mouth Every Night.  Dispense: 270 capsule; Refill: 1    5. Cervicalgia  -     baclofen (LIORESAL) 10 MG tablet; Take 1 tablet by mouth Every Night.  Dispense: 90 tablet; Refill: 1    Spent  15   minutes with chart and interview and consent for this encounter given by the patient.  You have chosen to receive care through a telehealth visit.  Do you consent to use a video/audio connection for your medical care today? Yes

## 2021-02-22 ENCOUNTER — TELEMEDICINE (OUTPATIENT)
Dept: FAMILY MEDICINE CLINIC | Facility: CLINIC | Age: 61
End: 2021-02-22

## 2021-02-22 VITALS
WEIGHT: 293 LBS | HEIGHT: 72 IN | SYSTOLIC BLOOD PRESSURE: 127 MMHG | DIASTOLIC BLOOD PRESSURE: 77 MMHG | HEART RATE: 84 BPM | BODY MASS INDEX: 39.68 KG/M2

## 2021-02-22 DIAGNOSIS — I10 BENIGN ESSENTIAL HYPERTENSION: ICD-10-CM

## 2021-02-22 DIAGNOSIS — M54.2 CERVICALGIA: Chronic | ICD-10-CM

## 2021-02-22 DIAGNOSIS — F41.9 ANXIETY: ICD-10-CM

## 2021-02-22 DIAGNOSIS — M54.2 CERVICALGIA: ICD-10-CM

## 2021-02-22 DIAGNOSIS — F33.42 MAJOR DEPRESSIVE DISORDER, RECURRENT, IN FULL REMISSION (HCC): Chronic | ICD-10-CM

## 2021-02-22 DIAGNOSIS — I10 BENIGN ESSENTIAL HYPERTENSION: Chronic | ICD-10-CM

## 2021-02-22 DIAGNOSIS — M05.79 RHEUMATOID ARTHRITIS OF MULTIPLE SITES WITHOUT ORGAN OR SYSTEM INVOLVEMENT WITH POSITIVE RHEUMATOID FACTOR (HCC): Chronic | ICD-10-CM

## 2021-02-22 DIAGNOSIS — F41.9 ANXIETY: Primary | Chronic | ICD-10-CM

## 2021-02-22 PROCEDURE — 99214 OFFICE O/P EST MOD 30 MIN: CPT | Performed by: FAMILY MEDICINE

## 2021-02-22 RX ORDER — SULFASALAZINE 500 MG/1
1500 TABLET ORAL 2 TIMES DAILY
Qty: 540 TABLET | Refills: 1 | Status: SHIPPED | OUTPATIENT
Start: 2021-02-22 | End: 2021-07-06 | Stop reason: SDUPTHER

## 2021-02-22 RX ORDER — LOSARTAN POTASSIUM 50 MG/1
50 TABLET ORAL DAILY
Qty: 90 TABLET | Refills: 1 | Status: SHIPPED | OUTPATIENT
Start: 2021-02-22 | End: 2021-07-06 | Stop reason: SDUPTHER

## 2021-02-22 RX ORDER — ALPRAZOLAM 0.5 MG/1
0.5 TABLET ORAL DAILY PRN
Qty: 30 TABLET | Refills: 2 | Status: SHIPPED | OUTPATIENT
Start: 2021-02-22 | End: 2021-04-25 | Stop reason: SDUPTHER

## 2021-02-22 RX ORDER — BACLOFEN 10 MG/1
10 TABLET ORAL NIGHTLY
Qty: 90 TABLET | Refills: 1 | Status: SHIPPED | OUTPATIENT
Start: 2021-02-22 | End: 2021-07-06 | Stop reason: SDUPTHER

## 2021-02-22 RX ORDER — BACLOFEN 10 MG/1
10 TABLET ORAL NIGHTLY
Qty: 90 TABLET | Refills: 1 | OUTPATIENT
Start: 2021-02-22

## 2021-02-22 RX ORDER — FOLIC ACID 1 MG/1
1000 TABLET ORAL DAILY
Qty: 90 TABLET | Refills: 1 | Status: SHIPPED | OUTPATIENT
Start: 2021-02-22 | End: 2021-07-06 | Stop reason: SDUPTHER

## 2021-02-22 RX ORDER — METOPROLOL SUCCINATE 100 MG/1
100 TABLET, EXTENDED RELEASE ORAL DAILY
Qty: 90 TABLET | Refills: 1 | Status: SHIPPED | OUTPATIENT
Start: 2021-02-22 | End: 2021-07-06 | Stop reason: SDUPTHER

## 2021-02-22 RX ORDER — FLUOXETINE HYDROCHLORIDE 20 MG/1
60 CAPSULE ORAL NIGHTLY
Qty: 270 CAPSULE | Refills: 1 | Status: SHIPPED | OUTPATIENT
Start: 2021-02-22 | End: 2021-07-06 | Stop reason: SDUPTHER

## 2021-02-22 RX ORDER — ALPRAZOLAM 0.5 MG/1
0.5 TABLET ORAL DAILY PRN
Qty: 30 TABLET | Refills: 2 | OUTPATIENT
Start: 2021-02-22

## 2021-02-22 RX ORDER — TRIAMTERENE AND HYDROCHLOROTHIAZIDE 37.5; 25 MG/1; MG/1
1 TABLET ORAL DAILY
Qty: 90 TABLET | Refills: 1 | Status: SHIPPED | OUTPATIENT
Start: 2021-02-22 | End: 2021-07-06 | Stop reason: SDUPTHER

## 2021-02-22 RX ORDER — LEVOTHYROXINE SODIUM 175 UG/1
175 TABLET ORAL DAILY
COMMUNITY
Start: 2020-12-24 | End: 2021-12-24

## 2021-02-22 RX ORDER — LOSARTAN POTASSIUM 50 MG/1
50 TABLET ORAL DAILY
Qty: 90 TABLET | Refills: 1 | OUTPATIENT
Start: 2021-02-22

## 2021-03-08 ENCOUNTER — HOSPITAL ENCOUNTER (OUTPATIENT)
Dept: ULTRASOUND IMAGING | Facility: HOSPITAL | Age: 61
Discharge: HOME | End: 2021-03-08
Admitting: OTOLARYNGOLOGY

## 2021-03-08 DIAGNOSIS — E04.1 THYROID NODULE: ICD-10-CM

## 2021-03-08 PROCEDURE — 76536 US EXAM OF HEAD AND NECK: CPT

## 2021-03-15 ENCOUNTER — TELEPHONE (OUTPATIENT)
Dept: FAMILY MEDICINE CLINIC | Facility: CLINIC | Age: 61
End: 2021-03-15

## 2021-03-15 DIAGNOSIS — E03.8 HYPOTHYROIDISM DUE TO HASHIMOTO'S THYROIDITIS: Primary | ICD-10-CM

## 2021-03-15 DIAGNOSIS — E06.3 HYPOTHYROIDISM DUE TO HASHIMOTO'S THYROIDITIS: Primary | ICD-10-CM

## 2021-03-15 DIAGNOSIS — E04.2 MULTIPLE THYROID NODULES: ICD-10-CM

## 2021-03-15 NOTE — TELEPHONE ENCOUNTER
Patient now has new insurance, pt requesting a referral to [facility]  . She is a current patient there.

## 2021-03-16 ENCOUNTER — BULK ORDERING (OUTPATIENT)
Dept: CASE MANAGEMENT | Facility: OTHER | Age: 61
End: 2021-03-16

## 2021-03-16 DIAGNOSIS — Z23 IMMUNIZATION DUE: ICD-10-CM

## 2021-03-16 NOTE — TELEPHONE ENCOUNTER
3/15/21 1:19 PM  Please send a referral to [facility] fax #[phone] for me to be seen for an appointment  March 17, 2021 @11:00 a.m. I'm a current patient but I have changed insurance and they need a referral to see me.

## 2021-04-19 ENCOUNTER — TRANSCRIBE ORDERS (OUTPATIENT)
Dept: PREADMISSION TESTING | Facility: HOSPITAL | Age: 61
End: 2021-04-19

## 2021-04-19 DIAGNOSIS — Z01.818 OTHER SPECIFIED PRE-OPERATIVE EXAMINATION: Primary | ICD-10-CM

## 2021-04-22 ENCOUNTER — PRE-ADMISSION TESTING (OUTPATIENT)
Dept: PREADMISSION TESTING | Facility: HOSPITAL | Age: 61
End: 2021-04-22

## 2021-04-22 VITALS
HEIGHT: 72 IN | TEMPERATURE: 97.8 F | RESPIRATION RATE: 18 BRPM | OXYGEN SATURATION: 97 % | HEART RATE: 77 BPM | BODY MASS INDEX: 39.68 KG/M2 | DIASTOLIC BLOOD PRESSURE: 83 MMHG | SYSTOLIC BLOOD PRESSURE: 137 MMHG | WEIGHT: 293 LBS

## 2021-04-22 LAB
ANION GAP SERPL CALCULATED.3IONS-SCNC: 13.7 MMOL/L (ref 5–15)
BUN SERPL-MCNC: 15 MG/DL (ref 8–23)
BUN/CREAT SERPL: 20.8 (ref 7–25)
CALCIUM SPEC-SCNC: 10 MG/DL (ref 8.6–10.5)
CHLORIDE SERPL-SCNC: 96 MMOL/L (ref 98–107)
CO2 SERPL-SCNC: 25.3 MMOL/L (ref 22–29)
CREAT SERPL-MCNC: 0.72 MG/DL (ref 0.57–1)
DEPRECATED RDW RBC AUTO: 38.8 FL (ref 37–54)
ERYTHROCYTE [DISTWIDTH] IN BLOOD BY AUTOMATED COUNT: 12.8 % (ref 12.3–15.4)
GFR SERPL CREATININE-BSD FRML MDRD: 82 ML/MIN/1.73
GLUCOSE SERPL-MCNC: 153 MG/DL (ref 65–99)
HCT VFR BLD AUTO: 42.8 % (ref 34–46.6)
HGB BLD-MCNC: 14.4 G/DL (ref 12–15.9)
MCH RBC QN AUTO: 28.2 PG (ref 26.6–33)
MCHC RBC AUTO-ENTMCNC: 33.6 G/DL (ref 31.5–35.7)
MCV RBC AUTO: 83.9 FL (ref 79–97)
PLATELET # BLD AUTO: 362 10*3/MM3 (ref 140–450)
PMV BLD AUTO: 8.9 FL (ref 6–12)
POTASSIUM SERPL-SCNC: 3.1 MMOL/L (ref 3.5–5.2)
RBC # BLD AUTO: 5.1 10*6/MM3 (ref 3.77–5.28)
SODIUM SERPL-SCNC: 135 MMOL/L (ref 136–145)
WBC # BLD AUTO: 8.5 10*3/MM3 (ref 3.4–10.8)

## 2021-04-22 PROCEDURE — 85027 COMPLETE CBC AUTOMATED: CPT

## 2021-04-22 PROCEDURE — 80048 BASIC METABOLIC PNL TOTAL CA: CPT

## 2021-04-22 PROCEDURE — 36415 COLL VENOUS BLD VENIPUNCTURE: CPT

## 2021-04-22 NOTE — DISCHARGE INSTRUCTIONS
Take the following medications the morning of surgery: SYNTHROID AND METOPROLOL    ARRIVE AT 1030 AM 4/29/21    If you are on prescription narcotic pain medication to control your pain you may also take that medication the morning of surgery.    General Instructions:  • Do not eat solid food after midnight the night before surgery.  • You may drink clear liquids day of surgery but must stop at least one hour before your hospital arrival time.  • It is beneficial for you to have a clear drink that contains carbohydrates the day of surgery.  We suggest a 12 to 20 ounce bottle of Gatorade or Powerade for non-diabetic patients or a 12 to 20 ounce bottle of G2 or Powerade Zero for diabetic patients. (Pediatric patients, are not advised to drink a 12 to 20 ounce carbohydrate drink)    Clear liquids are liquids you can see through.  Nothing red in color.     Plain water                               Sports drinks  Sodas                                   Gelatin (Jell-O)  Fruit juices without pulp such as white grape juice and apple juice  Popsicles that contain no fruit or yogurt  Tea or coffee (no cream or milk added)  Gatorade / Powerade  G2 / Powerade Zero    • Infants may have breast milk up to four hours before surgery.  • Infants drinking formula may drink formula up to six hours before surgery.   • Patients who avoid smoking, chewing tobacco and alcohol for 4 weeks prior to surgery have a reduced risk of post-operative complications.  Quit smoking as many days before surgery as you can.  • Do not smoke, use chewing tobacco or drink alcohol the day of surgery.   • If applicable bring your C-PAP/ BI-PAP machine.  • Bring any papers given to you in the doctor’s office.  • Wear clean comfortable clothes.  • Do not wear contact lenses, false eyelashes or make-up.  Bring a case for your glasses.   • Bring crutches or walker if applicable.  • Remove all piercings.  Leave jewelry and any other valuables at home.  • Hair  extensions with metal clips must be removed prior to surgery.  • The Pre-Admission Testing nurse will instruct you to bring medications if unable to obtain an accurate list in Pre-Admission Testing.            Preventing a Surgical Site Infection:  • For 2 to 3 days before surgery, avoid shaving with a razor because the razor can irritate skin and make it easier to develop an infection.    • Any areas of open skin can increase the risk of a post-operative wound infection by allowing bacteria to enter and travel throughout the body.  Notify your surgeon if you have any skin wounds / rashes even if it is not near the expected surgical site.  The area will need assessed to determine if surgery should be delayed until it is healed.  • The night prior to surgery shower using a fresh bar of anti-bacterial soap (such as Dial) and clean washcloth.  Sleep in a clean bed with clean clothing.  Do not allow pets to sleep with you.  • Shower on the morning of surgery using a fresh bar of anti-bacterial soap (such as Dial) and clean washcloth.  Dry with a clean towel and dress in clean clothing.  • Ask your surgeon if you will be receiving antibiotics prior to surgery.  • Make sure you, your family, and all healthcare providers clean their hands with soap and water or an alcohol based hand  before caring for you or your wound.    Day of surgery:  Your arrival time is approximately two hours before your scheduled surgery time.  Upon arrival, a Pre-op nurse and Anesthesiologist will review your health history, obtain vital signs, and answer questions you may have.  The only belongings needed at this time will be a list of your home medications and if applicable your C-PAP/BI-PAP machine.  A Pre-op nurse will start an IV and you may receive medication in preparation for surgery, including something to help you relax.     Please be aware that surgery does come with discomfort.  We want to make every effort to control your  discomfort so please discuss any uncontrolled symptoms with your nurse.   Your doctor will most likely have prescribed pain medications.      If you are going home after surgery you will receive individualized written care instructions before being discharged.  A responsible adult must drive you to and from the hospital on the day of your surgery and stay with you for 24 hours.  Discharge prescriptions can be filled by the hospital pharmacy during regular pharmacy hours.  If you are having surgery late in the day/evening your prescription may be e-prescribed to your pharmacy.  Please verify your pharmacy hours or chose a 24 hour pharmacy to avoid not having access to your prescription because your pharmacy has closed for the day.    If you are staying overnight following surgery, you will be transported to your hospital room following the recovery period.  [facility] has all private rooms.    If you have any questions please call Pre-Admission Testing at [phone].  Deductibles and co-payments are collected on the day of service. Please be prepared to pay the required co-pay, deductible or deposit on the day of service as defined by your plan.    Patient Education for Self-Quarantine Process    Following your COVID testing, we strongly recommend that you do not leave your home after you have been tested for COVID except to get medical care. This includes not going to work, school or to public areas.  If this is not possible for you to do please limit your activities to only required outings.  Be sure to wear a mask when you are with other people, practice social distancing and wash your hands frequently.      The following items provide additional details to keep you safe.  • Wash your hands with soap and water frequently for at least 20 seconds.   • Avoid touching your eyes, nose and mouth with unwashed hands.  • Do not share anything - utensils, towels, food from the same bowl.   • Have your own  utensils, drinking glass, dishes, towels and bedding.   • Do not have visitors.   • Do use FaceTime to stay in touch with family and friends.  • You should stay in a specific room away from others if possible.   • Stay at least 6 feet away from others in the home if you cannot have a dedicated room to yourself.   • Do not snuggle with your pet. While the CDC says there is no evidence that pets can spread COVID-19 or be infected from humans, it is probably best to avoid “petting, snuggling, being kissed or licked and sharing food (during self-quarantine)”, according to the CDC.   • Sanitize household surfaces daily. Include all high touch areas (door handles, light switches, phones, countertops, etc.)  • Do not share a bathroom with others, if possible.   • Wear a mask around others in your home if you are unable to stay in a separate room or 6 feet apart. If  you are unable to wear a mask, have your family member wear a mask if they must be within 6 feet of you.   Call your surgeon immediately if you experience any of the following symptoms:  • Sore Throat  • Shortness of Breath or difficulty breathing  • Cough  • Chills  • Body soreness or muscle pain  • Headache  • Fever  • New loss of taste or smell  • Do not arrive for your surgery ill.  Your procedure will need to be rescheduled to another time.  You will need to call your physician before the day of surgery to avoid any unnecessary exposure to hospital staff as well as other patients.

## 2021-04-25 DIAGNOSIS — F41.9 ANXIETY: Chronic | ICD-10-CM

## 2021-04-26 RX ORDER — ALPRAZOLAM 0.5 MG/1
0.5 TABLET ORAL DAILY PRN
Qty: 30 TABLET | Refills: 0 | Status: SHIPPED | OUTPATIENT
Start: 2021-04-26 | End: 2021-07-06 | Stop reason: SDUPTHER

## 2021-04-27 ENCOUNTER — LAB (OUTPATIENT)
Dept: LAB | Facility: HOSPITAL | Age: 61
End: 2021-04-27

## 2021-04-27 DIAGNOSIS — Z01.818 OTHER SPECIFIED PRE-OPERATIVE EXAMINATION: ICD-10-CM

## 2021-04-27 PROCEDURE — C9803 HOPD COVID-19 SPEC COLLECT: HCPCS

## 2021-04-27 PROCEDURE — U0004 COV-19 TEST NON-CDC HGH THRU: HCPCS

## 2021-04-28 ENCOUNTER — ANESTHESIA EVENT (OUTPATIENT)
Dept: PERIOP | Facility: HOSPITAL | Age: 61
End: 2021-04-28

## 2021-04-28 LAB — SARS-COV-2 RNA RESP QL NAA+PROBE: NOT DETECTED

## 2021-04-29 ENCOUNTER — HOSPITAL ENCOUNTER (OUTPATIENT)
Facility: HOSPITAL | Age: 61
Discharge: HOME | End: 2021-04-30
Attending: OTOLARYNGOLOGY | Admitting: OTOLARYNGOLOGY

## 2021-04-29 ENCOUNTER — ANESTHESIA (OUTPATIENT)
Dept: PERIOP | Facility: HOSPITAL | Age: 61
End: 2021-04-29

## 2021-04-29 DIAGNOSIS — E04.1 THYROID NODULE: ICD-10-CM

## 2021-04-29 DIAGNOSIS — E04.2 MULTIPLE THYROID NODULES: Primary | ICD-10-CM

## 2021-04-29 LAB
CA-I BLD-MCNC: 4.9 MG/DL (ref 4.6–5.4)
CA-I SERPL ISE-MCNC: 1.23 MMOL/L (ref 1.15–1.35)
GLUCOSE BLDC GLUCOMTR-MCNC: 182 MG/DL (ref 70–130)

## 2021-04-29 PROCEDURE — 82962 GLUCOSE BLOOD TEST: CPT

## 2021-04-29 PROCEDURE — 25010000002 FENTANYL CITRATE (PF) 100 MCG/2ML SOLUTION: Performed by: NURSE ANESTHETIST, CERTIFIED REGISTERED

## 2021-04-29 PROCEDURE — 25010000002 HYDROMORPHONE PER 4 MG: Performed by: NURSE ANESTHETIST, CERTIFIED REGISTERED

## 2021-04-29 PROCEDURE — G0378 HOSPITAL OBSERVATION PER HR: HCPCS

## 2021-04-29 PROCEDURE — 25010000002 PHENYLEPHRINE PER 1 ML: Performed by: NURSE ANESTHETIST, CERTIFIED REGISTERED

## 2021-04-29 PROCEDURE — 25010000002 MIDAZOLAM PER 1 MG: Performed by: ANESTHESIOLOGY

## 2021-04-29 PROCEDURE — 82330 ASSAY OF CALCIUM: CPT | Performed by: OTOLARYNGOLOGY

## 2021-04-29 PROCEDURE — 25010000002 NEOSTIGMINE 5 MG/10ML SOLUTION: Performed by: NURSE ANESTHETIST, CERTIFIED REGISTERED

## 2021-04-29 PROCEDURE — 25010000002 DROPERIDOL PER 5 MG: Performed by: NURSE ANESTHETIST, CERTIFIED REGISTERED

## 2021-04-29 PROCEDURE — 25010000002 PROPOFOL 10 MG/ML EMULSION: Performed by: NURSE ANESTHETIST, CERTIFIED REGISTERED

## 2021-04-29 PROCEDURE — 25010000002 ONDANSETRON PER 1 MG: Performed by: NURSE ANESTHETIST, CERTIFIED REGISTERED

## 2021-04-29 PROCEDURE — 88307 TISSUE EXAM BY PATHOLOGIST: CPT | Performed by: OTOLARYNGOLOGY

## 2021-04-29 PROCEDURE — 25010000002 SUCCINYLCHOLINE PER 20 MG: Performed by: NURSE ANESTHETIST, CERTIFIED REGISTERED

## 2021-04-29 PROCEDURE — C1889 IMPLANT/INSERT DEVICE, NOC: HCPCS | Performed by: OTOLARYNGOLOGY

## 2021-04-29 DEVICE — CLIP LIGAT VASC HORIZON TI MD BLU 6CT: Type: IMPLANTABLE DEVICE | Site: THYROID | Status: FUNCTIONAL

## 2021-04-29 RX ORDER — SODIUM CHLORIDE 0.9 % (FLUSH) 0.9 %
3 SYRINGE (ML) INJECTION EVERY 12 HOURS SCHEDULED
Status: DISCONTINUED | OUTPATIENT
Start: 2021-04-29 | End: 2021-04-30 | Stop reason: HOSPADM

## 2021-04-29 RX ORDER — SULFASALAZINE 500 MG/1
1500 TABLET ORAL 2 TIMES DAILY
Status: DISCONTINUED | OUTPATIENT
Start: 2021-04-29 | End: 2021-04-30 | Stop reason: HOSPADM

## 2021-04-29 RX ORDER — ALPRAZOLAM 0.5 MG/1
0.5 TABLET ORAL DAILY PRN
Status: DISCONTINUED | OUTPATIENT
Start: 2021-04-29 | End: 2021-04-30 | Stop reason: HOSPADM

## 2021-04-29 RX ORDER — MAGNESIUM HYDROXIDE 1200 MG/15ML
LIQUID ORAL AS NEEDED
Status: DISCONTINUED | OUTPATIENT
Start: 2021-04-29 | End: 2021-04-29 | Stop reason: HOSPADM

## 2021-04-29 RX ORDER — ERGOCALCIFEROL 1.25 MG/1
50000 CAPSULE ORAL
Status: DISCONTINUED | OUTPATIENT
Start: 2021-04-29 | End: 2021-04-30 | Stop reason: HOSPADM

## 2021-04-29 RX ORDER — BACITRACIN ZINC 500 [USP'U]/G
OINTMENT TOPICAL AS NEEDED
Status: DISCONTINUED | OUTPATIENT
Start: 2021-04-29 | End: 2021-04-29 | Stop reason: HOSPADM

## 2021-04-29 RX ORDER — GLYCOPYRROLATE 0.2 MG/ML
INJECTION INTRAMUSCULAR; INTRAVENOUS AS NEEDED
Status: DISCONTINUED | OUTPATIENT
Start: 2021-04-29 | End: 2021-04-29 | Stop reason: SURG

## 2021-04-29 RX ORDER — DIPHENHYDRAMINE HYDROCHLORIDE 50 MG/ML
12.5 INJECTION INTRAMUSCULAR; INTRAVENOUS
Status: DISCONTINUED | OUTPATIENT
Start: 2021-04-29 | End: 2021-04-29 | Stop reason: HOSPADM

## 2021-04-29 RX ORDER — OXYCODONE AND ACETAMINOPHEN 7.5; 325 MG/1; MG/1
1 TABLET ORAL ONCE AS NEEDED
Status: DISCONTINUED | OUTPATIENT
Start: 2021-04-29 | End: 2021-04-29 | Stop reason: HOSPADM

## 2021-04-29 RX ORDER — FENTANYL CITRATE 50 UG/ML
INJECTION, SOLUTION INTRAMUSCULAR; INTRAVENOUS AS NEEDED
Status: DISCONTINUED | OUTPATIENT
Start: 2021-04-29 | End: 2021-04-29 | Stop reason: SURG

## 2021-04-29 RX ORDER — ONDANSETRON 2 MG/ML
INJECTION INTRAMUSCULAR; INTRAVENOUS AS NEEDED
Status: DISCONTINUED | OUTPATIENT
Start: 2021-04-29 | End: 2021-04-29 | Stop reason: SURG

## 2021-04-29 RX ORDER — HYDROMORPHONE HYDROCHLORIDE 1 MG/ML
0.5 INJECTION, SOLUTION INTRAMUSCULAR; INTRAVENOUS; SUBCUTANEOUS
Status: DISCONTINUED | OUTPATIENT
Start: 2021-04-29 | End: 2021-04-29 | Stop reason: HOSPADM

## 2021-04-29 RX ORDER — LIDOCAINE HYDROCHLORIDE AND EPINEPHRINE 10; 10 MG/ML; UG/ML
INJECTION, SOLUTION INFILTRATION; PERINEURAL AS NEEDED
Status: DISCONTINUED | OUTPATIENT
Start: 2021-04-29 | End: 2021-04-29 | Stop reason: HOSPADM

## 2021-04-29 RX ORDER — SUCCINYLCHOLINE CHLORIDE 20 MG/ML
INJECTION INTRAMUSCULAR; INTRAVENOUS AS NEEDED
Status: DISCONTINUED | OUTPATIENT
Start: 2021-04-29 | End: 2021-04-29 | Stop reason: SURG

## 2021-04-29 RX ORDER — MIDAZOLAM HYDROCHLORIDE 1 MG/ML
2 INJECTION INTRAMUSCULAR; INTRAVENOUS
Status: DISCONTINUED | OUTPATIENT
Start: 2021-04-29 | End: 2021-04-29 | Stop reason: HOSPADM

## 2021-04-29 RX ORDER — SODIUM CHLORIDE 0.9 % (FLUSH) 0.9 %
10 SYRINGE (ML) INJECTION EVERY 12 HOURS SCHEDULED
Status: DISCONTINUED | OUTPATIENT
Start: 2021-04-29 | End: 2021-04-29 | Stop reason: HOSPADM

## 2021-04-29 RX ORDER — TRIAMTERENE AND HYDROCHLOROTHIAZIDE 37.5; 25 MG/1; MG/1
1 TABLET ORAL DAILY
Status: DISCONTINUED | OUTPATIENT
Start: 2021-04-29 | End: 2021-04-30 | Stop reason: HOSPADM

## 2021-04-29 RX ORDER — LEVOTHYROXINE SODIUM 175 UG/1
175 TABLET ORAL DAILY
Status: DISCONTINUED | OUTPATIENT
Start: 2021-04-29 | End: 2021-04-30 | Stop reason: HOSPADM

## 2021-04-29 RX ORDER — FOLIC ACID 1 MG/1
1000 TABLET ORAL DAILY
Status: DISCONTINUED | OUTPATIENT
Start: 2021-04-29 | End: 2021-04-30 | Stop reason: HOSPADM

## 2021-04-29 RX ORDER — ERGOCALCIFEROL 1.25 MG/1
50000 CAPSULE ORAL
Status: DISCONTINUED | OUTPATIENT
Start: 2021-04-29 | End: 2021-04-29

## 2021-04-29 RX ORDER — PROMETHAZINE HYDROCHLORIDE 25 MG/1
25 TABLET ORAL ONCE AS NEEDED
Status: DISCONTINUED | OUTPATIENT
Start: 2021-04-29 | End: 2021-04-29 | Stop reason: HOSPADM

## 2021-04-29 RX ORDER — SODIUM CHLORIDE, SODIUM LACTATE, POTASSIUM CHLORIDE, CALCIUM CHLORIDE 600; 310; 30; 20 MG/100ML; MG/100ML; MG/100ML; MG/100ML
9 INJECTION, SOLUTION INTRAVENOUS CONTINUOUS PRN
Status: DISCONTINUED | OUTPATIENT
Start: 2021-04-29 | End: 2021-04-30 | Stop reason: HOSPADM

## 2021-04-29 RX ORDER — HYDROCODONE BITARTRATE AND ACETAMINOPHEN 7.5; 325 MG/1; MG/1
1 TABLET ORAL ONCE AS NEEDED
Status: DISCONTINUED | OUTPATIENT
Start: 2021-04-29 | End: 2021-04-29 | Stop reason: HOSPADM

## 2021-04-29 RX ORDER — DIPHENHYDRAMINE HCL 25 MG
25 CAPSULE ORAL
Status: DISCONTINUED | OUTPATIENT
Start: 2021-04-29 | End: 2021-04-29 | Stop reason: HOSPADM

## 2021-04-29 RX ORDER — ONDANSETRON 2 MG/ML
4 INJECTION INTRAMUSCULAR; INTRAVENOUS ONCE AS NEEDED
Status: DISCONTINUED | OUTPATIENT
Start: 2021-04-29 | End: 2021-04-29 | Stop reason: HOSPADM

## 2021-04-29 RX ORDER — PROMETHAZINE HYDROCHLORIDE 25 MG/1
25 SUPPOSITORY RECTAL EVERY 6 HOURS PRN
Status: DISCONTINUED | OUTPATIENT
Start: 2021-04-29 | End: 2021-04-30 | Stop reason: HOSPADM

## 2021-04-29 RX ORDER — PROPOFOL 10 MG/ML
VIAL (ML) INTRAVENOUS AS NEEDED
Status: DISCONTINUED | OUTPATIENT
Start: 2021-04-29 | End: 2021-04-29 | Stop reason: SURG

## 2021-04-29 RX ORDER — METOPROLOL SUCCINATE 100 MG/1
100 TABLET, EXTENDED RELEASE ORAL DAILY
Status: DISCONTINUED | OUTPATIENT
Start: 2021-04-30 | End: 2021-04-30 | Stop reason: HOSPADM

## 2021-04-29 RX ORDER — HYDROCODONE BITARTRATE AND ACETAMINOPHEN 7.5; 325 MG/1; MG/1
1 TABLET ORAL EVERY 4 HOURS PRN
Status: DISCONTINUED | OUTPATIENT
Start: 2021-04-29 | End: 2021-04-30 | Stop reason: HOSPADM

## 2021-04-29 RX ORDER — FLUOXETINE HYDROCHLORIDE 20 MG/1
60 CAPSULE ORAL NIGHTLY
Status: DISCONTINUED | OUTPATIENT
Start: 2021-04-29 | End: 2021-04-30 | Stop reason: HOSPADM

## 2021-04-29 RX ORDER — NEOSTIGMINE METHYLSULFATE 0.5 MG/ML
INJECTION, SOLUTION INTRAVENOUS AS NEEDED
Status: DISCONTINUED | OUTPATIENT
Start: 2021-04-29 | End: 2021-04-29 | Stop reason: SURG

## 2021-04-29 RX ORDER — MORPHINE SULFATE 2 MG/ML
4 INJECTION, SOLUTION INTRAMUSCULAR; INTRAVENOUS
Status: DISCONTINUED | OUTPATIENT
Start: 2021-04-29 | End: 2021-04-30 | Stop reason: HOSPADM

## 2021-04-29 RX ORDER — BACLOFEN 10 MG/1
10 TABLET ORAL NIGHTLY
Status: DISCONTINUED | OUTPATIENT
Start: 2021-04-29 | End: 2021-04-30 | Stop reason: HOSPADM

## 2021-04-29 RX ORDER — MIDAZOLAM HYDROCHLORIDE 1 MG/ML
1 INJECTION INTRAMUSCULAR; INTRAVENOUS
Status: DISCONTINUED | OUTPATIENT
Start: 2021-04-29 | End: 2021-04-29 | Stop reason: HOSPADM

## 2021-04-29 RX ORDER — NALOXONE HCL 0.4 MG/ML
0.2 VIAL (ML) INJECTION AS NEEDED
Status: DISCONTINUED | OUTPATIENT
Start: 2021-04-29 | End: 2021-04-29 | Stop reason: HOSPADM

## 2021-04-29 RX ORDER — PROMETHAZINE HYDROCHLORIDE 25 MG/1
25 TABLET ORAL EVERY 6 HOURS PRN
Status: DISCONTINUED | OUTPATIENT
Start: 2021-04-29 | End: 2021-04-30 | Stop reason: HOSPADM

## 2021-04-29 RX ORDER — LIDOCAINE HYDROCHLORIDE 10 MG/ML
0.5 INJECTION, SOLUTION EPIDURAL; INFILTRATION; INTRACAUDAL; PERINEURAL ONCE AS NEEDED
Status: DISCONTINUED | OUTPATIENT
Start: 2021-04-29 | End: 2021-04-29 | Stop reason: HOSPADM

## 2021-04-29 RX ORDER — FENTANYL CITRATE 50 UG/ML
50 INJECTION, SOLUTION INTRAMUSCULAR; INTRAVENOUS
Status: DISCONTINUED | OUTPATIENT
Start: 2021-04-29 | End: 2021-04-29 | Stop reason: HOSPADM

## 2021-04-29 RX ORDER — ROCURONIUM BROMIDE 10 MG/ML
INJECTION, SOLUTION INTRAVENOUS AS NEEDED
Status: DISCONTINUED | OUTPATIENT
Start: 2021-04-29 | End: 2021-04-29 | Stop reason: SURG

## 2021-04-29 RX ORDER — PROMETHAZINE HYDROCHLORIDE 25 MG/1
25 SUPPOSITORY RECTAL ONCE AS NEEDED
Status: DISCONTINUED | OUTPATIENT
Start: 2021-04-29 | End: 2021-04-29 | Stop reason: HOSPADM

## 2021-04-29 RX ORDER — LIDOCAINE HYDROCHLORIDE 20 MG/ML
INJECTION, SOLUTION INFILTRATION; PERINEURAL AS NEEDED
Status: DISCONTINUED | OUTPATIENT
Start: 2021-04-29 | End: 2021-04-29 | Stop reason: SURG

## 2021-04-29 RX ORDER — LABETALOL HYDROCHLORIDE 5 MG/ML
5 INJECTION, SOLUTION INTRAVENOUS
Status: DISCONTINUED | OUTPATIENT
Start: 2021-04-29 | End: 2021-04-29 | Stop reason: HOSPADM

## 2021-04-29 RX ORDER — FLUMAZENIL 0.1 MG/ML
0.2 INJECTION INTRAVENOUS AS NEEDED
Status: DISCONTINUED | OUTPATIENT
Start: 2021-04-29 | End: 2021-04-29 | Stop reason: HOSPADM

## 2021-04-29 RX ORDER — ATORVASTATIN CALCIUM 10 MG/1
5 TABLET, FILM COATED ORAL DAILY
Status: DISCONTINUED | OUTPATIENT
Start: 2021-04-29 | End: 2021-04-30 | Stop reason: HOSPADM

## 2021-04-29 RX ORDER — EPHEDRINE SULFATE 50 MG/ML
5 INJECTION, SOLUTION INTRAVENOUS ONCE AS NEEDED
Status: DISCONTINUED | OUTPATIENT
Start: 2021-04-29 | End: 2021-04-29 | Stop reason: HOSPADM

## 2021-04-29 RX ORDER — SODIUM CHLORIDE 0.9 % (FLUSH) 0.9 %
10 SYRINGE (ML) INJECTION AS NEEDED
Status: DISCONTINUED | OUTPATIENT
Start: 2021-04-29 | End: 2021-04-29 | Stop reason: HOSPADM

## 2021-04-29 RX ORDER — NALOXONE HCL 0.4 MG/ML
0.4 VIAL (ML) INJECTION
Status: DISCONTINUED | OUTPATIENT
Start: 2021-04-29 | End: 2021-04-30 | Stop reason: HOSPADM

## 2021-04-29 RX ORDER — LOSARTAN POTASSIUM 50 MG/1
50 TABLET ORAL DAILY
Status: DISCONTINUED | OUTPATIENT
Start: 2021-04-29 | End: 2021-04-30 | Stop reason: HOSPADM

## 2021-04-29 RX ORDER — SODIUM CHLORIDE 0.9 % (FLUSH) 0.9 %
10 SYRINGE (ML) INJECTION AS NEEDED
Status: DISCONTINUED | OUTPATIENT
Start: 2021-04-29 | End: 2021-04-30 | Stop reason: HOSPADM

## 2021-04-29 RX ORDER — DROPERIDOL 2.5 MG/ML
INJECTION, SOLUTION INTRAMUSCULAR; INTRAVENOUS AS NEEDED
Status: DISCONTINUED | OUTPATIENT
Start: 2021-04-29 | End: 2021-04-29 | Stop reason: SURG

## 2021-04-29 RX ADMIN — HYDROMORPHONE HYDROCHLORIDE 0.5 MG: 1 INJECTION, SOLUTION INTRAMUSCULAR; INTRAVENOUS; SUBCUTANEOUS at 12:36

## 2021-04-29 RX ADMIN — NEOSTIGMINE METHYLSULFATE 2 MG: 0.5 INJECTION INTRAVENOUS at 11:41

## 2021-04-29 RX ADMIN — BACLOFEN 10 MG: 10 TABLET ORAL at 21:21

## 2021-04-29 RX ADMIN — ROCURONIUM BROMIDE 20 MG: 50 INJECTION INTRAVENOUS at 10:15

## 2021-04-29 RX ADMIN — ROCURONIUM BROMIDE 10 MG: 50 INJECTION INTRAVENOUS at 09:52

## 2021-04-29 RX ADMIN — FENTANYL CITRATE 50 MCG: 50 INJECTION, SOLUTION INTRAMUSCULAR; INTRAVENOUS at 12:28

## 2021-04-29 RX ADMIN — HYDROCODONE BITARTRATE AND ACETAMINOPHEN 1 TABLET: 7.5; 325 TABLET ORAL at 19:42

## 2021-04-29 RX ADMIN — SODIUM CHLORIDE, POTASSIUM CHLORIDE, SODIUM LACTATE AND CALCIUM CHLORIDE: 600; 310; 30; 20 INJECTION, SOLUTION INTRAVENOUS at 11:52

## 2021-04-29 RX ADMIN — FENTANYL CITRATE 50 MCG: 50 INJECTION INTRAMUSCULAR; INTRAVENOUS at 10:58

## 2021-04-29 RX ADMIN — SODIUM CHLORIDE, POTASSIUM CHLORIDE, SODIUM LACTATE AND CALCIUM CHLORIDE 9 ML/HR: 600; 310; 30; 20 INJECTION, SOLUTION INTRAVENOUS at 09:32

## 2021-04-29 RX ADMIN — SUCCINYLCHOLINE CHLORIDE 140 MG: 20 INJECTION, SOLUTION INTRAMUSCULAR; INTRAVENOUS; PARENTERAL at 09:52

## 2021-04-29 RX ADMIN — ONDANSETRON 4 MG: 2 INJECTION INTRAMUSCULAR; INTRAVENOUS at 11:30

## 2021-04-29 RX ADMIN — MIDAZOLAM 1 MG: 1 INJECTION INTRAMUSCULAR; INTRAVENOUS at 09:37

## 2021-04-29 RX ADMIN — LIDOCAINE HYDROCHLORIDE 100 MG: 20 INJECTION, SOLUTION INFILTRATION; PERINEURAL at 09:52

## 2021-04-29 RX ADMIN — FENTANYL CITRATE 100 MCG: 50 INJECTION INTRAMUSCULAR; INTRAVENOUS at 09:52

## 2021-04-29 RX ADMIN — GLYCOPYRROLATE 0.4 MG: 0.2 INJECTION INTRAMUSCULAR; INTRAVENOUS at 11:41

## 2021-04-29 RX ADMIN — FLUOXETINE HYDROCHLORIDE 60 MG: 20 CAPSULE ORAL at 21:21

## 2021-04-29 RX ADMIN — FENTANYL CITRATE 50 MCG: 50 INJECTION, SOLUTION INTRAMUSCULAR; INTRAVENOUS at 12:18

## 2021-04-29 RX ADMIN — PHENYLEPHRINE HYDROCHLORIDE 100 MCG: 10 INJECTION INTRAVENOUS at 10:27

## 2021-04-29 RX ADMIN — SULFASALAZINE 1500 MG: 500 TABLET ORAL at 21:21

## 2021-04-29 RX ADMIN — PROPOFOL 250 MG: 10 INJECTION, EMULSION INTRAVENOUS at 09:52

## 2021-04-29 RX ADMIN — FENTANYL CITRATE 50 MCG: 50 INJECTION INTRAMUSCULAR; INTRAVENOUS at 10:17

## 2021-04-29 RX ADMIN — ROCURONIUM BROMIDE 10 MG: 50 INJECTION INTRAVENOUS at 10:58

## 2021-04-29 RX ADMIN — HYDROCODONE BITARTRATE AND ACETAMINOPHEN 1 TABLET: 7.5; 325 TABLET ORAL at 14:24

## 2021-04-29 RX ADMIN — DROPERIDOL 0.62 MG: 2.5 INJECTION, SOLUTION INTRAMUSCULAR; INTRAVENOUS at 10:24

## 2021-04-29 NOTE — ANESTHESIA PREPROCEDURE EVALUATION
Anesthesia Evaluation     Patient summary reviewed and Nursing notes reviewed   NPO Solid Status: > 8 hours  NPO Liquid Status: > 2 hours           Airway   Mallampati: II  TM distance: >3 FB  Neck ROM: full  No difficulty expected  Dental - normal exam     Pulmonary - normal exam   (+) sleep apnea on CPAP,   Cardiovascular - normal exam  Exercise tolerance: good (4-7 METS)    Patient on routine beta blocker and Beta blocker given within 24 hours of surgery    (+) hypertension, CAD, cardiac stents unknown timeframe dysrhythmias, hyperlipidemia,       Neuro/Psych  (+) psychiatric history Anxiety and Depression,     GI/Hepatic/Renal/Endo    (+) morbid obesity,  diabetes mellitus type 2,     Musculoskeletal     Abdominal    Substance History      OB/GYN          Other   arthritis,                      Anesthesia Plan    ASA 3     general     intravenous induction     Anesthetic plan, all risks, benefits, and alternatives have been provided, discussed and informed consent has been obtained with: patient.    Plan discussed with CRNA.

## 2021-04-29 NOTE — ANESTHESIA PROCEDURE NOTES
Airway  Urgency: elective    Date/Time: 4/29/2021 9:55 AM  Airway not difficult    General Information and Staff    Patient location during procedure: OR  CRNA: Justine Fernández CRNA    Indications and Patient Condition  Indications for airway management: airway protection    Preoxygenated: yes  MILS not maintained throughout  Mask difficulty assessment: 1 - vent by mask    Final Airway Details  Final airway type: endotracheal airway      Successful airway: ETT  Cuffed: yes   Successful intubation technique: direct laryngoscopy  Endotracheal tube insertion site: oral  Blade: Josse  Blade size: 3  ETT size (mm): 7.0  Cormack-Lehane Classification: grade I - full view of glottis  Placement verified by: chest auscultation and capnometry   Measured from: lips  ETT/EBT  to lips (cm): 21  Number of attempts at approach: 1  Assessment: lips, teeth, and gum same as pre-op and atraumatic intubation    Additional Comments  Dentition intact and unchanged. CBEBS.  +ETCO2.

## 2021-04-29 NOTE — ANESTHESIA POSTPROCEDURE EVALUATION
Patient: Pratibha Pascal    Procedure Summary     Date: 04/29/21 Room / Location:  [facility]  /  [facility]    Anesthesia Start: 0947 Anesthesia Stop: 1205    Procedure: COMPLETION THYROIDECTOMY (Left Neck) Diagnosis:     Surgeons: Kimo Carter MD Provider: Binu Monroe MD    Anesthesia Type: general ASA Status: 3          Anesthesia Type: general    Vitals  Vitals Value Taken Time   /70 04/29/21 1245   Temp 37.1 °C (98.8 °F) 04/29/21 1205   Pulse 81 04/29/21 1245   Resp 16 04/29/21 1245   SpO2 95 % 04/29/21 1245           Post Anesthesia Care and Evaluation    Patient location during evaluation: bedside  Patient participation: complete - patient participated  Level of consciousness: awake and alert  Pain management: adequate  Airway patency: patent  Anesthetic complications: No anesthetic complications  PONV Status: controlled  Cardiovascular status: blood pressure returned to baseline and acceptable  Respiratory status: acceptable  Hydration status: acceptable

## 2021-04-30 ENCOUNTER — READMISSION MANAGEMENT (OUTPATIENT)
Dept: CALL CENTER | Facility: HOSPITAL | Age: 61
End: 2021-04-30

## 2021-04-30 VITALS
RESPIRATION RATE: 16 BRPM | SYSTOLIC BLOOD PRESSURE: 116 MMHG | OXYGEN SATURATION: 97 % | DIASTOLIC BLOOD PRESSURE: 72 MMHG | HEART RATE: 89 BPM | TEMPERATURE: 97.2 F

## 2021-04-30 LAB
CA-I BLD-MCNC: 4.8 MG/DL (ref 4.6–5.4)
CA-I SERPL ISE-MCNC: 1.19 MMOL/L (ref 1.15–1.35)
LAB AP CASE REPORT: NORMAL
LAB AP DIAGNOSIS COMMENT: NORMAL
PATH REPORT.FINAL DX SPEC: NORMAL
PATH REPORT.GROSS SPEC: NORMAL

## 2021-04-30 PROCEDURE — 82330 ASSAY OF CALCIUM: CPT | Performed by: OTOLARYNGOLOGY

## 2021-04-30 PROCEDURE — G0378 HOSPITAL OBSERVATION PER HR: HCPCS

## 2021-04-30 RX ORDER — HYDROCODONE BITARTRATE AND ACETAMINOPHEN 5; 325 MG/1; MG/1
1-2 TABLET ORAL EVERY 4 HOURS PRN
Qty: 30 TABLET | Refills: 0 | Status: SHIPPED | OUTPATIENT
Start: 2021-04-30 | End: 2021-07-07

## 2021-04-30 RX ORDER — PROMETHAZINE HYDROCHLORIDE 25 MG/1
25 TABLET ORAL EVERY 6 HOURS PRN
Qty: 15 TABLET | Refills: 0 | Status: SHIPPED | OUTPATIENT
Start: 2021-04-30 | End: 2021-07-07

## 2021-04-30 RX ADMIN — SULFASALAZINE 1500 MG: 500 TABLET ORAL at 08:19

## 2021-04-30 RX ADMIN — ATORVASTATIN CALCIUM 5 MG: 10 TABLET, FILM COATED ORAL at 08:19

## 2021-04-30 RX ADMIN — TRIAMTERENE AND HYDROCHLOROTHIAZIDE 1 TABLET: 37.5; 25 TABLET ORAL at 08:20

## 2021-04-30 RX ADMIN — METOPROLOL SUCCINATE 100 MG: 100 TABLET, EXTENDED RELEASE ORAL at 08:20

## 2021-04-30 RX ADMIN — LOSARTAN POTASSIUM 50 MG: 50 TABLET, FILM COATED ORAL at 08:19

## 2021-04-30 RX ADMIN — ERGOCALCIFEROL 50000 UNITS: 1.25 CAPSULE ORAL at 08:20

## 2021-04-30 RX ADMIN — LEVOTHYROXINE SODIUM 175 MCG: 0.17 TABLET ORAL at 08:19

## 2021-04-30 RX ADMIN — FOLIC ACID 1000 MCG: 1 TABLET ORAL at 08:20

## 2021-04-30 RX ADMIN — HYDROCODONE BITARTRATE AND ACETAMINOPHEN 1 TABLET: 7.5; 325 TABLET ORAL at 05:35

## 2021-04-30 NOTE — OUTREACH NOTE
Prep Survey      Responses   [facility] patient discharged from?  [place]   Is LACE score < 7 ?  No   Emergency Room discharge w/ pulse ox?  No   Eligibility  [facility]   Date of Admission  04/29/21   Date of Discharge  04/30/21   Discharge Disposition  Home or Self Care   Discharge diagnosis  Completion theyroidectomy   Does the patient have one of the following disease processes/diagnoses(primary or secondary)?  General Surgery   Does the patient have Home health ordered?  No   Is there a DME ordered?  No   Prep survey completed?  Yes          Francie Garcia RN

## 2021-05-03 ENCOUNTER — TRANSITIONAL CARE MANAGEMENT TELEPHONE ENCOUNTER (OUTPATIENT)
Dept: CALL CENTER | Facility: HOSPITAL | Age: 61
End: 2021-05-03

## 2021-05-03 NOTE — OUTREACH NOTE
Call Center TCM Note      Responses   [facility] patient discharged from?  [place]   Does the patient have one of the following disease processes/diagnoses(primary or secondary)?  General Surgery   TCM attempt successful?  No   Unsuccessful attempts  Attempt 2          Ching Schwartz RN    5/3/2021, 13:21 EDT

## 2021-05-03 NOTE — OUTREACH NOTE
Call Center TCM Note      Responses   [facility] patient discharged from?  [place]   Does the patient have one of the following disease processes/diagnoses(primary or secondary)?  General Surgery   TCM attempt successful?  No   Unsuccessful attempts  Attempt 1          Ching Schwartz RN    5/3/2021, 09:16 EDT

## 2021-05-04 ENCOUNTER — TRANSITIONAL CARE MANAGEMENT TELEPHONE ENCOUNTER (OUTPATIENT)
Dept: CALL CENTER | Facility: HOSPITAL | Age: 61
End: 2021-05-04

## 2021-05-04 NOTE — OUTREACH NOTE
Call Center TCM Note      Responses   [facility] [place] [facility] from?  [place]   Does the patient have one of the following disease processes/diagnoses(primary or secondary)?  General Surgery   TCM attempt successful?  No [Denise Daughter on verbal release ]   Unsuccessful attempts  Attempt 3          Evelina Perez RN    5/4/2021, 08:36 EDT

## 2021-05-11 ENCOUNTER — READMISSION MANAGEMENT (OUTPATIENT)
Dept: CALL CENTER | Facility: HOSPITAL | Age: 61
End: 2021-05-11

## 2021-05-11 NOTE — OUTREACH NOTE
General Surgery Week 2 Survey      Responses   [facility] patient discharged from?  [place]   Does the patient have one of the following disease processes/diagnoses(primary or secondary)?  General Surgery   Week 2 attempt successful?  Yes   Call start time  1106   Call end time  1108   Is the patient taking all medications as directed (includes completed medication regime)?  Yes   Does the patient have a follow up appointment scheduled with their surgeon?  Yes   Has the patient kept scheduled appointments due by today?  Yes   What is the patient's perception of their health status since discharge?  Improving   Is the patient/caregiver able to teach back the hierarchy of who to call/visit for symptoms/problems? PCP, Specialist, Home health nurse, Urgent Care, ED, 911  Yes   Additional teach back comments  States wound has healed nicely.  Has followed up with surgeon and will follow up again next month.  She will also follow up with her endo dr next month.          Regina Matamoros LPN

## 2021-07-06 DIAGNOSIS — F41.9 ANXIETY: Chronic | ICD-10-CM

## 2021-07-06 RX ORDER — ALPRAZOLAM 0.5 MG/1
TABLET ORAL
Qty: 30 TABLET | Refills: 0 | OUTPATIENT
Start: 2021-07-06

## 2021-07-07 ENCOUNTER — TELEMEDICINE (OUTPATIENT)
Dept: FAMILY MEDICINE CLINIC | Facility: CLINIC | Age: 61
End: 2021-07-07

## 2021-07-07 DIAGNOSIS — I10 BENIGN ESSENTIAL HYPERTENSION: Chronic | ICD-10-CM

## 2021-07-07 DIAGNOSIS — M54.2 CERVICALGIA: Chronic | ICD-10-CM

## 2021-07-07 DIAGNOSIS — M05.79 RHEUMATOID ARTHRITIS OF MULTIPLE SITES WITHOUT ORGAN OR SYSTEM INVOLVEMENT WITH POSITIVE RHEUMATOID FACTOR (HCC): Chronic | ICD-10-CM

## 2021-07-07 DIAGNOSIS — E03.8 HYPOTHYROIDISM DUE TO HASHIMOTO'S THYROIDITIS: ICD-10-CM

## 2021-07-07 DIAGNOSIS — F41.9 ANXIETY: Primary | Chronic | ICD-10-CM

## 2021-07-07 DIAGNOSIS — E06.3 HYPOTHYROIDISM DUE TO HASHIMOTO'S THYROIDITIS: ICD-10-CM

## 2021-07-07 DIAGNOSIS — F33.42 MAJOR DEPRESSIVE DISORDER, RECURRENT, IN FULL REMISSION (HCC): Chronic | ICD-10-CM

## 2021-07-07 PROCEDURE — 99214 OFFICE O/P EST MOD 30 MIN: CPT | Performed by: FAMILY MEDICINE

## 2021-07-07 RX ORDER — LOSARTAN POTASSIUM 50 MG/1
50 TABLET ORAL DAILY
Qty: 90 TABLET | Refills: 1 | Status: SHIPPED | OUTPATIENT
Start: 2021-07-07 | End: 2022-02-21 | Stop reason: SDUPTHER

## 2021-07-07 RX ORDER — TRIAMTERENE AND HYDROCHLOROTHIAZIDE 37.5; 25 MG/1; MG/1
1 TABLET ORAL DAILY
Qty: 90 TABLET | Refills: 1 | Status: SHIPPED | OUTPATIENT
Start: 2021-07-07 | End: 2022-02-21 | Stop reason: SDUPTHER

## 2021-07-07 RX ORDER — FOLIC ACID 1 MG/1
1000 TABLET ORAL DAILY
Qty: 90 TABLET | Refills: 1 | Status: SHIPPED | OUTPATIENT
Start: 2021-07-07 | End: 2022-02-21 | Stop reason: SDUPTHER

## 2021-07-07 RX ORDER — BACLOFEN 10 MG/1
10 TABLET ORAL NIGHTLY
Qty: 90 TABLET | Refills: 1 | Status: SHIPPED | OUTPATIENT
Start: 2021-07-07 | End: 2022-02-21 | Stop reason: SDUPTHER

## 2021-07-07 RX ORDER — FLUOXETINE HYDROCHLORIDE 20 MG/1
60 CAPSULE ORAL NIGHTLY
Qty: 270 CAPSULE | Refills: 1 | Status: SHIPPED | OUTPATIENT
Start: 2021-07-07 | End: 2022-02-21 | Stop reason: SDUPTHER

## 2021-07-07 RX ORDER — SULFASALAZINE 500 MG/1
1500 TABLET ORAL 2 TIMES DAILY
Qty: 540 TABLET | Refills: 1 | Status: SHIPPED | OUTPATIENT
Start: 2021-07-07 | End: 2022-02-14

## 2021-07-07 RX ORDER — ALPRAZOLAM 0.5 MG/1
0.5 TABLET ORAL DAILY PRN
Qty: 30 TABLET | Refills: 2 | Status: SHIPPED | OUTPATIENT
Start: 2021-07-07 | End: 2021-10-27

## 2021-07-07 RX ORDER — METOPROLOL SUCCINATE 100 MG/1
100 TABLET, EXTENDED RELEASE ORAL DAILY
Qty: 90 TABLET | Refills: 1 | Status: SHIPPED | OUTPATIENT
Start: 2021-07-07 | End: 2022-02-21 | Stop reason: SDUPTHER

## 2021-07-07 NOTE — PROGRESS NOTES
Subjective   Pratibha Pascal is a 61 y.o. female.     CC: Video Visit for Medical Management    History of Present Illness     Chief Complaint:   Chief Complaint   Patient presents with   • Anxiety   • Hypertension   • Depression   • Rheumatoid Arthritis   • Cervicalgia       Pratibha Pascal 61 y.o. female who presents today for Medical Management of the below listed issues and medication refills.  she has a problem list of   Patient Active Problem List   Diagnosis   • CAD (coronary artery disease)   • Mixed hyperlipidemia   • Benign essential hypertension   • Hypothyroidism   • Major depressive disorder, recurrent, in full remission (CMS/[facility])   • Rheumatoid arthritis (CMS/[facility])   • Cervicalgia   • Diabetes mellitus (CMS/[facility])   • Dyslipidemia   • BP (high blood pressure)   • Obstructive apnea   • AF (paroxysmal atrial fibrillation) (CMS/[facility])   • Proteinuria   • Burn   • Cellulitis of left leg   • Diabetes mellitus with neurological manifestation (CMS/[facility])   • MSSA (methicillin susceptible Staphylococcus aureus) infection   • Diabetic ulcer of left foot associated with diabetes mellitus due to underlying condition (CMS/[facility])   • Morbid obesity (CMS/[facility])   • Primary insomnia   • Anxiety   • Immunodeficiency, unspecified (CMS/[facility])   • Long term current use of non-steroidal anti-inflammatories (NSAID)   • Personal history of immunosupression therapy   • Primary generalized (osteo)arthritis   • Raised antibody titer   • Rheumatoid arthritis of multiple sites without organ or system involvement with positive rheumatoid factor (CMS/[facility])   • Other long term (current) drug therapy   • Uncontrolled type 2 diabetes mellitus with hyperglycemia (CMS/[facility])   • Vitamin D deficiency, unspecified   • Hypercalcemia   • Primary osteoarthritis of left knee   • OA (osteoarthritis) of knee   • Primary osteoarthritis of right knee   • Chronic fatigue   • Chronic pain of left knee   • History of coronary artery stent placement   • Multiple  thyroid nodules   • Thyroid nodule   .  Since the last visit, she has overall felt well.  she has been compliant with   Current Outpatient Medications:   •  ACCU-CHEK GUIDE test strip, , Disp: , Rfl:   •  atorvastatin (LIPITOR) 10 MG tablet, Take 0.5 tablets by mouth Daily., Disp: 45 tablet, Rfl: 1  •  dapagliflozin-metformin HCl ER (XIGDUO XR) 5-1000 MG tablet, Take 2 tablets by mouth Daily. (Patient taking differently: Take 2 tablets by mouth Daily. PATIENT TO CONSULT WITH DR. NATAELE DELACRUZ, ENDOCRINOLOGIST REGARDING MEDICATIONS PRIOR TO SURGERY), Disp: 60 tablet, Rfl: 5  •  levothyroxine (SYNTHROID, LEVOTHROID) 175 MCG tablet, Take 175 mcg by mouth Daily., Disp: , Rfl:   •  ONETOUCH DELICA LANCETS 33G misc, Test 3 times daily (Patient taking differently: 1 each. Test 3 times daily), Disp: 100 each, Rfl: 11  •  TRULICITY 1.5 MG/0.5ML solution pen-injector, Inject 1.5 mg under the skin into the appropriate area as directed 1 (One) Time Per Week. (Patient taking differently: Inject 3 mg under the skin into the appropriate area as directed 1 (One) Time Per Week. SATURDAY), Disp: 12 pen, Rfl: 1  •  vitamin D (ERGOCALCIFEROL) 1.25 MG (39514 UT) capsule capsule, Take 50,000 Units by mouth Daily (Monday-Friday)., Disp: , Rfl:   •  ALPRAZolam (XANAX) 0.5 MG tablet, Take 1 tablet by mouth Daily As Needed for Anxiety., Disp: 30 tablet, Rfl: 2  •  baclofen (LIORESAL) 10 MG tablet, Take 1 tablet by mouth Every Night., Disp: 90 tablet, Rfl: 1  •  FLUoxetine (PROzac) 20 MG capsule, Take 3 capsules by mouth Every Night., Disp: 270 capsule, Rfl: 1  •  folic acid (FOLVITE) 1 MG tablet, Take 1 tablet by mouth Daily., Disp: 90 tablet, Rfl: 1  •  losartan (COZAAR) 50 MG tablet, Take 1 tablet by mouth Daily., Disp: 90 tablet, Rfl: 1  •  metoprolol succinate XL (TOPROL-XL) 100 MG 24 hr tablet, Take 1 tablet by mouth Daily., Disp: 90 tablet, Rfl: 1  •  sulfaSALAzine (AZULFIDINE) 500 MG tablet, Take 3 tablets by mouth 2 (Two) Times a  Day., Disp: 540 tablet, Rfl: 1  •  triamterene-hydrochlorothiazide (MAXZIDE-25) 37.5-25 MG per tablet, Take 1 tablet by mouth Daily., Disp: 90 tablet, Rfl: 1  No current facility-administered medications for this visit.    Facility-Administered Medications Ordered in Other Visits:   •  mupirocin (BACTROBAN) 2 % nasal ointment, , Nasal, BID, Nabor Correia MD.  she denies medication side effects.    All of the other chronic condition(s) listed above are stable w/o issues.    There were no vitals taken for this visit.    Results for orders placed or performed during the hospital encounter of 04/29/21   Calcium, Ionized    Specimen: Blood   Result Value Ref Range    Ionized Calcium 1.23 1.15 - 1.35 mmol/L    Ionized Calcium 4.9 4.6 - 5.4 mg/dL   Calcium, Ionized    Specimen: Hand, Left; Blood   Result Value Ref Range    Ionized Calcium 1.19 1.15 - 1.35 mmol/L    Ionized Calcium 4.8 4.6 - 5.4 mg/dL   POC Glucose Once    Specimen: Blood   Result Value Ref Range    Glucose 182 (H) 70 - 130 mg/dL   Tissue Pathology Exam    Specimen: Thyroid; Tissue   Result Value Ref Range    Case Report       Surgical Pathology Report                         Case: [id number]                                  Authorizing Provider:  Kimo Carter MD     Collected:           04/29/2021 10:16 AM          Ordering Location:     [facility]  Received:            04/29/2021 01:18 PM                                 [facility]                                                                       Pathologist:           Arthur Wooten MD                                                         Specimen:    Thyroid, left completion thyroid                                                           Final Diagnosis       1. Left Completion Thyroidectomy:     A. Adenomatoid hyperplasia and lymphocytic thyroiditis with features suggesting Hashimoto's thyroiditis.   B. Focally loosely encapsuled mixed macrofollicular and microfollicular  "adenoma, 2.0 cm.   C. No parathyroid tissue nor lymph node structures identified.   D. No malignancy identified (see comment).   E. No lymphovascular space invasion nor involvement of strap musculature by neoplasm identified.   F. Focal interstitial fibrosis and hemosiderin-laden macrophages noted suggesting possible previous                    biopsy effect.    Jat/s      Comment       Reviewed sections show a loosely encapsulated relatively well-defined microfollicular and macrofollicular proliferation consistent with adenoma arising in a background of adenomatoid hyperplasia with changes suggesting Hashimoto's thyroiditis.  No malignancy is seen in the reviewed sections.  The previously reported tumor staging: pT1a, N0a remains unchanged (See [id number]).     Jat/s      Gross Description       1. Received in formalin labeled \"left completion thyroid\" is a 10 gram, 4.5 x 2.6 x 1.6 cm left thyroid lobectomy with a 2.0 x 1.5 x 0.6 cm isthmus. The capsule is smooth to shaggy tan red and focally cauterized. The specimen is differentially inked as follows: isthmus margin - yellow, posterior - black, anterior - blue. The lobe is serially sectioned from superior to inferior to reveal two solid pale pink nodules measuring 1.8 x 0.9 x 0.6 cm and 1.6 x 1.5 x 1.1 cm.  The larger is in the superior pole apex and appears well defined and loosely encapsulated.  The smaller nodule is in the mid pole region and appears un-encapsulated. The smaller nodule is 1 cm inferior to to the larger nodule and the remaining parenchyma is beefy red and grossly unremarkable. Both nodules are entirely submitted and representative sections are submitted submitted as follows:     1A: isthmus margin en face  1B: superior pole apex perpendicular to the larger nodule  1C: remainder of the larger nodule, superior pole  1D: normal parenchyma adjacent to nodules   1E: smaller nodule in its entirety, mid pole   1F: normal parenchyma, inferior pole "   1G: inferior pole base perpendicular     jap/uso/mec/pkm              The following portions of the patient's history were reviewed and updated as appropriate: allergies, current medications, past family history, past medical history, past social history, past surgical history and problem list.    Review of Systems   Constitutional: Negative for activity change, chills and fever.   Respiratory: Negative for cough.    Cardiovascular: Negative for chest pain.   Psychiatric/Behavioral: Negative for dysphoric mood.       Objective   Physical Exam  Constitutional:       General: She is not in acute distress.     Appearance: She is well-developed.   Pulmonary:      Effort: Pulmonary effort is normal.   Neurological:      Mental Status: She is alert and oriented to person, place, and time.   Psychiatric:         Behavior: Behavior normal.         Thought Content: Thought content normal.         Assessment/Plan   Diagnoses and all orders for this visit:    1. Anxiety (Primary)  -     ALPRAZolam (XANAX) 0.5 MG tablet; Take 1 tablet by mouth Daily As Needed for Anxiety.  Dispense: 30 tablet; Refill: 2    2. Major depressive disorder, recurrent, in full remission (CMS/HCC)  -     FLUoxetine (PROzac) 20 MG capsule; Take 3 capsules by mouth Every Night.  Dispense: 270 capsule; Refill: 1    3. Cervicalgia  -     baclofen (LIORESAL) 10 MG tablet; Take 1 tablet by mouth Every Night.  Dispense: 90 tablet; Refill: 1    4. Rheumatoid arthritis of multiple sites without organ or system involvement with positive rheumatoid factor (CMS/[facility])  -     folic acid (FOLVITE) 1 MG tablet; Take 1 tablet by mouth Daily.  Dispense: 90 tablet; Refill: 1  -     sulfaSALAzine (AZULFIDINE) 500 MG tablet; Take 3 tablets by mouth 2 (Two) Times a Day.  Dispense: 540 tablet; Refill: 1    5. Benign essential hypertension  -     losartan (COZAAR) 50 MG tablet; Take 1 tablet by mouth Daily.  Dispense: 90 tablet; Refill: 1  -     metoprolol succinate XL  (TOPROL-XL) 100 MG 24 hr tablet; Take 1 tablet by mouth Daily.  Dispense: 90 tablet; Refill: 1  -     triamterene-hydrochlorothiazide (MAXZIDE-25) 37.5-25 MG per tablet; Take 1 tablet by mouth Daily.  Dispense: 90 tablet; Refill: 1    6. Hypothyroidism due to Hashimoto's thyroiditis  -     TSH  -     T4, Free  -     T3, Free    Spent  15   minutes with chart and interview and consent for this encounter given by the patient.  You have chosen to receive care through a telehealth visit.  Do you consent to use a video/audio connection for your medical care today? Yes

## 2021-07-08 ENCOUNTER — PRIOR AUTHORIZATION (OUTPATIENT)
Dept: FAMILY MEDICINE CLINIC | Facility: CLINIC | Age: 61
End: 2021-07-08

## 2021-08-31 ENCOUNTER — TELEPHONE (OUTPATIENT)
Dept: FAMILY MEDICINE CLINIC | Facility: CLINIC | Age: 61
End: 2021-08-31

## 2021-08-31 DIAGNOSIS — D22.9 CHANGE IN MOLE: Primary | ICD-10-CM

## 2021-08-31 NOTE — TELEPHONE ENCOUNTER
----- Message from Pratibha Pascal sent at 8/30/2021  2:12 PM EDT -----  Regarding: RE: Referral Request  Contact: [phone]  I have a strange mole like bubble on my buttock that is getting larger. It's very soft and pliable.     ----- Message -----  From: RACHAEL SANCHEZ  Sent: 8/30/21 12:15 PM  To: Pratibha Pascal  Subject: RE: Referral Request    What are you needing the referral for.       ----- Message -----       From:Pratibha Pascal       Sent:8/30/2021 11:34 AM EDT         To:Benitez Wu MD    Subject:Referral Request    Could I please get a referral to [facility], [address], [facility], [place] [address], [phone]

## 2021-10-26 DIAGNOSIS — F41.9 ANXIETY: Chronic | ICD-10-CM

## 2021-10-27 RX ORDER — ALPRAZOLAM 0.5 MG/1
TABLET ORAL
Qty: 14 TABLET | Refills: 0 | Status: SHIPPED | OUTPATIENT
Start: 2021-10-27 | End: 2021-11-01 | Stop reason: SDUPTHER

## 2021-11-01 RX ORDER — DULAGLUTIDE 3 MG/.5ML
INJECTION, SOLUTION SUBCUTANEOUS
COMMUNITY
Start: 2021-08-09

## 2021-11-02 ENCOUNTER — TELEMEDICINE (OUTPATIENT)
Dept: FAMILY MEDICINE CLINIC | Facility: CLINIC | Age: 61
End: 2021-11-02

## 2021-11-02 VITALS
HEIGHT: 72 IN | SYSTOLIC BLOOD PRESSURE: 117 MMHG | DIASTOLIC BLOOD PRESSURE: 66 MMHG | BODY MASS INDEX: 39.68 KG/M2 | WEIGHT: 293 LBS

## 2021-11-02 DIAGNOSIS — F41.9 ANXIETY: Primary | Chronic | ICD-10-CM

## 2021-11-02 PROCEDURE — 99213 OFFICE O/P EST LOW 20 MIN: CPT | Performed by: FAMILY MEDICINE

## 2021-11-02 RX ORDER — BLOOD GLUCOSE CONTROL HIGH,LOW
EACH MISCELLANEOUS
COMMUNITY
Start: 2021-09-02

## 2021-11-02 RX ORDER — ALPRAZOLAM 0.5 MG/1
0.5 TABLET ORAL DAILY PRN
Qty: 90 TABLET | Refills: 0 | Status: SHIPPED | OUTPATIENT
Start: 2021-11-02 | End: 2022-02-21 | Stop reason: SDUPTHER

## 2021-11-02 NOTE — PROGRESS NOTES
Subjective   Pratibha Pascal is a 61 y.o. female.     CC: Video Visit for Medical Management    History of Present Illness     Chief Complaint:   Chief Complaint   Patient presents with   • Anxiety       Pratibha Pascal 61 y.o. female who presents today for Medical Management of the below listed issues and medication refills. She  has a problem list of   Patient Active Problem List   Diagnosis   • CAD (coronary artery disease)   • Mixed hyperlipidemia   • Benign essential hypertension   • Hypothyroidism   • Major depressive disorder, recurrent, in full remission ([facility])   • Rheumatoid arthritis ([facility])   • Cervicalgia   • Diabetes mellitus ([facility])   • Dyslipidemia   • BP (high blood pressure)   • Obstructive apnea   • AF (paroxysmal atrial fibrillation) ([facility])   • Proteinuria   • Burn   • Cellulitis of left leg   • Diabetes mellitus with neurological manifestation ([facility])   • MSSA (methicillin susceptible Staphylococcus aureus) infection   • Diabetic ulcer of left foot associated with diabetes mellitus due to underlying condition ([facility])   • Morbid obesity ([facility])   • Primary insomnia   • Anxiety   • Immunodeficiency, unspecified ([facility])   • Long term current use of non-steroidal anti-inflammatories (NSAID)   • Personal history of immunosupression therapy   • Primary generalized (osteo)arthritis   • Raised antibody titer   • Rheumatoid arthritis of multiple sites without organ or system involvement with positive rheumatoid factor ([facility])   • Other long term (current) drug therapy   • Uncontrolled type 2 diabetes mellitus with hyperglycemia ([facility])   • Vitamin D deficiency, unspecified   • Hypercalcemia   • Primary osteoarthritis of left knee   • OA (osteoarthritis) of knee   • Primary osteoarthritis of right knee   • Chronic fatigue   • Chronic pain of left knee   • History of coronary artery stent placement   • Multiple thyroid nodules   • Thyroid nodule   .  Since the last visit, She has overall felt well.  she has been compliant with    Current Outpatient Medications:   •  ALPRAZolam (XANAX) 0.5 MG tablet, Take 1 tablet by mouth Daily As Needed for Anxiety., Disp: 90 tablet, Rfl: 0  •  ACCU-CHEK GUIDE test strip, , Disp: , Rfl:   •  atorvastatin (LIPITOR) 10 MG tablet, Take 0.5 tablets by mouth Daily., Disp: 45 tablet, Rfl: 1  •  baclofen (LIORESAL) 10 MG tablet, Take 1 tablet by mouth Every Night., Disp: 90 tablet, Rfl: 1  •  Blood Glucose Calibration (Accu-Chek Guide Control) liquid, , Disp: , Rfl:   •  dapagliflozin-metformin HCl ER (XIGDUO XR) 5-1000 MG tablet, Take 2 tablets by mouth Daily. (Patient taking differently: Take 2 tablets by mouth Daily. PATIENT TO CONSULT WITH DR. NATALEE DELACRUZ, ENDOCRINOLOGIST REGARDING MEDICATIONS PRIOR TO SURGERY), Disp: 60 tablet, Rfl: 5  •  FLUoxetine (PROzac) 20 MG capsule, Take 3 capsules by mouth Every Night., Disp: 270 capsule, Rfl: 1  •  folic acid (FOLVITE) 1 MG tablet, Take 1 tablet by mouth Daily., Disp: 90 tablet, Rfl: 1  •  levothyroxine (SYNTHROID, LEVOTHROID) 175 MCG tablet, Take 175 mcg by mouth Daily., Disp: , Rfl:   •  losartan (COZAAR) 50 MG tablet, Take 1 tablet by mouth Daily., Disp: 90 tablet, Rfl: 1  •  metoprolol succinate XL (TOPROL-XL) 100 MG 24 hr tablet, Take 1 tablet by mouth Daily., Disp: 90 tablet, Rfl: 1  •  ONETOUCH DELICA LANCETS 33G misc, Test 3 times daily (Patient taking differently: 1 each. Test 3 times daily), Disp: 100 each, Rfl: 11  •  sulfaSALAzine (AZULFIDINE) 500 MG tablet, Take 3 tablets by mouth 2 (Two) Times a Day., Disp: 540 tablet, Rfl: 1  •  triamterene-hydrochlorothiazide (MAXZIDE-25) 37.5-25 MG per tablet, Take 1 tablet by mouth Daily., Disp: 90 tablet, Rfl: 1  •  TRULICITY 1.5 MG/0.5ML solution pen-injector, Inject 1.5 mg under the skin into the appropriate area as directed 1 (One) Time Per Week. (Patient taking differently: Inject 3 mg under the skin into the appropriate area as directed 1 (One) Time Per Week. SATURDAY), Disp: 12 pen, Rfl: 1  •  Trulicity  "3 MG/0.5ML solution pen-injector, INJECT 3 MG SUBCUTANEOUSLY ONCE A WEEK, Disp: , Rfl:   •  vitamin D (ERGOCALCIFEROL) 1.25 MG (83176 UT) capsule capsule, Take 50,000 Units by mouth Daily (Monday-Friday)., Disp: , Rfl:   No current facility-administered medications for this visit.    Facility-Administered Medications Ordered in Other Visits:   •  mupirocin (BACTROBAN) 2 % nasal ointment, , Nasal, BID, Nabor Correia MD.  She denies medication side effects.    All of the other chronic condition(s) listed above are stable w/o issues.    /66 Comment: VIDEO VISIT  Ht 182.9 cm (72\")   Wt 133 kg (294 lb)   BMI 39.87 kg/m²     Results for orders placed or performed during the hospital encounter of 04/29/21   Calcium, Ionized    Specimen: Blood   Result Value Ref Range    Ionized Calcium 1.23 1.15 - 1.35 mmol/L    Ionized Calcium 4.9 4.6 - 5.4 mg/dL   Calcium, Ionized    Specimen: Hand, Left; Blood   Result Value Ref Range    Ionized Calcium 1.19 1.15 - 1.35 mmol/L    Ionized Calcium 4.8 4.6 - 5.4 mg/dL   POC Glucose Once    Specimen: Blood   Result Value Ref Range    Glucose 182 (H) 70 - 130 mg/dL   Tissue Pathology Exam    Specimen: Thyroid; Tissue   Result Value Ref Range    Case Report       Surgical Pathology Report                         Case: [id number]                                  Authorizing Provider:  Kimo Carter MD     Collected:           04/29/2021 10:16 AM          Ordering Location:     [facility]  Received:            04/29/2021 01:18 PM                                 [facility] OR                                                                       Pathologist:           Arthur Wooten MD                                                         Specimen:    Thyroid, left completion thyroid                                                           Final Diagnosis       1. Left Completion Thyroidectomy:     A. Adenomatoid hyperplasia and lymphocytic thyroiditis with features " "suggesting Hashimoto's thyroiditis.   B. Focally loosely encapsuled mixed macrofollicular and microfollicular adenoma, 2.0 cm.   C. No parathyroid tissue nor lymph node structures identified.   D. No malignancy identified (see comment).   E. No lymphovascular space invasion nor involvement of strap musculature by neoplasm identified.   F. Focal interstitial fibrosis and hemosiderin-laden macrophages noted suggesting possible previous                    biopsy effect.    Jat/s      Comment       Reviewed sections show a loosely encapsulated relatively well-defined microfollicular and macrofollicular proliferation consistent with adenoma arising in a background of adenomatoid hyperplasia with changes suggesting Hashimoto's thyroiditis.  No malignancy is seen in the reviewed sections.  The previously reported tumor staging: pT1a, N0a remains unchanged (See [id number]).     t/s      Gross Description       1. Received in formalin labeled \"left completion thyroid\" is a 10 gram, 4.5 x 2.6 x 1.6 cm left thyroid lobectomy with a 2.0 x 1.5 x 0.6 cm isthmus. The capsule is smooth to shaggy tan red and focally cauterized. The specimen is differentially inked as follows: isthmus margin - yellow, posterior - black, anterior - blue. The lobe is serially sectioned from superior to inferior to reveal two solid pale pink nodules measuring 1.8 x 0.9 x 0.6 cm and 1.6 x 1.5 x 1.1 cm.  The larger is in the superior pole apex and appears well defined and loosely encapsulated.  The smaller nodule is in the mid pole region and appears un-encapsulated. The smaller nodule is 1 cm inferior to to the larger nodule and the remaining parenchyma is beefy red and grossly unremarkable. Both nodules are entirely submitted and representative sections are submitted submitted as follows:     1A: isthmus margin en face  1B: superior pole apex perpendicular to the larger nodule  1C: remainder of the larger nodule, superior pole  1D: normal " parenchyma adjacent to nodules   1E: smaller nodule in its entirety, mid pole   1F: normal parenchyma, inferior pole   1G: inferior pole base perpendicular     jap/uso/mec/pkm                The following portions of the patient's history were reviewed and updated as appropriate: allergies, current medications, past family history, past medical history, past social history, past surgical history, and problem list.    Review of Systems   Constitutional: Negative for activity change, chills and fever.   Respiratory: Negative for cough.    Cardiovascular: Negative for chest pain.   Psychiatric/Behavioral: Negative for dysphoric mood.       Objective   Physical Exam  Constitutional:       General: She is not in acute distress.     Appearance: She is well-developed.   Pulmonary:      Effort: Pulmonary effort is normal.   Neurological:      Mental Status: She is alert and oriented to person, place, and time.   Psychiatric:         Behavior: Behavior normal.         Thought Content: Thought content normal.           Assessment/Plan   Diagnoses and all orders for this visit:    1. Anxiety (Primary)  -     ALPRAZolam (XANAX) 0.5 MG tablet; Take 1 tablet by mouth Daily As Needed for Anxiety.  Dispense: 90 tablet; Refill: 0    Spent  8   minutes with chart and interview and consent for this encounter given by the patient.  You have chosen to receive care through a telehealth visit.  Do you consent to use a video/audio connection for your medical care today? Yes

## 2021-11-17 ENCOUNTER — PATIENT MESSAGE (OUTPATIENT)
Dept: FAMILY MEDICINE CLINIC | Facility: CLINIC | Age: 61
End: 2021-11-17

## 2021-12-21 ENCOUNTER — OFFICE VISIT (OUTPATIENT)
Dept: CARDIOLOGY | Facility: CLINIC | Age: 61
End: 2021-12-21

## 2021-12-21 VITALS
DIASTOLIC BLOOD PRESSURE: 84 MMHG | SYSTOLIC BLOOD PRESSURE: 128 MMHG | WEIGHT: 290.8 LBS | HEIGHT: 72 IN | BODY MASS INDEX: 39.39 KG/M2 | OXYGEN SATURATION: 95 % | HEART RATE: 82 BPM

## 2021-12-21 DIAGNOSIS — I25.10 CORONARY ARTERY DISEASE INVOLVING NATIVE CORONARY ARTERY OF NATIVE HEART WITHOUT ANGINA PECTORIS: Primary | Chronic | ICD-10-CM

## 2021-12-21 DIAGNOSIS — Z95.5 HISTORY OF CORONARY ARTERY STENT PLACEMENT: ICD-10-CM

## 2021-12-21 DIAGNOSIS — I10 BENIGN ESSENTIAL HYPERTENSION: ICD-10-CM

## 2021-12-21 DIAGNOSIS — E11.59 TYPE 2 DIABETES MELLITUS WITH OTHER CIRCULATORY COMPLICATION, WITHOUT LONG-TERM CURRENT USE OF INSULIN (HCC): ICD-10-CM

## 2021-12-21 DIAGNOSIS — E78.2 MIXED HYPERLIPIDEMIA: Chronic | ICD-10-CM

## 2021-12-21 DIAGNOSIS — G47.33 OBSTRUCTIVE APNEA: Chronic | ICD-10-CM

## 2021-12-21 DIAGNOSIS — E78.5 DYSLIPIDEMIA: ICD-10-CM

## 2021-12-21 PROCEDURE — 99214 OFFICE O/P EST MOD 30 MIN: CPT | Performed by: INTERNAL MEDICINE

## 2021-12-21 PROCEDURE — 93000 ELECTROCARDIOGRAM COMPLETE: CPT | Performed by: INTERNAL MEDICINE

## 2021-12-21 RX ORDER — LEVOTHYROXINE SODIUM 200 UG/1
200 TABLET ORAL DAILY
COMMUNITY
Start: 2021-11-23 | End: 2022-07-28

## 2021-12-21 NOTE — PROGRESS NOTES
Subjective:     Encounter Date:12/21/2021      Patient ID: Pratibha Pascal is a 61 y.o. female.    Chief Complaint:  History of Present Illness    This is a 61-year-old with a history of hypertension, rheumatoid arthritis, hyperlipidemia, diabetes mellitus type 2, obstructive sleep apnea, coronary artery disease status post anterior myocardial infarction and drug-eluting stent placement of the mid LAD, obstructive sleep apnea, who presents for follow-up.     She presents today for annual follow-up. She denies any shortness of breath, orthopnea, near-syncope or syncope, or lower extremity edema. Continues to have intermittent episodes of sharp chest discomfort which is chronic and unchanged. She also reports palpitations or feels like her heart is flipping or fluttering when she is feeling anxious or stressed. She continues to have chronic balance issues and uses a cane to ambulate.      Prior History:  I saw the patient initially in 6/2014 when she presented with an anterior myocardial infarction.  The patient called EMS due to chest pain at home.  EKGs performed in the field showed evidence of an anterior myocardial infarction.  Following her arrival to the emergency room she did have an episode of ventricular fibrillation requiring defibrillation and was immediately resuscitated.  She is brought emergently to the cardiac catheterization laboratory where she was found to have 100% thrombotic occlusion of her mid left anterior descending artery for which she underwent PCI and drug-eluting stent placement with a Xience expedition 3.5 x 18 mm stent which was postdilated with a 4.0 x 15 mm balloon.  Remaining coronary arteries showed mild nonobstructive disease.  She did have an episode of atrial fibrillation with rapid ventricular rate that eventually converted to sinus rhythm.  She has had no further episodes of atrial fibrillation since then.  Her initial echocardiogram did show evidence of depressed left  ventricular systolic function with apical wall motion abnormalities.  I saw the patient last in 7/2015 for routine follow-up.  At that time I stopped her ticagrelor and kept her on aspirin.     The patient was lost to follow-up and not seen back in the office until 8/2019 when she presented to the cardiac evaluation center for cardiac clearance for knee replacement.  She was having a lot of issues with easy fatigability and some indigestion type discomfort.  Stress test was recommended which showed evidence of anterior infarct with moderate jamil-infarct ischemia and an EF of 55%.  Based on those findings we proceeded with a cardiac catheterization on 9/3/2019 which showed mild nonobstructive coronary artery disease and a patent mid LAD stent and a normal-appearing left ventricular systolic function with anterior hypokinesis and an EF of 50 to 55%.     She was last seen in the office by KALI Pantoja in 6/2020 for preoperative evaluation before thyroid surgery.  At that time she was not having any new complaints and she was cleared to proceed with surgery.    Review of Systems   Constitutional: Negative for malaise/fatigue.   HENT: Negative for hearing loss, hoarse voice, nosebleeds and sore throat.    Eyes: Negative for pain.   Cardiovascular: Negative for chest pain, claudication, cyanosis, dyspnea on exertion, irregular heartbeat, leg swelling, near-syncope, orthopnea, palpitations, paroxysmal nocturnal dyspnea and syncope.   Respiratory: Negative for shortness of breath and snoring.    Endocrine: Negative for cold intolerance, heat intolerance, polydipsia, polyphagia and polyuria.   Skin: Negative for itching and rash.   Musculoskeletal: Negative for arthritis, falls, joint pain, joint swelling, muscle cramps, muscle weakness and myalgias.   Gastrointestinal: Negative for constipation, diarrhea, dysphagia, heartburn, hematemesis, hematochezia, melena, nausea and vomiting.   Genitourinary: Negative for  frequency, hematuria and hesitancy.   Neurological: Positive for loss of balance. Negative for excessive daytime sleepiness, dizziness, headaches, light-headedness, numbness and weakness.   Psychiatric/Behavioral: Negative for depression. The patient is not nervous/anxious.          Current Outpatient Medications:   •  ACCU-CHEK GUIDE test strip, , Disp: , Rfl:   •  ALPRAZolam (XANAX) 0.5 MG tablet, Take 1 tablet by mouth Daily As Needed for Anxiety., Disp: 90 tablet, Rfl: 0  •  atorvastatin (LIPITOR) 10 MG tablet, Take 0.5 tablets by mouth Daily., Disp: 45 tablet, Rfl: 1  •  baclofen (LIORESAL) 10 MG tablet, Take 1 tablet by mouth Every Night., Disp: 90 tablet, Rfl: 1  •  Blood Glucose Calibration (Accu-Chek Guide Control) liquid, , Disp: , Rfl:   •  dapagliflozin-metformin HCl ER (XIGDUO XR) 5-1000 MG tablet, Take 2 tablets by mouth Daily. (Patient taking differently: Take 2 tablets by mouth Daily. PATIENT TO CONSULT WITH DR. NATALEE DELACRUZ, ENDOCRINOLOGIST REGARDING MEDICATIONS PRIOR TO SURGERY), Disp: 60 tablet, Rfl: 5  •  Euthyrox 200 MCG tablet, Take 200 mcg by mouth Daily., Disp: , Rfl:   •  FLUoxetine (PROzac) 20 MG capsule, Take 3 capsules by mouth Every Night., Disp: 270 capsule, Rfl: 1  •  folic acid (FOLVITE) 1 MG tablet, Take 1 tablet by mouth Daily., Disp: 90 tablet, Rfl: 1  •  levothyroxine (SYNTHROID, LEVOTHROID) 175 MCG tablet, Take 175 mcg by mouth Daily., Disp: , Rfl:   •  losartan (COZAAR) 50 MG tablet, Take 1 tablet by mouth Daily., Disp: 90 tablet, Rfl: 1  •  metoprolol succinate XL (TOPROL-XL) 100 MG 24 hr tablet, Take 1 tablet by mouth Daily., Disp: 90 tablet, Rfl: 1  •  ONETOUCH DELICA LANCETS 33G misc, Test 3 times daily (Patient taking differently: 1 each. Test 3 times daily), Disp: 100 each, Rfl: 11  •  sulfaSALAzine (AZULFIDINE) 500 MG tablet, Take 3 tablets by mouth 2 (Two) Times a Day., Disp: 540 tablet, Rfl: 1  •  triamterene-hydrochlorothiazide (MAXZIDE-25) 37.5-25 MG per tablet,  Take 1 tablet by mouth Daily., Disp: 90 tablet, Rfl: 1  •  Trulicity 3 MG/0.5ML solution pen-injector, INJECT 3 MG SUBCUTANEOUSLY ONCE A WEEK, Disp: , Rfl:   •  vitamin D (ERGOCALCIFEROL) 1.25 MG (17961 UT) capsule capsule, Take 50,000 Units by mouth Daily (Monday-Friday)., Disp: , Rfl:   No current facility-administered medications for this visit.    Facility-Administered Medications Ordered in Other Visits:   •  mupirocin (BACTROBAN) 2 % nasal ointment, , Nasal, BID, Nabor Correia MD    Past Medical History:   Diagnosis Date   • Allergy to mold    • Anxiety    • Arthritis    • Benign essential hypertension    • CAD (coronary artery disease)    • Coronary artery disease    • Depression    • Diabetes mellitus (HCC)    • Fibromyalgia, primary    • H/O bone density study unclear   • H/O complete eye exam 2 -3 years   • Headache    • History of myocardial infarction 2014   • History of staph infection     HISTORY OF MSSA IN LEFT FOOT FROM DIABETIC ULCER HEALED   • HL (hearing loss)     RIGHT EAR  SEVERE HEARING LOSS   • HLD (hyperlipidemia)    • Hyperlipidemia    • Hypertension    • Hypothyroidism    • Hypothyroidism    • Injury of back    • Major depressive disorder, recurrent, in full remission (HCC)    • OA (osteoarthritis) of knee    • Obesity    • BRANNON (obstructive sleep apnea)     USE CPAP   • Primary generalized (osteo)arthritis    • Primary osteoarthritis of left knee    • Primary osteoarthritis of right knee    • Rheumatoid arthritis (HCC)    • Rheumatoid arthritis (HCC)    • Rheumatoid arthritis of multiple sites without organ or system involvement with positive rheumatoid factor (HCC)    • Seasonal allergies    • Tobacco abuse 06/27/2014    QUIT    • Type 2 diabetes mellitus (HCC)        Past Surgical History:   Procedure Laterality Date   • BACK SURGERY  10/1995    also 2/02; L5-S1; Dr. Carroll Avitia   • CARDIAC CATHETERIZATION     • CARDIAC CATHETERIZATION N/A 9/3/2019    Procedure: Coronary angiography   ;  Surgeon: Magy Rivera MD;  Location:  [facility];  Service: Cardiovascular   • CARDIAC CATHETERIZATION N/A 9/3/2019    Procedure: Left Heart Cath;  Surgeon: Magy Rivera MD;  Location:  [facility];  Service: Cardiovascular   • CARDIAC CATHETERIZATION N/A 9/3/2019    Procedure: Left ventriculography;  Surgeon: Magy Rivera MD;  Location:  [facility];  Service: Cardiovascular   • COLONOSCOPY     • CORONARY STENT PLACEMENT  06/2014   • JOINT REPLACEMENT     • KNEE ARTHROSCOPY Right 2010   • MAMMO BILATERAL  due    delcines   • PAP SMEAR  due   • THYROIDECTOMY Right 7/9/2020    Procedure: RIGHT THYROID LOBECTOMY AND RIGHT PARATHYROID EXPLORATION;  Surgeon: Kimo Carter MD;  Location:  [facility];  Service: ENT;  Laterality: Right;   • THYROIDECTOMY Left 4/29/2021    Procedure: COMPLETION THYROIDECTOMY;  Surgeon: Kimo Carter MD;  Location:  [facility];  Service: ENT;  Laterality: Left;   • TONSILLECTOMY      age 26   • TOTAL KNEE ARTHROPLASTY Left 10/14/2019    Procedure: TOTAL KNEE ARTHROPLASTY;  Surgeon: Nabor Correia MD;  [facility];  Service: Orthopedics   • TOTAL KNEE ARTHROPLASTY Right 2/12/2020    Procedure: TOTAL KNEE ARTHROPLASTY;  Surgeon: Nabor Correia MD;  Location: [facility] MA[facility] OR;  Service: Orthopedics;  Laterality: Right;   • US GUIDED FINE NEEDLE ASPIRATION  12/6/2019       Family History   Problem Relation Age of Onset   • Cancer Mother         breast   • Hypertension Mother    • Rheum arthritis Mother    • Thyroid disease Mother    • Arthritis Mother    • Hypertension Father    • Thyroid disease Father    • COPD Father    • Depression Father    • Hearing loss Father    • Heart failure Maternal Grandmother    • Rheum arthritis Other         aunt   • Depression Daughter    • Drug abuse Daughter    • Malig Hyperthermia Neg Hx        Social History     Tobacco Use   • Smoking status: Former Smoker      "Packs/day: 1.00     Years: 30.00     Pack years: 30.00     Types: Cigarettes     Quit date: 2014     Years since quittin.4   • Smokeless tobacco: Never Used   • Tobacco comment: quit 2014   Substance Use Topics   • Alcohol use: No   • Drug use: No         ECG 12 Lead    Date/Time: 2021 4:53 PM  Performed by: Magy Rivera MD  Authorized by: Magy Rivera MD   Comparison: compared with previous ECG   Similar to previous ECG  Rhythm: sinus rhythm               Objective:     Visit Vitals  /84   Pulse 82   Ht 182.9 cm (72\")   Wt 132 kg (290 lb 12.8 oz)   SpO2 95%   BMI 39.44 kg/m²         Constitutional:       Appearance: Normal appearance. Well-developed.   Eyes:      General: Lids are normal.      Conjunctiva/sclera: Conjunctivae normal.      Pupils: Pupils are equal, round, and reactive to light.   HENT:      Head: Normocephalic and atraumatic.   Neck:      Vascular: No carotid bruit or JVD.      Lymphadenopathy: No cervical adenopathy.   Pulmonary:      Effort: Pulmonary effort is normal.      Breath sounds: Normal breath sounds.   Cardiovascular:      Normal rate. Regular rhythm.      No gallop.   Pulses:     Radial: 2+ bilaterally.  Edema:     Peripheral edema absent.   Abdominal:      Palpations: Abdomen is soft.   Musculoskeletal:      Cervical back: Full passive range of motion without pain, normal range of motion and neck supple. Skin:     General: Skin is warm and dry.   Neurological:      Mental Status: Alert and oriented to person, place, and time.             Assessment:          Diagnosis Plan   1. Coronary artery disease involving native coronary artery of native heart without angina pectoris     2. History of coronary artery stent placement     3. Mixed hyperlipidemia     4. Benign essential hypertension     5. Dyslipidemia     6. Type 2 diabetes mellitus with other circulatory complication, without long-term current use of insulin (HCC)     7. Obstructive apnea          "   Plan:       1. Coronary artery disease. Appears to be largely stable and asymptomatic. EKG shows no acute changes. Continue medical management.  2. Hypertension. Well-controlled on her current regimen medications. Continue the same.  3. Hyperlipidemia. On atorvastatin. Recent lipid panel showed that her LDL was at goal around 40.  4. Diabetes mellitus type 2  5. Obstructive sleep apnea    I will plan on seeing the patient back again in 1 year or sooner if further issues arise.

## 2022-01-19 ENCOUNTER — PRIOR AUTHORIZATION (OUTPATIENT)
Dept: FAMILY MEDICINE CLINIC | Facility: CLINIC | Age: 62
End: 2022-01-19

## 2022-01-19 NOTE — TELEPHONE ENCOUNTER
The request has been approved. The authorization is effective for a maximum of 6 fills from 01/19/2022 to 07/18/2022, as long as the member is enrolled in their current health plan

## 2022-02-12 DIAGNOSIS — M05.79 RHEUMATOID ARTHRITIS OF MULTIPLE SITES WITHOUT ORGAN OR SYSTEM INVOLVEMENT WITH POSITIVE RHEUMATOID FACTOR: Chronic | ICD-10-CM

## 2022-02-14 RX ORDER — SULFASALAZINE 500 MG/1
TABLET ORAL
Qty: 540 TABLET | Refills: 0 | Status: SHIPPED | OUTPATIENT
Start: 2022-02-14 | End: 2022-02-21 | Stop reason: SDUPTHER

## 2022-02-22 ENCOUNTER — OFFICE VISIT (OUTPATIENT)
Dept: FAMILY MEDICINE CLINIC | Facility: CLINIC | Age: 62
End: 2022-02-22

## 2022-02-22 VITALS
WEIGHT: 290 LBS | HEIGHT: 72 IN | TEMPERATURE: 97.1 F | DIASTOLIC BLOOD PRESSURE: 65 MMHG | SYSTOLIC BLOOD PRESSURE: 122 MMHG | BODY MASS INDEX: 39.28 KG/M2 | RESPIRATION RATE: 16 BRPM | HEART RATE: 84 BPM

## 2022-02-22 DIAGNOSIS — I10 BENIGN ESSENTIAL HYPERTENSION: Primary | Chronic | ICD-10-CM

## 2022-02-22 DIAGNOSIS — M05.79 RHEUMATOID ARTHRITIS OF MULTIPLE SITES WITHOUT ORGAN OR SYSTEM INVOLVEMENT WITH POSITIVE RHEUMATOID FACTOR: Chronic | ICD-10-CM

## 2022-02-22 DIAGNOSIS — M54.2 CERVICALGIA: Chronic | ICD-10-CM

## 2022-02-22 DIAGNOSIS — F33.42 MAJOR DEPRESSIVE DISORDER, RECURRENT, IN FULL REMISSION: Chronic | ICD-10-CM

## 2022-02-22 DIAGNOSIS — F41.9 ANXIETY: Chronic | ICD-10-CM

## 2022-02-22 PROCEDURE — 99214 OFFICE O/P EST MOD 30 MIN: CPT | Performed by: FAMILY MEDICINE

## 2022-02-22 RX ORDER — TRIAMTERENE AND HYDROCHLOROTHIAZIDE 37.5; 25 MG/1; MG/1
1 TABLET ORAL DAILY
Qty: 90 TABLET | Refills: 1 | Status: SHIPPED | OUTPATIENT
Start: 2022-02-22 | End: 2022-07-27 | Stop reason: SDUPTHER

## 2022-02-22 RX ORDER — FLUOXETINE HYDROCHLORIDE 20 MG/1
60 CAPSULE ORAL NIGHTLY
Qty: 270 CAPSULE | Refills: 1 | Status: SHIPPED | OUTPATIENT
Start: 2022-02-22 | End: 2022-07-27 | Stop reason: SDUPTHER

## 2022-02-22 RX ORDER — FOLIC ACID 1 MG/1
1000 TABLET ORAL DAILY
Qty: 90 TABLET | Refills: 1 | Status: SHIPPED | OUTPATIENT
Start: 2022-02-22 | End: 2022-07-27 | Stop reason: SDUPTHER

## 2022-02-22 RX ORDER — LOSARTAN POTASSIUM 50 MG/1
50 TABLET ORAL DAILY
Qty: 90 TABLET | Refills: 1 | Status: SHIPPED | OUTPATIENT
Start: 2022-02-22 | End: 2022-07-27 | Stop reason: SDUPTHER

## 2022-02-22 RX ORDER — SULFASALAZINE 500 MG/1
1500 TABLET ORAL 2 TIMES DAILY
Qty: 540 TABLET | Refills: 1 | Status: SHIPPED | OUTPATIENT
Start: 2022-02-22 | End: 2022-07-27 | Stop reason: SDUPTHER

## 2022-02-22 RX ORDER — METOPROLOL SUCCINATE 100 MG/1
100 TABLET, EXTENDED RELEASE ORAL DAILY
Qty: 90 TABLET | Refills: 1 | Status: SHIPPED | OUTPATIENT
Start: 2022-02-22 | End: 2022-07-27 | Stop reason: SDUPTHER

## 2022-02-22 RX ORDER — ALPRAZOLAM 0.5 MG/1
0.5 TABLET ORAL DAILY PRN
Qty: 90 TABLET | Refills: 0 | Status: SHIPPED | OUTPATIENT
Start: 2022-02-22 | End: 2022-07-27 | Stop reason: SDUPTHER

## 2022-02-22 RX ORDER — BACLOFEN 10 MG/1
10 TABLET ORAL NIGHTLY
Qty: 90 TABLET | Refills: 1 | Status: SHIPPED | OUTPATIENT
Start: 2022-02-22 | End: 2022-07-27 | Stop reason: SDUPTHER

## 2022-02-22 NOTE — PROGRESS NOTES
Subjective   Pratibha Pascal is a 61 y.o. female.     History of Present Illness     Chief Complaint:   Chief Complaint   Patient presents with   • Hypertension     med refill due /    • Hyperlipidemia     [facility] per pt / no labs - meds reviewed with pt today    • Anxiety     diabetic eye exam due    • Arthritis       Pratibha Pascal 61 y.o. female who presents today for Medical Management of the below listed issues and medication refills. She  has a problem list of   Patient Active Problem List   Diagnosis   • CAD (coronary artery disease)   • Mixed hyperlipidemia   • Benign essential hypertension   • Hypothyroidism   • Major depressive disorder, recurrent, in full remission ([facility])   • Rheumatoid arthritis ([facility])   • Cervicalgia   • Diabetes mellitus ([facility])   • Dyslipidemia   • BP (high blood pressure)   • Obstructive apnea   • AF (paroxysmal atrial fibrillation) ([facility])   • Proteinuria   • Burn   • Cellulitis of left leg   • Diabetes mellitus with neurological manifestation ([facility])   • MSSA (methicillin susceptible Staphylococcus aureus) infection   • Diabetic ulcer of left foot associated with diabetes mellitus due to underlying condition ([facility])   • Morbid obesity ([facility])   • Primary insomnia   • Anxiety   • Immunodeficiency, unspecified ([facility])   • Long term current use of non-steroidal anti-inflammatories (NSAID)   • Personal history of immunosupression therapy   • Primary generalized (osteo)arthritis   • Raised antibody titer   • Rheumatoid arthritis of multiple sites without organ or system involvement with positive rheumatoid factor ([facility])   • Other long term (current) drug therapy   • Uncontrolled type 2 diabetes mellitus with hyperglycemia ([facility])   • Vitamin D deficiency, unspecified   • Hypercalcemia   • Primary osteoarthritis of left knee   • OA (osteoarthritis) of knee   • Primary osteoarthritis of right knee   • Chronic fatigue   • Chronic pain of left knee   • History of coronary artery stent placement   •  Multiple thyroid nodules   • Thyroid nodule   .  Since the last visit, She has overall felt well.  she has been compliant with   Current Outpatient Medications:   •  ACCU-CHEK GUIDE test strip, , Disp: , Rfl:   •  ALPRAZolam (XANAX) 0.5 MG tablet, Take 1 tablet by mouth Daily As Needed for Anxiety., Disp: 90 tablet, Rfl: 0  •  atorvastatin (LIPITOR) 10 MG tablet, Take 0.5 tablets by mouth Daily., Disp: 45 tablet, Rfl: 1  •  baclofen (LIORESAL) 10 MG tablet, Take 1 tablet by mouth Every Night., Disp: 90 tablet, Rfl: 1  •  Blood Glucose Calibration (Accu-Chek Guide Control) liquid, , Disp: , Rfl:   •  dapagliflozin-metformin HCl ER (XIGDUO XR) 5-1000 MG tablet, Take 2 tablets by mouth Daily. (Patient taking differently: Take 2 tablets by mouth Daily. PATIENT TO CONSULT WITH DR. NATALEE DELACRUZ, ENDOCRINOLOGIST REGARDING MEDICATIONS PRIOR TO SURGERY), Disp: 60 tablet, Rfl: 5  •  Euthyrox 200 MCG tablet, Take 200 mcg by mouth Daily., Disp: , Rfl:   •  FLUoxetine (PROzac) 20 MG capsule, Take 3 capsules by mouth Every Night., Disp: 270 capsule, Rfl: 1  •  folic acid (FOLVITE) 1 MG tablet, Take 1 tablet by mouth Daily., Disp: 90 tablet, Rfl: 1  •  losartan (COZAAR) 50 MG tablet, Take 1 tablet by mouth Daily., Disp: 90 tablet, Rfl: 1  •  metoprolol succinate XL (TOPROL-XL) 100 MG 24 hr tablet, Take 1 tablet by mouth Daily., Disp: 90 tablet, Rfl: 1  •  ONETOUCH DELICA LANCETS 33G misc, Test 3 times daily (Patient taking differently: 1 each. Test 3 times daily), Disp: 100 each, Rfl: 11  •  sulfaSALAzine (AZULFIDINE) 500 MG tablet, Take 3 tablets by mouth 2 (Two) Times a Day., Disp: 540 tablet, Rfl: 1  •  triamterene-hydrochlorothiazide (MAXZIDE-25) 37.5-25 MG per tablet, Take 1 tablet by mouth Daily., Disp: 90 tablet, Rfl: 1  •  Trulicity 3 MG/0.5ML solution pen-injector, INJECT 3 MG SUBCUTANEOUSLY ONCE A WEEK, Disp: , Rfl:   •  vitamin D (ERGOCALCIFEROL) 1.25 MG (88401 UT) capsule capsule, Take 50,000 Units by mouth Daily  "(Monday-Friday)., Disp: , Rfl:   No current facility-administered medications for this visit.    Facility-Administered Medications Ordered in Other Visits:   •  mupirocin (BACTROBAN) 2 % nasal ointment, , Nasal, BID, Nabor Correia MD.  She denies medication side effects.    All of the other chronic condition(s) listed above are stable w/o issues.    /65   Pulse 84   Temp 97.1 °F (36.2 °C) (Oral)   Resp 16   Ht 182.9 cm (72\")   Wt 132 kg (290 lb)   BMI 39.33 kg/m²     Results for orders placed or performed during the hospital encounter of 04/29/21   Calcium, Ionized    Specimen: Blood   Result Value Ref Range    Ionized Calcium 1.23 1.15 - 1.35 mmol/L    Ionized Calcium 4.9 4.6 - 5.4 mg/dL   Calcium, Ionized    Specimen: Hand, Left; Blood   Result Value Ref Range    Ionized Calcium 1.19 1.15 - 1.35 mmol/L    Ionized Calcium 4.8 4.6 - 5.4 mg/dL   POC Glucose Once    Specimen: Blood   Result Value Ref Range    Glucose 182 (H) 70 - 130 mg/dL   Tissue Pathology Exam    Specimen: Thyroid; Tissue   Result Value Ref Range    Case Report       Surgical Pathology Report                         Case: [id number]                                  Authorizing Provider:  Kimo Carter MD     Collected:           04/29/2021 10:16 AM          Ordering Location:     [facility]  Received:            04/29/2021 01:18 PM                                 [facility] OR                                                                       Pathologist:           Arthur Wooten MD                                                         Specimen:    Thyroid, left completion thyroid                                                           Final Diagnosis       1. Left Completion Thyroidectomy:     A. Adenomatoid hyperplasia and lymphocytic thyroiditis with features suggesting Hashimoto's thyroiditis.   B. Focally loosely encapsuled mixed macrofollicular and microfollicular adenoma, 2.0 cm.   C. No parathyroid " "tissue nor lymph node structures identified.   D. No malignancy identified (see comment).   E. No lymphovascular space invasion nor involvement of strap musculature by neoplasm identified.   F. Focal interstitial fibrosis and hemosiderin-laden macrophages noted suggesting possible previous                    biopsy effect.    Jat/s      Comment       Reviewed sections show a loosely encapsulated relatively well-defined microfollicular and macrofollicular proliferation consistent with adenoma arising in a background of adenomatoid hyperplasia with changes suggesting Hashimoto's thyroiditis.  No malignancy is seen in the reviewed sections.  The previously reported tumor staging: pT1a, N0a remains unchanged (See [id number]).     t/s      Gross Description       1. Received in formalin labeled \"left completion thyroid\" is a 10 gram, 4.5 x 2.6 x 1.6 cm left thyroid lobectomy with a 2.0 x 1.5 x 0.6 cm isthmus. The capsule is smooth to shaggy tan red and focally cauterized. The specimen is differentially inked as follows: isthmus margin - yellow, posterior - black, anterior - blue. The lobe is serially sectioned from superior to inferior to reveal two solid pale pink nodules measuring 1.8 x 0.9 x 0.6 cm and 1.6 x 1.5 x 1.1 cm.  The larger is in the superior pole apex and appears well defined and loosely encapsulated.  The smaller nodule is in the mid pole region and appears un-encapsulated. The smaller nodule is 1 cm inferior to to the larger nodule and the remaining parenchyma is beefy red and grossly unremarkable. Both nodules are entirely submitted and representative sections are submitted submitted as follows:     1A: isthmus margin en face  1B: superior pole apex perpendicular to the larger nodule  1C: remainder of the larger nodule, superior pole  1D: normal parenchyma adjacent to nodules   1E: smaller nodule in its entirety, mid pole   1F: normal parenchyma, inferior pole   1G: inferior pole base perpendicular "     jap/uso/[facility]/pkm                The following portions of the patient's history were reviewed and updated as appropriate: allergies, current medications, past family history, past medical history, past social history, past surgical history, and problem list.    Review of Systems   Constitutional: Negative for activity change, chills and fever.   Respiratory: Negative for cough.    Cardiovascular: Negative for chest pain.   Psychiatric/Behavioral: Negative for dysphoric mood.       Objective   Physical Exam  Constitutional:       General: She is not in acute distress.     Appearance: She is well-developed.   Cardiovascular:      Rate and Rhythm: Normal rate and regular rhythm.   Pulmonary:      Effort: Pulmonary effort is normal.      Breath sounds: Normal breath sounds.   Neurological:      Mental Status: She is alert and oriented to person, place, and time.   Psychiatric:         Behavior: Behavior normal.         Thought Content: Thought content normal.     Labs from endocrine independently reviewed by me at today's visit.    The patient has read and signed the [facility] Controlled Substance Contract.  I will continue to see patient for regular follow up appointments.  They are well controlled on their medication.  DILLAN has been reviewed by me and is updated every 3 months. The patient is aware of the potential for addiction and dependence.    Assessment/Plan   Diagnoses and all orders for this visit:    1. Benign essential hypertension (Primary)  -     losartan (COZAAR) 50 MG tablet; Take 1 tablet by mouth Daily.  Dispense: 90 tablet; Refill: 1  -     metoprolol succinate XL (TOPROL-XL) 100 MG 24 hr tablet; Take 1 tablet by mouth Daily.  Dispense: 90 tablet; Refill: 1  -     triamterene-hydrochlorothiazide (MAXZIDE-25) 37.5-25 MG per tablet; Take 1 tablet by mouth Daily.  Dispense: 90 tablet; Refill: 1    2. Anxiety  -     ALPRAZolam (XANAX) 0.5 MG tablet; Take 1 tablet by mouth Daily As Needed for  Anxiety.  Dispense: 90 tablet; Refill: 0    3. Cervicalgia  -     baclofen (LIORESAL) 10 MG tablet; Take 1 tablet by mouth Every Night.  Dispense: 90 tablet; Refill: 1    4. Major depressive disorder, recurrent, in full remission (HCC)  -     FLUoxetine (PROzac) 20 MG capsule; Take 3 capsules by mouth Every Night.  Dispense: 270 capsule; Refill: 1    5. Rheumatoid arthritis of multiple sites without organ or system involvement with positive rheumatoid factor (HCC)  -     folic acid (FOLVITE) 1 MG tablet; Take 1 tablet by mouth Daily.  Dispense: 90 tablet; Refill: 1  -     sulfaSALAzine (AZULFIDINE) 500 MG tablet; Take 3 tablets by mouth 2 (Two) Times a Day.  Dispense: 540 tablet; Refill: 1

## 2022-05-26 DIAGNOSIS — F41.9 ANXIETY: Chronic | ICD-10-CM

## 2022-05-26 RX ORDER — ALPRAZOLAM 0.5 MG/1
TABLET ORAL
Qty: 90 TABLET | Refills: 0 | OUTPATIENT
Start: 2022-05-26

## 2022-07-23 DIAGNOSIS — F41.9 ANXIETY: Chronic | ICD-10-CM

## 2022-07-25 RX ORDER — ALPRAZOLAM 0.5 MG/1
TABLET ORAL
Qty: 90 TABLET | Refills: 0 | OUTPATIENT
Start: 2022-07-25

## 2022-07-27 NOTE — PROGRESS NOTES
Subjective   Pratibha Pascal is a 62 y.o. female.     History of Present Illness     Chief Complaint:   Chief Complaint   Patient presents with   • Hypertension     MED REFILL DUE    • Hyperlipidemia     Diabetic eye exam due   • Anxiety   • Depression   • Arthritis       Pratibha Pascal 62 y.o. female who presents today for Medical Management of the below listed issues. She  has a problem list of   Patient Active Problem List   Diagnosis   • CAD (coronary artery disease)   • Mixed hyperlipidemia   • Benign essential hypertension   • Hypothyroidism   • Major depressive disorder, recurrent, in full remission ([facility])   • Rheumatoid arthritis ([facility])   • Cervicalgia   • Diabetes mellitus ([facility])   • Dyslipidemia   • BP (high blood pressure)   • Obstructive apnea   • AF (paroxysmal atrial fibrillation) ([facility])   • Proteinuria   • Burn   • Cellulitis of left leg   • Diabetes mellitus with neurological manifestation ([facility])   • MSSA (methicillin susceptible Staphylococcus aureus) infection   • Diabetic ulcer of left foot associated with diabetes mellitus due to underlying condition ([facility])   • Morbid obesity ([facility])   • Primary insomnia   • Anxiety   • Immunodeficiency, unspecified ([facility])   • Long term current use of non-steroidal anti-inflammatories (NSAID)   • Personal history of immunosupression therapy   • Primary generalized (osteo)arthritis   • Raised antibody titer   • Rheumatoid arthritis of multiple sites without organ or system involvement with positive rheumatoid factor ([facility])   • Other long term (current) drug therapy   • Uncontrolled type 2 diabetes mellitus with hyperglycemia ([facility])   • Vitamin D deficiency, unspecified   • Hypercalcemia   • Primary osteoarthritis of left knee   • OA (osteoarthritis) of knee   • Primary osteoarthritis of right knee   • Chronic fatigue   • Chronic pain of left knee   • History of coronary artery stent placement   • Multiple thyroid nodules   • Thyroid nodule   .  Since the last visit, She has  overall felt well.  she has been compliant with   Current Outpatient Medications:   •  ALPRAZolam (XANAX) 0.5 MG tablet, Take 1 tablet by mouth Daily As Needed for Anxiety., Disp: 90 tablet, Rfl: 0  •  baclofen (LIORESAL) 10 MG tablet, Take 1 tablet by mouth Every Night., Disp: 90 tablet, Rfl: 1  •  FLUoxetine (PROzac) 20 MG capsule, Take 3 capsules by mouth Every Night., Disp: 270 capsule, Rfl: 1  •  folic acid (FOLVITE) 1 MG tablet, Take 1 tablet by mouth Daily., Disp: 90 tablet, Rfl: 1  •  levothyroxine (SYNTHROID, LEVOTHROID) 200 MCG tablet, Take 1 tablet by mouth Daily., Disp: , Rfl:   •  losartan (COZAAR) 50 MG tablet, Take 1 tablet by mouth Daily., Disp: 90 tablet, Rfl: 1  •  metoprolol succinate XL (TOPROL-XL) 100 MG 24 hr tablet, Take 1 tablet by mouth Daily., Disp: 90 tablet, Rfl: 1  •  sulfaSALAzine (AZULFIDINE) 500 MG tablet, Take 3 tablets by mouth 2 (Two) Times a Day., Disp: 540 tablet, Rfl: 1  •  triamterene-hydrochlorothiazide (MAXZIDE-25) 37.5-25 MG per tablet, Take 1 tablet by mouth Daily., Disp: 90 tablet, Rfl: 1  •  ACCU-CHEK GUIDE test strip, , Disp: , Rfl:   •  atorvastatin (LIPITOR) 10 MG tablet, Take 0.5 tablets by mouth Daily., Disp: 45 tablet, Rfl: 1  •  Blood Glucose Calibration (Accu-Chek Guide Control) liquid, , Disp: , Rfl:   •  dapagliflozin-metformin HCl ER (XIGDUO XR) 5-1000 MG tablet, Take 2 tablets by mouth Daily. (Patient taking differently: Take 2 tablets by mouth Daily. PATIENT TO CONSULT WITH DR. NATALEE DELACRUZ, ENDOCRINOLOGIST REGARDING MEDICATIONS PRIOR TO SURGERY), Disp: 60 tablet, Rfl: 5  •  levothyroxine (SYNTHROID, LEVOTHROID) 50 MCG tablet, TAKE 1 TABLET BY MOUTH ONCE DAILY (TOTAL DOSE 250 MCG DAILY), Disp: , Rfl:   •  ONETOUCH DELICA LANCETS 33G misc, Test 3 times daily (Patient taking differently: 1 each. Test 3 times daily), Disp: 100 each, Rfl: 11  •  Trulicity 3 MG/0.5ML solution pen-injector, INJECT 3 MG SUBCUTANEOUSLY ONCE A WEEK, Disp: , Rfl:   •  vitamin D  "(ERGOCALCIFEROL) 1.25 MG (30867 UT) capsule capsule, Take 50,000 Units by mouth Daily (Monday-Friday)., Disp: , Rfl:   No current facility-administered medications for this visit.    Facility-Administered Medications Ordered in Other Visits:   •  mupirocin (BACTROBAN) 2 % nasal ointment, , Nasal, BID, Nabor Correia MD.  She denies medication side effects.    All of the other chronic condition(s) listed above are stable w/o issues.    /72   Pulse 90   Temp 98.9 °F (37.2 °C) (Oral)   Resp 18   Ht 182.9 cm (72\")   Wt 132 kg (291 lb)   BMI 39.47 kg/m²     Results for orders placed or performed during the hospital encounter of 04/29/21   Calcium, Ionized    Specimen: Blood   Result Value Ref Range    Ionized Calcium 1.23 1.15 - 1.35 mmol/L    Ionized Calcium 4.9 4.6 - 5.4 mg/dL   Calcium, Ionized    Specimen: Hand, Left; Blood   Result Value Ref Range    Ionized Calcium 1.19 1.15 - 1.35 mmol/L    Ionized Calcium 4.8 4.6 - 5.4 mg/dL   POC Glucose Once    Specimen: Blood   Result Value Ref Range    Glucose 182 (H) 70 - 130 mg/dL   Tissue Pathology Exam    Specimen: Thyroid; Tissue   Result Value Ref Range    Case Report       Surgical Pathology Report                         Case: [id number]                                  Authorizing Provider:  Kimo Carter MD     Collected:           04/29/2021 10:16 AM          [facility]  Received:            04/29/2021 01:18 PM                                 [facility]                                                                       Pathologist:           Arthur Wooten MD                                                         Specimen:    Thyroid, left completion thyroid                                                           Final Diagnosis       1. Left Completion Thyroidectomy:     A. Adenomatoid hyperplasia and lymphocytic thyroiditis with features suggesting Hashimoto's thyroiditis.   B. Focally loosely " "encapsuled mixed macrofollicular and microfollicular adenoma, 2.0 cm.   C. No parathyroid tissue nor lymph node structures identified.   D. No malignancy identified (see comment).   E. No lymphovascular space invasion nor involvement of strap musculature by neoplasm identified.   F. Focal interstitial fibrosis and hemosiderin-laden macrophages noted suggesting possible previous                    biopsy effect.    Jat/kds      Comment       Reviewed sections show a loosely encapsulated relatively well-defined microfollicular and macrofollicular proliferation consistent with adenoma arising in a background of adenomatoid hyperplasia with changes suggesting Hashimoto's thyroiditis.  No malignancy is seen in the reviewed sections.  The previously reported tumor staging: pT1a, N0a remains unchanged (See [id number]).     Jat/s      Gross Description       1. Received in formalin labeled \"left completion thyroid\" is a 10 gram, 4.5 x 2.6 x 1.6 cm left thyroid lobectomy with a 2.0 x 1.5 x 0.6 cm isthmus. The capsule is smooth to shaggy tan red and focally cauterized. The specimen is differentially inked as follows: isthmus margin - yellow, posterior - black, anterior - blue. The lobe is serially sectioned from superior to inferior to reveal two solid pale pink nodules measuring 1.8 x 0.9 x 0.6 cm and 1.6 x 1.5 x 1.1 cm.  The larger is in the superior pole apex and appears well defined and loosely encapsulated.  The smaller nodule is in the mid pole region and appears un-encapsulated. The smaller nodule is 1 cm inferior to to the larger nodule and the remaining parenchyma is beefy red and grossly unremarkable. Both nodules are entirely submitted and representative sections are submitted submitted as follows:     1A: isthmus margin en face  1B: superior pole apex perpendicular to the larger nodule  1C: remainder of the larger nodule, superior pole  1D: normal parenchyma adjacent to nodules   1E: smaller nodule in its " entirety, mid pole   1F: normal parenchyma, inferior pole   1G: inferior pole base perpendicular     jap/uso/[facility]/pkm                The following portions of the patient's history were reviewed and updated as appropriate: allergies, current medications, past family history, past medical history, past social history, past surgical history, and problem list.    Review of Systems   Constitutional: Negative for activity change, chills and fever.   Respiratory: Negative for cough.    Cardiovascular: Negative for chest pain.   Psychiatric/Behavioral: Negative for dysphoric mood.       Objective   Physical Exam  Constitutional:       General: She is not in acute distress.     Appearance: She is well-developed.   Cardiovascular:      Rate and Rhythm: Normal rate and regular rhythm.   Pulmonary:      Effort: Pulmonary effort is normal.      Breath sounds: Normal breath sounds.   Neurological:      Mental Status: She is alert and oriented to person, place, and time.   Psychiatric:         Behavior: Behavior normal.         Thought Content: Thought content normal.     Labs from Endocrine reviewed by me at today's visit.        Diagnoses and all orders for this visit:    1. Benign essential hypertension (Primary)  -     losartan (COZAAR) 50 MG tablet; Take 1 tablet by mouth Daily.  Dispense: 90 tablet; Refill: 1  -     metoprolol succinate XL (TOPROL-XL) 100 MG 24 hr tablet; Take 1 tablet by mouth Daily.  Dispense: 90 tablet; Refill: 1  -     triamterene-hydrochlorothiazide (MAXZIDE-25) 37.5-25 MG per tablet; Take 1 tablet by mouth Daily.  Dispense: 90 tablet; Refill: 1    2. Anxiety  -     ALPRAZolam (XANAX) 0.5 MG tablet; Take 1 tablet by mouth Daily As Needed for Anxiety.  Dispense: 90 tablet; Refill: 0    3. Cervicalgia  -     baclofen (LIORESAL) 10 MG tablet; Take 1 tablet by mouth Every Night.  Dispense: 90 tablet; Refill: 1    4. Rheumatoid arthritis of multiple sites without organ or system involvement with positive  rheumatoid factor (HCC)  -     folic acid (FOLVITE) 1 MG tablet; Take 1 tablet by mouth Daily.  Dispense: 90 tablet; Refill: 1  -     sulfaSALAzine (AZULFIDINE) 500 MG tablet; Take 3 tablets by mouth 2 (Two) Times a Day.  Dispense: 540 tablet; Refill: 1    5. Major depressive disorder, recurrent, in full remission (HCC)  -     FLUoxetine (PROzac) 20 MG capsule; Take 3 capsules by mouth Every Night.  Dispense: 270 capsule; Refill: 1    6. Screening for colon cancer  -     Ambulatory Referral to Gastroenterology

## 2022-07-28 ENCOUNTER — PRIOR AUTHORIZATION (OUTPATIENT)
Dept: FAMILY MEDICINE CLINIC | Facility: CLINIC | Age: 62
End: 2022-07-28

## 2022-07-28 ENCOUNTER — OFFICE VISIT (OUTPATIENT)
Dept: FAMILY MEDICINE CLINIC | Facility: CLINIC | Age: 62
End: 2022-07-28

## 2022-07-28 VITALS
SYSTOLIC BLOOD PRESSURE: 118 MMHG | DIASTOLIC BLOOD PRESSURE: 72 MMHG | HEART RATE: 90 BPM | HEIGHT: 72 IN | BODY MASS INDEX: 39.42 KG/M2 | TEMPERATURE: 98.9 F | WEIGHT: 291 LBS | RESPIRATION RATE: 18 BRPM

## 2022-07-28 DIAGNOSIS — F41.9 ANXIETY: Chronic | ICD-10-CM

## 2022-07-28 DIAGNOSIS — F33.42 MAJOR DEPRESSIVE DISORDER, RECURRENT, IN FULL REMISSION: Chronic | ICD-10-CM

## 2022-07-28 DIAGNOSIS — I10 BENIGN ESSENTIAL HYPERTENSION: Primary | Chronic | ICD-10-CM

## 2022-07-28 DIAGNOSIS — M54.2 CERVICALGIA: Chronic | ICD-10-CM

## 2022-07-28 DIAGNOSIS — M05.79 RHEUMATOID ARTHRITIS OF MULTIPLE SITES WITHOUT ORGAN OR SYSTEM INVOLVEMENT WITH POSITIVE RHEUMATOID FACTOR: Chronic | ICD-10-CM

## 2022-07-28 DIAGNOSIS — Z12.11 SCREENING FOR COLON CANCER: ICD-10-CM

## 2022-07-28 PROCEDURE — 99214 OFFICE O/P EST MOD 30 MIN: CPT | Performed by: FAMILY MEDICINE

## 2022-07-28 RX ORDER — LEVOTHYROXINE SODIUM 0.2 MG/1
1 TABLET ORAL DAILY
COMMUNITY
Start: 2022-05-26 | End: 2022-12-22

## 2022-07-28 RX ORDER — METOPROLOL SUCCINATE 100 MG/1
100 TABLET, EXTENDED RELEASE ORAL DAILY
Qty: 90 TABLET | Refills: 1 | Status: SHIPPED | OUTPATIENT
Start: 2022-07-28 | End: 2023-02-09

## 2022-07-28 RX ORDER — FLUOXETINE HYDROCHLORIDE 20 MG/1
60 CAPSULE ORAL NIGHTLY
Qty: 270 CAPSULE | Refills: 1 | Status: SHIPPED | OUTPATIENT
Start: 2022-07-28 | End: 2023-02-20 | Stop reason: SDUPTHER

## 2022-07-28 RX ORDER — SULFASALAZINE 500 MG/1
1500 TABLET ORAL 2 TIMES DAILY
Qty: 540 TABLET | Refills: 1 | Status: SHIPPED | OUTPATIENT
Start: 2022-07-28 | End: 2023-02-09

## 2022-07-28 RX ORDER — ALPRAZOLAM 0.5 MG/1
0.5 TABLET ORAL DAILY PRN
Qty: 90 TABLET | Refills: 0 | Status: SHIPPED | OUTPATIENT
Start: 2022-07-28 | End: 2022-12-22

## 2022-07-28 RX ORDER — TRIAMTERENE AND HYDROCHLOROTHIAZIDE 37.5; 25 MG/1; MG/1
1 TABLET ORAL DAILY
Qty: 90 TABLET | Refills: 1 | Status: SHIPPED | OUTPATIENT
Start: 2022-07-28 | End: 2023-02-20 | Stop reason: SDUPTHER

## 2022-07-28 RX ORDER — LOSARTAN POTASSIUM 50 MG/1
50 TABLET ORAL DAILY
Qty: 90 TABLET | Refills: 1 | Status: SHIPPED | OUTPATIENT
Start: 2022-07-28 | End: 2023-02-20 | Stop reason: SDUPTHER

## 2022-07-28 RX ORDER — BACLOFEN 10 MG/1
10 TABLET ORAL NIGHTLY
Qty: 90 TABLET | Refills: 1 | Status: SHIPPED | OUTPATIENT
Start: 2022-07-28 | End: 2023-02-21

## 2022-07-28 RX ORDER — FOLIC ACID 1 MG/1
1000 TABLET ORAL DAILY
Qty: 90 TABLET | Refills: 1 | Status: SHIPPED | OUTPATIENT
Start: 2022-07-28 | End: 2023-02-20 | Stop reason: SDUPTHER

## 2022-07-28 RX ORDER — LEVOTHYROXINE SODIUM 0.05 MG/1
TABLET ORAL
COMMUNITY
Start: 2022-05-26 | End: 2022-12-22

## 2022-07-28 NOTE — TELEPHONE ENCOUNTER
PA started for Alprazolam  90 DAYS SUPPLY EXCEEDS LIMIT,LIMITED TO 60 DS PER YEAR; PRIOR AUTH REQD CALL [phone] SPLY EXCDS PLAN BY 58 DAYS  *can always use [facility] if needed    Madeleine

## 2022-07-29 NOTE — TELEPHONE ENCOUNTER
The request has been approved. The authorization is effective for a maximum of 6 fills from 07/28/2022 to 01/27/2023    Can always use [facility] if she needs more than insurance will allow.     Madeleine

## 2022-08-19 ENCOUNTER — PRE-PROCEDURE SCREENING (OUTPATIENT)
Dept: GASTROENTEROLOGY | Facility: CLINIC | Age: 62
End: 2022-08-19

## 2022-10-30 DIAGNOSIS — F41.9 ANXIETY: Chronic | ICD-10-CM

## 2022-11-01 RX ORDER — ALPRAZOLAM 0.5 MG/1
TABLET ORAL
Qty: 90 TABLET | Refills: 0 | OUTPATIENT
Start: 2022-11-01

## 2022-12-22 ENCOUNTER — OFFICE VISIT (OUTPATIENT)
Dept: CARDIOLOGY | Facility: CLINIC | Age: 62
End: 2022-12-22

## 2022-12-22 VITALS
WEIGHT: 293 LBS | BODY MASS INDEX: 39.68 KG/M2 | DIASTOLIC BLOOD PRESSURE: 84 MMHG | HEART RATE: 81 BPM | SYSTOLIC BLOOD PRESSURE: 130 MMHG | HEIGHT: 72 IN

## 2022-12-22 DIAGNOSIS — Z95.5 HISTORY OF CORONARY ARTERY STENT PLACEMENT: ICD-10-CM

## 2022-12-22 DIAGNOSIS — E78.2 MIXED HYPERLIPIDEMIA: Chronic | ICD-10-CM

## 2022-12-22 DIAGNOSIS — I10 BENIGN ESSENTIAL HYPERTENSION: ICD-10-CM

## 2022-12-22 DIAGNOSIS — E11.59 TYPE 2 DIABETES MELLITUS WITH OTHER CIRCULATORY COMPLICATION, WITHOUT LONG-TERM CURRENT USE OF INSULIN: ICD-10-CM

## 2022-12-22 DIAGNOSIS — I25.10 CORONARY ARTERY DISEASE INVOLVING NATIVE CORONARY ARTERY OF NATIVE HEART WITHOUT ANGINA PECTORIS: Primary | Chronic | ICD-10-CM

## 2022-12-22 PROCEDURE — 99214 OFFICE O/P EST MOD 30 MIN: CPT | Performed by: INTERNAL MEDICINE

## 2022-12-22 PROCEDURE — 93000 ELECTROCARDIOGRAM COMPLETE: CPT | Performed by: INTERNAL MEDICINE

## 2022-12-22 RX ORDER — LEVOTHYROXINE SODIUM 300 UG/1
300 TABLET ORAL DAILY
COMMUNITY

## 2022-12-22 NOTE — PROGRESS NOTES
Subjective:     Encounter Date:12/22/2022      Patient ID: Pratibha Pascal is a 62 y.o. female.    Chief Complaint:  History of Present Illness    This is a 62-year-old with a history of hypertension, rheumatoid arthritis, hyperlipidemia, diabetes mellitus type 2, obstructive sleep apnea, coronary artery disease status post anterior myocardial infarction and drug-eluting stent placement of the mid LAD, obstructive sleep apnea, who presents for follow-up.     She presents today for annual follow-up.  She reports brief episodes of chest pain on the left side of her chest.  This usually occurs at rest.  She feels like it does not last very long but cannot tell me exactly how long it does last.  She does report there is similarities to the discomfort she had with her anterior myocardial infarction.  The symptoms do not occur with exertion or activity.  She otherwise denies any shortness of breath, palpitations, orthopnea, near-syncope or syncope, or lower extremity edema.    Recent lipid panel showed that her lipids were at goal except her triglycerides were in the 300s.  She was started on fish oil by her endocrinologist but she reports she had to stop this because of significant diarrhea.    She has been under a lot of stress lately.  Her 84-year-old mother was recently diagnosed with breast cancer and is to undergo mastectomy followed by chemotherapy next month.      Prior History:  I saw the patient initially in 6/2014 when she presented with an anterior myocardial infarction.  The patient called EMS due to chest pain at home.  EKGs performed in the field showed evidence of an anterior myocardial infarction.  Following her arrival to the emergency room she did have an episode of ventricular fibrillation requiring defibrillation and was immediately resuscitated.  She is brought emergently to the cardiac catheterization laboratory where she was found to have 100% thrombotic occlusion of her mid left anterior  descending artery for which she underwent PCI and drug-eluting stent placement with a Xience expedition 3.5 x 18 mm stent which was postdilated with a 4.0 x 15 mm balloon.  Remaining coronary arteries showed mild nonobstructive disease.  She did have an episode of atrial fibrillation with rapid ventricular rate that eventually converted to sinus rhythm.  She has had no further episodes of atrial fibrillation since then.  Her initial echocardiogram did show evidence of depressed left ventricular systolic function with apical wall motion abnormalities.  I saw the patient last in 7/2015 for routine follow-up.  At that time I stopped her ticagrelor and kept her on aspirin.     The patient was lost to follow-up and not seen back in the office until 8/2019 when she presented to the cardiac evaluation center for cardiac clearance for knee replacement.  She was having a lot of issues with easy fatigability and some indigestion type discomfort.  Stress test was recommended which showed evidence of anterior infarct with moderate jamil-infarct ischemia and an EF of 55%.  Based on those findings we proceeded with a cardiac catheterization on 9/3/2019 which showed mild nonobstructive coronary artery disease and a patent mid LAD stent and a normal-appearing left ventricular systolic function with anterior hypokinesis and an EF of 50 to 55%.     She was last seen in the office by KALI Pantoja in 6/2020 for preoperative evaluation before thyroid surgery.  At that time she was not having any new complaints and she was cleared to proceed with surgery.    Review of Systems   Constitutional: Negative for malaise/fatigue.   HENT: Negative for hearing loss, hoarse voice, nosebleeds and sore throat.    Eyes: Negative for pain.   Cardiovascular: Positive for chest pain. Negative for claudication, cyanosis, dyspnea on exertion, irregular heartbeat, leg swelling, near-syncope, orthopnea, palpitations, paroxysmal nocturnal dyspnea and  syncope.   Respiratory: Negative for shortness of breath and snoring.    Endocrine: Negative for cold intolerance, heat intolerance, polydipsia, polyphagia and polyuria.   Skin: Negative for itching and rash.   Musculoskeletal: Negative for arthritis, falls, joint pain, joint swelling, muscle cramps, muscle weakness and myalgias.   Gastrointestinal: Negative for constipation, diarrhea, dysphagia, heartburn, hematemesis, hematochezia, melena, nausea and vomiting.   Genitourinary: Negative for frequency, hematuria and hesitancy.   Neurological: Negative for excessive daytime sleepiness, dizziness, headaches, light-headedness, numbness and weakness.   Psychiatric/Behavioral: Negative for depression. The patient is not nervous/anxious.          Current Outpatient Medications:   •  ACCU-CHEK GUIDE test strip, , Disp: , Rfl:   •  atorvastatin (LIPITOR) 10 MG tablet, Take 0.5 tablets by mouth Daily., Disp: 45 tablet, Rfl: 1  •  baclofen (LIORESAL) 10 MG tablet, Take 1 tablet by mouth Every Night., Disp: 90 tablet, Rfl: 1  •  Blood Glucose Calibration (Accu-Chek Guide Control) liquid, , Disp: , Rfl:   •  dapagliflozin-metformin HCl ER (XIGDUO XR) 5-1000 MG tablet, Take 2 tablets by mouth Daily. (Patient taking differently: Take 2 tablets by mouth Daily. PATIENT TO CONSULT WITH DR. NATALEE DELACRUZ, ENDOCRINOLOGIST REGARDING MEDICATIONS PRIOR TO SURGERY), Disp: 60 tablet, Rfl: 5  •  FLUoxetine (PROzac) 20 MG capsule, Take 3 capsules by mouth Every Night., Disp: 270 capsule, Rfl: 1  •  folic acid (FOLVITE) 1 MG tablet, Take 1 tablet by mouth Daily., Disp: 90 tablet, Rfl: 1  •  levothyroxine (SYNTHROID, LEVOTHROID) 300 MCG tablet, Take 300 mcg by mouth Daily., Disp: , Rfl:   •  losartan (COZAAR) 50 MG tablet, Take 1 tablet by mouth Daily., Disp: 90 tablet, Rfl: 1  •  metoprolol succinate XL (TOPROL-XL) 100 MG 24 hr tablet, Take 1 tablet by mouth Daily., Disp: 90 tablet, Rfl: 1  •  ONETOUCH DELICA LANCETS 33G misc, Test 3 times  daily (Patient taking differently: 1 each. Test 3 times daily), Disp: 100 each, Rfl: 11  •  sulfaSALAzine (AZULFIDINE) 500 MG tablet, Take 3 tablets by mouth 2 (Two) Times a Day., Disp: 540 tablet, Rfl: 1  •  triamterene-hydrochlorothiazide (MAXZIDE-25) 37.5-25 MG per tablet, Take 1 tablet by mouth Daily., Disp: 90 tablet, Rfl: 1  •  Trulicity 3 MG/0.5ML solution pen-injector, INJECT 3 MG SUBCUTANEOUSLY ONCE A WEEK, Disp: , Rfl:   No current facility-administered medications for this visit.    Facility-Administered Medications Ordered in Other Visits:   •  mupirocin (BACTROBAN) 2 % nasal ointment, , Nasal, BID, Nabor Correia MD    Past Medical History:   Diagnosis Date   • Allergy to mold    • Anxiety    • Arthritis    • Benign essential hypertension    • CAD (coronary artery disease)    • Coronary artery disease    • Depression    • Diabetes mellitus (HCC)    • Fibromyalgia, primary    • H/O bone density study unclear   • H/O complete eye exam 2 -3 years   • Headache    • History of myocardial infarction 2014   • History of staph infection     HISTORY OF MSSA IN LEFT FOOT FROM DIABETIC ULCER HEALED   • HL (hearing loss)     RIGHT EAR  SEVERE HEARING LOSS   • HLD (hyperlipidemia)    • Hyperlipidemia    • Hypertension    • Hypothyroidism    • Hypothyroidism    • Injury of back    • Major depressive disorder, recurrent, in full remission (HCC)    • OA (osteoarthritis) of knee    • Obesity    • BRANNON (obstructive sleep apnea)     USE CPAP   • Primary generalized (osteo)arthritis    • Primary osteoarthritis of left knee    • Primary osteoarthritis of right knee    • Rheumatoid arthritis (HCC)    • Rheumatoid arthritis (HCC)    • Rheumatoid arthritis of multiple sites without organ or system involvement with positive rheumatoid factor (HCC)    • Seasonal allergies    • Tobacco abuse 06/27/2014    QUIT    • Type 2 diabetes mellitus (HCC)        Past Surgical History:   Procedure Laterality Date   • BACK SURGERY  10/1995     also 2/02; L5-S1; Dr. Carroll Avitia   • CARDIAC CATHETERIZATION     • CARDIAC CATHETERIZATION N/A 9/3/2019    Procedure: Coronary angiography  ;  Surgeon: Magy Rivera MD;  Location:  [facility];  Service: Cardiovascular   • CARDIAC CATHETERIZATION N/A 9/3/2019    Procedure: Left Heart Cath;  Surgeon: Magy Rivera MD;  Location:  [facility];  Service: Cardiovascular   • CARDIAC CATHETERIZATION N/A 9/3/2019    Procedure: Left ventriculography;  Surgeon: Magy Rivera MD;  Location:  [facility];  Service: Cardiovascular   • COLONOSCOPY     • CORONARY STENT PLACEMENT  06/2014   • JOINT REPLACEMENT     • KNEE ARTHROSCOPY Right 2010   • MAMMO BILATERAL  due    delcines   • PAP SMEAR  due   • THYROIDECTOMY Right 7/9/2020    Procedure: RIGHT THYROID LOBECTOMY AND RIGHT PARATHYROID EXPLORATION;  Surgeon: Kimo Carter MD;  Location:  [facility];  Service: ENT;  Laterality: Right;   • THYROIDECTOMY Left 4/29/2021    Procedure: COMPLETION THYROIDECTOMY;  Surgeon: Kimo Carter MD;  Location:  [facility];  Service: ENT;  Laterality: Left;   • TONSILLECTOMY      age 26   • TOTAL KNEE ARTHROPLASTY Left 10/14/2019    Procedure: TOTAL KNEE ARTHROPLASTY;  Surgeon: Nabor Correia MD;  Location: [facility];  Service: Orthopedics   • TOTAL KNEE ARTHROPLASTY Right 2/12/2020    Procedure: TOTAL KNEE ARTHROPLASTY;  Surgeon: Nabor Correia MD;  Location: [facility] MAIN OR;  Service: Orthopedics;  Laterality: Right;   • US GUIDED FINE NEEDLE ASPIRATION  12/6/2019       Family History   Problem Relation Age of Onset   • Cancer Mother         breast   • Hypertension Mother    • Rheum arthritis Mother    • Thyroid disease Mother    • Arthritis Mother    • Hypertension Father    • Thyroid disease Father    • COPD Father    • Depression Father    • Hearing loss Father    • Heart failure Maternal Grandmother    • Rheum arthritis Other         aunt   • Depression  "Daughter    • Drug abuse Daughter    • Malig Hyperthermia Neg Hx        Social History     Tobacco Use   • Smoking status: Former     Packs/day: 1.00     Years: 30.00     Pack years: 30.00     Types: Cigarettes     Quit date: 2014     Years since quittin.4   • Smokeless tobacco: Never   • Tobacco comments:     quit 2014   Substance Use Topics   • Alcohol use: No   • Drug use: No         ECG 12 Lead    Date/Time: 2022 1:54 PM  Performed by: Magy Rivera MD  Authorized by: Magy Rivera MD   Comparison: compared with previous ECG   Similar to previous ECG  Rhythm: sinus rhythm  Q waves: V1 and V2    QRS axis: left                 Objective:     Visit Vitals  /84   Pulse 81   Ht 182.9 cm (72\")   Wt 135 kg (297 lb 12.8 oz)   BMI 40.39 kg/m²         Constitutional:       Appearance: Normal appearance. Well-developed.   Eyes:      General: Lids are normal.      Conjunctiva/sclera: Conjunctivae normal.      Pupils: Pupils are equal, round, and reactive to light.   HENT:      Head: Normocephalic and atraumatic.   Neck:      Vascular: No carotid bruit or JVD.      Lymphadenopathy: No cervical adenopathy.   Pulmonary:      Effort: Pulmonary effort is normal.      Breath sounds: Normal breath sounds.   Cardiovascular:      Normal rate. Regular rhythm.      No gallop.   Pulses:     Radial: 2+ bilaterally.  Edema:     Peripheral edema absent.   Abdominal:      Palpations: Abdomen is soft.   Musculoskeletal:      Cervical back: Full passive range of motion without pain, normal range of motion and neck supple. Skin:     General: Skin is warm and dry.   Neurological:      Mental Status: Alert and oriented to person, place, and time.             Assessment:          Diagnosis Plan   1. Coronary artery disease involving native coronary artery of native heart without angina pectoris        2. History of coronary artery stent placement        3. Mixed hyperlipidemia        4. Benign essential " hypertension        5. Type 2 diabetes mellitus with other circulatory complication, without long-term current use of insulin ([facility])               Plan:       1.  Coronary artery disease.  She has episodes of left-sided chest pain which she describes as brief although its unclear how long that actually last.  The symptoms are nonexertional.  Her EKG is unremarkable.  We will monitor her symptoms for now.  If there is any increase in the frequency of the duration of the symptoms of asked her to notify me.  Otherwise continue current management.  2.  Hypertension.  Well-controlled on current regimen of medications.  Continue the same.  3.  Hyperlipidemia.  On atorvastatin for goal LDL of 70 or below.  Most recent lipid panel shows that her LDL was at goal.  Triglycerides are elevated and are being managed by endocrinology.  I encouraged her to speak with her endocrinologist about alternative medications for treatment since she did not tolerate the fish oil.  4.  Diabetes mellitus type 2.    We will see the patient back again in 6 months.

## 2023-02-09 DIAGNOSIS — M05.79 RHEUMATOID ARTHRITIS OF MULTIPLE SITES WITHOUT ORGAN OR SYSTEM INVOLVEMENT WITH POSITIVE RHEUMATOID FACTOR: Chronic | ICD-10-CM

## 2023-02-09 DIAGNOSIS — I10 BENIGN ESSENTIAL HYPERTENSION: Chronic | ICD-10-CM

## 2023-02-09 RX ORDER — SULFASALAZINE 500 MG/1
TABLET ORAL
Qty: 180 TABLET | Refills: 0 | Status: SHIPPED | OUTPATIENT
Start: 2023-02-09 | End: 2023-02-20 | Stop reason: SDUPTHER

## 2023-02-09 RX ORDER — METOPROLOL SUCCINATE 100 MG/1
TABLET, EXTENDED RELEASE ORAL
Qty: 30 TABLET | Refills: 0 | Status: SHIPPED | OUTPATIENT
Start: 2023-02-09 | End: 2023-02-20 | Stop reason: SDUPTHER

## 2023-02-11 DIAGNOSIS — F33.42 MAJOR DEPRESSIVE DISORDER, RECURRENT, IN FULL REMISSION: Chronic | ICD-10-CM

## 2023-02-11 DIAGNOSIS — I10 BENIGN ESSENTIAL HYPERTENSION: Chronic | ICD-10-CM

## 2023-02-13 RX ORDER — FLUOXETINE HYDROCHLORIDE 20 MG/1
CAPSULE ORAL
Qty: 270 CAPSULE | Refills: 0 | OUTPATIENT
Start: 2023-02-13

## 2023-02-13 RX ORDER — METOPROLOL SUCCINATE 100 MG/1
TABLET, EXTENDED RELEASE ORAL
Qty: 30 TABLET | Refills: 0 | OUTPATIENT
Start: 2023-02-13

## 2023-02-21 ENCOUNTER — OFFICE VISIT (OUTPATIENT)
Dept: FAMILY MEDICINE CLINIC | Facility: CLINIC | Age: 63
End: 2023-02-21
Payer: MEDICARE

## 2023-02-21 VITALS
TEMPERATURE: 97.8 F | BODY MASS INDEX: 39.68 KG/M2 | WEIGHT: 293 LBS | SYSTOLIC BLOOD PRESSURE: 136 MMHG | HEART RATE: 94 BPM | RESPIRATION RATE: 18 BRPM | DIASTOLIC BLOOD PRESSURE: 77 MMHG | HEIGHT: 72 IN

## 2023-02-21 DIAGNOSIS — I10 BENIGN ESSENTIAL HYPERTENSION: Chronic | ICD-10-CM

## 2023-02-21 DIAGNOSIS — Z00.00 MEDICARE ANNUAL WELLNESS VISIT, SUBSEQUENT: Primary | ICD-10-CM

## 2023-02-21 DIAGNOSIS — M54.2 CERVICALGIA: Chronic | ICD-10-CM

## 2023-02-21 DIAGNOSIS — M54.50 LUMBAR BACK PAIN: ICD-10-CM

## 2023-02-21 DIAGNOSIS — F33.42 MAJOR DEPRESSIVE DISORDER, RECURRENT, IN FULL REMISSION: Chronic | ICD-10-CM

## 2023-02-21 DIAGNOSIS — M05.79 RHEUMATOID ARTHRITIS OF MULTIPLE SITES WITHOUT ORGAN OR SYSTEM INVOLVEMENT WITH POSITIVE RHEUMATOID FACTOR: Chronic | ICD-10-CM

## 2023-02-21 DIAGNOSIS — Z12.11 SCREENING FOR COLON CANCER: ICD-10-CM

## 2023-02-21 PROCEDURE — G0439 PPPS, SUBSEQ VISIT: HCPCS | Performed by: FAMILY MEDICINE

## 2023-02-21 PROCEDURE — 1170F FXNL STATUS ASSESSED: CPT | Performed by: FAMILY MEDICINE

## 2023-02-21 PROCEDURE — 99214 OFFICE O/P EST MOD 30 MIN: CPT | Performed by: FAMILY MEDICINE

## 2023-02-21 PROCEDURE — 1159F MED LIST DOCD IN RCRD: CPT | Performed by: FAMILY MEDICINE

## 2023-02-21 RX ORDER — FLUOXETINE HYDROCHLORIDE 20 MG/1
60 CAPSULE ORAL NIGHTLY
Qty: 270 CAPSULE | Refills: 1 | Status: SHIPPED | OUTPATIENT
Start: 2023-02-21

## 2023-02-21 RX ORDER — TRIAMTERENE AND HYDROCHLOROTHIAZIDE 37.5; 25 MG/1; MG/1
1 TABLET ORAL DAILY
Qty: 90 TABLET | Refills: 1 | Status: SHIPPED | OUTPATIENT
Start: 2023-02-21

## 2023-02-21 RX ORDER — FOLIC ACID 1 MG/1
1000 TABLET ORAL DAILY
Qty: 90 TABLET | Refills: 1 | Status: SHIPPED | OUTPATIENT
Start: 2023-02-21

## 2023-02-21 RX ORDER — LOSARTAN POTASSIUM 50 MG/1
50 TABLET ORAL DAILY
Qty: 90 TABLET | Refills: 1 | Status: SHIPPED | OUTPATIENT
Start: 2023-02-21

## 2023-02-21 RX ORDER — NABUMETONE 500 MG/1
500 TABLET, FILM COATED ORAL 2 TIMES DAILY PRN
Qty: 60 TABLET | Refills: 2 | Status: SHIPPED | OUTPATIENT
Start: 2023-02-21

## 2023-02-21 RX ORDER — BACLOFEN 20 MG/1
20 TABLET ORAL NIGHTLY
Qty: 90 TABLET | Refills: 1 | Status: SHIPPED | OUTPATIENT
Start: 2023-02-21

## 2023-02-21 RX ORDER — SULFASALAZINE 500 MG/1
1500 TABLET ORAL 2 TIMES DAILY
Qty: 540 TABLET | Refills: 1 | Status: SHIPPED | OUTPATIENT
Start: 2023-02-21

## 2023-02-21 RX ORDER — METOPROLOL SUCCINATE 100 MG/1
100 TABLET, EXTENDED RELEASE ORAL DAILY
Qty: 90 TABLET | Refills: 1 | Status: SHIPPED | OUTPATIENT
Start: 2023-02-21

## 2023-02-21 NOTE — PATIENT INSTRUCTIONS
Medicare Wellness  Personal Prevention Plan of Service     Date of Office Visit:    Encounter Provider:  Benitez Wu MD  Place of Service:  [facility]  Patient Name: Pratibha Pascal  :  1960    As part of the Medicare Wellness portion of your visit today, we are providing you with this personalized preventive plan of services (PPPS). This plan is based upon recommendations of the [place] Preventive Services Task Force (USPSTF) and the Advisory Committee on Immunization Practices (ACIP).    This lists the preventive care services that should be considered, and provides dates of when you are due. Items listed as completed are up-to-date and do not require any further intervention.    Health Maintenance   Topic Date Due    TDAP/TD VACCINES (2 - Td or Tdap) 2017    DIABETIC FOOT EXAM  10/16/2018    LUNG CANCER SCREENING  2020    URINE MICROALBUMIN  2021    COLORECTAL CANCER SCREENING  2023 (Originally 1960)    HEMOGLOBIN A1C  2023    DIABETIC EYE EXAM  10/04/2023    LIPID PANEL  2023    ANNUAL WELLNESS VISIT  2024    Pneumococcal Vaccine 0-64 (3 - PPSV23 if available, else PCV20) 2024    INFLUENZA VACCINE  Completed    HEPATITIS C SCREENING  Discontinued    COVID-19 Vaccine  Discontinued    MAMMOGRAM  Discontinued    PAP SMEAR  Discontinued    ZOSTER VACCINE  Discontinued       No orders of the defined types were placed in this encounter.      Return in about 6 months (around 2023) for Recheck.

## 2023-02-21 NOTE — PROGRESS NOTES
The ABCs of the Annual Wellness Visit  Subsequent Medicare Wellness Visit    Subjective    Pratibha Pascal is a 62 y.o. female who presents for a Subsequent Medicare Wellness Visit.    The following portions of the patient's history were reviewed and   updated as appropriate: allergies, current medications, past family history, past medical history, past social history, past surgical history and problem list.    Compared to one year ago, the patient feels her physical   health is the same.    Compared to one year ago, the patient feels her mental   health is the same.    Recent Hospitalizations:  She was not admitted to the hospital during the last year.       Current Medical Providers:  Patient Care Team:  Benitez Wu MD as PCP - General (Family Medicine)  Magy Rivera MD as Consulting Physician (Cardiology)  Rona Washington MD as Consulting Physician (Endocrinology)  Kimo Carter MD as Surgeon (Otolaryngology)    Outpatient Medications Prior to Visit   Medication Sig Dispense Refill   • ACCU-CHEK GUIDE test strip      • atorvastatin (LIPITOR) 10 MG tablet Take 0.5 tablets by mouth Daily. 45 tablet 1   • Blood Glucose Calibration (Accu-Chek Guide Control) liquid      • dapagliflozin-metformin HCl ER (XIGDUO XR) 5-1000 MG tablet Take 2 tablets by mouth Daily. (Patient taking differently: Take 2 tablets by mouth Daily. PATIENT TO CONSULT WITH DR. RONA WASHINGTON, ENDOCRINOLOGIST REGARDING MEDICATIONS PRIOR TO SURGERY) 60 tablet 5   • levothyroxine (SYNTHROID, LEVOTHROID) 300 MCG tablet Take 300 mcg by mouth Daily.     • ONETOUCH DELICA LANCETS 33G misc Test 3 times daily (Patient taking differently: 1 each. Test 3 times daily) 100 each 11   • Trulicity 3 MG/0.5ML solution pen-injector INJECT 3 MG SUBCUTANEOUSLY ONCE A WEEK     • baclofen (LIORESAL) 10 MG tablet Take 1 tablet by mouth Every Night. 90 tablet 1   • FLUoxetine (PROzac) 20 MG capsule Take 3 capsules by mouth Every Night. 270 capsule 1   •  folic acid (FOLVITE) 1 MG tablet Take 1 tablet by mouth Daily. 90 tablet 1   • losartan (COZAAR) 50 MG tablet Take 1 tablet by mouth Daily. 90 tablet 1   • metoprolol succinate XL (TOPROL-XL) 100 MG 24 hr tablet Take 1 tablet by mouth once daily 30 tablet 0   • sulfaSALAzine (AZULFIDINE) 500 MG tablet TAKE 3 TABLETS BY MOUTH TWICE DAILY 180 tablet 0   • triamterene-hydrochlorothiazide (MAXZIDE-25) 37.5-25 MG per tablet Take 1 tablet by mouth Daily. 90 tablet 1     Facility-Administered Medications Prior to Visit   Medication Dose Route Frequency Provider Last Rate Last Admin   • mupirocin (BACTROBAN) 2 % nasal ointment   Nasal BID Nabor Correia MD           No opioid medication identified on active medication list. I have reviewed chart for other potential  high risk medication/s and harmful drug interactions in the elderly.          Aspirin is not on active medication list.  Aspirin use is not indicated based on review of current medical condition/s. Risk of harm outweighs potential benefits.  .    Patient Active Problem List   Diagnosis   • CAD (coronary artery disease)   • Mixed hyperlipidemia   • Benign essential hypertension   • Hypothyroidism   • Major depressive disorder, recurrent, in full remission ([facility])   • Rheumatoid arthritis ([facility])   • Cervicalgia   • Diabetes mellitus ([facility])   • Dyslipidemia   • BP (high blood pressure)   • Obstructive apnea   • AF (paroxysmal atrial fibrillation) ([facility])   • Proteinuria   • Burn   • Cellulitis of left leg   • Diabetes mellitus with neurological manifestation ([facility])   • MSSA (methicillin susceptible Staphylococcus aureus) infection   • Diabetic ulcer of left foot associated with diabetes mellitus due to underlying condition ([facility])   • Morbid obesity ([facility])   • Primary insomnia   • Anxiety   • Immunodeficiency, unspecified ([facility])   • Long term current use of non-steroidal anti-inflammatories (NSAID)   • Personal history of immunosupression therapy   • Primary generalized  "(osteo)arthritis   • Raised antibody titer   • Rheumatoid arthritis of multiple sites without organ or system involvement with positive rheumatoid factor (HCC)   • Other long term (current) drug therapy   • Uncontrolled type 2 diabetes mellitus with hyperglycemia (HCC)   • Vitamin D deficiency, unspecified   • Hypercalcemia   • Primary osteoarthritis of left knee   • OA (osteoarthritis) of knee   • Primary osteoarthritis of right knee   • Chronic fatigue   • Chronic pain of left knee   • History of coronary artery stent placement   • Multiple thyroid nodules   • Thyroid nodule     Advance Care Planning  Advance Directive is not on file.  ACP discussion was held with the patient during this visit. Patient does not have an advance directive, declines further assistance.     Objective    Vitals:    23 1623   BP: 136/77   Pulse: 94   Resp: 18   Temp: 97.8 °F (36.6 °C)   TempSrc: Oral   Weight: 134 kg (296 lb)   Height: 182.9 cm (72\")   PainSc: 0-No pain     Estimated body mass index is 40.14 kg/m² as calculated from the following:    Height as of this encounter: 182.9 cm (72\").    Weight as of this encounter: 134 kg (296 lb).    Class 3 Severe Obesity (BMI >=40). Obesity-related health conditions include the following: hypertension, coronary heart disease, diabetes mellitus and dyslipidemias. Obesity is unchanged. BMI is is above average; BMI management plan is completed. We discussed portion control and increasing exercise.      Does the patient have evidence of cognitive impairment? No          HEALTH RISK ASSESSMENT    Smoking Status:  Social History     Tobacco Use   Smoking Status Former   • Packs/day: 1.00   • Years: 30.00   • Pack years: 30.00   • Types: Cigarettes   • Quit date: 2014   • Years since quittin.6   Smokeless Tobacco Never   Tobacco Comments    quit 2014     Alcohol Consumption:  Social History     Substance and Sexual Activity   Alcohol Use No     Fall Risk Screen:    STEADI Fall " Risk Assessment has not been completed.    Depression Screening:  PHQ-2/PHQ-9 Depression Screening 2/21/2023   Little Interest or Pleasure in Doing Things 0-->not at all   Feeling Down, Depressed or Hopeless 0-->not at all   PHQ-9: Brief Depression Severity Measure Score 0       Health Habits and Functional and Cognitive Screening:  Functional & Cognitive Status 2/21/2023   Do you have difficulty preparing food and eating? No   Do you have difficulty bathing yourself, getting dressed or grooming yourself? No   Do you have difficulty using the toilet? No   Do you have difficulty moving around from place to place? No   Do you have trouble with steps or getting out of a bed or a chair? No   Current Diet Well Balanced Diet   Dental Exam Up to date   Eye Exam Up to date   Exercise (times per week) 0 times per week   Current Exercises Include No Regular Exercise   Do you need help using the phone?  No   Are you deaf or do you have serious difficulty hearing?  No   Do you need help with transportation? No   Do you need help shopping? No   Do you need help preparing meals?  No   Do you need help with housework?  No   Do you need help with laundry? No   Do you need help taking your medications? No   Do you need help managing money? No   Do you ever drive or ride in a car without wearing a seat belt? No   Have you felt unusual stress, anger or loneliness in the last month? No   Who do you live with? Spouse   If you need help, do you have trouble finding someone available to you? Yes   Have you been bothered in the last four weeks by sexual problems? No   Do you have difficulty concentrating, remembering or making decisions? No       Age-appropriate Screening Schedule:  Refer to the list below for future screening recommendations based on patient's age, sex and/or medical conditions. Orders for these recommended tests are listed in the plan section. The patient has been provided with a written plan.                 CMS  "Preventative Services Quick Reference  Risk Factors Identified During Encounter  None Identified  The above risks/problems have been discussed with the patient.  Pertinent information has been shared with the patient in the After Visit Summary.  An After Visit Summary and PPPS were made available to the patient.    Follow Up:   Next Medicare Wellness visit to be scheduled in 1 year.       Additional E&M Note during same encounter follows:  Patient has multiple medical problems which are significant and separately identifiable that require additional work above and beyond the Medicare Wellness Visit.      Chief Complaint  Hypertension (Med refill due), Hyperlipidemia, Anxiety, Depression, low back pain  (Right lower back / no injury  /  x 1 week  / pain has improved worse in the morning / radiates down right leg to right foot ), and medicare wellness  (Due / colonoscopy due )    Subjective        HPI  Pratibha Pascal is also being seen today for Medication Management and Back Pain.    Pt reports a roughly 1 week h/o some right lower back discomfort. Pt has had 2 prior disc surgeries in the past and reports no recent injury but has developed the LBP and \"stiffness\" since then. Pain can \"shoot\" down the right leg. Tried some Advil with some help. Pain seems to worse upon awakening in the AM.     Pt doing well on the medication(s) w/o SEs, and is due refill today.      Review of Systems   Constitutional: Negative for chills and fever.   HENT: Negative for congestion and sinus pressure.    Eyes: Negative for visual disturbance.   Respiratory: Negative for cough and shortness of breath.    Cardiovascular: Negative for chest pain.   Gastrointestinal: Negative for abdominal pain.   Genitourinary: Negative for dysuria.   Musculoskeletal: Positive for back pain.   Skin: Negative for rash.   Hematological: Negative for adenopathy.   Psychiatric/Behavioral: Negative for dysphoric mood.       Objective   Vital Signs:  /77  " " Pulse 94   Temp 97.8 °F (36.6 °C) (Oral)   Resp 18   Ht 182.9 cm (72\")   Wt 134 kg (296 lb)   BMI 40.14 kg/m²     Physical Exam  Constitutional:       General: She is not in acute distress.     Appearance: She is well-developed.   Cardiovascular:      Rate and Rhythm: Normal rate and regular rhythm.   Pulmonary:      Effort: Pulmonary effort is normal.      Breath sounds: Normal breath sounds.   Musculoskeletal:         General: Normal range of motion.   Neurological:      Mental Status: She is alert and oriented to person, place, and time.   Psychiatric:         Behavior: Behavior normal.         Thought Content: Thought content normal.      Labs from her specialist reviewed by me at today's visit.    The following data was reviewed by: Benitez Wu MD on 02/21/2023:  Common labs    Common Labs 2/28/22 5/31/22   Glucose 184 (A)    BUN 15    Creatinine 0.72    Sodium 138    Potassium 3.8    Chloride 98    Calcium 9.2    Albumin 4.3    Total Bilirubin 0.2    Alkaline Phosphatase 109    AST (SGOT) 22    ALT (SGPT) 22    WBC  11.32 (A)   Hemoglobin  14.7   Hematocrit  49.2 (A)   Platelets  525 (A)   (A) Abnormal value                       Assessment and Plan   Diagnoses and all orders for this visit:    1. Medicare annual wellness visit, subsequent (Primary)    2. Benign essential hypertension  -     losartan (COZAAR) 50 MG tablet; Take 1 tablet by mouth Daily.  Dispense: 90 tablet; Refill: 1  -     metoprolol succinate XL (TOPROL-XL) 100 MG 24 hr tablet; Take 1 tablet by mouth Daily.  Dispense: 90 tablet; Refill: 1  -     triamterene-hydrochlorothiazide (MAXZIDE-25) 37.5-25 MG per tablet; Take 1 tablet by mouth Daily.  Dispense: 90 tablet; Refill: 1    3. Major depressive disorder, recurrent, in full remission (HCC)  -     FLUoxetine (PROzac) 20 MG capsule; Take 3 capsules by mouth Every Night.  Dispense: 270 capsule; Refill: 1    4. Rheumatoid arthritis of multiple sites without organ or system involvement " with positive rheumatoid factor (HCC)  -     folic acid (FOLVITE) 1 MG tablet; Take 1 tablet by mouth Daily.  Dispense: 90 tablet; Refill: 1  -     sulfaSALAzine (AZULFIDINE) 500 MG tablet; Take 3 tablets by mouth 2 (Two) Times a Day.  Dispense: 540 tablet; Refill: 1    5. Screening for colon cancer  -     Ambulatory Referral to Gastroenterology    6. Cervicalgia  -     baclofen (LIORESAL) 20 MG tablet; Take 1 tablet by mouth Every Night.  Dispense: 90 tablet; Refill: 1  -     nabumetone (Relafen) 500 MG tablet; Take 1 tablet by mouth 2 (Two) Times a Day As Needed for Mild Pain.  Dispense: 60 tablet; Refill: 2    7. Lumbar back pain  -     baclofen (LIORESAL) 20 MG tablet; Take 1 tablet by mouth Every Night.  Dispense: 90 tablet; Refill: 1  -     nabumetone (Relafen) 500 MG tablet; Take 1 tablet by mouth 2 (Two) Times a Day As Needed for Mild Pain.  Dispense: 60 tablet; Refill: 2           I spent 15 minutes caring for Pratibha on this date of service. This time includes time spent by me in the following activities:preparing for the visit, reviewing tests, performing a medically appropriate examination and/or evaluation , ordering medications, tests, or procedures and documenting information in the medical record  Follow Up   Return in about 6 months (around 8/21/2023) for Recheck.  Patient was given instructions and counseling regarding her condition or for health maintenance advice. Please see specific information pulled into the AVS if appropriate.

## 2023-04-03 ENCOUNTER — TELEMEDICINE (OUTPATIENT)
Dept: FAMILY MEDICINE CLINIC | Facility: CLINIC | Age: 63
End: 2023-04-03
Payer: MEDICARE

## 2023-04-03 VITALS — WEIGHT: 293 LBS | BODY MASS INDEX: 39.68 KG/M2 | HEIGHT: 72 IN

## 2023-04-03 VITALS — BODY MASS INDEX: 39.68 KG/M2 | HEIGHT: 72 IN | WEIGHT: 293 LBS

## 2023-04-03 DIAGNOSIS — U07.1 UPPER RESPIRATORY TRACT INFECTION DUE TO COVID-19 VIRUS: Primary | ICD-10-CM

## 2023-04-03 DIAGNOSIS — J06.9 UPPER RESPIRATORY TRACT INFECTION DUE TO COVID-19 VIRUS: Primary | ICD-10-CM

## 2023-04-03 RX ORDER — DOXYCYCLINE HYCLATE 100 MG
100 TABLET ORAL 2 TIMES DAILY
Qty: 20 TABLET | Refills: 0 | Status: SHIPPED | OUTPATIENT
Start: 2023-04-03

## 2023-04-03 RX ORDER — MONTELUKAST SODIUM 10 MG/1
10 TABLET ORAL NIGHTLY
Qty: 14 TABLET | Refills: 0 | Status: SHIPPED | OUTPATIENT
Start: 2023-04-03 | End: 2023-04-17

## 2023-04-03 NOTE — PROGRESS NOTES
Subjective   Pratibha Pascal is a 63 y.o. female.     CC: VV for COVID    History of Present Illness     Pt seen today on a VV after testing positive for COVID. Pt reports cough/chest tightness x 3 days, then RN/congestion, and sx are worsening with increased ST. Tested positive today. Temp to 102 earlier today.        The following portions of the patient's history were reviewed and updated as appropriate: allergies, current medications, past family history, past medical history, past social history, past surgical history and problem list.    Review of Systems   Constitutional: Positive for fatigue and fever. Negative for activity change and chills.   HENT: Positive for congestion, postnasal drip and sore throat.    Respiratory: Positive for cough. Negative for shortness of breath.    Cardiovascular: Negative for chest pain.   Neurological: Positive for headaches.   Psychiatric/Behavioral: Negative for dysphoric mood.       Objective   Physical Exam  Constitutional:       General: She is not in acute distress.     Appearance: She is well-developed.   Pulmonary:      Effort: Pulmonary effort is normal.   Neurological:      Mental Status: She is alert and oriented to person, place, and time.   Psychiatric:         Behavior: Behavior normal.         Thought Content: Thought content normal.         Assessment & Plan   Diagnoses and all orders for this visit:    1. Upper respiratory tract infection due to COVID-19 virus (Primary)  Comments:  with systemic symptoms  Orders:  -     doxycycline (VIBRAMYICN) 100 MG tablet; Take 1 tablet by mouth 2 (Two) Times a Day.  Dispense: 20 tablet; Refill: 0  -     montelukast (Singulair) 10 MG tablet; Take 1 tablet by mouth Every Night for 14 days.  Dispense: 14 tablet; Refill: 0    Script to CC at 54mg QD x 7 days, along with Quercetin and Zinc.       Spent 14    minutes with chart and interview and consent for this encounter given by the patient.  You have chosen to receive care  through a telehealth visit.  Do you consent to use a video/audio connection for your medical care today? Yes  Patient was in their residence when this visit took place and I was in my medical office.

## 2023-04-04 ENCOUNTER — TELEMEDICINE (OUTPATIENT)
Dept: FAMILY MEDICINE CLINIC | Facility: CLINIC | Age: 63
End: 2023-04-04
Payer: MEDICARE

## 2023-04-05 RX ORDER — KETOROLAC TROMETHAMINE 10 MG/1
10 TABLET, FILM COATED ORAL EVERY 6 HOURS PRN
Qty: 20 TABLET | Refills: 0 | Status: SHIPPED | OUTPATIENT
Start: 2023-04-05

## 2023-04-13 ENCOUNTER — PRIOR AUTHORIZATION (OUTPATIENT)
Dept: FAMILY MEDICINE CLINIC | Facility: CLINIC | Age: 63
End: 2023-04-13
Payer: MEDICARE

## 2023-07-24 ENCOUNTER — HOSPITAL ENCOUNTER (OUTPATIENT)
Dept: CARDIOLOGY | Facility: HOSPITAL | Age: 63
Discharge: HOME | End: 2023-07-24
Payer: MEDICARE

## 2023-07-24 VITALS — WEIGHT: 293 LBS | BODY MASS INDEX: 39.68 KG/M2 | HEIGHT: 72 IN

## 2023-07-24 PROCEDURE — 0 TECHNETIUM TETROFOSMIN KIT: Performed by: NURSE PRACTITIONER

## 2023-07-24 PROCEDURE — A9502 TC99M TETROFOSMIN: HCPCS | Performed by: NURSE PRACTITIONER

## 2023-07-24 RX ADMIN — TETROFOSMIN 1 DOSE: 1.38 INJECTION, POWDER, LYOPHILIZED, FOR SOLUTION INTRAVENOUS at 08:12

## 2023-07-25 DIAGNOSIS — I25.10 CORONARY ARTERY DISEASE INVOLVING NATIVE CORONARY ARTERY OF NATIVE HEART WITHOUT ANGINA PECTORIS: Primary | ICD-10-CM

## 2023-07-26 ENCOUNTER — TRANSCRIBE ORDERS (OUTPATIENT)
Dept: CARDIOLOGY | Facility: CLINIC | Age: 63
End: 2023-07-26
Payer: MEDICARE

## 2023-07-26 DIAGNOSIS — Z01.810 PRE-OPERATIVE CARDIOVASCULAR EXAMINATION: Primary | ICD-10-CM

## 2023-07-26 DIAGNOSIS — Z13.6 SCREENING FOR ISCHEMIC HEART DISEASE: ICD-10-CM

## 2023-07-28 ENCOUNTER — LAB (OUTPATIENT)
Dept: LAB | Facility: HOSPITAL | Age: 63
End: 2023-07-28
Payer: MEDICARE

## 2023-07-28 DIAGNOSIS — Z13.6 SCREENING FOR ISCHEMIC HEART DISEASE: ICD-10-CM

## 2023-07-28 DIAGNOSIS — Z01.810 PRE-OPERATIVE CARDIOVASCULAR EXAMINATION: ICD-10-CM

## 2023-07-28 LAB
ANION GAP SERPL CALCULATED.3IONS-SCNC: 15 MMOL/L (ref 5–15)
BASOPHILS # BLD AUTO: 0.12 10*3/MM3 (ref 0–0.2)
BASOPHILS NFR BLD AUTO: 1.4 % (ref 0–1.5)
BUN SERPL-MCNC: 20 MG/DL (ref 8–23)
BUN/CREAT SERPL: 25.6 (ref 7–25)
CALCIUM SPEC-SCNC: 10 MG/DL (ref 8.6–10.5)
CHLORIDE SERPL-SCNC: 97 MMOL/L (ref 98–107)
CO2 SERPL-SCNC: 28 MMOL/L (ref 22–29)
CREAT SERPL-MCNC: 0.78 MG/DL (ref 0.57–1)
DEPRECATED RDW RBC AUTO: 39.6 FL (ref 37–54)
EGFRCR SERPLBLD CKD-EPI 2021: 84.9 ML/MIN/1.73
EOSINOPHIL # BLD AUTO: 0.22 10*3/MM3 (ref 0–0.4)
EOSINOPHIL NFR BLD AUTO: 2.6 % (ref 0.3–6.2)
ERYTHROCYTE [DISTWIDTH] IN BLOOD BY AUTOMATED COUNT: 12.2 % (ref 12.3–15.4)
GLUCOSE SERPL-MCNC: 296 MG/DL (ref 65–99)
HCT VFR BLD AUTO: 45.4 % (ref 34–46.6)
HGB BLD-MCNC: 15.6 G/DL (ref 12–15.9)
IMM GRANULOCYTES # BLD AUTO: 0.05 10*3/MM3 (ref 0–0.05)
IMM GRANULOCYTES NFR BLD AUTO: 0.6 % (ref 0–0.5)
LYMPHOCYTES # BLD AUTO: 2.52 10*3/MM3 (ref 0.7–3.1)
LYMPHOCYTES NFR BLD AUTO: 29.9 % (ref 19.6–45.3)
MCH RBC QN AUTO: 31 PG (ref 26.6–33)
MCHC RBC AUTO-ENTMCNC: 34.4 G/DL (ref 31.5–35.7)
MCV RBC AUTO: 90.1 FL (ref 79–97)
MONOCYTES # BLD AUTO: 1.29 10*3/MM3 (ref 0.1–0.9)
MONOCYTES NFR BLD AUTO: 15.3 % (ref 5–12)
NEUTROPHILS NFR BLD AUTO: 4.22 10*3/MM3 (ref 1.7–7)
NEUTROPHILS NFR BLD AUTO: 50.2 % (ref 42.7–76)
NRBC BLD AUTO-RTO: 0 /100 WBC (ref 0–0.2)
PLATELET # BLD AUTO: 279 10*3/MM3 (ref 140–450)
PMV BLD AUTO: 9.5 FL (ref 6–12)
POTASSIUM SERPL-SCNC: 4.2 MMOL/L (ref 3.5–5.2)
RBC # BLD AUTO: 5.04 10*6/MM3 (ref 3.77–5.28)
SODIUM SERPL-SCNC: 140 MMOL/L (ref 136–145)
WBC NRBC COR # BLD: 8.42 10*3/MM3 (ref 3.4–10.8)

## 2023-07-28 PROCEDURE — 85025 COMPLETE CBC W/AUTO DIFF WBC: CPT

## 2023-07-28 PROCEDURE — 36415 COLL VENOUS BLD VENIPUNCTURE: CPT

## 2023-07-28 PROCEDURE — 80048 BASIC METABOLIC PNL TOTAL CA: CPT

## 2023-08-02 ENCOUNTER — HOSPITAL ENCOUNTER (OUTPATIENT)
Facility: HOSPITAL | Age: 63
Setting detail: HOSPITAL OUTPATIENT SURGERY
Discharge: HOME | End: 2023-08-02
Attending: INTERNAL MEDICINE | Admitting: INTERNAL MEDICINE
Payer: MEDICARE

## 2023-08-02 VITALS
OXYGEN SATURATION: 95 % | BODY MASS INDEX: 39.68 KG/M2 | SYSTOLIC BLOOD PRESSURE: 124 MMHG | HEART RATE: 83 BPM | HEIGHT: 72 IN | WEIGHT: 293 LBS | DIASTOLIC BLOOD PRESSURE: 58 MMHG | RESPIRATION RATE: 16 BRPM | TEMPERATURE: 97.1 F

## 2023-08-02 DIAGNOSIS — I25.10 CORONARY ARTERY DISEASE INVOLVING NATIVE CORONARY ARTERY OF NATIVE HEART WITHOUT ANGINA PECTORIS: ICD-10-CM

## 2023-08-02 LAB — GLUCOSE BLDC GLUCOMTR-MCNC: 216 MG/DL (ref 70–130)

## 2023-08-02 PROCEDURE — 82948 REAGENT STRIP/BLOOD GLUCOSE: CPT

## 2023-08-02 PROCEDURE — 25010000002 MIDAZOLAM PER 1 MG: Performed by: INTERNAL MEDICINE

## 2023-08-02 PROCEDURE — S0260 H&P FOR SURGERY: HCPCS | Performed by: INTERNAL MEDICINE

## 2023-08-02 PROCEDURE — 93458 L HRT ARTERY/VENTRICLE ANGIO: CPT | Performed by: INTERNAL MEDICINE

## 2023-08-02 PROCEDURE — 25510000001 IOPAMIDOL PER 1 ML: Performed by: INTERNAL MEDICINE

## 2023-08-02 PROCEDURE — 25010000002 HEPARIN (PORCINE) PER 1000 UNITS: Performed by: INTERNAL MEDICINE

## 2023-08-02 PROCEDURE — C1769 GUIDE WIRE: HCPCS | Performed by: INTERNAL MEDICINE

## 2023-08-02 PROCEDURE — 25010000002 FENTANYL CITRATE (PF) 50 MCG/ML SOLUTION: Performed by: INTERNAL MEDICINE

## 2023-08-02 PROCEDURE — C1894 INTRO/SHEATH, NON-LASER: HCPCS | Performed by: INTERNAL MEDICINE

## 2023-08-02 RX ORDER — SODIUM CHLORIDE 0.9 % (FLUSH) 0.9 %
10 SYRINGE (ML) INJECTION EVERY 12 HOURS SCHEDULED
Status: DISCONTINUED | OUTPATIENT
Start: 2023-08-02 | End: 2023-08-02 | Stop reason: HOSPADM

## 2023-08-02 RX ORDER — METFORMIN HYDROCHLORIDE 500 MG/1
500 TABLET, EXTENDED RELEASE ORAL 2 TIMES DAILY
Status: ON HOLD | COMMUNITY
End: 2023-08-02 | Stop reason: SDUPTHER

## 2023-08-02 RX ORDER — ASPIRIN 81 MG/1
81 TABLET ORAL DAILY
COMMUNITY

## 2023-08-02 RX ORDER — METFORMIN HYDROCHLORIDE 500 MG/1
500 TABLET, EXTENDED RELEASE ORAL 2 TIMES DAILY
Start: 2023-08-02

## 2023-08-02 RX ORDER — FENTANYL CITRATE 50 UG/ML
INJECTION, SOLUTION INTRAMUSCULAR; INTRAVENOUS
Status: DISCONTINUED | OUTPATIENT
Start: 2023-08-02 | End: 2023-08-02 | Stop reason: HOSPADM

## 2023-08-02 RX ORDER — SODIUM CHLORIDE 9 MG/ML
40 INJECTION, SOLUTION INTRAVENOUS AS NEEDED
Status: DISCONTINUED | OUTPATIENT
Start: 2023-08-02 | End: 2023-08-02 | Stop reason: HOSPADM

## 2023-08-02 RX ORDER — VERAPAMIL HYDROCHLORIDE 2.5 MG/ML
INJECTION, SOLUTION INTRAVENOUS
Status: DISCONTINUED | OUTPATIENT
Start: 2023-08-02 | End: 2023-08-02 | Stop reason: HOSPADM

## 2023-08-02 RX ORDER — LIDOCAINE HYDROCHLORIDE 20 MG/ML
INJECTION, SOLUTION INFILTRATION; PERINEURAL
Status: DISCONTINUED | OUTPATIENT
Start: 2023-08-02 | End: 2023-08-02 | Stop reason: HOSPADM

## 2023-08-02 RX ORDER — ACETAMINOPHEN 325 MG/1
650 TABLET ORAL EVERY 4 HOURS PRN
Status: DISCONTINUED | OUTPATIENT
Start: 2023-08-02 | End: 2023-08-02 | Stop reason: HOSPADM

## 2023-08-02 RX ORDER — MIDAZOLAM HYDROCHLORIDE 1 MG/ML
INJECTION INTRAMUSCULAR; INTRAVENOUS
Status: DISCONTINUED | OUTPATIENT
Start: 2023-08-02 | End: 2023-08-02 | Stop reason: HOSPADM

## 2023-08-02 RX ORDER — SODIUM CHLORIDE 9 MG/ML
75 INJECTION, SOLUTION INTRAVENOUS CONTINUOUS
Status: DISCONTINUED | OUTPATIENT
Start: 2023-08-02 | End: 2023-08-02 | Stop reason: HOSPADM

## 2023-08-02 RX ORDER — HEPARIN SODIUM 1000 [USP'U]/ML
INJECTION, SOLUTION INTRAVENOUS; SUBCUTANEOUS
Status: DISCONTINUED | OUTPATIENT
Start: 2023-08-02 | End: 2023-08-02 | Stop reason: HOSPADM

## 2023-08-02 RX ORDER — DAPAGLIFLOZIN 10 MG/1
0.5 TABLET, FILM COATED ORAL DAILY
COMMUNITY

## 2023-08-02 RX ORDER — SODIUM CHLORIDE 0.9 % (FLUSH) 0.9 %
10 SYRINGE (ML) INJECTION AS NEEDED
Status: DISCONTINUED | OUTPATIENT
Start: 2023-08-02 | End: 2023-08-02 | Stop reason: HOSPADM

## 2023-08-02 RX ADMIN — SODIUM CHLORIDE 75 ML/HR: 9 INJECTION, SOLUTION INTRAVENOUS at 08:51

## 2023-08-02 NOTE — DISCHARGE INSTRUCTIONS
"[facility]  [address] [place] [address]    Coronary Angiogram (Radial/Ulnar Approach) After Care    Refer to this sheet in the next few weeks. These instructions provide you with information on caring for yourself after your procedure. Your caregiver may also give you more specific instructions. Your treatment has been planned according to current medical practices, but problems sometimes occur. Call your caregiver if you have any problems or questions after your procedure.    Home Care Instructions:  You may shower the day after the procedure. Remove the bandage (dressing) and gently wash the site with plain soap and water. Gently pat the site dry. You may apply a band aid daily for 2 days if desired.    Do not apply powder or lotion to the site.  Do not submerge the affected site in water for 3 to 5 days or until the site is completely healed.   Do not lift, push or pull anything over 5 pounds for 5 days after your procedure or as directed by your physician.  As a reference, a gallon of milk weighs 8 pounds.   Inspect the site at least twice daily. You may notice some bruising at the site and it may be tender for 1 to 2 weeks.     Increase your fluid intake for the next 2 days.    Keep arm elevated for 24 hours. For the remainder of the day, keep your arm in "Pledge of Allegiance" position when up and about.     You may drive 24 hours after the procedure unless otherwise instructed by your caregiver.  Do not operate machinery or power tools for 24 hours.  A responsible adult should be with you for the first 24 hours after you arrive home. Do not make any important legal decisions or sign legal papers for 24 hours.  Do not drink alcohol for 24 hours.    Metformin or any medications containing Metformin should not be taken for 48 hours after your procedure.      Call Your Doctor if:   You have unusual pain at the radial/ulnar (wrist) site.  You have redness, warmth, swelling, or pain at the " radial/ulnar (wrist) site.  You have drainage (other than a small amount of blood on the dressing).  `You have chills or a fever > 101.  Your arm becomes pale or dark, cool, tingly, or numb.  You develop chest pain, shortness of breath, feel faint or pass out.    You have heavy bleeding from the site, hold pressure on the site for 20 minutes.  If the bleeding stops, apply a fresh bandage and call your cardiologist.  However, if you        continue to have bleeding, call 911 and continue to apply pressure to the site.   You have any symptoms of a stroke.  Remember BE FAST  B-balance. Sudden trouble walking or loss of balance.  E-eyes.  Sudden changes in how you see or a sudden onset of a very bad headache.   F-face. Sudden weakness or loss of feeling of the face or facial droop on one side.   A-arms Sudden weakness or numbness in one arm.  One arm drifts down if they are both held out in front of you. This happens suddenly and usually on one side of the body.   S-speech.  Sudden trouble speaking, slurred speech or trouble understanding what are saying.   T-time  Time to call emergency services.  Write down the symptoms and the time they started.

## 2023-08-02 NOTE — Clinical Note
Hemostasis started on the right radial artery. Manual pressure applied to vessel. Manual pressure was held by Diego Dumont. Manual pressure was held for 5 min. Hemostasis achieved successfully. Closure device additional comment: Tensoplast

## 2023-08-02 NOTE — H&P
[facility] History And Physical Assessment    Patient Name: Pratibha Pascal  Age/Sex: 63 y.o. female  : 1960  MRN: [id number]    Date of Hospital Admission: 2023  Date of Encounter Visit: 23  Encounter Provider: Magy Rivera MD  Place of Service: [facility] CARDIOLOGY  Patient Care Team:  Benitez Wu MD as PCP - General (Family Medicine)  Magy Rivera MD as Consulting Physician (Cardiology)  Rona Washington MD as Consulting Physician (Endocrinology)  Kimo Carter MD as Surgeon (Otolaryngology)    Subjective:     Chief Complaint:  Abnormal stress test, cardiomyopathy, chest pain    History of Present Illness:  See HPI from 2023.  Since that office visit she underwent an echocardiogram that showed a new decline in her LV function to an EF of 43%.  Stress test was performed showing anterior infarct and moderate jamil-infarct ischemia.  She reports that since the stress test she has been having episodes of chest pain lasting a few minutes at a time.  She feels like they have been increasing in severity although not in frequency or duration.  Symptoms can occur at rest or with activity.  She feels like the symptoms are similar to what she experienced before her myocardial infarction in .  She presents today for cardiac catheterization.    HPI from 2023.  This is a 64 y/o female who follows with Dr. Rivera and is new to me today. She has a pmhx of hypertension, rheumatoid arthritis, hyperlipidemia, diabetes, obstructive sleep apnea and coronary artery disease. Dr. Rivera initially saw the patient in  when she presented as an anterior MI. She was taken to cardiac catheterization lab where she was found to have 100% thombotic occlusion of her mid LAD for which she received a drug eluting stent. Her remaining coronaries showed nonobstructive disease. She had a brief episode of atrial fibrillation with RVR but converted and has had no  further episodes since. Echocardiogram at that time showed a depressed left ventricular systolic function with apical wall motion abnormalities.      She was lost to follow up for a few years until she was seen in the [facility] for clearance to undergo knee replacement. A stress test was performed that showed evidence of anterior infarct with moderate jamil-infarct ischemia and an EF of 55%.  Based on those findings we proceeded with a cardiac catheterization on 9/3/2019 which showed mild nonobstructive coronary artery disease and a patent mid LAD stent and a normal-appearing left ventricular systolic function with anterior hypokinesis and an EF of 50 to 55%.      She was last seen in the office by Dr. Rivera in December 2022 and was doing well. She did have some complaints of atypical chest pain and it was decided to monitor for the time being. No changes were made.     She is here today for a follow up visit. She is concerned about the amount of sweating she does. She says with the least little bit of activity, she starts sweating profusely. She had COVID in April and feels the sweating got worse after COVID. She does have some anxiety and reports that she doesn't get out of the house much. She denies any chest pain, shortness of breath, palpitations, dizziness or syncope. She denies any significant swelling in her lower extremities, orthopnea or PND. She does get short of breath with exertion at times.        Past Medical History:  Past Medical History:   Diagnosis Date    Allergy to mold     Anxiety     Arthritis     Benign essential hypertension     CAD (coronary artery disease)     Coronary artery disease     Depression     Fibromyalgia, primary     H/O bone density study unclear    H/O complete eye exam 2 -3 years    Headache     History of myocardial infarction 2014    History of staph infection     HISTORY OF MSSA IN LEFT FOOT FROM DIABETIC ULCER HEALED    HL (hearing loss)     RIGHT EAR  SEVERE HEARING LOSS    HLD  (hyperlipidemia)     Hyperlipidemia     Hypertension     Hypothyroidism     Injury of back     Major depressive disorder, recurrent, in full remission     OA (osteoarthritis) of knee     Obesity     BRANNON (obstructive sleep apnea)     USE CPAP    Primary generalized (osteo)arthritis     Primary osteoarthritis of left knee     Primary osteoarthritis of right knee     Rheumatoid arthritis     Rheumatoid arthritis of multiple sites without organ or system involvement with positive rheumatoid factor     Seasonal allergies     Tobacco abuse 06/27/2014    QUIT     Type 2 diabetes mellitus        Past Surgical History:   Procedure Laterality Date    BACK SURGERY  10/1995    also 2/02; L5-S1; Dr. Carroll Avitia    CARDIAC CATHETERIZATION      CARDIAC CATHETERIZATION N/A 09/03/2019    Procedure: Coronary angiography  ;  Surgeon: Magy Rivera MD;  Location:  [facility];  Service: Cardiovascular    CARDIAC CATHETERIZATION N/A 09/03/2019    Procedure: Left Heart Cath;  Surgeon: Magy Rivera MD;  Location:  [facility];  Service: Cardiovascular    CARDIAC CATHETERIZATION N/A 09/03/2019    Procedure: Left ventriculography;  Surgeon: Magy Rivera MD;  Location:  [facility];  Service: Cardiovascular    CATARACT EXTRACTION WITH INTRAOCULAR LENS IMPLANT Bilateral 11/2022    COLONOSCOPY      CORONARY STENT PLACEMENT  06/2014    KNEE ARTHROSCOPY Right 2010    MAMMO BILATERAL  due    delcines    PAP SMEAR  due    THYROIDECTOMY Right 07/09/2020    Procedure: RIGHT THYROID LOBECTOMY AND RIGHT PARATHYROID EXPLORATION;  Surgeon: Kimo Carter MD;  Location:  [facility];  Service: ENT;  Laterality: Right;    THYROIDECTOMY Left 04/29/2021    Procedure: COMPLETION THYROIDECTOMY;  Surgeon: Kimo Carter MD;  Location:  [facility];  Service: ENT;  Laterality: Left;    TONSILLECTOMY      age 26    TOTAL KNEE ARTHROPLASTY Left 10/14/2019    Procedure: TOTAL KNEE  ARTHROPLASTY;  Surgeon: Nabor Correia MD;  Location: [facility] MAIN OR;  Service: Orthopedics    TOTAL KNEE ARTHROPLASTY Right 02/12/2020    Procedure: TOTAL KNEE ARTHROPLASTY;  Surgeon: Nabor Correia MD;  Location: [facility] MAIN OR;  Service: Orthopedics;  Laterality: Right;    US GUIDED FINE NEEDLE ASPIRATION  12/06/2019       Home Medications:   Medications Prior to Admission   Medication Sig Dispense Refill Last Dose    aspirin 81 MG EC tablet Take 1 tablet by mouth Daily.   8/1/2023    atorvastatin (LIPITOR) 10 MG tablet Take 0.5 tablets by mouth Daily. (Patient taking differently: Take 1 tablet by mouth Daily.) 45 tablet 1 8/2/2023    Blood Glucose Calibration (Accu-Chek Guide Control) liquid    8/1/2023    dapagliflozin Propanediol (Farxiga) 10 MG tablet Take 5 mg by mouth Daily.   8/1/2023    FLUoxetine (PROzac) 20 MG capsule Take 3 capsules by mouth Every Night. 270 capsule 1 8/1/2023    metFORMIN ER (GLUCOPHAGE-XR) 500 MG 24 hr tablet Take 1 tablet by mouth 2 (Two) Times a Day.   8/1/2023    ACCU-CHEK GUIDE test strip        baclofen (LIORESAL) 20 MG tablet Take 1 tablet by mouth Every Night. (Patient taking differently: Take 0.5 tablets by mouth Every Night.) 90 tablet 1     folic acid (FOLVITE) 1 MG tablet Take 1 tablet by mouth Daily. 90 tablet 1     ketorolac (TORADOL) 10 MG tablet Take 1 tablet by mouth Every 6 (Six) Hours As Needed for Moderate Pain. 20 tablet 0     levothyroxine (SYNTHROID, LEVOTHROID) 300 MCG tablet Take 1 tablet by mouth Daily.       losartan (COZAAR) 50 MG tablet Take 1 tablet by mouth Daily. 90 tablet 1     metoprolol succinate XL (TOPROL-XL) 100 MG 24 hr tablet Take 1 tablet by mouth Daily. 90 tablet 1     montelukast (Singulair) 10 MG tablet Take 1 tablet by mouth Every Night for 14 days. 14 tablet 0     nabumetone (Relafen) 500 MG tablet Take 1 tablet by mouth 2 (Two) Times a Day As Needed for Mild Pain. 60 tablet 2     ONETOUCH DELICA LANCETS 33G misc Test 3 times daily  (Patient taking differently: 1 each. Test 3 times daily) 100 each 11     sulfaSALAzine (AZULFIDINE) 500 MG tablet Take 3 tablets by mouth 2 (Two) Times a Day. 540 tablet 1     triamterene-hydrochlorothiazide (MAXZIDE-25) 37.5-25 MG per tablet Take 1 tablet by mouth Daily. 90 tablet 1     Trulicity 3 MG/0.5ML solution pen-injector INJECT 3 MG SUBCUTANEOUSLY ONCE A WEEK          Allergies:  Allergies   Allergen Reactions    Cefuroxime GI Intolerance     Patient has tolerated ceftriaxone (2018), cefazolin (Oct 2018) and piperacillin-tazobactam (2017).    Ciprofloxacin GI Intolerance    Clindamycin/Lincomycin GI Intolerance    Dicloxacillin GI Intolerance    Erythromycin Itching and Swelling     EYES SWELLING    Talwin [Pentazocine] Itching       Past Social History:  Social History     Socioeconomic History    Marital status:    Tobacco Use    Smoking status: Former     Packs/day: 1.00     Years: 30.00     Pack years: 30.00     Types: Cigarettes     Quit date: 2014     Years since quittin.1    Smokeless tobacco: Never    Tobacco comments:     quit 2014   Substance and Sexual Activity    Alcohol use: No    Drug use: No    Sexual activity: Defer       Past Family History:  Family History   Problem Relation Age of Onset    Cancer Mother         breast    Hypertension Mother     Rheum arthritis Mother     Thyroid disease Mother     Arthritis Mother     Hypertension Father     Thyroid disease Father     COPD Father     Depression Father     Hearing loss Father     Heart failure Maternal Grandmother     Rheum arthritis Other         aunt    Depression Daughter     Drug abuse Daughter     Malig Hyperthermia Neg Hx        Review of Systems:   All systems reviewed. Pertinent positives identified in HPI. All other systems are negative.    Objective:   Temp:  [97.1 øF (36.2 øC)] 97.1 øF (36.2 øC)  Heart Rate:  [90] 90  Resp:  [20] 20  BP: (143)/(82) 143/82  No intake or output data in the 24 hours  ending 08/02/23 0901  Body mass index is 40.69 kg/mý.      08/02/23  0840   Weight: 136 kg (300 lb)     Weight change:     Physical Exam:   General Appearance:    Alert, cooperative, in no acute distress   Head:    Normocephalic, without obvious abnormality, atraumatic   Eyes:            Lids and lashes normal, conjunctivae and sclerae normal, no   icterus, no pallor, corneas clear, PERRLA   Ears:    Ears appear intact with no abnormalities noted   Neck:   No adenopathy, supple, trachea midline, no thyromegaly, no   carotid bruit, no JVD   Lungs:     Clear to auscultation,respirations regular, even and unlabored    Heart:    Regular rhythm and normal rate, normal S1 and S2, no murmur, no gallop, no rub, no click   Chest Wall:    No abnormalities observed   Abdomen:     Normal bowel sounds, no masses, no organomegaly, soft        non-tender, non-distended, no guarding, no rebound  tenderness   Extremities:   Moves all extremities well, no edema, no cyanosis, no redness   Pulses:   Pulses palpable and equal bilaterally. Normal radial, carotid, femoral, dorsalis pedis and posterior tibial pulses bilaterally. Normal abdominal aorta   Skin:  Psychiatric:   No bleeding, bruising or rash    Alert and oriented x 3, normal mood and affect         Lab Review:   Results from last 7 days   Lab Units 07/28/23  1327   SODIUM mmol/L 140   POTASSIUM mmol/L 4.2   CHLORIDE mmol/L 97*   CO2 mmol/L 28.0   BUN mg/dL 20   CREATININE mg/dL 0.78   GLUCOSE mg/dL 296*   CALCIUM mg/dL 10.0         Results from last 7 days   Lab Units 07/28/23  1327   WBC 10*3/mm3 8.42   HEMOGLOBIN g/dL 15.6   HEMATOCRIT % 45.4   PLATELETS 10*3/mm3 279         I personally viewed and interpreted the patient's EKG    Assessment/Plan:     Chest pain  Abnormal stress test. With anterior infarct and jamil-infarct ischemia  Cardiomyopathy. EF of 43%.  This is a new decline.   Coronary artery disease.  History of anterior myocardial infarction and LAD stent  placement  Hypertension  Diabetes mellitus type 2  Hyperlipidemia    - For cardiac catheterization today.     Magy Rivera MD  08/02/23  09:01 EDT

## 2023-08-04 DIAGNOSIS — F33.42 MAJOR DEPRESSIVE DISORDER, RECURRENT, IN FULL REMISSION: Chronic | ICD-10-CM

## 2023-08-04 RX ORDER — FLUOXETINE HYDROCHLORIDE 20 MG/1
CAPSULE ORAL
Qty: 90 CAPSULE | Refills: 0 | Status: SHIPPED | OUTPATIENT
Start: 2023-08-04

## 2023-08-07 ENCOUNTER — TELEPHONE (OUTPATIENT)
Dept: ORTHOPEDIC SURGERY | Facility: CLINIC | Age: 63
End: 2023-08-07
Payer: MEDICARE

## 2023-08-27 DIAGNOSIS — I10 BENIGN ESSENTIAL HYPERTENSION: Chronic | ICD-10-CM

## 2023-08-27 RX ORDER — LOSARTAN POTASSIUM 50 MG/1
TABLET ORAL
Qty: 30 TABLET | Refills: 0 | Status: SHIPPED | OUTPATIENT
Start: 2023-08-27

## 2023-09-02 DIAGNOSIS — M05.79 RHEUMATOID ARTHRITIS OF MULTIPLE SITES WITHOUT ORGAN OR SYSTEM INVOLVEMENT WITH POSITIVE RHEUMATOID FACTOR: Chronic | ICD-10-CM

## 2023-09-02 DIAGNOSIS — F33.42 MAJOR DEPRESSIVE DISORDER, RECURRENT, IN FULL REMISSION: Chronic | ICD-10-CM

## 2023-09-03 RX ORDER — FLUOXETINE HYDROCHLORIDE 20 MG/1
CAPSULE ORAL
Qty: 90 CAPSULE | Refills: 0 | OUTPATIENT
Start: 2023-09-03

## 2023-09-03 RX ORDER — FOLIC ACID 1 MG/1
TABLET ORAL
Qty: 90 TABLET | Refills: 0 | OUTPATIENT
Start: 2023-09-03

## 2023-09-04 NOTE — PROGRESS NOTES
Subjective:     Encounter Date:09/05/2023      Patient ID: Pratibha Pascal is a 63 y.o. female.    Chief Complaint:  History of Present Illness    This is a 63-year-old with hypertension, rheumatoid arthritis, hyperlipidemia, diabetes mellitus type 2, obstructive sleep apnea, coronary artery disease status post anterior myocardial infarction and drug-eluting stent placement of the mid LAD, obstructive sleep apnea, who presents for follow-up.     In 12/2022 she reported brief episodes of chest pain on the left side of her chest occurring at rest. There were some similarities to the pain she experienced with her myocardial infarction.  Her recent lipid panel showed that her lipids were at goal except her triglycerides were in the 300s.  She was started on fish oil by her endocrinologist but she reports she had to stop this because of significant diarrhea.  We opted to monitor her symptoms at that time.      She was seen by KALI Harris in 6/2023 at which time she complained of increased sweating and dyspnea on exertion.  A repeat echocardiogram was performed showing a decline in her EF to 44% and apical wall motion abnormalities with normal diastolic function and no significant valvular disease.  A stress test was then performed in 7/2023 which showed anterior infarct with jamil-infarct ischemia.  A cardiac catheterization was recommended and performed on 8/2/2023 and showed mild non-obstructive disease with a patent LAD stent and elevated left ventricular end diastolic pressure of 21 to 23 mmHg.  She was continued on triamterene-hydrocholorthiazide with plans to consider loop diuretic in the future.     She presents today for follow up.  Denies any shortness of breath, chest pain, palpitations, near-syncope or syncope or worsening lower extremity swelling.  She continues to have issues with excessive sweating even with minimal activity.  She is quite frustrated about her symptoms.  She continues struggle  with her anxiety.  She stopped alprazolam on her own.  She had to stop the Farxiga because of issues with perineal irritation that recurred even after stopping for a period of time and resuming at a low-dose.  She continues to struggle with stress and anxiety that has worsened since she is also the primary caregiver for her 84-year-old mother who recently underwent breast cancer treatment.         Prior History:  I saw the patient initially in 6/2014 when she presented with an anterior myocardial infarction.  The patient called EMS due to chest pain at home.  EKGs performed in the field showed evidence of an anterior myocardial infarction.  Following her arrival to the emergency room she did have an episode of ventricular fibrillation requiring defibrillation and was immediately resuscitated.  She is brought emergently to the cardiac catheterization laboratory where she was found to have 100% thrombotic occlusion of her mid left anterior descending artery for which she underwent PCI and drug-eluting stent placement with a Xience expedition 3.5 x 18 mm stent which was postdilated with a 4.0 x 15 mm balloon.  Remaining coronary arteries showed mild nonobstructive disease.  She did have an episode of atrial fibrillation with rapid ventricular rate that eventually converted to sinus rhythm.  She has had no further episodes of atrial fibrillation since then.  Her initial echocardiogram did show evidence of depressed left ventricular systolic function with apical wall motion abnormalities.  I saw the patient last in 7/2015 for routine follow-up.  At that time I stopped her ticagrelor and kept her on aspirin.     The patient was lost to follow-up and not seen back in the office until 8/2019 when she presented to the cardiac evaluation center for cardiac clearance for knee replacement.  She was having a lot of issues with easy fatigability and some indigestion type discomfort.  Stress test was recommended which showed  evidence of anterior infarct with moderate jamil-infarct ischemia and an EF of 55%.  Based on those findings we proceeded with a cardiac catheterization on 9/3/2019 which showed mild nonobstructive coronary artery disease and a patent mid LAD stent and a normal-appearing left ventricular systolic function with anterior hypokinesis and an EF of 50 to 55%.     She was last seen in the office by KALI Pantoja in 6/2020 for preoperative evaluation before thyroid surgery.  At that time she was not having any new complaints and she was cleared to proceed with surgery.       Review of Systems   Constitutional: Positive for diaphoresis and malaise/fatigue.   HENT:  Negative for hearing loss, hoarse voice, nosebleeds and sore throat.    Eyes:  Negative for pain.   Cardiovascular:  Positive for dyspnea on exertion. Negative for chest pain, claudication, cyanosis, irregular heartbeat, leg swelling, near-syncope, orthopnea, palpitations, paroxysmal nocturnal dyspnea and syncope.   Respiratory:  Negative for shortness of breath and snoring.    Endocrine: Negative for cold intolerance, heat intolerance, polydipsia, polyphagia and polyuria.   Skin:  Negative for itching and rash.   Musculoskeletal:  Positive for joint pain. Negative for arthritis, falls, joint swelling, muscle cramps, muscle weakness and myalgias.   Gastrointestinal:  Negative for constipation, diarrhea, dysphagia, heartburn, hematemesis, hematochezia, melena, nausea and vomiting.   Genitourinary:  Negative for frequency, hematuria and hesitancy.   Neurological:  Negative for excessive daytime sleepiness, dizziness, headaches, light-headedness, numbness and weakness.   Psychiatric/Behavioral:  Negative for depression. The patient is not nervous/anxious.        Current Outpatient Medications:     ACCU-CHEK GUIDE test strip, , Disp: , Rfl:     aspirin 81 MG EC tablet, Take 1 tablet by mouth Daily., Disp: , Rfl:     atorvastatin (LIPITOR) 10 MG tablet, Take 0.5  tablets by mouth Daily. (Patient taking differently: Take 1 tablet by mouth Daily.), Disp: 45 tablet, Rfl: 1    baclofen (LIORESAL) 20 MG tablet, Take 1 tablet by mouth Every Night. (Patient taking differently: Take 0.5 tablets by mouth Every Night.), Disp: 90 tablet, Rfl: 1    Blood Glucose Calibration (Accu-Chek Guide Control) liquid, , Disp: , Rfl:     FLUoxetine (PROzac) 20 MG capsule, TAKE 3 CAPSULES BY MOUTH ONCE DAILY AT NIGHT, Disp: 90 capsule, Rfl: 0    folic acid (FOLVITE) 1 MG tablet, Take 1 tablet by mouth Daily., Disp: 90 tablet, Rfl: 1    ketorolac (TORADOL) 10 MG tablet, Take 1 tablet by mouth Every 6 (Six) Hours As Needed for Moderate Pain., Disp: 20 tablet, Rfl: 0    levothyroxine (SYNTHROID, LEVOTHROID) 300 MCG tablet, Take 1 tablet by mouth Every Night., Disp: , Rfl:     losartan (COZAAR) 50 MG tablet, Take 1 tablet by mouth once daily, Disp: 30 tablet, Rfl: 0    metFORMIN ER (GLUCOPHAGE-XR) 500 MG 24 hr tablet, Take 1 tablet by mouth 2 (Two) Times a Day. Resume on 8/4/2023, Disp: , Rfl:     metoprolol succinate XL (TOPROL-XL) 100 MG 24 hr tablet, Take 1 tablet by mouth Daily., Disp: 90 tablet, Rfl: 1    nabumetone (Relafen) 500 MG tablet, Take 1 tablet by mouth 2 (Two) Times a Day As Needed for Mild Pain., Disp: 60 tablet, Rfl: 2    ONETOUCH DELICA LANCETS 33G misc, Test 3 times daily (Patient taking differently: 1 each. Test 3 times daily), Disp: 100 each, Rfl: 11    sulfaSALAzine (AZULFIDINE) 500 MG tablet, Take 3 tablets by mouth 2 (Two) Times a Day., Disp: 540 tablet, Rfl: 1    triamterene-hydrochlorothiazide (MAXZIDE-25) 37.5-25 MG per tablet, Take 1 tablet by mouth Daily., Disp: 90 tablet, Rfl: 1    Trulicity 3 MG/0.5ML solution pen-injector, INJECT 3 MG SUBCUTANEOUSLY ONCE A WEEK, Disp: , Rfl:   No current facility-administered medications for this visit.    Facility-Administered Medications Ordered in Other Visits:     mupirocin (BACTROBAN) 2 % nasal ointment, , Nasal, BID, Nabor Correia,  MD    Past Medical History:   Diagnosis Date    Allergy to mold     Anxiety     Arthritis     Benign essential hypertension     CAD (coronary artery disease)     Coronary artery disease     Depression     Fibromyalgia, primary     H/O bone density study unclear    H/O complete eye exam 2 -3 years    Headache     History of myocardial infarction 2014    History of staph infection     HISTORY OF MSSA IN LEFT FOOT FROM DIABETIC ULCER HEALED    HL (hearing loss)     RIGHT EAR  SEVERE HEARING LOSS    HLD (hyperlipidemia)     Hyperlipidemia     Hypertension     Hypothyroidism     Injury of back     Major depressive disorder, recurrent, in full remission     OA (osteoarthritis) of knee     Obesity     BRANNON (obstructive sleep apnea)     USE CPAP    Primary generalized (osteo)arthritis     Primary osteoarthritis of left knee     Primary osteoarthritis of right knee     Rheumatoid arthritis     Rheumatoid arthritis of multiple sites without organ or system involvement with positive rheumatoid factor     Seasonal allergies     Tobacco abuse 06/27/2014    QUIT     Type 2 diabetes mellitus        Past Surgical History:   Procedure Laterality Date    BACK SURGERY  10/1995    also 2/02; L5-S1; Dr. Carroll Avitia    CARDIAC CATHETERIZATION      CARDIAC CATHETERIZATION N/A 09/03/2019    Procedure: Coronary angiography  ;  Surgeon: Magy Rivera MD;  Location:  [facility];  Service: Cardiovascular    CARDIAC CATHETERIZATION N/A 09/03/2019    Procedure: Left Heart Cath;  Surgeon: Magy Rivera MD;  Location:  [facility];  Service: Cardiovascular    CARDIAC CATHETERIZATION N/A 09/03/2019    Procedure: Left ventriculography;  Surgeon: Magy Rivera MD;  Location:  [facility];  Service: Cardiovascular    CARDIAC CATHETERIZATION N/A 8/2/2023    Procedure: Left Heart Cath;  Surgeon: Mgay Rivera MD;  Location:  [facility];  Service: Cardiovascular;   Laterality: N/A;    CARDIAC CATHETERIZATION N/A 8/2/2023    Procedure: Coronary angiography;  Surgeon: Magy Rivera MD;  Location:  [facility];  Service: Cardiovascular;  Laterality: N/A;    CARDIAC CATHETERIZATION N/A 8/2/2023    Procedure: Left ventriculography;  Surgeon: Magy Rivera MD;  Location:  [facility];  Service: Cardiovascular;  Laterality: N/A;    CATARACT EXTRACTION WITH INTRAOCULAR LENS IMPLANT Bilateral 11/2022    COLONOSCOPY      CORONARY STENT PLACEMENT  06/2014    KNEE ARTHROSCOPY Right 2010    MAMMO BILATERAL  due    delcines    PAP SMEAR  due    THYROIDECTOMY Right 07/09/2020    Procedure: RIGHT THYROID LOBECTOMY AND RIGHT PARATHYROID EXPLORATION;  Surgeon: Kimo Carter MD;  Location:  [facility];  Service: ENT;  Laterality: Right;    THYROIDECTOMY Left 04/29/2021    Procedure: COMPLETION THYROIDECTOMY;  Surgeon: Kimo Carter MD;  Location:  [facility];  Service: ENT;  Laterality: Left;    TONSILLECTOMY      age 26    TOTAL KNEE ARTHROPLASTY Left 10/14/2019    Procedure: TOTAL KNEE ARTHROPLASTY;  Surgeon: Nabor Correia MD;  Location: [facility];  Service: Orthopedics    TOTAL KNEE ARTHROPLASTY Right 02/12/2020    Procedure: TOTAL KNEE ARTHROPLASTY;  Surgeon: Nabor Correia MD;  [facility];  Service: Orthopedics;  Laterality: Right;    US GUIDED FINE NEEDLE ASPIRATION  12/06/2019       Family History   Problem Relation Age of Onset    Cancer Mother         breast    Hypertension Mother     Rheum arthritis Mother     Thyroid disease Mother     Arthritis Mother     Hypertension Father     Thyroid disease Father     COPD Father     Depression Father     Hearing loss Father     Heart failure Maternal Grandmother     Rheum arthritis Other         aunt    Depression Daughter     Drug abuse Daughter     Malig Hyperthermia Neg Hx        Social History     Tobacco Use    Smoking status: Former     Packs/day: 1.00     Years:  "30.00     Pack years: 30.00     Types: Cigarettes     Quit date: 2014     Years since quittin.1    Smokeless tobacco: Never    Tobacco comments:     quit 2014   Vaping Use    Vaping Use: Never used   Substance Use Topics    Alcohol use: No    Drug use: No         ECG 12 Lead    Date/Time: 2023 11:30 AM  Performed by: Magy Rivera MD  Authorized by: Magy Rivera MD   Comparison: compared with previous ECG   Similar to previous ECG  Rhythm: sinus rhythm           Objective:     Visit Vitals  /70 (BP Location: Left arm, Patient Position: Sitting, Cuff Size: Adult)   Ht 182.9 cm (72\")   Wt (!) 138 kg (303 lb 9.6 oz)   SpO2 97%   BMI 41.18 kg/m²         Constitutional:       Appearance: Normal appearance. Well-developed.   Eyes:      General: Lids are normal.      Conjunctiva/sclera: Conjunctivae normal.      Pupils: Pupils are equal, round, and reactive to light.   HENT:      Head: Normocephalic and atraumatic.   Neck:      Vascular: No carotid bruit or JVD.      Lymphadenopathy: No cervical adenopathy.   Pulmonary:      Effort: Pulmonary effort is normal.      Breath sounds: Normal breath sounds.   Cardiovascular:      Normal rate. Regular rhythm.      No gallop.       Comments: Varicose veins involving the right lower extremity around the ankle and foot  Pulses:     Radial: 2+ bilaterally.  Edema:     Peripheral edema absent.   Abdominal:      Palpations: Abdomen is soft.   Musculoskeletal:      Cervical back: Full passive range of motion without pain, normal range of motion and neck supple. Skin:     General: Skin is warm and dry.   Neurological:      Mental Status: Alert and oriented to person, place, and time.           Assessment:          Diagnosis Plan   1. Coronary artery disease involving native coronary artery of native heart without angina pectoris        2. Mixed hyperlipidemia        3. Primary hypertension        4. AF (paroxysmal atrial fibrillation)        5. History of " coronary artery stent placement        6. Type 2 diabetes mellitus with other circulatory complication, without long-term current use of insulin        7. Hypothyroidism due to Hashimoto's thyroiditis        8. Obstructive apnea               Plan:       1.  Coronary artery disease.  Recent cardiac catheterization with patent stent and otherwise nonobstructive disease.  No symptoms of angina at this time.  EKG remained stable.  Continue medical management.  2.  Cardiomyopathy.  Mildly declined EF of about 44%.  Appears to be nonischemic.  She is already on guideline directed management metoprolol succinate and losartan.  Her recent cardiac catheterization showed mildly elevated left ventricular filling pressures.  She is not having any symptoms associated with volume overload.  She is already on triamterene-hydrochlorothiazide.  We will continue the triamterene-hydrochlorothiazide and add a low-dose of spironolactone 25 mg daily as part of guideline directed management of cardiomyopathy and for mildly elevated left ventricular filling pressures.  Unfortunately SGLT2 inhibitor had to be stopped recently due to recurrent side effects.  3.  Hypertension.  Well-controlled on her current regimen medications.  I asked the patient to notify me if she develops any issues with low blood pressures with the addition of spironolactone.  4.  Hyperlipidemia.  On low-dose atorvastatin for goal LDL of 70 or below.    5.  Hypertriglyceridemia.  Management per [facility].  5.  Diabetes mellitus type 2  6.  Hypothyroidism  7.  Obstructive sleep apnea  8.  Rheumatoid arthritis.  Followed by [facility].  9.  Diaphoresis.  Unable to find a cardiac cause for her symptoms.    We will plan on seeing the patient back again as scheduled in September.  In the meanwhile I asked the patient to have a BMP performed in 1 to 2 months following the initiation of spironolactone.

## 2023-09-05 ENCOUNTER — OFFICE VISIT (OUTPATIENT)
Dept: CARDIOLOGY | Facility: CLINIC | Age: 63
End: 2023-09-05
Payer: MEDICARE

## 2023-09-05 VITALS
HEIGHT: 72 IN | DIASTOLIC BLOOD PRESSURE: 70 MMHG | OXYGEN SATURATION: 97 % | SYSTOLIC BLOOD PRESSURE: 128 MMHG | WEIGHT: 293 LBS | BODY MASS INDEX: 39.68 KG/M2

## 2023-09-05 DIAGNOSIS — E11.59 TYPE 2 DIABETES MELLITUS WITH OTHER CIRCULATORY COMPLICATION, WITHOUT LONG-TERM CURRENT USE OF INSULIN: ICD-10-CM

## 2023-09-05 DIAGNOSIS — Z95.5 HISTORY OF CORONARY ARTERY STENT PLACEMENT: ICD-10-CM

## 2023-09-05 DIAGNOSIS — G47.33 OBSTRUCTIVE APNEA: Chronic | ICD-10-CM

## 2023-09-05 DIAGNOSIS — I48.0 AF (PAROXYSMAL ATRIAL FIBRILLATION): ICD-10-CM

## 2023-09-05 DIAGNOSIS — E06.3 HYPOTHYROIDISM DUE TO HASHIMOTO'S THYROIDITIS: ICD-10-CM

## 2023-09-05 DIAGNOSIS — I10 PRIMARY HYPERTENSION: Chronic | ICD-10-CM

## 2023-09-05 DIAGNOSIS — I25.10 CORONARY ARTERY DISEASE INVOLVING NATIVE CORONARY ARTERY OF NATIVE HEART WITHOUT ANGINA PECTORIS: Primary | ICD-10-CM

## 2023-09-05 DIAGNOSIS — E03.8 HYPOTHYROIDISM DUE TO HASHIMOTO'S THYROIDITIS: ICD-10-CM

## 2023-09-05 DIAGNOSIS — E78.2 MIXED HYPERLIPIDEMIA: Chronic | ICD-10-CM

## 2023-09-05 RX ORDER — SPIRONOLACTONE 25 MG/1
25 TABLET ORAL DAILY
Qty: 30 TABLET | Refills: 5 | Status: SHIPPED | OUTPATIENT
Start: 2023-09-05

## 2023-09-05 NOTE — LETTER
September 5, 2023       No Recipients    Patient: Pratibha Pascal   YOB: 1960   Date of Visit: 9/5/2023       Dear Benitez Wu MD    Pratibha Pascal was in my office today. Below is a copy of my note.    If you have questions, please do not hesitate to call me. I look forward to following Pratibha along with you.         Sincerely,        Magy Rivera MD        CC:   No Recipients        Subjective:     Encounter Date:09/05/2023      Patient ID: Pratibha Pascal is a 63 y.o. female.    Chief Complaint:  History of Present Illness    This is a 63-year-old with hypertension, rheumatoid arthritis, hyperlipidemia, diabetes mellitus type 2, obstructive sleep apnea, coronary artery disease status post anterior myocardial infarction and drug-eluting stent placement of the mid LAD, obstructive sleep apnea, who presents for follow-up.     In 12/2022 she reported brief episodes of chest pain on the left side of her chest occurring at rest. There were some similarities to the pain she experienced with her myocardial infarction.  Her recent lipid panel showed that her lipids were at goal except her triglycerides were in the 300s.  She was started on fish oil by her endocrinologist but she reports she had to stop this because of significant diarrhea.  We opted to monitor her symptoms at that time.      She was seen by KALI Harris in 6/2023 at which time she complained of increased sweating and dyspnea on exertion.  A repeat echocardiogram was performed showing a decline in her EF to 44% and apical wall motion abnormalities with normal diastolic function and no significant valvular disease.  A stress test was then performed in 7/2023 which showed anterior infarct with jamil-infarct ischemia.  A cardiac catheterization was recommended and performed on 8/2/2023 and showed mild non-obstructive disease with a patent LAD stent and elevated left ventricular end diastolic pressure of 21 to 23 mmHg.  She was continued  on triamterene-hydrocholorthiazide with plans to consider loop diuretic in the future.     She presents today for follow up.          Prior History:  I saw the patient initially in 6/2014 when she presented with an anterior myocardial infarction.  The patient called EMS due to chest pain at home.  EKGs performed in the field showed evidence of an anterior myocardial infarction.  Following her arrival to the emergency room she did have an episode of ventricular fibrillation requiring defibrillation and was immediately resuscitated.  She is brought emergently to the cardiac catheterization laboratory where she was found to have 100% thrombotic occlusion of her mid left anterior descending artery for which she underwent PCI and drug-eluting stent placement with a Xience expedition 3.5 x 18 mm stent which was postdilated with a 4.0 x 15 mm balloon.  Remaining coronary arteries showed mild nonobstructive disease.  She did have an episode of atrial fibrillation with rapid ventricular rate that eventually converted to sinus rhythm.  She has had no further episodes of atrial fibrillation since then.  Her initial echocardiogram did show evidence of depressed left ventricular systolic function with apical wall motion abnormalities.  I saw the patient last in 7/2015 for routine follow-up.  At that time I stopped her ticagrelor and kept her on aspirin.     The patient was lost to follow-up and not seen back in the office until 8/2019 when she presented to the cardiac evaluation center for cardiac clearance for knee replacement.  She was having a lot of issues with easy fatigability and some indigestion type discomfort.  Stress test was recommended which showed evidence of anterior infarct with moderate jamil-infarct ischemia and an EF of 55%.  Based on those findings we proceeded with a cardiac catheterization on 9/3/2019 which showed mild nonobstructive coronary artery disease and a patent mid LAD stent and a normal-appearing  left ventricular systolic function with anterior hypokinesis and an EF of 50 to 55%.     She was last seen in the office by KALI Pantoja in 6/2020 for preoperative evaluation before thyroid surgery.  At that time she was not having any new complaints and she was cleared to proceed with surgery.       ROS      Current Outpatient Medications:   •  ACCU-CHEK GUIDE test strip, , Disp: , Rfl:   •  aspirin 81 MG EC tablet, Take 1 tablet by mouth Daily., Disp: , Rfl:   •  atorvastatin (LIPITOR) 10 MG tablet, Take 0.5 tablets by mouth Daily. (Patient taking differently: Take 1 tablet by mouth Daily.), Disp: 45 tablet, Rfl: 1  •  baclofen (LIORESAL) 20 MG tablet, Take 1 tablet by mouth Every Night. (Patient taking differently: Take 0.5 tablets by mouth Every Night.), Disp: 90 tablet, Rfl: 1  •  Blood Glucose Calibration (Accu-Chek Guide Control) liquid, , Disp: , Rfl:   •  FLUoxetine (PROzac) 20 MG capsule, TAKE 3 CAPSULES BY MOUTH ONCE DAILY AT NIGHT, Disp: 90 capsule, Rfl: 0  •  folic acid (FOLVITE) 1 MG tablet, Take 1 tablet by mouth Daily., Disp: 90 tablet, Rfl: 1  •  ketorolac (TORADOL) 10 MG tablet, Take 1 tablet by mouth Every 6 (Six) Hours As Needed for Moderate Pain., Disp: 20 tablet, Rfl: 0  •  levothyroxine (SYNTHROID, LEVOTHROID) 300 MCG tablet, Take 1 tablet by mouth Every Night., Disp: , Rfl:   •  losartan (COZAAR) 50 MG tablet, Take 1 tablet by mouth once daily, Disp: 30 tablet, Rfl: 0  •  metFORMIN ER (GLUCOPHAGE-XR) 500 MG 24 hr tablet, Take 1 tablet by mouth 2 (Two) Times a Day. Resume on 8/4/2023, Disp: , Rfl:   •  metoprolol succinate XL (TOPROL-XL) 100 MG 24 hr tablet, Take 1 tablet by mouth Daily., Disp: 90 tablet, Rfl: 1  •  nabumetone (Relafen) 500 MG tablet, Take 1 tablet by mouth 2 (Two) Times a Day As Needed for Mild Pain., Disp: 60 tablet, Rfl: 2  •  ONETOUCH DELICA LANCETS 33G misc, Test 3 times daily (Patient taking differently: 1 each. Test 3 times daily), Disp: 100 each, Rfl: 11  •   sulfaSALAzine (AZULFIDINE) 500 MG tablet, Take 3 tablets by mouth 2 (Two) Times a Day., Disp: 540 tablet, Rfl: 1  •  triamterene-hydrochlorothiazide (MAXZIDE-25) 37.5-25 MG per tablet, Take 1 tablet by mouth Daily., Disp: 90 tablet, Rfl: 1  •  Trulicity 3 MG/0.5ML solution pen-injector, INJECT 3 MG SUBCUTANEOUSLY ONCE A WEEK, Disp: , Rfl:   No current facility-administered medications for this visit.    Facility-Administered Medications Ordered in Other Visits:   •  mupirocin (BACTROBAN) 2 % nasal ointment, , Nasal, BID, Nabor Correia MD    Past Medical History:   Diagnosis Date   • Allergy to mold    • Anxiety    • Arthritis    • Benign essential hypertension    • CAD (coronary artery disease)    • Coronary artery disease    • Depression    • Fibromyalgia, primary    • H/O bone density study unclear   • H/O complete eye exam 2 -3 years   • Headache    • History of myocardial infarction 2014   • History of staph infection     HISTORY OF MSSA IN LEFT FOOT FROM DIABETIC ULCER HEALED   • HL (hearing loss)     RIGHT EAR  SEVERE HEARING LOSS   • HLD (hyperlipidemia)    • Hyperlipidemia    • Hypertension    • Hypothyroidism    • Injury of back    • Major depressive disorder, recurrent, in full remission    • OA (osteoarthritis) of knee    • Obesity    • BRANNON (obstructive sleep apnea)     USE CPAP   • Primary generalized (osteo)arthritis    • Primary osteoarthritis of left knee    • Primary osteoarthritis of right knee    • Rheumatoid arthritis    • Rheumatoid arthritis of multiple sites without organ or system involvement with positive rheumatoid factor    • Seasonal allergies    • Tobacco abuse 06/27/2014    QUIT    • Type 2 diabetes mellitus        Past Surgical History:   Procedure Laterality Date   • BACK SURGERY  10/1995    also 2/02; L5-S1; Dr. Carroll Avitia   • CARDIAC CATHETERIZATION     • CARDIAC CATHETERIZATION N/A 09/03/2019    Procedure: Coronary angiography  ;  Surgeon: Magy Rivera MD;  Location: [facility]  CATH INVASIVE LOCATION;  Service: Cardiovascular   • CARDIAC CATHETERIZATION N/A 09/03/2019    Procedure: Left Heart Cath;  Surgeon: Magy Rivera MD;  Location: [facility] CATH INVASIVE LOCATION;  Service: Cardiovascular   • CARDIAC CATHETERIZATION N/A 09/03/2019    Procedure: Left ventriculography;  Surgeon: Magy Rivera MD;  Location: [facility] CATH INVASIVE LOCATION;  Service: Cardiovascular   • CARDIAC CATHETERIZATION N/A 8/2/2023    Procedure: Left Heart Cath;  Surgeon: Magy Rivera MD;  Location: [facility] CATH INVASIVE LOCATION;  Service: Cardiovascular;  Laterality: N/A;   • CARDIAC CATHETERIZATION N/A 8/2/2023    Procedure: Coronary angiography;  Surgeon: Magy Rivera MD;  Location: [facility] CATH INVASIVE LOCATION;  Service: Cardiovascular;  Laterality: N/A;   • CARDIAC CATHETERIZATION N/A 8/2/2023    Procedure: Left ventriculography;  Surgeon: Magy Rivera MD;  Location: [facility] CATH INVASIVE LOCATION;  Service: Cardiovascular;  Laterality: N/A;   • CATARACT EXTRACTION WITH INTRAOCULAR LENS IMPLANT Bilateral 11/2022   • COLONOSCOPY     • CORONARY STENT PLACEMENT  06/2014   • KNEE ARTHROSCOPY Right 2010   • MAMMO BILATERAL  due    delcines   • PAP SMEAR  due   • THYROIDECTOMY Right 07/09/2020    Procedure: RIGHT THYROID LOBECTOMY AND RIGHT PARATHYROID EXPLORATION;  Surgeon: Kimo Carter MD;  Location:  [facility];  Service: ENT;  Laterality: Right;   • THYROIDECTOMY Left 04/29/2021    Procedure: COMPLETION THYROIDECTOMY;  Surgeon: Kimo Carter MD;  Location: [facility];  Service: ENT;  Laterality: Left;   • TONSILLECTOMY      age 26   • TOTAL KNEE ARTHROPLASTY Left 10/14/2019    Procedure: TOTAL KNEE ARTHROPLASTY;  Surgeon: Nabor Correia MD;  Location: [facility] MAIN OR;  Service: Orthopedics   • TOTAL KNEE ARTHROPLASTY Right 02/12/2020    Procedure: TOTAL KNEE ARTHROPLASTY;  Surgeon: Nabor Correia MD;  Location: [facility] MAIN OR;  Service: Orthopedics;  Laterality: Right;   • US  "GUIDED FINE NEEDLE ASPIRATION  2019       Family History   Problem Relation Age of Onset   • Cancer Mother         breast   • Hypertension Mother    • Rheum arthritis Mother    • Thyroid disease Mother    • Arthritis Mother    • Hypertension Father    • Thyroid disease Father    • COPD Father    • Depression Father    • Hearing loss Father    • Heart failure Maternal Grandmother    • Rheum arthritis Other         aunt   • Depression Daughter    • Drug abuse Daughter    • Malig Hyperthermia Neg Hx        Social History     Tobacco Use   • Smoking status: Former     Packs/day: 1.00     Years: 30.00     Pack years: 30.00     Types: Cigarettes     Quit date: 2014     Years since quittin.1   • Smokeless tobacco: Never   • Tobacco comments:     quit 2014   Vaping Use   • Vaping Use: Never used   Substance Use Topics   • Alcohol use: No   • Drug use: No       Procedures       Objective:     Visit Vitals  /70 (BP Location: Left arm, Patient Position: Sitting, Cuff Size: Adult)   Ht 182.9 cm (72\")   Wt (!) 138 kg (303 lb 9.6 oz)   SpO2 97%   BMI 41.18 kg/m²         Physical Exam    Lab Review:       Assessment:          Diagnosis Plan   1. Coronary artery disease involving native coronary artery of native heart without angina pectoris        2. Mixed hyperlipidemia        3. Primary hypertension        4. AF (paroxysmal atrial fibrillation)        5. History of coronary artery stent placement        6. Type 2 diabetes mellitus with other circulatory complication, without long-term current use of insulin        7. Hypothyroidism due to Hashimoto's thyroiditis        8. Obstructive apnea               Plan:                  "

## 2023-09-25 DIAGNOSIS — I10 BENIGN ESSENTIAL HYPERTENSION: Chronic | ICD-10-CM

## 2023-09-25 RX ORDER — LOSARTAN POTASSIUM 50 MG/1
TABLET ORAL
Qty: 14 TABLET | Refills: 0 | Status: SHIPPED | OUTPATIENT
Start: 2023-09-25 | End: 2023-09-27 | Stop reason: SDUPTHER

## 2023-09-26 ENCOUNTER — TELEPHONE (OUTPATIENT)
Dept: FAMILY MEDICINE CLINIC | Facility: CLINIC | Age: 63
End: 2023-09-26
Payer: MEDICARE

## 2023-09-26 DIAGNOSIS — I10 BENIGN ESSENTIAL HYPERTENSION: Chronic | ICD-10-CM

## 2023-09-26 RX ORDER — METOPROLOL SUCCINATE 100 MG/1
100 TABLET, EXTENDED RELEASE ORAL DAILY
Qty: 14 TABLET | Refills: 0 | Status: SHIPPED | OUTPATIENT
Start: 2023-09-26 | End: 2023-09-27 | Stop reason: SDUPTHER

## 2023-09-26 NOTE — TELEPHONE ENCOUNTER
----- Message from Pratibha Pascal sent at 9/25/2023 11:20 PM EDT -----  Regarding: Metroprolol   Contact: [phone]  I'm out of this medication. Would you please fill enough to get me to my appointment Oct. 2

## 2023-09-27 RX ORDER — FLUOXETINE HYDROCHLORIDE 20 MG/1
CAPSULE ORAL
Qty: 90 CAPSULE | Refills: 1 | Status: CANCELLED | OUTPATIENT
Start: 2023-09-27

## 2023-09-28 ENCOUNTER — EXTERNAL PBMM DATA (OUTPATIENT)
Dept: PHARMACY | Facility: OTHER | Age: 63
End: 2023-09-28
Payer: MEDICARE

## 2023-09-30 DIAGNOSIS — I10 BENIGN ESSENTIAL HYPERTENSION: Chronic | ICD-10-CM

## 2023-10-01 RX ORDER — FOLIC ACID 1 MG/1
1000 TABLET ORAL DAILY
Qty: 90 TABLET | Refills: 1 | Status: CANCELLED | OUTPATIENT
Start: 2023-10-01

## 2023-10-01 NOTE — PROGRESS NOTES
Chief Complaint:   Chief Complaint   Patient presents with    Hypertension    Hyperlipidemia    Anxiety    Depression       Pratibha Pascal 63 y.o. female who presents today for Medical Management of the below listed issues. She  has a problem list of   Patient Active Problem List   Diagnosis    CAD (coronary artery disease)    Mixed hyperlipidemia    Benign essential hypertension    Hypothyroidism    Major depressive disorder, recurrent, in full remission    Rheumatoid arthritis    Cervicalgia    Diabetes mellitus    Dyslipidemia    BP (high blood pressure)    Obstructive apnea    AF (paroxysmal atrial fibrillation)    Proteinuria    Burn    Cellulitis of left leg    Diabetes mellitus with neurological manifestation    MSSA (methicillin susceptible Staphylococcus aureus) infection    Diabetic ulcer of left foot associated with diabetes mellitus due to underlying condition    Morbid obesity    Primary insomnia    Anxiety    Immunodeficiency, unspecified    Long term current use of non-steroidal anti-inflammatories (NSAID)    Personal history of immunosupression therapy    Primary generalized (osteo)arthritis    Raised antibody titer    Rheumatoid arthritis of multiple sites without organ or system involvement with positive rheumatoid factor    Other long term (current) drug therapy    Uncontrolled type 2 diabetes mellitus with hyperglycemia    Vitamin D deficiency, unspecified    Hypercalcemia    Primary osteoarthritis of left knee    OA (osteoarthritis) of knee    Primary osteoarthritis of right knee    Chronic fatigue    Chronic pain of left knee    History of coronary artery stent placement    Multiple thyroid nodules    Thyroid nodule   .  Since the last visit, She has overall felt well.  she has been compliant with   Current Outpatient Medications:     baclofen (LIORESAL) 20 MG tablet, Take 1 tablet by mouth Every Night., Disp: 90 tablet, Rfl: 1    FLUoxetine (PROzac) 20 MG capsule, Take 3 capsules by mouth  Daily., Disp: 270 capsule, Rfl: 1    folic acid (FOLVITE) 1 MG tablet, Take 1 tablet by mouth Daily., Disp: 90 tablet, Rfl: 1    losartan (COZAAR) 50 MG tablet, Take 1 tablet by mouth Daily., Disp: 90 tablet, Rfl: 1    metoprolol succinate XL (TOPROL-XL) 100 MG 24 hr tablet, Take 1 tablet by mouth Daily., Disp: 90 tablet, Rfl: 1    triamterene-hydrochlorothiazide (MAXZIDE-25) 37.5-25 MG per tablet, Take 1 tablet by mouth Daily., Disp: 90 tablet, Rfl: 1    ACCU-CHEK GUIDE test strip, , Disp: , Rfl:     aspirin 81 MG EC tablet, Take 1 tablet by mouth Daily., Disp: , Rfl:     atorvastatin (LIPITOR) 10 MG tablet, Take 0.5 tablets by mouth Daily. (Patient taking differently: Take 1 tablet by mouth Daily.), Disp: 45 tablet, Rfl: 1    Blood Glucose Calibration (Accu-Chek Guide Control) liquid, , Disp: , Rfl:     ketorolac (TORADOL) 10 MG tablet, Take 1 tablet by mouth Every 6 (Six) Hours As Needed for Moderate Pain., Disp: 20 tablet, Rfl: 0    levothyroxine (SYNTHROID, LEVOTHROID) 300 MCG tablet, Take 1 tablet by mouth Every Night., Disp: , Rfl:     metFORMIN ER (GLUCOPHAGE-XR) 500 MG 24 hr tablet, Take 1 tablet by mouth 2 (Two) Times a Day. Resume on 8/4/2023, Disp: , Rfl:     nabumetone (Relafen) 500 MG tablet, Take 1 tablet by mouth 2 (Two) Times a Day As Needed for Mild Pain., Disp: 60 tablet, Rfl: 2    ONETOUCH DELICA LANCETS 33G misc, Test 3 times daily (Patient taking differently: 1 each. Test 3 times daily), Disp: 100 each, Rfl: 11    spironolactone (ALDACTONE) 25 MG tablet, Take 1 tablet by mouth Daily., Disp: 30 tablet, Rfl: 5    sulfaSALAzine (AZULFIDINE) 500 MG tablet, Take 3 tablets by mouth 2 (Two) Times a Day., Disp: 540 tablet, Rfl: 1    Trulicity 3 MG/0.5ML solution pen-injector, INJECT 3 MG SUBCUTANEOUSLY ONCE A WEEK, Disp: , Rfl:   No current facility-administered medications for this visit.    Facility-Administered Medications Ordered in Other Visits:     mupirocin (BACTROBAN) 2 % nasal ointment, ,  "Nasal, BID, Nabor Correia MD.  She denies medication side effects.    All of the other chronic condition(s) listed above are stable w/o issues.    /79   Pulse 96   Temp 97.7 °F (36.5 °C) (Oral)   Resp 18   Ht 182.9 cm (72\")   Wt (!) 138 kg (304 lb)   SpO2 97%   BMI 41.23 kg/m²     Results for orders placed or performed during the hospital encounter of 08/02/23   POC Glucose Once    Specimen: Blood   Result Value Ref Range    Glucose 216 (H) 70 - 130 mg/dL             The following portions of the patient's history were reviewed and updated as appropriate: allergies, current medications, past family history, past medical history, past social history, past surgical history, and problem list.    Review of Systems   Constitutional:  Negative for activity change, chills and fever.   Respiratory:  Negative for cough.    Cardiovascular:  Negative for chest pain.   Psychiatric/Behavioral:  Negative for dysphoric mood.      Objective              Physical Exam  Constitutional:       General: She is not in acute distress.     Appearance: She is well-developed.   Cardiovascular:      Rate and Rhythm: Normal rate and regular rhythm.   Pulmonary:      Effort: Pulmonary effort is normal.      Breath sounds: Normal breath sounds.   Neurological:      Mental Status: She is alert and oriented to person, place, and time.   Psychiatric:         Behavior: Behavior normal.         Thought Content: Thought content normal.   Pt sees Endocrine for her DM/thyroid and Cardiology.        Diagnoses and all orders for this visit:    1. Benign essential hypertension (Primary)  -     triamterene-hydrochlorothiazide (MAXZIDE-25) 37.5-25 MG per tablet; Take 1 tablet by mouth Daily.  Dispense: 90 tablet; Refill: 1  -     metoprolol succinate XL (TOPROL-XL) 100 MG 24 hr tablet; Take 1 tablet by mouth Daily.  Dispense: 90 tablet; Refill: 1  -     losartan (COZAAR) 50 MG tablet; Take 1 tablet by mouth Daily.  Dispense: 90 tablet; Refill: " 1    2. Major depressive disorder, recurrent, in full remission  -     FLUoxetine (PROzac) 20 MG capsule; Take 3 capsules by mouth Daily.  Dispense: 270 capsule; Refill: 1    3. Rheumatoid arthritis of multiple sites without organ or system involvement with positive rheumatoid factor    4. Cervicalgia  -     baclofen (LIORESAL) 20 MG tablet; Take 1 tablet by mouth Every Night.  Dispense: 90 tablet; Refill: 1    5. Lumbar back pain  -     baclofen (LIORESAL) 20 MG tablet; Take 1 tablet by mouth Every Night.  Dispense: 90 tablet; Refill: 1    Pt has the paperwork to get her c-scope and is asked to send that in.

## 2023-10-02 ENCOUNTER — OFFICE VISIT (OUTPATIENT)
Dept: FAMILY MEDICINE CLINIC | Facility: CLINIC | Age: 63
End: 2023-10-02
Payer: MEDICARE

## 2023-10-02 VITALS
DIASTOLIC BLOOD PRESSURE: 79 MMHG | HEART RATE: 96 BPM | OXYGEN SATURATION: 97 % | HEIGHT: 72 IN | SYSTOLIC BLOOD PRESSURE: 121 MMHG | RESPIRATION RATE: 18 BRPM | BODY MASS INDEX: 39.68 KG/M2 | TEMPERATURE: 97.7 F | WEIGHT: 293 LBS

## 2023-10-02 DIAGNOSIS — M54.50 LUMBAR BACK PAIN: Chronic | ICD-10-CM

## 2023-10-02 DIAGNOSIS — M54.2 CERVICALGIA: Chronic | ICD-10-CM

## 2023-10-02 DIAGNOSIS — F33.42 MAJOR DEPRESSIVE DISORDER, RECURRENT, IN FULL REMISSION: Chronic | ICD-10-CM

## 2023-10-02 DIAGNOSIS — M05.79 RHEUMATOID ARTHRITIS OF MULTIPLE SITES WITHOUT ORGAN OR SYSTEM INVOLVEMENT WITH POSITIVE RHEUMATOID FACTOR: Chronic | ICD-10-CM

## 2023-10-02 DIAGNOSIS — I10 BENIGN ESSENTIAL HYPERTENSION: Primary | Chronic | ICD-10-CM

## 2023-10-02 PROCEDURE — 3074F SYST BP LT 130 MM HG: CPT | Performed by: FAMILY MEDICINE

## 2023-10-02 PROCEDURE — 99214 OFFICE O/P EST MOD 30 MIN: CPT | Performed by: FAMILY MEDICINE

## 2023-10-02 PROCEDURE — 3078F DIAST BP <80 MM HG: CPT | Performed by: FAMILY MEDICINE

## 2023-10-02 PROCEDURE — 1159F MED LIST DOCD IN RCRD: CPT | Performed by: FAMILY MEDICINE

## 2023-10-02 PROCEDURE — 1160F RVW MEDS BY RX/DR IN RCRD: CPT | Performed by: FAMILY MEDICINE

## 2023-10-02 RX ORDER — TRIAMTERENE AND HYDROCHLOROTHIAZIDE 37.5; 25 MG/1; MG/1
1 TABLET ORAL DAILY
Qty: 90 TABLET | Refills: 1 | Status: SHIPPED | OUTPATIENT
Start: 2023-10-02

## 2023-10-02 RX ORDER — LOSARTAN POTASSIUM 50 MG/1
50 TABLET ORAL DAILY
Qty: 90 TABLET | Refills: 1 | Status: SHIPPED | OUTPATIENT
Start: 2023-10-02

## 2023-10-02 RX ORDER — METOPROLOL SUCCINATE 100 MG/1
100 TABLET, EXTENDED RELEASE ORAL DAILY
Qty: 90 TABLET | Refills: 1 | Status: SHIPPED | OUTPATIENT
Start: 2023-10-02

## 2023-10-02 RX ORDER — FLUOXETINE HYDROCHLORIDE 20 MG/1
60 CAPSULE ORAL DAILY
Qty: 270 CAPSULE | Refills: 1 | Status: SHIPPED | OUTPATIENT
Start: 2023-10-02

## 2023-10-02 RX ORDER — LOSARTAN POTASSIUM 50 MG/1
TABLET ORAL
Qty: 30 TABLET | Refills: 0 | OUTPATIENT
Start: 2023-10-02

## 2023-10-02 RX ORDER — BACLOFEN 20 MG/1
20 TABLET ORAL NIGHTLY
Qty: 90 TABLET | Refills: 1 | Status: SHIPPED | OUTPATIENT
Start: 2023-10-02

## 2023-10-17 ENCOUNTER — TELEPHONE (OUTPATIENT)
Age: 63
End: 2023-10-17
Payer: MEDICARE

## 2023-10-17 NOTE — TELEPHONE ENCOUNTER
Received a call from Nathalie from Dr. Rueda office , patient is currently scheduled October 24, tendancy transfer in left hand office is needing a clearance .    Please advise  / Nathalie can be reached back at [phone].

## 2023-10-20 NOTE — TELEPHONE ENCOUNTER
Received call from Elena ([facility]) at Dr. Rueda's office inquiring about clearance for Pratibha Pascal.  Reviewed notes that this was filled out and faxed back to office on 10/17.  Elena verbalized understanding and stated that it's possible the document hasn't been scanned in yet.  No further action needed at this time.    Thank you,  Elena VALENTIN RN  Triage Nurse [facility]   15:12 EDT

## 2023-12-28 ENCOUNTER — OFFICE VISIT (OUTPATIENT)
Age: 63
End: 2023-12-28
Payer: MEDICARE

## 2023-12-28 VITALS
HEART RATE: 90 BPM | WEIGHT: 293 LBS | BODY MASS INDEX: 39.68 KG/M2 | DIASTOLIC BLOOD PRESSURE: 72 MMHG | HEIGHT: 72 IN | SYSTOLIC BLOOD PRESSURE: 126 MMHG

## 2023-12-28 DIAGNOSIS — I25.10 CORONARY ARTERY DISEASE INVOLVING NATIVE CORONARY ARTERY OF NATIVE HEART WITHOUT ANGINA PECTORIS: Primary | ICD-10-CM

## 2023-12-28 NOTE — PROGRESS NOTES
Subjective:     Encounter Date:12/28/2023      Patient ID: Pratibha Pascal is a 63 y.o. female.    Chief Complaint:follow up HTN, CAD  History of Present Illness  This is a 62 y/o female who follows with Dr. Rivera and is known to me. She has a pmhx of hypertension, rheumatoid arthritis, hyperlipidemia, diabetes, obstructive sleep apnea and coronary artery disease.     She is here today for a follow up visit. She has been doing well. She underwent surgery on her hand for a ruptured tendon and is recovering well from this. She denies any chest pain, shortness of breath, palpitations, dizziness or syncope. She denies any swelling in her lower extremities, orthopnea or PND. She does continue to have episodes of diaphoresis and feels this is likely due to menopause.    Prior history:  Dr. Rivera initially saw the patient in 2014 when she presented as an anterior MI. She was taken to cardiac catheterization lab where she was found to have 100% thombotic occlusion of her mid LAD for which she received a drug eluting stent. Her remaining coronaries showed nonobstructive disease. She had a brief episode of atrial fibrillation with RVR but converted and has had no further episodes since. Echocardiogram at that time showed a depressed left ventricular systolic function with apical wall motion abnormalities.     She was lost to follow up for a few years until [place] [facility] [place]. A stress test was performed that showed evidence of anterior infarct with moderate jamil-infarct ischemia and an EF of 55%.  Based on those findings we proceeded with a cardiac catheterization on 9/3/2019 which showed mild nonobstructive coronary artery disease and a patent mid LAD stent and a normal-appearing left ventricular systolic function with anterior hypokinesis and an EF of 50 to 55%.     She was last seen in the office by Dr. Rivera in December 2022 and was doing well. She did have some complaints  of atypical chest pain and it was decided to monitor for the time being. No changes were made.    I saw her in June and she reported worsening diaphoresis and dyspnea on exertion.  A repeat echocardiogram was performed showing a decline in her EF to 44% and apical wall motion abnormalities with normal diastolic function and no significant valvular disease.  A stress test was then performed in 7/2023 which showed anterior infarct with jamil-infarct ischemia.  A cardiac catheterization was recommended and performed on 8/2/2023 and showed mild non-obstructive disease with a patent LAD stent and elevated left ventricular end diastolic pressure of 21 to 23 mmHg.  She was continued on triamterene-hydrocholorthiazide with plans to consider loop diuretic in the future.     At follow up with Dr. Rivera, she reported having to stop Farxiga due to issues with perineal irritation. She was started on spironolactone. Repeat labs following this were normal.    I have reviewed and updated as appropriate allergies, current medications, past family history, past medical history, past surgical history and problem list.    Review of Systems   Constitutional: Positive for diaphoresis. Negative for fever, malaise/fatigue, weight gain and weight loss.   HENT:  Negative for congestion, hoarse voice and sore throat.    Eyes:  Negative for blurred vision and double vision.   Cardiovascular:  Negative for chest pain, dyspnea on exertion, leg swelling, orthopnea, palpitations and syncope.   Respiratory:  Negative for cough, shortness of breath and wheezing.    Gastrointestinal:  Negative for abdominal pain, hematemesis, hematochezia and melena.   Genitourinary:  Negative for dysuria and hematuria.   Neurological:  Negative for dizziness, headaches, light-headedness and numbness.   Psychiatric/Behavioral:  Negative for depression. The patient is not nervous/anxious.          Current Outpatient Medications:     ACCU-CHEK GUIDE test strip, , Disp:  , Rfl:     aspirin 81 MG EC tablet, Take 1 tablet by mouth Daily., Disp: , Rfl:     atorvastatin (LIPITOR) 10 MG tablet, Take 0.5 tablets by mouth Daily. (Patient taking differently: Take 1 tablet by mouth Daily.), Disp: 45 tablet, Rfl: 1    baclofen (LIORESAL) 20 MG tablet, Take 1 tablet by mouth Every Night., Disp: 90 tablet, Rfl: 1    Blood Glucose Calibration (Accu-Chek Guide Control) liquid, , Disp: , Rfl:     FLUoxetine (PROzac) 20 MG capsule, Take 3 capsules by mouth Daily., Disp: 270 capsule, Rfl: 1    folic acid (FOLVITE) 1 MG tablet, Take 1 tablet by mouth Daily., Disp: 90 tablet, Rfl: 1    ketorolac (TORADOL) 10 MG tablet, Take 1 tablet by mouth Every 6 (Six) Hours As Needed for Moderate Pain., Disp: 20 tablet, Rfl: 0    levothyroxine (SYNTHROID, LEVOTHROID) 300 MCG tablet, Take 1 tablet by mouth Every Night., Disp: , Rfl:     losartan (COZAAR) 50 MG tablet, Take 1 tablet by mouth Daily., Disp: 90 tablet, Rfl: 1    metFORMIN ER (GLUCOPHAGE-XR) 500 MG 24 hr tablet, Take 1 tablet by mouth 2 (Two) Times a Day. Resume on 8/4/2023, Disp: , Rfl:     metoprolol succinate XL (TOPROL-XL) 100 MG 24 hr tablet, Take 1 tablet by mouth Daily., Disp: 90 tablet, Rfl: 1    nabumetone (Relafen) 500 MG tablet, Take 1 tablet by mouth 2 (Two) Times a Day As Needed for Mild Pain., Disp: 60 tablet, Rfl: 2    ONETOUCH DELICA LANCETS 33G misc, Test 3 times daily (Patient taking differently: 1 each. Test 3 times daily), Disp: 100 each, Rfl: 11    spironolactone (ALDACTONE) 25 MG tablet, Take 1 tablet by mouth Daily., Disp: 30 tablet, Rfl: 5    sulfaSALAzine (AZULFIDINE) 500 MG tablet, Take 3 tablets by mouth 2 (Two) Times a Day., Disp: 540 tablet, Rfl: 1    triamterene-hydrochlorothiazide (MAXZIDE-25) 37.5-25 MG per tablet, Take 1 tablet by mouth Daily., Disp: 90 tablet, Rfl: 1    Trulicity 3 MG/0.5ML solution pen-injector, INJECT 3 MG SUBCUTANEOUSLY ONCE A WEEK, Disp: , Rfl:   No current facility-administered medications for this  visit.    Facility-Administered Medications Ordered in Other Visits:     mupirocin (BACTROBAN) 2 % nasal ointment, , Nasal, BID, Nabor Correia MD    Past Medical History:   Diagnosis Date    Allergy to mold     Anxiety     Arthritis     Benign essential hypertension     CAD (coronary artery disease)     Coronary artery disease     Depression     Fibromyalgia, primary     H/O bone density study unclear    H/O complete eye exam 2 -3 years    Headache     History of myocardial infarction 2014    History of staph infection     HISTORY OF MSSA IN LEFT FOOT FROM DIABETIC ULCER HEALED    HL (hearing loss)     RIGHT EAR  SEVERE HEARING LOSS    HLD (hyperlipidemia)     Hyperlipidemia     Hypertension     Hypothyroidism     Injury of back     Major depressive disorder, recurrent, in full remission     OA (osteoarthritis) of knee     Obesity     BRANNON (obstructive sleep apnea)     USE CPAP    Primary generalized (osteo)arthritis     Primary osteoarthritis of left knee     Primary osteoarthritis of right knee     Rheumatoid arthritis     Rheumatoid arthritis of multiple sites without organ or system involvement with positive rheumatoid factor     Seasonal allergies     Tobacco abuse 06/27/2014    QUIT     Type 2 diabetes mellitus        Past Surgical History:   Procedure Laterality Date    BACK SURGERY  10/1995    also 2/02; L5-S1; Dr. Carroll Avitia    CARDIAC CATHETERIZATION      CARDIAC CATHETERIZATION N/A 09/03/2019    Procedure: Coronary angiography  ;  Surgeon: Magy Rivera MD;  Location:  [facility];  Service: Cardiovascular    CARDIAC CATHETERIZATION N/A 09/03/2019    Procedure: Left Heart Cath;  Surgeon: Magy Rivera MD;  Location:  [facility];  Service: Cardiovascular    CARDIAC CATHETERIZATION N/A 09/03/2019    Procedure: Left ventriculography;  Surgeon: Magy Rivera MD;  Location:  [facility];  Service: Cardiovascular    CARDIAC CATHETERIZATION N/A  8/2/2023    Procedure: Left Heart Cath;  Surgeon: Magy Rivera MD;  Location:  [facility];  Service: Cardiovascular;  Laterality: N/A;    CARDIAC CATHETERIZATION N/A 8/2/2023    Procedure: Coronary angiography;  Surgeon: Magy Rivera MD;  Location:  [facility];  Service: Cardiovascular;  Laterality: N/A;    CARDIAC CATHETERIZATION N/A 8/2/2023    Procedure: Left ventriculography;  Surgeon: Magy Rivera MD;  Location:  [facility];  Service: Cardiovascular;  Laterality: N/A;    CATARACT EXTRACTION WITH INTRAOCULAR LENS IMPLANT Bilateral 11/2022    COLONOSCOPY      CORONARY STENT PLACEMENT  06/2014    KNEE ARTHROSCOPY Right 2010    MAMMO BILATERAL  due    delcines    PAP SMEAR  due    THYROIDECTOMY Right 07/09/2020    Procedure: RIGHT THYROID LOBECTOMY AND RIGHT PARATHYROID EXPLORATION;  Surgeon: Kimo Carter MD;  Location:  [facility];  Service: ENT;  Laterality: Right;    THYROIDECTOMY Left 04/29/2021    Procedure: COMPLETION THYROIDECTOMY;  Surgeon: Kimo Carter MD;  Location:  [facility];  Service: ENT;  Laterality: Left;    TONSILLECTOMY      age 26    TOTAL KNEE ARTHROPLASTY Left 10/14/2019    Procedure: TOTAL KNEE ARTHROPLASTY;  Surgeon: Nabor Correia MD;  [facility];  Service: Orthopedics    TOTAL KNEE ARTHROPLASTY Right 02/12/2020    Procedure: TOTAL KNEE ARTHROPLASTY;  Surgeon: Nabor Correia MD;  Location: [facility];  Service: Orthopedics;  Laterality: Right;    US GUIDED FINE NEEDLE ASPIRATION  12/06/2019       Family History   Problem Relation Age of Onset    Cancer Mother         breast    Hypertension Mother     Rheum arthritis Mother     Thyroid disease Mother     Arthritis Mother     Hypertension Father     Thyroid disease Father     COPD Father     Depression Father     Hearing loss Father     Heart failure Maternal Grandmother     Rheum arthritis Other         aunt    Depression Daughter     Drug  "abuse Daughter     Quin Hyperthermia Neg Hx        Social History     Tobacco Use    Smoking status: Former     Packs/day: 1.00     Years: 30.00     Additional pack years: 0.00     Total pack years: 30.00     Types: Cigarettes     Quit date: 2014     Years since quittin.5    Smokeless tobacco: Never    Tobacco comments:     quit 2014   Vaping Use    Vaping Use: Never used   Substance Use Topics    Alcohol use: No    Drug use: No         ECG 12 Lead    Date/Time: 2023 4:04 PM  Performed by: Michaela Viramontes APRN    Authorized by: Michaela Viramontes APRN  Comparison: compared with previous ECG from 2023  Similar to previous ECG  Rhythm: sinus rhythm  QRS axis: left             Objective:     Visit Vitals  /72   Pulse 90   Ht 182.9 cm (72\")   Wt (!) 137 kg (301 lb)   BMI 40.82 kg/m²             Physical Exam  Constitutional:       Appearance: Normal appearance. She is obese.   HENT:      Head: Normocephalic.   Neck:      Vascular: No carotid bruit.   Cardiovascular:      Rate and Rhythm: Normal rate and regular rhythm.      Chest Wall: PMI is not displaced.      Pulses: Normal pulses.           Radial pulses are 2+ on the right side and 2+ on the left side.        Posterior tibial pulses are 2+ on the right side and 2+ on the left side.      Heart sounds: Normal heart sounds. No murmur heard.     No friction rub. No gallop.   Pulmonary:      Effort: Pulmonary effort is normal.      Breath sounds: Normal breath sounds.   Abdominal:      General: Bowel sounds are normal. There is no distension.      Palpations: Abdomen is soft.   Musculoskeletal:      Right lower leg: No edema.      Left lower leg: No edema.   Skin:     General: Skin is warm and dry.      Capillary Refill: Capillary refill takes less than 2 seconds.   Neurological:      Mental Status: She is alert and oriented to person, place, and time.   Psychiatric:         Mood and Affect: Mood normal.         Behavior: Behavior normal.    "      Thought Content: Thought content normal.        Lab Review:         Cardiac Procedures:       Assessment:         There are no diagnoses linked to this encounter.          Plan:         PAF: in sinus rhythm on EKG today.This seems to be a one time episode not requiring anticoagulation  ICM: appears pretty well compensated. On GDMT with Toprol XL, losartan and spironolactone. Appears compensated on exam. No changes.  CAD: s/p PCI with stent to LAD. EKG is stable.  HTN: blood pressure is well controlled on current regimen. No changes.    Thank you for allowing me to participate in this patient's care. Please call with any questions or concerns. Ms. Pascal will follow up with Dr. Rivera in 6 months.          Your medication list            Accurate as of December 28, 2023  3:09 PM. If you have any questions, ask your nurse or doctor.                CHANGE how you take these medications        Instructions Last Dose Given Next Dose Due   atorvastatin 10 MG tablet  Commonly known as: LIPITOR  What changed: how much to take      Take 0.5 tablets by mouth Daily.       OneTouch Delica Lancets 33G misc  What changed: how much to take      Test 3 times daily              CONTINUE taking these medications        Instructions Last Dose Given Next Dose Due   Accu-Chek Guide Control liquid           Accu-Chek Guide test strip  Generic drug: glucose blood           aspirin 81 MG EC tablet      Take 1 tablet by mouth Daily.       baclofen 20 MG tablet  Commonly known as: LIORESAL      Take 1 tablet by mouth Every Night.       FLUoxetine 20 MG capsule  Commonly known as: PROzac      Take 3 capsules by mouth Daily.       folic acid 1 MG tablet  Commonly known as: FOLVITE      Take 1 tablet by mouth Daily.       ketorolac 10 MG tablet  Commonly known as: TORADOL      Take 1 tablet by mouth Every 6 (Six) Hours As Needed for Moderate Pain.       levothyroxine 300 MCG tablet  Commonly known as: SYNTHROID, LEVOTHROID      Take 1 tablet  by mouth Every Night.       losartan 50 MG tablet  Commonly known as: COZAAR      Take 1 tablet by mouth Daily.       metFORMIN  MG 24 hr tablet  Commonly known as: GLUCOPHAGE-XR      Take 1 tablet by mouth 2 (Two) Times a Day. Resume on 8/4/2023       metoprolol succinate  MG 24 hr tablet  Commonly known as: TOPROL-XL      Take 1 tablet by mouth Daily.       nabumetone 500 MG tablet  Commonly known as: Relafen      Take 1 tablet by mouth 2 (Two) Times a Day As Needed for Mild Pain.       spironolactone 25 MG tablet  Commonly known as: ALDACTONE      Take 1 tablet by mouth Daily.       sulfaSALAzine 500 MG tablet  Commonly known as: AZULFIDINE      Take 3 tablets by mouth 2 (Two) Times a Day.       triamterene-hydrochlorothiazide 37.5-25 MG per tablet  Commonly known as: MAXZIDE-25      Take 1 tablet by mouth Daily.       Trulicity 3 MG/0.5ML solution pen-injector  Generic drug: Dulaglutide      INJECT 3 MG SUBCUTANEOUSLY ONCE A WEEK                  KALI Figueroa  12/28/23  9:11 AM EDT

## 2024-02-14 RX ORDER — SPIRONOLACTONE 25 MG/1
25 TABLET ORAL DAILY
Qty: 90 TABLET | Refills: 1 | Status: SHIPPED | OUTPATIENT
Start: 2024-02-14

## 2024-03-25 DIAGNOSIS — M54.50 LUMBAR BACK PAIN: Chronic | ICD-10-CM

## 2024-03-25 DIAGNOSIS — F33.42 MAJOR DEPRESSIVE DISORDER, RECURRENT, IN FULL REMISSION: Chronic | ICD-10-CM

## 2024-03-25 DIAGNOSIS — M54.2 CERVICALGIA: Chronic | ICD-10-CM

## 2024-03-25 RX ORDER — BACLOFEN 20 MG/1
20 TABLET ORAL NIGHTLY
Qty: 30 TABLET | Refills: 0 | OUTPATIENT
Start: 2024-03-25

## 2024-03-25 RX ORDER — FLUOXETINE HYDROCHLORIDE 20 MG/1
60 CAPSULE ORAL DAILY
Qty: 90 CAPSULE | Refills: 0 | OUTPATIENT
Start: 2024-03-25

## 2024-03-25 NOTE — TELEPHONE ENCOUNTER
Last ov 10/10/2023 dr monge    Return in about 6 months (around 4/2/2024) for Recheck.  Scheduled appointment 4/3/2024

## 2024-03-28 DIAGNOSIS — I10 BENIGN ESSENTIAL HYPERTENSION: Chronic | ICD-10-CM

## 2024-03-28 RX ORDER — LOSARTAN POTASSIUM 50 MG/1
50 TABLET ORAL DAILY
Qty: 30 TABLET | Refills: 0 | Status: SHIPPED | OUTPATIENT
Start: 2024-03-28

## 2024-04-01 DIAGNOSIS — I10 BENIGN ESSENTIAL HYPERTENSION: Chronic | ICD-10-CM

## 2024-04-01 RX ORDER — METOPROLOL SUCCINATE 100 MG/1
100 TABLET, EXTENDED RELEASE ORAL DAILY
Qty: 30 TABLET | Refills: 0 | Status: SHIPPED | OUTPATIENT
Start: 2024-04-01 | End: 2024-04-02 | Stop reason: SDUPTHER

## 2024-04-01 NOTE — TELEPHONE ENCOUNTER
Last ov  10/02/2023 dr monge    Return in about 6 months (around 4/2/2024) for Recheck.  Appointment 04/03/2024 dr monge

## 2024-04-02 RX ORDER — RITUXIMAB-PVVR 500 MG/50ML
375 INJECTION, SOLUTION INTRAVENOUS
COMMUNITY

## 2024-04-02 RX ORDER — TIRZEPATIDE 5 MG/.5ML
5 INJECTION, SOLUTION SUBCUTANEOUS WEEKLY
COMMUNITY
Start: 2024-03-08

## 2024-04-02 NOTE — PROGRESS NOTES
The ABCs of the Annual Wellness Visit  [facility]    Subjective     Pratibha Pascal is a 64 y.o. female who presents for a  Welcome to Medicare Visit.    The following portions of the patient's history were reviewed and   updated as appropriate: allergies, current medications, past family history, past medical history, past social history, past surgical history, and problem list.     Compared to one year ago, the patient feels her physical   health is the same.    Compared to one year ago, the patient feels her mental   health is the same.    Recent Hospitalizations:  She was not admitted to the hospital during the last year.       Current Medical Providers:  Patient Care Team:  Benitez Wu MD as PCP - General (Family Medicine)  Magy Rivera MD as Consulting Physician (Cardiology)  Rona Washington MD as Consulting Physician (Endocrinology)  Kimo Carter MD as Surgeon (Otolaryngology)    Outpatient Medications Prior to Visit   Medication Sig Dispense Refill    ACCU-CHEK GUIDE test strip       aspirin 81 MG EC tablet Take 1 tablet by mouth Daily.      atorvastatin (LIPITOR) 10 MG tablet Take 0.5 tablets by mouth Daily. (Patient taking differently: Take 1 tablet by mouth Daily.) 45 tablet 1    Blood Glucose Calibration (Accu-Chek Guide Control) liquid       ketorolac (TORADOL) 10 MG tablet Take 1 tablet by mouth Every 6 (Six) Hours As Needed for Moderate Pain. 20 tablet 0    levothyroxine (SYNTHROID, LEVOTHROID) 300 MCG tablet Take 1 tablet by mouth Every Night.      metFORMIN ER (GLUCOPHAGE-XR) 500 MG 24 hr tablet Take 1 tablet by mouth 2 (Two) Times a Day. Resume on 8/4/2023      Mounjaro 5 MG/0.5ML solution pen-injector pen Inject 0.5 mL under the skin into the appropriate area as directed 1 (One) Time Per Week.      nabumetone (Relafen) 500 MG tablet Take 1 tablet by mouth 2 (Two) Times a Day As Needed for Mild Pain. 60 tablet 2    ONETOUCH DELICA LANCETS 33G misc Test 3 times daily  (Patient taking differently: 1 each. Test 3 times daily) 100 each 11    riTUXimab-pvvr (Ruxience) 500 MG/50ML solution injection Infuse 375 mg/m2 into a venous catheter.      spironolactone (ALDACTONE) 25 MG tablet Take 1 tablet by mouth once daily 90 tablet 1    sulfaSALAzine (AZULFIDINE) 500 MG tablet Take 3 tablets by mouth 2 (Two) Times a Day. 540 tablet 1    Trulicity 3 MG/0.5ML solution pen-injector INJECT 3 MG SUBCUTANEOUSLY ONCE A WEEK      baclofen (LIORESAL) 20 MG tablet Take 1 tablet by mouth Every Night. 90 tablet 1    FLUoxetine (PROzac) 20 MG capsule Take 3 capsules by mouth Daily. 270 capsule 1    folic acid (FOLVITE) 1 MG tablet Take 1 tablet by mouth Daily. 90 tablet 1    losartan (COZAAR) 50 MG tablet Take 1 tablet by mouth once daily 30 tablet 0    metoprolol succinate XL (TOPROL-XL) 100 MG 24 hr tablet Take 1 tablet by mouth once daily 30 tablet 0    triamterene-hydrochlorothiazide (MAXZIDE-25) 37.5-25 MG per tablet Take 1 tablet by mouth Daily. 90 tablet 1     Facility-Administered Medications Prior to Visit   Medication Dose Route Frequency Provider Last Rate Last Admin    mupirocin (BACTROBAN) 2 % nasal ointment   Nasal BID Nabor Correia MD           No opioid medication identified on active medication list. I have reviewed chart for other potential  high risk medication/s and harmful drug interactions in the elderly.        Aspirin is on active medication list. Aspirin use is indicated based on review of current medical condition/s. Pros and cons of this therapy have been discussed today. Benefits of this medication outweigh potential harm.  Patient has been encouraged to continue taking this medication.  .      Patient Active Problem List   Diagnosis    CAD (coronary artery disease)    Mixed hyperlipidemia    Benign essential hypertension    Hypothyroidism    Major depressive disorder, recurrent, in full remission    Rheumatoid arthritis    Cervicalgia    Diabetes mellitus    Dyslipidemia  "   BP (high blood pressure)    Obstructive apnea    AF (paroxysmal atrial fibrillation)    Proteinuria    Burn    Cellulitis of left leg    Diabetes mellitus with neurological manifestation    MSSA (methicillin susceptible Staphylococcus aureus) infection    Diabetic ulcer of left foot associated with diabetes mellitus due to underlying condition    Morbid obesity    Primary insomnia    Anxiety    Immunodeficiency, unspecified    Long term current use of non-steroidal anti-inflammatories (NSAID)    Personal history of immunosupression therapy    Primary generalized (osteo)arthritis    Raised antibody titer    Rheumatoid arthritis of multiple sites without organ or system involvement with positive rheumatoid factor    Other long term (current) drug therapy    Uncontrolled type 2 diabetes mellitus with hyperglycemia    Vitamin D deficiency, unspecified    Hypercalcemia    Primary osteoarthritis of left knee    OA (osteoarthritis) of knee    Primary osteoarthritis of right knee    Chronic fatigue    Chronic pain of left knee    History of coronary artery stent placement    Multiple thyroid nodules    Thyroid nodule     Advance Care Planning   Advance Care Planning     Advance Directive is not on file.  ACP discussion was held with the patient during this visit. Patient does not have an advance directive, declines further assistance.       Objective   Vitals:    04/03/24 1254   BP: 137/84   Pulse: 94   Resp: 18   Temp: 97.9 °F (36.6 °C)   TempSrc: Oral   SpO2: 97%   Weight: 136 kg (300 lb)   Height: 182.9 cm (72\")   PainSc:   3     Estimated body mass index is 40.69 kg/m² as calculated from the following:    Height as of this encounter: 182.9 cm (72\").    Weight as of this encounter: 136 kg (300 lb).    Class 3 Severe Obesity (BMI >=40). Obesity-related health conditions include the following: obstructive sleep apnea, hypertension, coronary heart disease, diabetes mellitus, dyslipidemias, and osteoarthritis. Obesity is " unchanged. BMI is is above average; BMI management plan is completed. We discussed portion control and increasing exercise.      Does the patient have evidence of cognitive impairment?   No         Procedures       HEALTH RISK ASSESSMENT    Smoking Status:  Social History     Tobacco Use   Smoking Status Former    Current packs/day: 0.00    Average packs/day: 1 pack/day for 30.0 years (30.0 ttl pk-yrs)    Types: Cigarettes    Start date: 1984    Quit date: 2014    Years since quittin.7   Smokeless Tobacco Never   Tobacco Comments    quit 2014     Alcohol Consumption:  Social History     Substance and Sexual Activity   Alcohol Use No       Fall Risk Screen:    YOLANDAADI Fall Risk Assessment has not been completed.    Depression Screen:       4/3/2024    12:59 PM   PHQ-2/PHQ-9 Depression Screening   Little Interest or Pleasure in Doing Things 0-->not at all   Feeling Down, Depressed or Hopeless 0-->not at all   PHQ-9: Brief Depression Severity Measure Score 0       Health Habits and Functional and Cognitive Screenin/3/2024    12:00 PM   Functional & Cognitive Status   Do you have difficulty preparing food and eating? No   Do you have difficulty bathing yourself, getting dressed or grooming yourself? No   Do you have difficulty using the toilet? No   Do you have difficulty moving around from place to place? No   Do you have trouble with steps or getting out of a bed or a chair? No   Current Diet Well Balanced Diet   Dental Exam Up to date   Eye Exam Up to date   Exercise (times per week) 0 times per week   Current Exercises Include No Regular Exercise   Do you need help using the phone?  No   Are you deaf or do you have serious difficulty hearing?  No   Do you need help to go to places out of walking distance? No   Do you need help shopping? No   Do you need help preparing meals?  No   Do you need help with housework?  No   Do you need help with laundry? No   Do you need help taking your  medications? No   Do you need help managing money? No   Do you ever drive or ride in a car without wearing a seat belt? No   Have you felt unusual stress, anger or loneliness in the last month? No   Who do you live with? Alone   If you need help, do you have trouble finding someone available to you? Yes   Have you been bothered in the last four weeks by sexual problems? No   Do you have difficulty concentrating, remembering or making decisions? No       Visual Acuity:    Vision Screening    Right eye Left eye Both eyes   Without correction 20/20 20/20 20/20   With correction          Age-appropriate Screening Schedule:  Refer to the list below for future screening recommendations based on patient's age, sex and/or medical conditions. Orders for these recommended tests are listed in the plan section. The patient has been provided with a written plan.    Health Maintenance   Topic Date Due    DXA SCAN  Never done    DIABETIC FOOT EXAM  10/16/2018    LUNG CANCER SCREENING  04/19/2020    COLORECTAL CANCER SCREENING  01/01/2021    URINE MICROALBUMIN  01/14/2021    TDAP/TD VACCINES (2 - Td or Tdap) 04/03/2024 (Originally 1/1/2017)    RSV Vaccine - Adults (1 - 1-dose 60+ series) 05/03/2024 (Originally 2/25/2019)    DIABETIC EYE EXAM  06/08/2024    INFLUENZA VACCINE  08/01/2024    HEMOGLOBIN A1C  08/06/2024    LIPID PANEL  02/06/2025    ANNUAL WELLNESS VISIT  04/03/2025    BMI FOLLOWUP  04/03/2025    Pneumococcal Vaccine 65+  Completed    HEPATITIS C SCREENING  Discontinued    COVID-19 Vaccine  Discontinued    MAMMOGRAM  Discontinued    PAP SMEAR  Discontinued    ZOSTER VACCINE  Discontinued        CMS Preventative Services Quick Reference  Risk Factors Identified During Encounter    None Identified  The above risks/problems have been discussed with the patient.  Pertinent information has been shared with the patient in the After Visit Summary.    Follow Up:   Initial Medicare Visit in one year    An After Visit Summary and  "PPPS were made available to the patient.      Additional E&M Note during same encounter follows:  Patient has multiple medical problems which are significant and separately identifiable that require additional work above and beyond the Medicare Wellness Visit.      Chief Complaint  Hypertension (EKG done 12/28/2023 cardiology /), Hyperlipidemia, Anxiety, Depression, Arthritis (Med refill due  ), and welcome to medicare wellness (Due )    Subjective        Hypertension  Associated symptoms include anxiety. Pertinent negatives include no chest pain or shortness of breath.   Hyperlipidemia  Pertinent negatives include no chest pain or shortness of breath.   Anxiety  Patient reports no chest pain or shortness of breath.     Her past medical history is significant for depression.   DepressionPatient is not experiencing: shortness of breath.      Arthritis  Pertinent negatives include no dysuria, fever or rash.     Pratibha Pascal is also being seen today for medication management.    Pt doing well on the medication(s) w/o SEs, and is due refill today.      Review of Systems   Constitutional:  Negative for chills and fever.   HENT:  Negative for congestion and sinus pressure.    Eyes:  Negative for visual disturbance.   Respiratory:  Negative for cough and shortness of breath.    Cardiovascular:  Negative for chest pain.   Gastrointestinal:  Negative for abdominal pain.   Genitourinary:  Negative for dysuria.   Musculoskeletal:  Positive for arthritis.   Skin:  Negative for rash.   Hematological:  Negative for adenopathy.   Psychiatric/Behavioral:  Negative for dysphoric mood.        Objective   Vital Signs:  /84   Pulse 94   Temp 97.9 °F (36.6 °C) (Oral)   Resp 18   Ht 182.9 cm (72\")   Wt 136 kg (300 lb)   SpO2 97%   BMI 40.69 kg/m²     Physical Exam  Constitutional:       General: She is not in acute distress.     Appearance: She is well-developed.   Cardiovascular:      Rate and Rhythm: Normal rate and " regular rhythm.   Pulmonary:      Effort: Pulmonary effort is normal.      Breath sounds: Normal breath sounds.   Neurological:      Mental Status: She is alert and oriented to person, place, and time.   Psychiatric:         Behavior: Behavior normal.         Thought Content: Thought content normal.     Labs from her endocrinology office reviewed by me at today's visit.    The following data was reviewed by: Benitez Wu MD on 04/03/2024:  Common labs          7/28/2023    13:27 10/24/2023    09:07 1/16/2024    09:18   Common Labs   Glucose 296      BUN 20      Creatinine 0.78      Sodium 140      Potassium 4.2  3.4        Chloride 97      Calcium 10.0      WBC 8.42   9.32       Hemoglobin 15.6   15.4       Hematocrit 45.4   46.1       Platelets 279   302          Details          This result is from an external source.                     Assessment and Plan   Diagnoses and all orders for this visit:    1. Welcome to Medicare preventive visit (Primary)    2. Benign essential hypertension  -     losartan (COZAAR) 50 MG tablet; Take 1 tablet by mouth Daily.  Dispense: 90 tablet; Refill: 1  -     metoprolol succinate XL (TOPROL-XL) 100 MG 24 hr tablet; Take 1 tablet by mouth Daily.  Dispense: 90 tablet; Refill: 1  -     triamterene-hydrochlorothiazide (MAXZIDE-25) 37.5-25 MG per tablet; Take 1 tablet by mouth Daily.  Dispense: 90 tablet; Refill: 1    3. Cervicalgia  -     baclofen (LIORESAL) 20 MG tablet; Take 1 tablet by mouth Every Night.  Dispense: 90 tablet; Refill: 1    4. Lumbar back pain  -     baclofen (LIORESAL) 20 MG tablet; Take 1 tablet by mouth Every Night.  Dispense: 90 tablet; Refill: 1    5. Rheumatoid arthritis of multiple sites without organ or system involvement with positive rheumatoid factor  -     folic acid (FOLVITE) 1 MG tablet; Take 1 tablet by mouth Daily.  Dispense: 90 tablet; Refill: 1    6. Major depressive disorder, recurrent, in full remission  -     FLUoxetine (PROzac) 20 MG capsule;  Take 3 capsules by mouth Daily.  Dispense: 270 capsule; Refill: 1    7. Special screening for malignant neoplasms, colon  -     Cologuard - Stool, Per Rectum; Future    8. Screening for malignant neoplasm of the rectum  -     Cologuard - Stool, Per Rectum; Future           I spent 15 minutes caring for Pratibha on this date of service. This time includes time spent by me in the following activities:preparing for the visit, performing a medically appropriate examination and/or evaluation , ordering medications, tests, or procedures, and documenting information in the medical record  Follow Up   Return in about 6 months (around 10/3/2024) for Recheck.  Patient was given instructions and counseling regarding her condition or for health maintenance advice. Please see specific information pulled into the AVS if appropriate.

## 2024-04-03 ENCOUNTER — OFFICE VISIT (OUTPATIENT)
Dept: FAMILY MEDICINE CLINIC | Facility: CLINIC | Age: 64
End: 2024-04-03
Payer: MEDICARE

## 2024-04-03 VITALS
WEIGHT: 293 LBS | BODY MASS INDEX: 39.68 KG/M2 | OXYGEN SATURATION: 97 % | SYSTOLIC BLOOD PRESSURE: 137 MMHG | HEART RATE: 94 BPM | RESPIRATION RATE: 18 BRPM | DIASTOLIC BLOOD PRESSURE: 84 MMHG | HEIGHT: 72 IN | TEMPERATURE: 97.9 F

## 2024-04-03 DIAGNOSIS — Z12.11 SPECIAL SCREENING FOR MALIGNANT NEOPLASMS, COLON: ICD-10-CM

## 2024-04-03 DIAGNOSIS — I10 BENIGN ESSENTIAL HYPERTENSION: Chronic | ICD-10-CM

## 2024-04-03 DIAGNOSIS — F33.42 MAJOR DEPRESSIVE DISORDER, RECURRENT, IN FULL REMISSION: Chronic | ICD-10-CM

## 2024-04-03 DIAGNOSIS — M05.79 RHEUMATOID ARTHRITIS OF MULTIPLE SITES WITHOUT ORGAN OR SYSTEM INVOLVEMENT WITH POSITIVE RHEUMATOID FACTOR: Chronic | ICD-10-CM

## 2024-04-03 DIAGNOSIS — Z12.12 SCREENING FOR MALIGNANT NEOPLASM OF THE RECTUM: ICD-10-CM

## 2024-04-03 DIAGNOSIS — M54.50 LUMBAR BACK PAIN: Chronic | ICD-10-CM

## 2024-04-03 DIAGNOSIS — Z00.00 WELCOME TO MEDICARE PREVENTIVE VISIT: Primary | ICD-10-CM

## 2024-04-03 DIAGNOSIS — M54.2 CERVICALGIA: Chronic | ICD-10-CM

## 2024-04-03 RX ORDER — FLUOXETINE HYDROCHLORIDE 20 MG/1
60 CAPSULE ORAL DAILY
Qty: 270 CAPSULE | Refills: 1 | Status: SHIPPED | OUTPATIENT
Start: 2024-04-03

## 2024-04-03 RX ORDER — BACLOFEN 20 MG/1
20 TABLET ORAL NIGHTLY
Qty: 90 TABLET | Refills: 1 | Status: SHIPPED | OUTPATIENT
Start: 2024-04-03

## 2024-04-03 RX ORDER — LOSARTAN POTASSIUM 50 MG/1
50 TABLET ORAL DAILY
Qty: 90 TABLET | Refills: 1 | Status: SHIPPED | OUTPATIENT
Start: 2024-04-03

## 2024-04-03 RX ORDER — FOLIC ACID 1 MG/1
1000 TABLET ORAL DAILY
Qty: 90 TABLET | Refills: 1 | Status: SHIPPED | OUTPATIENT
Start: 2024-04-03

## 2024-04-03 RX ORDER — TRIAMTERENE AND HYDROCHLOROTHIAZIDE 37.5; 25 MG/1; MG/1
1 TABLET ORAL DAILY
Qty: 90 TABLET | Refills: 1 | Status: SHIPPED | OUTPATIENT
Start: 2024-04-03

## 2024-04-03 RX ORDER — METOPROLOL SUCCINATE 100 MG/1
100 TABLET, EXTENDED RELEASE ORAL DAILY
Qty: 90 TABLET | Refills: 1 | Status: SHIPPED | OUTPATIENT
Start: 2024-04-03

## 2024-04-03 NOTE — PATIENT INSTRUCTIONS
Medicare Wellness  Personal Prevention Plan of Service     Date of Office Visit:    Encounter Provider:  Benitez Wu MD  Place of Service:  [facility]  Patient Name: Pratibha Pascal  :  1960    As part of the Medicare Wellness portion of your visit today, we are providing you with this personalized preventive plan of services (PPPS). This plan is based upon recommendations of the [place] Preventive Services Task Force (USPSTF) and the Advisory Committee on Immunization Practices (ACIP).    This lists the preventive care services that should be considered, and provides dates of when you are due. Items listed as completed are up-to-date and do not require any further intervention.    Health Maintenance   Topic Date Due    DXA SCAN  Never done    DIABETIC FOOT EXAM  10/16/2018    LUNG CANCER SCREENING  2020    COLORECTAL CANCER SCREENING  2021    URINE MICROALBUMIN  2021    TDAP/TD VACCINES (2 - Td or Tdap) 2024 (Originally 2017)    RSV Vaccine - Adults (1 - 1-dose 60+ series) 2024 (Originally 2019)    DIABETIC EYE EXAM  2024 (Originally 10/4/2023)    INFLUENZA VACCINE  2024    HEMOGLOBIN A1C  2024    LIPID PANEL  2025    ANNUAL WELLNESS VISIT  2025    BMI FOLLOWUP  2025    Pneumococcal Vaccine 65+  Completed    HEPATITIS C SCREENING  Discontinued    COVID-19 Vaccine  Discontinued    MAMMOGRAM  Discontinued    PAP SMEAR  Discontinued    ZOSTER VACCINE  Discontinued       No orders of the defined types were placed in this encounter.      Return in about 6 months (around 10/3/2024) for Recheck.

## 2024-05-07 ENCOUNTER — TELEPHONE (OUTPATIENT)
Age: 64
End: 2024-05-07
Payer: MEDICARE

## 2024-07-12 ENCOUNTER — OFFICE VISIT (OUTPATIENT)
Age: 64
End: 2024-07-12
Payer: MEDICARE

## 2024-07-12 VITALS
SYSTOLIC BLOOD PRESSURE: 125 MMHG | DIASTOLIC BLOOD PRESSURE: 80 MMHG | BODY MASS INDEX: 39.68 KG/M2 | HEIGHT: 72 IN | OXYGEN SATURATION: 98 % | HEART RATE: 78 BPM | WEIGHT: 293 LBS

## 2024-07-12 DIAGNOSIS — E78.2 MIXED HYPERLIPIDEMIA: Chronic | ICD-10-CM

## 2024-07-12 DIAGNOSIS — M05.79 RHEUMATOID ARTHRITIS OF MULTIPLE SITES WITHOUT ORGAN OR SYSTEM INVOLVEMENT WITH POSITIVE RHEUMATOID FACTOR: ICD-10-CM

## 2024-07-12 DIAGNOSIS — E03.8 HYPOTHYROIDISM DUE TO HASHIMOTO'S THYROIDITIS: ICD-10-CM

## 2024-07-12 DIAGNOSIS — I10 PRIMARY HYPERTENSION: Chronic | ICD-10-CM

## 2024-07-12 DIAGNOSIS — E78.5 DYSLIPIDEMIA: ICD-10-CM

## 2024-07-12 DIAGNOSIS — E06.3 HYPOTHYROIDISM DUE TO HASHIMOTO'S THYROIDITIS: ICD-10-CM

## 2024-07-12 DIAGNOSIS — I10 BENIGN ESSENTIAL HYPERTENSION: ICD-10-CM

## 2024-07-12 DIAGNOSIS — Z95.5 HISTORY OF CORONARY ARTERY STENT PLACEMENT: ICD-10-CM

## 2024-07-12 DIAGNOSIS — I25.10 CORONARY ARTERY DISEASE INVOLVING NATIVE CORONARY ARTERY OF NATIVE HEART WITHOUT ANGINA PECTORIS: Primary | Chronic | ICD-10-CM

## 2024-07-12 DIAGNOSIS — I48.0 AF (PAROXYSMAL ATRIAL FIBRILLATION): ICD-10-CM

## 2024-07-12 DIAGNOSIS — E11.59 TYPE 2 DIABETES MELLITUS WITH OTHER CIRCULATORY COMPLICATION, WITHOUT LONG-TERM CURRENT USE OF INSULIN: ICD-10-CM

## 2024-07-12 RX ORDER — DAPAGLIFLOZIN 10 MG/1
10 TABLET, FILM COATED ORAL DAILY
COMMUNITY
Start: 2024-06-19

## 2024-07-12 NOTE — PROGRESS NOTES
Subjective:     Encounter Date:07/12/2024      Patient ID: Pratibha Pascal is a 64 y.o. female.    Chief Complaint:  History of Present Illness    This is a 64-year-old with hypertension, rheumatoid arthritis, hyperlipidemia, diabetes mellitus type 2, obstructive sleep apnea, coronary artery disease status post anterior myocardial infarction and drug-eluting stent placement of the mid LAD, obstructive sleep apnea, cardiomyopathy, who presents for follow-up.     She was seen by KALI Harris in 6/2023 at which time she complained of increased sweating and dyspnea on exertion.  A repeat echocardiogram was performed showing a decline in her EF to 44% and apical wall motion abnormalities with normal diastolic function and no significant valvular disease.  A stress test was then performed in 7/2023 which showed anterior infarct with jamil-infarct ischemia.  A cardiac catheterization was recommended and performed on 8/2/2023 and showed mild non-obstructive disease with a patent LAD stent and elevated left ventricular end diastolic pressure of 21 to 23 mmHg.  She was continued on triamterene-hydrocholorthiazide with plans to consider loop diuretic in the future.     I saw her last in 9/2023 at which time I recommended adding spironolactone 25 mg daily to her regimen.    She return in 1/2024 and was seen by KALI Madrid.  She denied any significant symptoms at that time.  No changes were made to her management.     She presents back today for follow-up.  She denies any chest pain, shortness of breath out of the ordinary, palpitations, orthopnea, near-syncope or syncope or lower extremity swelling.  She reports that she has restarted the Farxiga and is tolerating it so far.     Prior History:  I saw the patient initially in 6/2014 when she presented with an anterior myocardial infarction.  The patient called EMS due to chest pain at home.  EKGs performed in the field showed evidence of an anterior myocardial  infarction.  Following her arrival to the emergency room she did have an episode of ventricular fibrillation requiring defibrillation and was immediately resuscitated.  She is brought emergently to the cardiac catheterization laboratory where she was found to have 100% thrombotic occlusion of her mid left anterior descending artery for which she underwent PCI and drug-eluting stent placement with a Xience expedition 3.5 x 18 mm stent which was postdilated with a 4.0 x 15 mm balloon.  Remaining coronary arteries showed mild nonobstructive disease.  She did have an episode of atrial fibrillation with rapid ventricular rate that eventually converted to sinus rhythm.  She has had no further episodes of atrial fibrillation since then.  Her initial echocardiogram did show evidence of depressed left ventricular systolic function with apical wall motion abnormalities.  I saw the patient last in 7/2015 for routine follow-up.  At that time I stopped her ticagrelor and kept her on aspirin.     The patient was lost to follow-up and not seen back in the office until 8/2019 when she presented to the cardiac evaluation center for cardiac clearance for knee replacement.  She was having a lot of issues with easy fatigability and some indigestion type discomfort.  Stress test was recommended which showed evidence of anterior infarct with moderate jamil-infarct ischemia and an EF of 55%.  Based on those findings we proceeded with a cardiac catheterization on 9/3/2019 which showed mild nonobstructive coronary artery disease and a patent mid LAD stent and a normal-appearing left ventricular systolic function with anterior hypokinesis and an EF of 50 to 55%.     She was last seen in the office by KALI Pantoja in 6/2020 for preoperative evaluation before thyroid surgery.  At that time she was not having any new complaints and she was cleared to proceed with surgery.    In 12/2022 a lipid panel showed that her lipids were at goal  except her triglycerides were in the 300s.  She was started on fish oil by her endocrinologist but she reports she had to stop this because of significant diarrhea.         Review of Systems   Constitutional: Positive for malaise/fatigue.   HENT:  Negative for hearing loss, hoarse voice, nosebleeds and sore throat.    Eyes:  Negative for pain.   Cardiovascular:  Positive for dyspnea on exertion. Negative for chest pain, claudication, cyanosis, irregular heartbeat, leg swelling, near-syncope, orthopnea, palpitations, paroxysmal nocturnal dyspnea and syncope.   Respiratory:  Negative for shortness of breath and snoring.    Endocrine: Negative for cold intolerance, heat intolerance, polydipsia, polyphagia and polyuria.   Skin:  Negative for itching and rash.   Musculoskeletal:  Negative for arthritis, falls, joint pain, joint swelling, muscle cramps, muscle weakness and myalgias.   Gastrointestinal:  Negative for constipation, diarrhea, dysphagia, heartburn, hematemesis, hematochezia, melena, nausea and vomiting.   Genitourinary:  Negative for frequency, hematuria and hesitancy.   Neurological:  Negative for excessive daytime sleepiness, dizziness, headaches, light-headedness, numbness and weakness.   Psychiatric/Behavioral:  Negative for depression. The patient is not nervous/anxious.          Current Outpatient Medications:     ACCU-CHEK GUIDE test strip, , Disp: , Rfl:     aspirin 81 MG EC tablet, Take 1 tablet by mouth Daily., Disp: , Rfl:     atorvastatin (LIPITOR) 10 MG tablet, Take 0.5 tablets by mouth Daily. (Patient taking differently: Take 1 tablet by mouth Daily.), Disp: 45 tablet, Rfl: 1    baclofen (LIORESAL) 20 MG tablet, Take 1 tablet by mouth Every Night., Disp: 90 tablet, Rfl: 1    Blood Glucose Calibration (Accu-Chek Guide Control) liquid, , Disp: , Rfl:     dapagliflozin Propanediol 10 MG tablet, Take 10 mg by mouth Daily., Disp: , Rfl:     FLUoxetine (PROzac) 20 MG capsule, Take 3 capsules by mouth  Daily., Disp: 270 capsule, Rfl: 1    folic acid (FOLVITE) 1 MG tablet, Take 1 tablet by mouth Daily., Disp: 90 tablet, Rfl: 1    ketorolac (TORADOL) 10 MG tablet, Take 1 tablet by mouth Every 6 (Six) Hours As Needed for Moderate Pain., Disp: 20 tablet, Rfl: 0    levothyroxine (SYNTHROID, LEVOTHROID) 300 MCG tablet, Take 1 tablet by mouth Every Night., Disp: , Rfl:     losartan (COZAAR) 50 MG tablet, Take 1 tablet by mouth Daily., Disp: 90 tablet, Rfl: 1    metFORMIN ER (GLUCOPHAGE-XR) 500 MG 24 hr tablet, Take 1 tablet by mouth 2 (Two) Times a Day. Resume on 8/4/2023, Disp: , Rfl:     metoprolol succinate XL (TOPROL-XL) 100 MG 24 hr tablet, Take 1 tablet by mouth Daily., Disp: 90 tablet, Rfl: 1    nabumetone (Relafen) 500 MG tablet, Take 1 tablet by mouth 2 (Two) Times a Day As Needed for Mild Pain., Disp: 60 tablet, Rfl: 2    ONETOUCH DELICA LANCETS 33G misc, Test 3 times daily (Patient taking differently: 1 each. Test 3 times daily), Disp: 100 each, Rfl: 11    riTUXimab-pvvr (Ruxience) 500 MG/50ML solution injection, Infuse 375 mg/m2 into a venous catheter., Disp: , Rfl:     spironolactone (ALDACTONE) 25 MG tablet, Take 1 tablet by mouth once daily, Disp: 90 tablet, Rfl: 1    sulfaSALAzine (AZULFIDINE) 500 MG tablet, Take 3 tablets by mouth 2 (Two) Times a Day., Disp: 540 tablet, Rfl: 1    triamterene-hydrochlorothiazide (MAXZIDE-25) 37.5-25 MG per tablet, Take 1 tablet by mouth Daily., Disp: 90 tablet, Rfl: 1    Trulicity 3 MG/0.5ML solution pen-injector, INJECT 3 MG SUBCUTANEOUSLY ONCE A WEEK, Disp: , Rfl:   No current facility-administered medications for this visit.    Facility-Administered Medications Ordered in Other Visits:     mupirocin (BACTROBAN) 2 % nasal ointment, , Nasal, BID, Nabor Correia MD    Past Medical History:   Diagnosis Date    Allergy to mold     Anxiety     Arthritis     Benign essential hypertension     CAD (coronary artery disease)     Coronary artery disease     Depression      Fibromyalgia, primary     H/O bone density study unclear    H/O complete eye exam 2 -3 years    Headache     History of myocardial infarction 2014    History of staph infection     HISTORY OF MSSA IN LEFT FOOT FROM DIABETIC ULCER HEALED    HL (hearing loss)     RIGHT EAR  SEVERE HEARING LOSS    HLD (hyperlipidemia)     Hyperlipidemia     Hypertension     Hypothyroidism     Injury of back     Major depressive disorder, recurrent, in full remission     Myocardial infarction 6/27/2014    OA (osteoarthritis) of knee     Obesity     BRANNON (obstructive sleep apnea)     USE CPAP    Primary generalized (osteo)arthritis     Primary osteoarthritis of left knee     Primary osteoarthritis of right knee     Rheumatoid arthritis     Rheumatoid arthritis of multiple sites without organ or system involvement with positive rheumatoid factor     Seasonal allergies     Tobacco abuse 06/27/2014    QUIT     Type 2 diabetes mellitus        Past Surgical History:   Procedure Laterality Date    BACK SURGERY  10/1995    also 2/02; L5-S1; Dr. Carroll Avitia    CARDIAC CATHETERIZATION      CARDIAC CATHETERIZATION N/A 09/03/2019    Procedure: Coronary angiography  ;  Surgeon: Magy Rivera MD;  Location:  [facility];  Service: Cardiovascular    CARDIAC CATHETERIZATION N/A 09/03/2019    Procedure: Left Heart Cath;  Surgeon: Magy Rivera MD;  Location:  [facility];  Service: Cardiovascular    CARDIAC CATHETERIZATION N/A 09/03/2019    Procedure: Left ventriculography;  Surgeon: Magy Rivera MD;  Location:  [facility];  Service: Cardiovascular    CARDIAC CATHETERIZATION N/A 08/02/2023    Procedure: Left Heart Cath;  Surgeon: Magy Rivera MD;  Location:  [facility];  Service: Cardiovascular;  Laterality: N/A;    CARDIAC CATHETERIZATION N/A 08/02/2023    Procedure: Coronary angiography;  Surgeon: Magy Rivera MD;  Location:  [facility];   Service: Cardiovascular;  Laterality: N/A;    CARDIAC CATHETERIZATION N/A 2023    Procedure: Left ventriculography;  Surgeon: Magy Rivera MD;  Location:  [facility];  Service: Cardiovascular;  Laterality: N/A;    CATARACT EXTRACTION WITH INTRAOCULAR LENS IMPLANT Bilateral 2022    COLONOSCOPY      CORONARY STENT PLACEMENT  2014    JOINT REPLACEMENT      KNEE ARTHROSCOPY Right     MAMMO BILATERAL  due    delcines    PAP SMEAR  due    THYROIDECTOMY Right 2020    Procedure: RIGHT THYROID LOBECTOMY AND RIGHT PARATHYROID EXPLORATION;  Surgeon: Kimo Carter MD;  Location:  [facility];  Service: ENT;  Laterality: Right;    THYROIDECTOMY Left 2021    Procedure: COMPLETION THYROIDECTOMY;  Surgeon: Kimo Carter MD;  Location:  [facility];  Service: ENT;  Laterality: Left;    TONSILLECTOMY      age 26    TOTAL KNEE ARTHROPLASTY Left 10/14/2019    Procedure: TOTAL KNEE ARTHROPLASTY;  Surgeon: Nabor Correia MD;  Location: [facility];  Service: Orthopedics    TOTAL KNEE ARTHROPLASTY Right 2020    Procedure: TOTAL KNEE ARTHROPLASTY;  Surgeon: Nabor Correia MD;  Location: [facility];  Service: Orthopedics;  Laterality: Right;    US GUIDED FINE NEEDLE ASPIRATION  2019       Family History   Problem Relation Age of Onset    Cancer Mother         breast    Hypertension Mother     Rheum arthritis Mother     Thyroid disease Mother     Arthritis Mother     Hypertension Father          3/13/2018    Thyroid disease Father          3/13/2018    COPD Father             Depression Father             Hearing loss Father             Heart failure Maternal Grandmother     Rheum arthritis Other         aunt    Depression Daughter     Drug abuse Daughter     Malig Hyperthermia Neg Hx        Social History     Tobacco Use    Smoking status: Former     Current packs/day: 0.00     Average packs/day: 1 pack/day for 30.0  "years (30.0 ttl pk-yrs)     Types: Cigarettes     Start date: 6/27/1984     Quit date: 6/27/2014     Years since quitting: 10.0    Smokeless tobacco: Never    Tobacco comments:     quit 6/27/2014   Vaping Use    Vaping status: Never Used   Substance Use Topics    Alcohol use: No    Drug use: No         ECG 12 Lead    Date/Time: 7/12/2024 1:49 PM  Performed by: Magy Rivera MD    Authorized by: Magy Rivera MD  Comparison: compared with previous ECG   Similar to previous ECG  Rhythm: sinus rhythm  Q waves: V1, V2 and V3               Objective:     Visit Vitals  /80 (BP Location: Left arm, Patient Position: Sitting, Cuff Size: Adult)   Pulse 78   Ht 182.9 cm (72\")   Wt 135 kg (298 lb 3.2 oz)   SpO2 98%   BMI 40.44 kg/m²         Constitutional:       Appearance: Normal appearance. Well-developed.   Eyes:      General: Lids are normal.      Conjunctiva/sclera: Conjunctivae normal.      Pupils: Pupils are equal, round, and reactive to light.   HENT:      Head: Normocephalic and atraumatic.   Neck:      Vascular: No carotid bruit or JVD.      Lymphadenopathy: No cervical adenopathy.   Pulmonary:      Effort: Pulmonary effort is normal.      Breath sounds: Normal breath sounds.   Cardiovascular:      Normal rate. Regular rhythm.      No gallop.       Comments: Bilateral lower extremity varicose veins  Pulses:     Radial: 2+ bilaterally.  Edema:     Peripheral edema absent.   Abdominal:      Palpations: Abdomen is soft.   Musculoskeletal:      Cervical back: Full passive range of motion without pain, normal range of motion and neck supple. Skin:     General: Skin is warm and dry.   Neurological:      Mental Status: Alert and oriented to person, place, and time.           Assessment:          Diagnosis Plan   1. Coronary artery disease involving native coronary artery of native heart without angina pectoris        2. History of coronary artery stent placement        3. Dyslipidemia        4. AF (paroxysmal " atrial fibrillation)        5. Benign essential hypertension        6. Primary hypertension        7. Mixed hyperlipidemia        8. Type 2 diabetes mellitus with other circulatory complication, without long-term current use of insulin        9. Hypothyroidism due to Hashimoto's thyroiditis        10. Rheumatoid arthritis of multiple sites without organ or system involvement with positive rheumatoid factor               Plan:           1.  Coronary artery disease.  Appears to be stable and asymptomatic.  EKG shows no acute changes.  Continue current medical management.  2.  Hypertension.  Well-controlled on current regimen medications.  Continue the same.  3.  Hyperlipidemia.  On low-dose atorvastatin for goal DL of 70 or below.  Her last lipid panel showed that LDL was at goal.  Continue current management.  4.  Cardiomyopathy.  EF of 44% in 7/2023.  She is on guideline directed management with metoprolol succinate, spironolactone, Farxiga, and losartan.  No evidence of significant volume overload.  Continue current medical management.  Will discuss repeat echocardiogram at her next follow-up to reassess for any improvement in her function on medical management.  5.  Diabetes mellitus type 2  6.  Hypothyroidism  7.  Rheumatoid arthritis.    Will plan on seeing the patient back again in 6 months.  Will discuss repeat echocardiogram at that time.

## 2024-07-12 NOTE — LETTER
July 12, 2024       No Recipients    Patient: Pratibha Pascal   YOB: 1960   Date of Visit: 7/12/2024       Dear Benitez Wu MD,    Pratibha Pascal was in my office today. Below are the relevant portions of my assessment and plan of care.           If you have questions, please do not hesitate to call me. I look forward to following Pratibha along with you.         Sincerely,        Magy Rivera MD        CC:   No Recipients

## 2024-08-14 RX ORDER — SPIRONOLACTONE 25 MG/1
25 TABLET ORAL DAILY
Qty: 90 TABLET | Refills: 3 | Status: SHIPPED | OUTPATIENT
Start: 2024-08-14

## 2024-09-25 DIAGNOSIS — M54.2 CERVICALGIA: Chronic | ICD-10-CM

## 2024-09-25 DIAGNOSIS — M54.50 LUMBAR BACK PAIN: Chronic | ICD-10-CM

## 2024-09-25 DIAGNOSIS — F33.42 MAJOR DEPRESSIVE DISORDER, RECURRENT, IN FULL REMISSION: Chronic | ICD-10-CM

## 2024-10-02 RX ORDER — BACLOFEN 20 MG/1
20 TABLET ORAL NIGHTLY
Qty: 90 TABLET | Refills: 0 | OUTPATIENT
Start: 2024-10-02

## 2024-10-02 NOTE — PROGRESS NOTES
Chief Complaint:   Chief Complaint   Patient presents with    Hypertension    Hyperlipidemia    Anxiety    Depression    Immunizations     HD FLU VACCINE LEFT DELTOID TODAY        Pratibha Pascal 64 y.o. female who presents today for Medical Management of the below listed issues. She  has a problem list of   Patient Active Problem List   Diagnosis    CAD (coronary artery disease)    Mixed hyperlipidemia    Benign essential hypertension    Hypothyroidism    Major depressive disorder, recurrent, in full remission    Rheumatoid arthritis    Cervicalgia    Diabetes mellitus    Dyslipidemia    BP (high blood pressure)    Obstructive apnea    Proteinuria    Burn    Cellulitis of left leg    Diabetes mellitus with neurological manifestation    MSSA (methicillin susceptible Staphylococcus aureus) infection    Diabetic ulcer of left foot associated with diabetes mellitus due to underlying condition    Morbid obesity    Primary insomnia    Anxiety    Immunodeficiency, unspecified    Long term current use of non-steroidal anti-inflammatories (NSAID)    Personal history of immunosupression therapy    Primary generalized (osteo)arthritis    Raised antibody titer    Rheumatoid arthritis of multiple sites without organ or system involvement with positive rheumatoid factor    Other long term (current) drug therapy    Uncontrolled type 2 diabetes mellitus with hyperglycemia    Vitamin D deficiency, unspecified    Hypercalcemia    Primary osteoarthritis of left knee    OA (osteoarthritis) of knee    Primary osteoarthritis of right knee    Chronic fatigue    Chronic pain of left knee    History of coronary artery stent placement    Multiple thyroid nodules    Thyroid nodule    Degeneration of intervertebral disc of lumbar region with discogenic back pain and lower extremity pain    Peripheral polyneuropathy   .  Since the last visit, She has slowly had increased anhedonia and agoraphobic behavior.  Patient's chronic neuropathy really  prevents her from walking very well, and she has to use a cane or rollator walker most of the time.  She has had 2 previous back surgeries many decades ago, of the lumbar spine, and she does report increasing pain over time.  she has been compliant with   Current Outpatient Medications:     ACCU-CHEK GUIDE test strip, , Disp: , Rfl:     ARIPiprazole (Abilify) 2 MG tablet, Take 1 tablet by mouth Every Night., Disp: 90 tablet, Rfl: 1    aspirin 81 MG EC tablet, Take 1 tablet by mouth Daily., Disp: , Rfl:     atorvastatin (LIPITOR) 10 MG tablet, Take 0.5 tablets by mouth Daily. (Patient taking differently: Take 1 tablet by mouth Daily.), Disp: 45 tablet, Rfl: 1    baclofen (LIORESAL) 20 MG tablet, Take 1 tablet by mouth Every Night., Disp: 90 tablet, Rfl: 1    Blood Glucose Calibration (Accu-Chek Guide Control) liquid, , Disp: , Rfl:     dapagliflozin Propanediol 10 MG tablet, Take 10 mg by mouth Daily., Disp: , Rfl:     FLUoxetine (PROzac) 20 MG capsule, Take 3 capsules by mouth Daily., Disp: 270 capsule, Rfl: 1    folic acid (FOLVITE) 1 MG tablet, Take 1 tablet by mouth Daily., Disp: 90 tablet, Rfl: 1    ketorolac (TORADOL) 10 MG tablet, Take 1 tablet by mouth Every 6 (Six) Hours As Needed for Moderate Pain., Disp: 20 tablet, Rfl: 0    levothyroxine (SYNTHROID, LEVOTHROID) 300 MCG tablet, Take 1 tablet by mouth Every Night., Disp: , Rfl:     losartan (COZAAR) 50 MG tablet, Take 1 tablet by mouth Daily., Disp: 90 tablet, Rfl: 1    metFORMIN ER (GLUCOPHAGE-XR) 500 MG 24 hr tablet, Take 1 tablet by mouth 2 (Two) Times a Day. Resume on 8/4/2023, Disp: , Rfl:     metoprolol succinate XL (TOPROL-XL) 100 MG 24 hr tablet, Take 1 tablet by mouth Daily., Disp: 90 tablet, Rfl: 1    nabumetone (Relafen) 500 MG tablet, Take 1 tablet by mouth 2 (Two) Times a Day As Needed for Mild Pain., Disp: 60 tablet, Rfl: 2    ONETOUCH DELICA LANCETS 33G misc, Test 3 times daily (Patient taking differently: 1 each. Test 3 times daily), Disp:  "100 each, Rfl: 11    riTUXimab-pvvr (Ruxience) 500 MG/50ML solution injection, Infuse 375 mg/m2 into a venous catheter., Disp: , Rfl:     spironolactone (ALDACTONE) 25 MG tablet, Take 1 tablet by mouth once daily, Disp: 90 tablet, Rfl: 3    sulfaSALAzine (AZULFIDINE) 500 MG tablet, Take 3 tablets by mouth 2 (Two) Times a Day., Disp: 540 tablet, Rfl: 1    triamterene-hydrochlorothiazide (MAXZIDE-25) 37.5-25 MG per tablet, Take 1 tablet by mouth Daily., Disp: 90 tablet, Rfl: 1    Trulicity 3 MG/0.5ML solution pen-injector, INJECT 3 MG SUBCUTANEOUSLY ONCE A WEEK, Disp: , Rfl:   No current facility-administered medications for this visit.    Facility-Administered Medications Ordered in Other Visits:     mupirocin (BACTROBAN) 2 % nasal ointment, , Nasal, BID, Nabor Correia MD.  She denies medication side effects.    All of the other chronic condition(s) listed above are stable w/o issues.    /78   Pulse 86   Temp 98.1 °F (36.7 °C) (Oral)   Resp 16   Ht 182.9 cm (72\")   Wt 131 kg (288 lb)   SpO2 99%   BMI 39.06 kg/m²     Results for orders placed or performed in visit on 04/03/24   Cologuard - Stool, Per Rectum    Specimen: Per Rectum; Stool   Result Value Ref Range    Cologuard Negative Negative             The following portions of the patient's history were reviewed and updated as appropriate: allergies, current medications, past family history, past medical history, past social history, past surgical history, and problem list.    Review of Systems   Constitutional:  Negative for activity change, chills and fever.   Respiratory:  Negative for cough.    Cardiovascular:  Negative for chest pain.   Psychiatric/Behavioral:  Positive for dysphoric mood. Negative for suicidal ideas.        Objective     Class 3 Severe Obesity (BMI >=40). Obesity-related health conditions include the following: hypertension, diabetes mellitus, and dyslipidemias. Obesity is unchanged. BMI is is above average; BMI management plan " is completed. We discussed portion control and increasing exercise.        Physical Exam  Vitals and nursing note reviewed.   Constitutional:       General: She is not in acute distress.     Appearance: She is well-developed.   Cardiovascular:      Rate and Rhythm: Normal rate and regular rhythm.   Pulmonary:      Effort: Pulmonary effort is normal.      Breath sounds: Normal breath sounds.   Neurological:      Mental Status: She is alert and oriented to person, place, and time.   Psychiatric:         Behavior: Behavior normal.         Thought Content: Thought content normal.     Labs from endocrinology reviewed by me at today's visit.        Diagnoses and all orders for this visit:    1. Benign essential hypertension (Primary)  -     losartan (COZAAR) 50 MG tablet; Take 1 tablet by mouth Daily.  Dispense: 90 tablet; Refill: 1  -     metoprolol succinate XL (TOPROL-XL) 100 MG 24 hr tablet; Take 1 tablet by mouth Daily.  Dispense: 90 tablet; Refill: 1  -     triamterene-hydrochlorothiazide (MAXZIDE-25) 37.5-25 MG per tablet; Take 1 tablet by mouth Daily.  Dispense: 90 tablet; Refill: 1    2. Cervicalgia  -     baclofen (LIORESAL) 20 MG tablet; Take 1 tablet by mouth Every Night.  Dispense: 90 tablet; Refill: 1    3. Lumbar back pain  -     baclofen (LIORESAL) 20 MG tablet; Take 1 tablet by mouth Every Night.  Dispense: 90 tablet; Refill: 1    4. Recurrent major depressive disorder, in partial remission  Comments:  not to goal; medication added  Orders:  -     FLUoxetine (PROzac) 20 MG capsule; Take 3 capsules by mouth Daily.  Dispense: 270 capsule; Refill: 1  -     ARIPiprazole (Abilify) 2 MG tablet; Take 1 tablet by mouth Every Night.  Dispense: 90 tablet; Refill: 1    5. Rheumatoid arthritis of multiple sites without organ or system involvement with positive rheumatoid factor  -     folic acid (FOLVITE) 1 MG tablet; Take 1 tablet by mouth Daily.  Dispense: 90 tablet; Refill: 1    6. Immunization due  -     Fluzone  High-Dose 65+yrs (1717-6970)    7. Degeneration of intervertebral disc of lumbar region with discogenic back pain and lower extremity pain  -     MRI Lumbar Spine Without Contrast; Future    8. Peripheral polyneuropathy  -     MRI Lumbar Spine Without Contrast; Future

## 2024-10-03 ENCOUNTER — OFFICE VISIT (OUTPATIENT)
Dept: FAMILY MEDICINE CLINIC | Facility: CLINIC | Age: 64
End: 2024-10-03
Payer: MEDICARE

## 2024-10-03 VITALS
HEART RATE: 86 BPM | HEIGHT: 72 IN | TEMPERATURE: 98.1 F | OXYGEN SATURATION: 99 % | RESPIRATION RATE: 16 BRPM | WEIGHT: 288 LBS | SYSTOLIC BLOOD PRESSURE: 128 MMHG | DIASTOLIC BLOOD PRESSURE: 78 MMHG | BODY MASS INDEX: 39.01 KG/M2

## 2024-10-03 DIAGNOSIS — M54.2 CERVICALGIA: Chronic | ICD-10-CM

## 2024-10-03 DIAGNOSIS — G62.9 PERIPHERAL POLYNEUROPATHY: ICD-10-CM

## 2024-10-03 DIAGNOSIS — F33.41 RECURRENT MAJOR DEPRESSIVE DISORDER, IN PARTIAL REMISSION: ICD-10-CM

## 2024-10-03 DIAGNOSIS — M05.79 RHEUMATOID ARTHRITIS OF MULTIPLE SITES WITHOUT ORGAN OR SYSTEM INVOLVEMENT WITH POSITIVE RHEUMATOID FACTOR: Chronic | ICD-10-CM

## 2024-10-03 DIAGNOSIS — Z23 IMMUNIZATION DUE: ICD-10-CM

## 2024-10-03 DIAGNOSIS — M51.362 DEGENERATION OF INTERVERTEBRAL DISC OF LUMBAR REGION WITH DISCOGENIC BACK PAIN AND LOWER EXTREMITY PAIN: ICD-10-CM

## 2024-10-03 DIAGNOSIS — M54.50 LUMBAR BACK PAIN: Chronic | ICD-10-CM

## 2024-10-03 DIAGNOSIS — I10 BENIGN ESSENTIAL HYPERTENSION: Primary | Chronic | ICD-10-CM

## 2024-10-03 PROCEDURE — 1126F AMNT PAIN NOTED NONE PRSNT: CPT | Performed by: FAMILY MEDICINE

## 2024-10-03 PROCEDURE — 1159F MED LIST DOCD IN RCRD: CPT | Performed by: FAMILY MEDICINE

## 2024-10-03 PROCEDURE — 1160F RVW MEDS BY RX/DR IN RCRD: CPT | Performed by: FAMILY MEDICINE

## 2024-10-03 PROCEDURE — 3078F DIAST BP <80 MM HG: CPT | Performed by: FAMILY MEDICINE

## 2024-10-03 PROCEDURE — 99214 OFFICE O/P EST MOD 30 MIN: CPT | Performed by: FAMILY MEDICINE

## 2024-10-03 PROCEDURE — 90662 IIV NO PRSV INCREASED AG IM: CPT | Performed by: FAMILY MEDICINE

## 2024-10-03 PROCEDURE — G0008 ADMIN INFLUENZA VIRUS VAC: HCPCS | Performed by: FAMILY MEDICINE

## 2024-10-03 PROCEDURE — 3074F SYST BP LT 130 MM HG: CPT | Performed by: FAMILY MEDICINE

## 2024-10-03 RX ORDER — FOLIC ACID 1 MG/1
1000 TABLET ORAL DAILY
Qty: 90 TABLET | Refills: 1 | Status: SHIPPED | OUTPATIENT
Start: 2024-10-03

## 2024-10-03 RX ORDER — BACLOFEN 20 MG/1
20 TABLET ORAL NIGHTLY
Qty: 90 TABLET | Refills: 1 | Status: SHIPPED | OUTPATIENT
Start: 2024-10-03

## 2024-10-03 RX ORDER — ARIPIPRAZOLE 2 MG/1
2 TABLET ORAL NIGHTLY
Qty: 90 TABLET | Refills: 1 | Status: SHIPPED | OUTPATIENT
Start: 2024-10-03

## 2024-10-03 RX ORDER — TRIAMTERENE/HYDROCHLOROTHIAZID 37.5-25 MG
1 TABLET ORAL DAILY
Qty: 90 TABLET | Refills: 1 | Status: SHIPPED | OUTPATIENT
Start: 2024-10-03

## 2024-10-03 RX ORDER — METOPROLOL SUCCINATE 100 MG/1
100 TABLET, EXTENDED RELEASE ORAL DAILY
Qty: 90 TABLET | Refills: 1 | Status: SHIPPED | OUTPATIENT
Start: 2024-10-03

## 2024-10-03 RX ORDER — LOSARTAN POTASSIUM 50 MG/1
50 TABLET ORAL DAILY
Qty: 90 TABLET | Refills: 1 | Status: SHIPPED | OUTPATIENT
Start: 2024-10-03

## 2024-10-03 NOTE — PROGRESS NOTES
.    Injection  Hd flu Injection performed in left deltoid   by Shelia Dang MA. Patient tolerated the procedure well without complications.  10/03/24   Shelia Dang MA

## 2024-10-10 ENCOUNTER — HOSPITAL ENCOUNTER (OUTPATIENT)
Dept: MRI IMAGING | Facility: HOSPITAL | Age: 64
Discharge: HOME | End: 2024-10-10
Admitting: FAMILY MEDICINE
Payer: MEDICARE

## 2024-10-10 DIAGNOSIS — G62.9 PERIPHERAL POLYNEUROPATHY: ICD-10-CM

## 2024-10-10 DIAGNOSIS — M51.362 DEGENERATION OF INTERVERTEBRAL DISC OF LUMBAR REGION WITH DISCOGENIC BACK PAIN AND LOWER EXTREMITY PAIN: ICD-10-CM

## 2024-10-10 PROCEDURE — 72148 MRI LUMBAR SPINE W/O DYE: CPT

## 2024-10-14 ENCOUNTER — TELEPHONE (OUTPATIENT)
Dept: FAMILY MEDICINE CLINIC | Facility: CLINIC | Age: 64
End: 2024-10-14
Payer: MEDICARE

## 2024-10-14 DIAGNOSIS — M51.362 DEGENERATION OF INTERVERTEBRAL DISC OF LUMBAR REGION WITH DISCOGENIC BACK PAIN AND LOWER EXTREMITY PAIN: Primary | ICD-10-CM

## 2024-10-14 NOTE — TELEPHONE ENCOUNTER
----- Message from Benitez Wu sent at 10/12/2024  9:25 AM EDT -----  Thankfully, her MRI doesn't show anything surgical, but she may benefit from epidurals. Refer to Pain Mgmt if she wishes to do that.

## 2024-10-14 NOTE — TELEPHONE ENCOUNTER
Thankfully, her MRI doesn't show anything surgical, but she may benefit from epidurals. Refer to Pain Mgmt if she wishes to do that.   PT WOULD LIKE TO GO TO PAIN MANAGEMENT  PLEASE REVIEW

## 2024-10-30 ENCOUNTER — PREP FOR SURGERY (OUTPATIENT)
Dept: SURGERY | Facility: SURGERY CENTER | Age: 64
End: 2024-10-30
Payer: MEDICARE

## 2024-10-30 ENCOUNTER — OFFICE VISIT (OUTPATIENT)
Dept: PAIN MEDICINE | Facility: CLINIC | Age: 64
End: 2024-10-30
Payer: MEDICARE

## 2024-10-30 VITALS
BODY MASS INDEX: 38.99 KG/M2 | OXYGEN SATURATION: 94 % | HEART RATE: 89 BPM | WEIGHT: 287.9 LBS | TEMPERATURE: 96 F | DIASTOLIC BLOOD PRESSURE: 74 MMHG | RESPIRATION RATE: 18 BRPM | HEIGHT: 72 IN | SYSTOLIC BLOOD PRESSURE: 122 MMHG

## 2024-10-30 DIAGNOSIS — M47.816 LUMBAR FACET ARTHROPATHY: Primary | ICD-10-CM

## 2024-10-30 DIAGNOSIS — M54.16 LUMBAR RADICULOPATHY: Primary | ICD-10-CM

## 2024-10-30 DIAGNOSIS — M51.372 DEGENERATION OF INTERVERTEBRAL DISC OF LUMBOSACRAL REGION WITH DISCOGENIC BACK PAIN AND LOWER EXTREMITY PAIN: ICD-10-CM

## 2024-10-30 DIAGNOSIS — Z98.890 S/P LUMBAR LAMINECTOMY: ICD-10-CM

## 2024-10-30 DIAGNOSIS — M54.16 LUMBAR RADICULOPATHY: ICD-10-CM

## 2024-10-30 PROCEDURE — 1159F MED LIST DOCD IN RCRD: CPT | Performed by: PHYSICIAN ASSISTANT

## 2024-10-30 PROCEDURE — 99204 OFFICE O/P NEW MOD 45 MIN: CPT | Performed by: PHYSICIAN ASSISTANT

## 2024-10-30 PROCEDURE — 1160F RVW MEDS BY RX/DR IN RCRD: CPT | Performed by: PHYSICIAN ASSISTANT

## 2024-10-30 PROCEDURE — 3078F DIAST BP <80 MM HG: CPT | Performed by: PHYSICIAN ASSISTANT

## 2024-10-30 PROCEDURE — 3074F SYST BP LT 130 MM HG: CPT | Performed by: PHYSICIAN ASSISTANT

## 2024-10-30 PROCEDURE — 1126F AMNT PAIN NOTED NONE PRSNT: CPT | Performed by: PHYSICIAN ASSISTANT

## 2024-10-30 RX ORDER — DIAZEPAM 5 MG/1
10 TABLET ORAL ONCE
OUTPATIENT
Start: 2024-10-30 | End: 2024-10-30

## 2024-10-30 NOTE — PROGRESS NOTES
CHIEF COMPLAINT  LOW BACK AND LEG PAIN      Subjective   Pratibha Pascal is a 64 y.o. female.   She presents to the office for initial evaluation of low back and leg pain. She was referred here by Dr. Benitez Wu.  This patient has a longstanding history of lumbar spine pain with previous history of lumbar discectomy performed in 1995 and 2002 by Dr. Carroll Peterson at L4-5, L5-S1.  She has noted recrudescence of pain in a bandlike distribution across the lumbar spine which radiates into the bilateral buttocks and extremities.  She does have a permanent patchy area of numbness in the stripe along the lateral aspect of the right leg which is secondary to permanent nerve damage following one of her ruptured disc.  The patient also continues to have purplish discoloration of the right foot and states that all vascular studies have been normal and this has also been attributed to permanent nerve damage.  She finds that walking or standing for any length of time tends to significantly aggravate her pain.  Patient reports back pain is greater than leg pain.  Pain in the back is described as a shooting, aching and throbbing sensation.    Previous lumbar surgery by Dr. Carroll Peterson at L4-5, L5-S1 1995 and 2002.    This patient has undergone LESI's x 3 prior to her surgeries with suboptimal relief.    She has not had any physical therapy since undergoing lumbar surgery last performed in 2002.  Patient states that she occasionally will alternate with ice and heat therapy.    History complicated by myocardial infarction which occurred in 2014 however she is now managed only with aspirin 81 mg therapy.  She also does have rheumatoid arthritis which is managed by Dr. Quentin Castillo.    Pain today 0/10 VAS as long as she is seated however will increase to 7/10 with activity.      Back Pain  This is a recurrent problem. The current episode started more than 1 year ago. The problem occurs constantly. The problem is unchanged. The  pain is present in the lumbar spine. The quality of the pain is described as shooting and aching (throbbing). The pain radiates to the left buttock, right buttock, left thigh, right thigh, left anterolateral lower leg, right anterolateral lower leg, right foot and left foot. The pain is at a severity of 0/10. Pain severity now: pain increases to 7/10 with activity. The pain is Worse during the day. The symptoms are aggravated by standing, bending and position (walking/activity). Associated symptoms include leg pain, numbness (bilateral feet) and weakness (bilateral legs). Risk factors include poor posture, sedentary lifestyle and lack of exercise. She has tried chiropractic manipulation, ice and heat for the symptoms.        PEG Assessment   What number best describes your pain on average in the past week?7  What number best describes how, during the past week, pain has interfered with your enjoyment of life?10  What number best describes how, during the past week, pain has interfered with your general activity?  8        Current Outpatient Medications:     ARIPiprazole (Abilify) 2 MG tablet, Take 1 tablet by mouth Every Night., Disp: 90 tablet, Rfl: 1    aspirin 81 MG EC tablet, Take 1 tablet by mouth Daily., Disp: , Rfl:     atorvastatin (LIPITOR) 10 MG tablet, Take 0.5 tablets by mouth Daily. (Patient taking differently: Take 1 tablet by mouth Daily.), Disp: 45 tablet, Rfl: 1    baclofen (LIORESAL) 20 MG tablet, Take 1 tablet by mouth Every Night., Disp: 90 tablet, Rfl: 1    dapagliflozin Propanediol 10 MG tablet, Take 10 mg by mouth Daily., Disp: , Rfl:     FLUoxetine (PROzac) 20 MG capsule, Take 3 capsules by mouth Daily., Disp: 270 capsule, Rfl: 1    folic acid (FOLVITE) 1 MG tablet, Take 1 tablet by mouth Daily., Disp: 90 tablet, Rfl: 1    levothyroxine (SYNTHROID, LEVOTHROID) 300 MCG tablet, Take 1 tablet by mouth Every Night., Disp: , Rfl:     losartan (COZAAR) 50 MG tablet, Take 1 tablet by mouth Daily.,  Disp: 90 tablet, Rfl: 1    metFORMIN ER (GLUCOPHAGE-XR) 500 MG 24 hr tablet, Take 1 tablet by mouth 2 (Two) Times a Day. Resume on 8/4/2023, Disp: , Rfl:     metoprolol succinate XL (TOPROL-XL) 100 MG 24 hr tablet, Take 1 tablet by mouth Daily., Disp: 90 tablet, Rfl: 1    riTUXimab-pvvr (Ruxience) 500 MG/50ML solution injection, Infuse 375 mg/m2 into a venous catheter., Disp: , Rfl:     spironolactone (ALDACTONE) 25 MG tablet, Take 1 tablet by mouth once daily, Disp: 90 tablet, Rfl: 3    sulfaSALAzine (AZULFIDINE) 500 MG tablet, Take 3 tablets by mouth 2 (Two) Times a Day., Disp: 540 tablet, Rfl: 1    triamterene-hydrochlorothiazide (MAXZIDE-25) 37.5-25 MG per tablet, Take 1 tablet by mouth Daily., Disp: 90 tablet, Rfl: 1    Trulicity 3 MG/0.5ML solution pen-injector, INJECT 3 MG SUBCUTANEOUSLY ONCE A WEEK, Disp: , Rfl:     gabapentin (NEURONTIN) 300 MG capsule, START 1 CAPSULE PO QHS X 3 DAYS; IF TOLERATED MAY INCREASE TO 1 CAPSULE PO BID X 3 DAYS; IF TOLERATED MAY INCREASE TO 1 CAPSULE PO TID, Disp: 90 capsule, Rfl: 0  No current facility-administered medications for this visit.    Facility-Administered Medications Ordered in Other Visits:     mupirocin (BACTROBAN) 2 % nasal ointment, , Nasal, BID, Nabor Correia MD    The following portions of the patient's history were reviewed and updated as appropriate: allergies, current medications, past family history, past medical history, past social history, past surgical history, and problem list.      REVIEW OF PERTINENT MEDICAL DATA    MRI LUMBAR SPINE WO CONTRAST-     HISTORY:  worsening neuropathy, prior Lumbar surgery x 2.; M51.362-Other  intervertebral disc degeneration, lumbar region with discogenic back  pain and lower extremity pain; G62.9-Polyneuropathy, unspecified     COMPARISON: None     FINDINGS: There is moderate to severe loss of disc height from L3-S1.  Anterior bridging osteophyte is noted at L4-L5 and L5-S1. Grade 1  anterolisthesis of L2 upon L3 is  appreciated estimated to be 2 mm. The  conus is at L1 and the caudal aspect of the spinal cord appears  unremarkable.     L1-L2: Mild facet degenerative disease is present bilaterally.     L2-L3: There is mild canal stenosis secondary to moderate facet  degenerative disease and a mild broad-based disc osteophyte complex.  Mild foraminal stenosis is present bilaterally secondary to facet  hypertrophy and extension of the disc osteophyte complex into the neural  foramen.     L3-L4: A mild broad-based disc osteophyte complex is present resulting  mild flattening of ventral surface of the thecal sac. Mild lateral  recess narrowing is present bilaterally. Mild to moderate foraminal  stenosis is present bilaterally secondary to loss of disc height and  extension of the disc osteophyte complex into the neural foramen.     L4-L5: A left laminotomy is noted. Mild facet degenerative disease is  present bilaterally. A mild broad-based disc osteophyte complex is  present resulting in minimal flattening of the ventral surface of the  thecal sac. There is no evidence of central canal stenosis. Mild to  moderate foraminal stenosis is present bilaterally, more prominent on  the right secondary to loss of disc height and extension of a disc  osteophyte complex into the neural foramen.     L5-S1: Mild facet degenerative disease is present bilaterally. A right  laminotomy is noted. There is no evidence of central canal stenosis.  Mild to moderate foraminal stenosis is present on the right and there is  mild foraminal stenosis on the left secondary to loss of disc height and  extension of the disc osteophyte complex into the neural foramen.     IMPRESSION:  The patient is undergone a left laminotomy at L4-L5 and a  right laminotomy at L5-S1. There is no evidence of a disc herniation.  Intubating osteophyte is noted at L4-L5 and L5-S1. Multilevel  degenerative disease involving lumbar spine is noted as described above  including  "multilevel facet degenerative disease and mild canal stenosis.  Canal stenosis is most prominent at L2-L3. See above.     This report was finalized on 10/11/2024 9:34 PM by Dr. Wilton Desai M.D on Workstation: [id number]       Review of Systems   Gastrointestinal:  Negative for constipation and diarrhea.   Genitourinary:  Negative for difficulty urinating.   Musculoskeletal:  Positive for back pain.   Neurological:  Positive for weakness (bilateral legs) and numbness (bilateral feet).   Psychiatric/Behavioral:  Positive for sleep disturbance. Negative for suicidal ideas. The patient is nervous/anxious.        I have reviewed and confirmed the accuracy of the ROS as documented by the MA/LPN/RN MARBIN Vann    Vitals:    10/30/24 1449   BP: 122/74   Pulse: 89   Resp: 18   Temp: 96 °F (35.6 °C)   SpO2: 94%   Weight: 131 kg (287 lb 14.4 oz)   Height: 182.9 cm (72\")   PainSc: 0-No pain         Objective       Physical Exam  Vitals and nursing note reviewed.   Constitutional:       Appearance: Normal appearance. She is obese.   HENT:      Head: Normocephalic.   Pulmonary:      Effort: Pulmonary effort is normal.   Musculoskeletal:      Lumbar back: Tenderness (MODERATE PAIN TO PALPATION OVER THE BILATERAL LUMBAR FACET JOINT SPACES) present. Decreased range of motion.        Back:       Comments: PURPLISH DISCOLORATION TO LOWER RIGHT LEG/FOOT--INTACT DORSALIS PEDIS PULSE 1+   Skin:     General: Skin is warm and dry.   Neurological:      General: No focal deficit present.      Mental Status: She is alert and oriented to person, place, and time.      Cranial Nerves: Cranial nerves 2-12 are intact.      Sensory: Sensory deficit (DECREASED SENSATION TO LIGHT TOUCH ALONG LATERAL ASPECT RIGHT THIGH) present.      Motor: Weakness present.      Gait: Gait abnormal (ANTALGIC GAIT; AMBULATES WITH CANE).      Deep Tendon Reflexes:      Reflex Scores:       Patellar reflexes are 0 on the right side and 0 on the left side.   "     Achilles reflexes are 0 on the right side and 0 on the left side.  Psychiatric:         Mood and Affect: Mood normal.         Behavior: Behavior normal.         Thought Content: Thought content normal.         Judgment: Judgment normal.         Assessment & Plan   Diagnoses and all orders for this visit:    1. Lumbar facet arthropathy (Primary)  -     Ambulatory Referral to Physical Therapy for Evaluation & Treatment    2. S/P lumbar laminectomy  -     Ambulatory Referral to Physical Therapy for Evaluation & Treatment    3. Lumbar radiculopathy  -     Ambulatory Referral to Physical Therapy for Evaluation & Treatment    4. Degeneration of intervertebral disc of lumbosacral region with discogenic back pain and lower extremity pain        --- Pratibha Pascal reports a pain score of 0.  Given her pain assessment as noted, treatment options were discussed and the following options were decided upon as a follow-up plan to address the patient's pain: continuation of current treatment plan for pain, prescription for non-opiod analgesics, referral to Physical Therapy, and use of non-medical modalities (ice, heat, stretching and/or behavior modifications).    --- Based on the patient's physical exam and MRI findings I do recommend proceeding with #1 diagnostic bilateral L4-S1 medial branch block under fluoroscopy guidance with plan to proceed to radiofrequency ablation if favorable response is obtained.  --- A referral has also been placed to physical therapy for further evaluation and treat recommendations to be made for persistent low back pain.  --- This patient may be a candidate for consideration of neuromodulation.  Will discuss this further in the future.   --- I will also institute a trial of gabapentin 300 mg p.o. 3 times daily with a slow titration schedule which has been provided to the patient.    Pain / Disability Scale    The scale used for measurement of pain and/or disability for this patient was the  [place] back pain disability scale.  The score was 52 on 10/30/2024      Diagnostic Facet Joint Procedure:   MBB     The first diagnostic facet joint procedure is considered medically reasonable and necessary for the diagnosis and treatment of chronic pain for this patient due to the patient meeting all of the following criteria:    - 1. Moderate to severe chronic neck or low back pain, predominantly axial, that causes functional deficit measured on pain or disability scale.  - 2. Pain present for minimum of 3 months with documented failure to respond to noninvasive conservative management (as tolerated)  - 3. Absence of untreated radiculopathy or neurogenic claudication (except for radiculopathy caused by facet joint synovial cyst)  - 4. There is no non-facet pathology per clinical assessment or radiology studies that could explain the source of the patient’s pain, including but not limited to fracture, tumor, infection, or significant deformity.    The confirmatory diagnostic facet joint procedure is considered medically reasonable and necessary for the diagnosis and treatment of chronic pain for this patient due to the patient meeting all of the following criteria:    - 1. Moderate to severe chronic neck or low back pain, predominantly axial, that causes functional deficit measured on pain or disability scale.  - 2. Pain present for minimum of 3 months with documented failure to respond to noninvasive conservative management (as tolerated)  - 3. Absence of untreated radiculopathy or neurogenic claudication (except for radiculopathy caused by facet joint synovial cyst)  - 4. There is no non-facet pathology per clinical assessment or radiology studies that could explain the source of the patient’s pain, including but not limited to fracture, tumor, infection, or significant deformity.    The patient has also shown a consistent positive response of at least 80% relief of primary (index) pain (with the duration of  relief being consistent with the agent used).        DILLAN REPORT    As part of the patient's treatment plan, I am prescribing controlled substances. The patient has been made aware of appropriate use of such medications, including potential risk of somnolence, limited ability to drive and/or work safely, and the potential for dependence or overdose. It has also bee made clear that these medications are for use by this patient only, without concomitant use of alcohol or other substances unless prescribed.     Patient has completed prescribing agreement detailing terms of continued prescribing of controlled substances, including monitoring DILLAN reports, urine drug screening, and pill counts if necessary. The patient is aware that inappropriate use will results in cessation of prescribing such medications.    DILLAN report has been reviewed and scanned into the patient's chart.    As the clinician, I personally reviewed the DILLAN from 10/30/24 while the patient was in the office today.    History and physical exam exhibit continued safe and appropriate use of controlled substances.       Dictated utilizing Dragon dictation.

## 2024-10-31 RX ORDER — GABAPENTIN 300 MG/1
CAPSULE ORAL
Qty: 90 CAPSULE | Refills: 0 | Status: SHIPPED | OUTPATIENT
Start: 2024-10-31

## 2024-11-06 ENCOUNTER — TRANSCRIBE ORDERS (OUTPATIENT)
Dept: SURGERY | Facility: SURGERY CENTER | Age: 64
End: 2024-11-06
Payer: MEDICARE

## 2024-11-06 DIAGNOSIS — Z41.9 SURGERY, ELECTIVE: Primary | ICD-10-CM

## 2024-12-03 NOTE — DISCHARGE INSTRUCTIONS
[facility] - Post-procedure Instructions          --  While there are no absolute restrictions, it is recommended that you do not perform strenuous activity today. In the morning, you may resume your level of activity as before your block.    --  If you have a band-aid at your injection site, please remove it later today. Observe the area for any redness, swelling, pus-like drainage, or a temperature over 101°. If any of these symptoms occur, please call your doctor at [phone]. If after office hours, leave a message and the on-call provider will return your call.    --  Ice may be applied to your injection site. It is recommended you avoid direct heat (heating pad; hot tub) for 1-2 days.    --  Call [facility] at [phone] if you experience persistent headache, persistent bleeding from the injection site, or severe pain not relieved by heat or oral medication.    --  Do not make important decisions today.    --  Due to the effects of the block and/or the I.V. Sedation, DO NOT drive or operate hazardous machinery for 12 hours.  Local anesthetics may cause numbness after procedure and precautions must be taken with regards to operating equipment as well as with walking, even if ambulating with assistance of another person or with an assistive device.    --  Do not drink alcohol for 12 hours.    -- You may return to work tomorrow, or as directed by your referring doctor.    --  Occasionally you may notice a slight increase in your pain after the procedure. This should start to improve within the next 24-48 hours. Radiofrequency ablation procedure pain may last 3-4 weeks.    --  It may take as long as 3-4 days before you notice a gradual improvement in your pain and/or other symptoms.    -- You may continue to take your prescribed pain medication as needed.    --  Some normal possible side effects of steroid use could include fluid retention, increased blood sugar, dull headache,  increased sweating, increased appetite, mood swings and flushing.    --  Diabetics are recommended to watch their blood glucose level closely for 24-48 hours after the injection.    --  Must stay in PACU for 20 min upon arrival and prove no leg weakness before being discharged.    --  IN THE EVENT OF A LIFE THREATENING EMERGENCY, (CHEST PAIN, BREATHING DIFFICULTIES, PARALYSIS…) YOU SHOULD GO TO YOUR NEAREST EMERGENCY ROOM.    --  You should be contacted by our office within 2-3 days to schedule follow up or next appointment date.  If not contacted within 7 days, please call the office at [phone]     If you have even 1 hour of relief, these injections are considered successful.

## 2024-12-03 NOTE — H&P
[facility]   HISTORY AND PHYSICAL    Patient Name: Pratibha Pascal  : 1960  MRN: [id number]  Primary Care Physician:  Benitez Wu MD  Date of admission: 2024    Subjective   Subjective     Chief Complaint: Low back pain    History of Present Illness  Chronic axial low back pain that has not improved with conservative therapies.      Review of Systems   Constitutional:  Negative for chills and fever.   Respiratory:  Negative for cough and shortness of breath.    Musculoskeletal:  Positive for back pain.        Personal History     Past Medical History:   Diagnosis Date    Allergy to mold     Anxiety     Arthritis     Benign essential hypertension     CAD (coronary artery disease)     Cervical disc disorder     Chronic pain disorder     Coronary artery disease     Depression     Extremity pain     Fibromyalgia, primary     H/O bone density study unclear    H/O complete eye exam 2 -3 years    Headache     Headache, tension-type     History of myocardial infarction     History of staph infection     HISTORY OF MSSA IN LEFT FOOT FROM DIABETIC ULCER HEALED    HL (hearing loss)     RIGHT EAR  SEVERE HEARING LOSS    HLD (hyperlipidemia)     Hyperlipidemia     Hypertension     Hypothyroidism     Injury of back     Joint pain     Low back pain     Lumbosacral disc disease     Major depressive disorder, recurrent, in full remission     Myocardial infarction 2014    Neck pain     OA (osteoarthritis) of knee     Obesity     BRANNON (obstructive sleep apnea)     USE CPAP    Peripheral neuropathy     Primary generalized (osteo)arthritis     Primary osteoarthritis of left knee     Primary osteoarthritis of right knee     Rheumatoid arthritis     Rheumatoid arthritis of multiple sites without organ or system involvement with positive rheumatoid factor     Seasonal allergies     Spinal stenosis     TMJ dysfunction     Tobacco abuse 2014    QUIT     Type 2 diabetes mellitus        Past Surgical History:    Procedure Laterality Date    BACK SURGERY  10/1995    also 02/2002; L5-S1; Dr. Carroll Avitia    CARDIAC CATHETERIZATION      CARDIAC CATHETERIZATION N/A 09/03/2019    Procedure: Coronary angiography  ;  Surgeon: Magy Rivera MD;  Location:  [facility];  Service: Cardiovascular    CARDIAC CATHETERIZATION N/A 09/03/2019    Procedure: Left Heart Cath;  Surgeon: Magy Rivera MD;  Location:  [facility];  Service: Cardiovascular    CARDIAC CATHETERIZATION N/A 09/03/2019    Procedure: Left ventriculography;  Surgeon: Magy Rivera MD;  Location:  [facility];  Service: Cardiovascular    CARDIAC CATHETERIZATION N/A 08/02/2023    Procedure: Left Heart Cath;  Surgeon: Magy Rivera MD;  Location:  [facility];  Service: Cardiovascular;  Laterality: N/A;    CARDIAC CATHETERIZATION N/A 08/02/2023    Procedure: Coronary angiography;  Surgeon: Magy Rivera MD;  Location:  [facility];  Service: Cardiovascular;  Laterality: N/A;    CARDIAC CATHETERIZATION N/A 08/02/2023    Procedure: Left ventriculography;  Surgeon: Magy Rivera MD;  Location:  [facility];  Service: Cardiovascular;  Laterality: N/A;    CATARACT EXTRACTION WITH INTRAOCULAR LENS IMPLANT Bilateral 11/2022    COLONOSCOPY      CORONARY STENT PLACEMENT  06/2014    EPIDURAL BLOCK      JOINT REPLACEMENT  2019    KNEE ARTHROSCOPY Right 2010    LAMINECTOMY      MAMMO BILATERAL  due    delcines    ORTHOPEDIC SURGERY      PAP SMEAR  due    SPINE SURGERY      THYROIDECTOMY Right 07/09/2020    Procedure: RIGHT THYROID LOBECTOMY AND RIGHT PARATHYROID EXPLORATION;  Surgeon: Kimo Carter MD;  [place] [facility];  Service: ENT;  Laterality: Right;    THYROIDECTOMY Left 04/29/2021    Procedure: COMPLETION THYROIDECTOMY;  Surgeon: Kimo Carter MD;  Location:  [facility];  Service: ENT;  Laterality: Left;    TONSILLECTOMY      age 26     TOTAL KNEE ARTHROPLASTY Left 10/14/2019    Procedure: TOTAL KNEE ARTHROPLASTY;  Surgeon: Nabor Correia MD;  Location: [facility];  Service: Orthopedics    TOTAL KNEE ARTHROPLASTY Right 02/12/2020    Procedure: TOTAL KNEE ARTHROPLASTY;  Surgeon: Nabor Correia MD;  Location: [facility] OR;  Service: Orthopedics;  Laterality: Right;    US GUIDED FINE NEEDLE ASPIRATION  12/06/2019       Family History: family history includes Arthritis in her mother; COPD in her father; Cancer in her mother; Depression in her daughter and father; Drug abuse in her daughter; Hearing loss in her father; Heart failure in her maternal grandmother; Hypertension in her father and mother; Rheum arthritis in her mother and another family member; Thyroid disease in her father and mother. Otherwise pertinent FHx was reviewed and not pertinent to current issue.    Social History:  reports that she quit smoking about 10 years ago. Her smoking use included cigarettes. She started smoking about 42 years ago. She has a 32 pack-year smoking history. She has never used smokeless tobacco. She reports that she does not drink alcohol and does not use drugs.    Home Medications:  ARIPiprazole, Dulaglutide, FLUoxetine, aspirin, atorvastatin, baclofen, dapagliflozin Propanediol, folic acid, gabapentin, levothyroxine, losartan, metFORMIN ER, metoprolol succinate XL, riTUXimab-pvvr, spironolactone, sulfaSALAzine, and triamterene-hydrochlorothiazide    Allergies:  Allergies   Allergen Reactions    Cefuroxime GI Intolerance     Patient has tolerated ceftriaxone (Jan 2018), cefazolin (Oct 2018) and piperacillin-tazobactam (July 2017).    Ciprofloxacin GI Intolerance    Clindamycin/Lincomycin GI Intolerance    Dicloxacillin GI Intolerance    Erythromycin Itching and Swelling     EYES SWELLING    Talwin [Pentazocine] Itching       Objective    Objective     Vitals:        Physical Exam  Constitutional:       General: She is not in acute distress.  Pulmonary:       Effort: Pulmonary effort is normal. No respiratory distress.   Skin:     General: Skin is warm and dry.   Neurological:      Mental Status: She is alert.   Psychiatric:         Mood and Affect: Mood normal.         Thought Content: Thought content normal.         Result Review    Result Review:  I have personally reviewed the results from the time of this admission to 12/4/2024 13:29 EST and agree with these findings:  []  Laboratory list / accordion  []  Microbiology  []  Radiology  []  EKG/Telemetry   []  Cardiology/Vascular   []  Pathology  []  Old records  []  Other:  Most notable findings include: No new      Assessment & Plan   Assessment / Plan     Brief Patient Summary:  Pratibha Pascal is a 64 y.o. female who has chronic axial low back pain    Active Hospital Problems:  Active Hospital Problems    Diagnosis     **Lumbar facet arthropathy      Plan: Lumbar medial branch blocks at bilateral~L4-S1      VTE Prophylaxis:  No VTE prophylaxis order currently exists.    Mai Benson MD

## 2024-12-04 ENCOUNTER — HOSPITAL ENCOUNTER (OUTPATIENT)
Dept: GENERAL RADIOLOGY | Facility: SURGERY CENTER | Age: 64
Setting detail: HOSPITAL OUTPATIENT SURGERY
End: 2024-12-04
Payer: MEDICARE

## 2024-12-04 ENCOUNTER — HOSPITAL ENCOUNTER (OUTPATIENT)
Facility: SURGERY CENTER | Age: 64
Setting detail: HOSPITAL OUTPATIENT SURGERY
Discharge: HOME | End: 2024-12-04
Attending: ANESTHESIOLOGY | Admitting: ANESTHESIOLOGY
Payer: MEDICARE

## 2024-12-04 VITALS
SYSTOLIC BLOOD PRESSURE: 128 MMHG | HEART RATE: 84 BPM | OXYGEN SATURATION: 95 % | TEMPERATURE: 98 F | DIASTOLIC BLOOD PRESSURE: 76 MMHG | RESPIRATION RATE: 16 BRPM | HEIGHT: 72 IN | BODY MASS INDEX: 38.87 KG/M2 | WEIGHT: 287 LBS

## 2024-12-04 DIAGNOSIS — Z41.9 SURGERY, ELECTIVE: ICD-10-CM

## 2024-12-04 DIAGNOSIS — M47.816 LUMBAR FACET ARTHROPATHY: ICD-10-CM

## 2024-12-04 LAB — GLUCOSE BLDC GLUCOMTR-MCNC: 133 MG/DL (ref 70–130)

## 2024-12-04 PROCEDURE — 25010000002 BUPIVACAINE (PF) 0.25 % SOLUTION 10 ML VIAL: Performed by: ANESTHESIOLOGY

## 2024-12-04 PROCEDURE — 64494 INJ PARAVERT F JNT L/S 2 LEV: CPT | Performed by: ANESTHESIOLOGY

## 2024-12-04 PROCEDURE — 25010000002 LIDOCAINE PF 1% 1 % SOLUTION 5 ML VIAL: Performed by: ANESTHESIOLOGY

## 2024-12-04 PROCEDURE — 64493 INJ PARAVERT F JNT L/S 1 LEV: CPT | Performed by: ANESTHESIOLOGY

## 2024-12-04 PROCEDURE — 77002 NEEDLE LOCALIZATION BY XRAY: CPT

## 2024-12-04 PROCEDURE — 76000 FLUOROSCOPY <1 HR PHYS/QHP: CPT

## 2024-12-04 RX ORDER — DIAZEPAM 5 MG/1
10 TABLET ORAL ONCE
Status: COMPLETED | OUTPATIENT
Start: 2024-12-04 | End: 2024-12-04

## 2024-12-04 RX ADMIN — DIAZEPAM 10 MG: 5 TABLET ORAL at 13:40

## 2024-12-04 NOTE — OP NOTE
Lumbar Medial Branch Blockade at  Bilateral L4-S1  [facility]      PREOPERATIVE DIAGNOSIS:  Lumbar spondylosis without myelopathy    POSTOPERATIVE DIAGNOSIS:  Lumbar spondylosis without myelopathy    PROCEDURE:   Diagnostic Lumbar Medial Branch Nerve Blockades, with fluoroscopy:  at the L4, L5 transverse processes and the sacral alar groove)   42917-74 -- Lumbar Facet blocks, 1st Level  52409-06 -- Lumbar Facet blocks, 2nd  Level     PRE-PROCEDURE DISCUSSION WITH PATIENT:    Risks and complications were discussed with the patient prior to starting the procedure and informed consent was obtained.      SURGEON:  Mai Benson MD    REASON FOR PROCEDURE:    The patient complains of pain that seems to have a significant axial component, Tenderness of the affected facet joints on exam under fluoroscopy, and Painful area identified on exam under fluoroscopy    SEDATION:  Anxiolysis was used for this procedure which included PO Valium 10mg  ANESTHETIC:  0.25% bupivacaine  STEROID:  None  TOTAL VOLUME OF SOLUTION:  6ml    DESCRIPTON OF PROCEDURE:  After obtaining informed consent, IV access was not obtained in the preoperative area.   The patient was taken to the operating room.  The patient was placed in the prone position with a pillow under the abdomen. All pressure points were well padded.  Heart rate, blood pressure, and pulse oximeter were monitored.  The patient was monitored and sedated by the RN under my direction. The lumbosacral area was prepped with Chloraprep and draped in a sterile fashion.     AP fluoroscopic image was used to visualize the junction of the right S1 superior articular process with the sacral ala.  The skin and subcutaneous tissue over the area was anesthetized with 1% lidocaine.  A 22-gauge spinal needle was then advanced percutaneously through the anesthetized skin tract under fluoroscopic guidance in a coaxial view to contact periosteum.  After negative aspiration, a  volume of 1 mL of the local anesthetic was injected without resistance.  The image was then optimized to maximize visualization of the junctions of the L4, L5 superior articular processes with the transverse processes.  The skin and subcutaneous tissue over the areas was anesthetized with 1% lidocaine.  A 22-gauge spinal needle was then advanced percutaneously through the anesthetized skin tracts under fluoroscopic guidance in a coaxial view to contact periosteum at the target sites.  After negative aspiration, a volume of 1 mL of the local anesthetic  was injected without resistance at each of the target sites.      The same procedure was then performed on the contralateral side in the exact same fashion.        Onset of analgesia was noted.  Vital signs remained stable throughout.      ESTIMATED BLOOD LOSS:  <5 mL  SPECIMENS:  none    COMPLICATIONS:   No complications were noted.    TOLERANCE & DISCHARGE CONDITION:    The patient tolerated the procedure well.  The patient was transported to the recovery area without difficulties.  The patient was discharged to home under the care of family in stable and satisfactory condition.    Pre-procedure pain score: 5/10 at rest, 8/10 with activity  Post-procedure pain score: 1/10    PLAN OF CARE:  The patient was given our standard instruction sheet.  We discussed that Lumbar Medial Branch Blockade is a diagnostic procedure in consideration for radiofrequency ablation if two diagnostic procedures prove to be positive for significant benefit.  An alternative plan could be therapeutic lumbar branch blockades.  The patient is asked to keep an account of pain relief after the procedure today.  The patient will  Return to clinic 1-2 wks.  The patient will resume all medications as per the medication reconciliation sheet.

## 2024-12-11 ENCOUNTER — OFFICE VISIT (OUTPATIENT)
Dept: PAIN MEDICINE | Facility: CLINIC | Age: 64
End: 2024-12-11
Payer: MEDICARE

## 2024-12-11 VITALS
BODY MASS INDEX: 39.17 KG/M2 | WEIGHT: 289.2 LBS | OXYGEN SATURATION: 97 % | TEMPERATURE: 95.5 F | SYSTOLIC BLOOD PRESSURE: 108 MMHG | HEIGHT: 72 IN | HEART RATE: 89 BPM | DIASTOLIC BLOOD PRESSURE: 82 MMHG

## 2024-12-11 DIAGNOSIS — Z98.890 S/P LUMBAR LAMINECTOMY: ICD-10-CM

## 2024-12-11 DIAGNOSIS — M47.816 LUMBAR FACET ARTHROPATHY: Primary | ICD-10-CM

## 2024-12-11 PROCEDURE — 99213 OFFICE O/P EST LOW 20 MIN: CPT | Performed by: PHYSICIAN ASSISTANT

## 2024-12-11 PROCEDURE — 3074F SYST BP LT 130 MM HG: CPT | Performed by: PHYSICIAN ASSISTANT

## 2024-12-11 PROCEDURE — 1159F MED LIST DOCD IN RCRD: CPT | Performed by: PHYSICIAN ASSISTANT

## 2024-12-11 PROCEDURE — 3079F DIAST BP 80-89 MM HG: CPT | Performed by: PHYSICIAN ASSISTANT

## 2024-12-11 PROCEDURE — 1160F RVW MEDS BY RX/DR IN RCRD: CPT | Performed by: PHYSICIAN ASSISTANT

## 2024-12-11 PROCEDURE — 1125F AMNT PAIN NOTED PAIN PRSNT: CPT | Performed by: PHYSICIAN ASSISTANT

## 2024-12-11 NOTE — PROGRESS NOTES
CHIEF COMPLAINT    Back pain    Subjective   Pratibha Pascal is a 64 y.o. female  who presents to the office for follow-up of procedure.  She completed a Lumbar Medial Branch Blockade at  Bilateral L4-S1    on  12/4/24 performed by Dr. Benson for management of back pain. Patient reports 2 days of 80% relief from the procedure.  He has since noted recrudescence of pain in a bandlike distribution across the lumbar spine referred into the bilateral paraspinal muscles.    Patient is currently taking gabapentin 300 mg nightly and is unable to tolerate taking it during daytime hours.    Pain today 7/10 VAS in severity.    Previous lumbar surgery by Dr. Carroll Peterson at L4-5, L5-S1 1995 and 2002.     This patient has undergone LESI's x 3 prior to her surgeries with suboptimal relief.    Back Pain  This is a recurrent problem. The current episode started more than 1 year ago. The problem occurs constantly. The problem is unchanged. The pain is present in the lumbar spine. The quality of the pain is described as shooting and aching (throbbing). The pain radiates to the left buttock, right buttock, left thigh, right thigh, left anterolateral lower leg, right anterolateral lower leg, right foot and left foot. The pain is at a severity of 7/10. The pain is moderate (pain increases to 7/10 with activity). The pain is Worse during the day. The symptoms are aggravated by standing, bending and position (walking/activity). Associated symptoms include leg pain, numbness (right leg) and weakness (bilateral leg). Pertinent negatives include no fever. Risk factors include poor posture, sedentary lifestyle and lack of exercise. She has tried chiropractic manipulation, ice and heat for the symptoms.        PEG Assessment   What number best describes your pain on average in the past week?7  What number best describes how, during the past week, pain has interfered with your enjoyment of life?9  What number best describes how, during the past  week, pain has interfered with your general activity?  9    Review of Pertinent Medical Data ---  MRI LUMBAR SPINE WO CONTRAST-     HISTORY:  worsening neuropathy, prior Lumbar surgery x 2.; M51.362-Other  intervertebral disc degeneration, lumbar region with discogenic back  pain and lower extremity pain; G62.9-Polyneuropathy, unspecified     COMPARISON: None     FINDINGS: There is moderate to severe loss of disc height from L3-S1.  Anterior bridging osteophyte is noted at L4-L5 and L5-S1. Grade 1  anterolisthesis of L2 upon L3 is appreciated estimated to be 2 mm. The  conus is at L1 and the caudal aspect of the spinal cord appears  unremarkable.     L1-L2: Mild facet degenerative disease is present bilaterally.     L2-L3: There is mild canal stenosis secondary to moderate facet  degenerative disease and a mild broad-based disc osteophyte complex.  Mild foraminal stenosis is present bilaterally secondary to facet  hypertrophy and extension of the disc osteophyte complex into the neural  foramen.     L3-L4: A mild broad-based disc osteophyte complex is present resulting  mild flattening of ventral surface of the thecal sac. Mild lateral  recess narrowing is present bilaterally. Mild to moderate foraminal  stenosis is present bilaterally secondary to loss of disc height and  extension of the disc osteophyte complex into the neural foramen.     L4-L5: A left laminotomy is noted. Mild facet degenerative disease is  present bilaterally. A mild broad-based disc osteophyte complex is  present resulting in minimal flattening of the ventral surface of the  thecal sac. There is no evidence of central canal stenosis. Mild to  moderate foraminal stenosis is present bilaterally, more prominent on  the right secondary to loss of disc height and extension of a disc  osteophyte complex into the neural foramen.     L5-S1: Mild facet degenerative disease is present bilaterally. A right  laminotomy is noted. There is no evidence of  "central canal stenosis.  Mild to moderate foraminal stenosis is present on the right and there is  mild foraminal stenosis on the left secondary to loss of disc height and  extension of the disc osteophyte complex into the neural foramen.     IMPRESSION:  The patient is undergone a left laminotomy at L4-L5 and a  right laminotomy at L5-S1. There is no evidence of a disc herniation.  Intubating osteophyte is noted at L4-L5 and L5-S1. Multilevel  degenerative disease involving lumbar spine is noted as described above  including multilevel facet degenerative disease and mild canal stenosis.  Canal stenosis is most prominent at L2-L3. See above.     This report was finalized on 10/11/2024 9:34 PM by Dr. Wilton Desai M.D on Workstation: [employer]    The following portions of the patient's history were reviewed and updated as appropriate: allergies, current medications, past family history, past medical history, past social history, past surgical history, and problem list.    Review of Systems   Constitutional:  Negative for chills and fever.   Respiratory:  Negative for cough and shortness of breath.    Gastrointestinal:  Negative for constipation and diarrhea.   Genitourinary:  Negative for difficulty urinating.   Musculoskeletal:  Positive for back pain and gait problem (ambulates with a cane).   Neurological:  Positive for weakness (bilateral leg) and numbness (right leg). Negative for dizziness and light-headedness.   Psychiatric/Behavioral:  Negative for agitation.      I have reviewed and confirmed the accuracy of the ROS as documented by the MA/LPN/RN MARBIN Vann   Vitals:    12/11/24 1100   BP: 108/82   BP Location: Left arm   Patient Position: Sitting   Pulse: 89   Temp: 95.5 °F (35.3 °C)   TempSrc: Temporal   SpO2: 97%   Weight: 131 kg (289 lb 3.2 oz)   Height: 182.9 cm (72\")   PainSc:   7   PainLoc: Back         Objective   Physical Exam  Vitals and nursing note reviewed.   Constitutional:       " Appearance: Normal appearance. She is obese.   HENT:      Head: Normocephalic.   Neurological:      Mental Status: She is alert and oriented to person, place, and time.      Cranial Nerves: Cranial nerves 2-12 are intact.      Sensory: Sensation is intact.      Motor: Motor function is intact.      Gait: Gait abnormal (ambulates with cane).   Psychiatric:         Mood and Affect: Mood normal.         Behavior: Behavior normal.         Thought Content: Thought content normal.         Judgment: Judgment normal.         Assessment & Plan   Diagnoses and all orders for this visit:    1. Lumbar facet arthropathy (Primary)    2. S/P lumbar laminectomy        Pratibha Pascal reports a pain score of 7.  Given her pain assessment as noted, treatment options were discussed and the following options were decided upon as a follow-up plan to address the patient's pain: continuation of current treatment plan for pain and use of non-medical modalities (ice, heat, stretching and/or behavior modifications).      --- Follow-up prn as patient elects not to proceed with a second diagnostic injection due to the procedural pain that she experienced.  --- The patient has not noted any improvement of level of analgesia with the ongoing use of gabapentin 300 mg nightly as she is unable to tolerate taking it during the daytime hours.  At this time I have instructed the patient to simply discontinue the gabapentin as it has not been very efficacious.                    Dictated utilizing Dragon dictation.

## 2025-03-03 ENCOUNTER — OFFICE VISIT (OUTPATIENT)
Dept: CARDIOLOGY | Facility: CLINIC | Age: 65
End: 2025-03-03
Payer: MEDICARE

## 2025-03-03 VITALS
SYSTOLIC BLOOD PRESSURE: 138 MMHG | HEART RATE: 96 BPM | BODY MASS INDEX: 39.14 KG/M2 | HEIGHT: 72 IN | WEIGHT: 289 LBS | DIASTOLIC BLOOD PRESSURE: 76 MMHG

## 2025-03-03 DIAGNOSIS — I50.22 CHRONIC SYSTOLIC CHF (CONGESTIVE HEART FAILURE): ICD-10-CM

## 2025-03-03 DIAGNOSIS — I25.10 CORONARY ARTERY DISEASE INVOLVING NATIVE CORONARY ARTERY OF NATIVE HEART WITHOUT ANGINA PECTORIS: Chronic | ICD-10-CM

## 2025-03-03 DIAGNOSIS — E78.5 DYSLIPIDEMIA: ICD-10-CM

## 2025-03-03 DIAGNOSIS — I42.9 CARDIOMYOPATHY, UNSPECIFIED TYPE: Primary | ICD-10-CM

## 2025-03-03 DIAGNOSIS — E78.2 MIXED HYPERLIPIDEMIA: Chronic | ICD-10-CM

## 2025-03-03 DIAGNOSIS — I10 BENIGN ESSENTIAL HYPERTENSION: ICD-10-CM

## 2025-03-03 DIAGNOSIS — Z95.5 HISTORY OF CORONARY ARTERY STENT PLACEMENT: ICD-10-CM

## 2025-03-03 PROCEDURE — 1160F RVW MEDS BY RX/DR IN RCRD: CPT | Performed by: NURSE PRACTITIONER

## 2025-03-03 PROCEDURE — 3078F DIAST BP <80 MM HG: CPT | Performed by: NURSE PRACTITIONER

## 2025-03-03 PROCEDURE — 99214 OFFICE O/P EST MOD 30 MIN: CPT | Performed by: NURSE PRACTITIONER

## 2025-03-03 PROCEDURE — 93000 ELECTROCARDIOGRAM COMPLETE: CPT | Performed by: NURSE PRACTITIONER

## 2025-03-03 PROCEDURE — 3075F SYST BP GE 130 - 139MM HG: CPT | Performed by: NURSE PRACTITIONER

## 2025-03-03 PROCEDURE — 1159F MED LIST DOCD IN RCRD: CPT | Performed by: NURSE PRACTITIONER

## 2025-03-03 NOTE — PROGRESS NOTES
Subjective:     Encounter Date:03/03/2025      Patient ID: Pratibha Pascal is a 65 y.o. female.    Chief Complaint:follow up HTN, CAD  History of Present Illness  This is a 64 y/o female who follows with Dr. Rivera and is known to me. She has a pmhx of hypertension, rheumatoid arthritis, hyperlipidemia, diabetes, obstructive sleep apnea and coronary artery disease.     She is here today for follow-up visit.  She tells me that she had a fall when bending over due to a feeling a little off balance.  This is the first time she is fallen in quite some time.  It is very difficult for her to get up when she does fall due to her arthritis.  She is concerned that recent lab work that has been obtained with her rheumatologist indicates that she may be a little bit on the dehydrated side.  She is wondering if this has contributed to her fall as well.  She denies any chest discomfort, shortness of breath, palpitations or syncope.  She denies any swelling in her legs, orthopnea or PND.  Blood pressure has been pretty well-controlled.    Prior history:  Dr. Rivera initially saw the patient in 2014 when she presented as an anterior MI. She was taken to cardiac catheterization lab where she was found to have 100% thombotic occlusion of her mid LAD for which she received a drug eluting stent. Her remaining coronaries showed nonobstructive disease. She had a brief episode of atrial fibrillation with RVR but converted and has had no further episodes since. Echocardiogram at that time showed a depressed left ventricular systolic function with apical wall motion abnormalities.     She was lost to follow up for a few years [place] [facility] [place]. A stress test was performed that showed evidence of anterior infarct with moderate jamil-infarct ischemia and an EF of 55%.  Based on those findings we proceeded with a cardiac catheterization on 9/3/2019 which showed mild nonobstructive coronary  artery disease and a patent mid LAD stent and a normal-appearing left ventricular systolic function with anterior hypokinesis and an EF of 50 to 55%.     She was last seen in the office by Dr. Rivera in December 2022 and was doing well. She did have some complaints of atypical chest pain and it was decided to monitor for the time being. No changes were made.    I saw her in June and she reported worsening diaphoresis and dyspnea on exertion.  A repeat echocardiogram was performed showing a decline in her EF to 44% and apical wall motion abnormalities with normal diastolic function and no significant valvular disease.  A stress test was then performed in 7/2023 which showed anterior infarct with jamil-infarct ischemia.  A cardiac catheterization was recommended and performed on 8/2/2023 and showed mild non-obstructive disease with a patent LAD stent and elevated left ventricular end diastolic pressure of 21 to 23 mmHg.  She was continued on triamterene-hydrocholorthiazide with plans to consider loop diuretic in the future.     At follow up with Dr. Rivera, she reported having to stop Farxiga due to issues with perineal irritation. She was started on spironolactone. Repeat labs following this were normal.    When I saw her in January 2024 she was feeling well and no changes were made.  She then saw Dr. Rivera in July and continued to feel well.  She had restarted Farxiga and seem to be tolerating it well.  Dr. Rivera recommended considering an echocardiogram after her next visit.    I have reviewed and updated as appropriate allergies, current medications, past family history, past medical history, past surgical history and problem list.    Review of Systems   Constitutional: Negative for diaphoresis, fever, malaise/fatigue, weight gain and weight loss.   HENT:  Negative for congestion, hoarse voice and sore throat.    Eyes:  Negative for blurred vision and double vision.   Cardiovascular:  Negative for chest pain, dyspnea  on exertion, leg swelling, orthopnea, palpitations and syncope.   Respiratory:  Negative for cough, shortness of breath and wheezing.    Gastrointestinal:  Negative for abdominal pain, hematemesis, hematochezia and melena.   Genitourinary:  Negative for dysuria and hematuria.   Neurological:  Positive for loss of balance. Negative for dizziness, headaches, light-headedness and numbness.   Psychiatric/Behavioral:  Negative for depression. The patient is not nervous/anxious.          Current Outpatient Medications:     ARIPiprazole (Abilify) 2 MG tablet, Take 1 tablet by mouth Every Night., Disp: 90 tablet, Rfl: 1    aspirin 81 MG EC tablet, Take 1 tablet by mouth Daily., Disp: , Rfl:     atorvastatin (LIPITOR) 10 MG tablet, Take 0.5 tablets by mouth Daily. (Patient taking differently: Take 1 tablet by mouth Daily.), Disp: 45 tablet, Rfl: 1    baclofen (LIORESAL) 20 MG tablet, Take 1 tablet by mouth Every Night., Disp: 90 tablet, Rfl: 1    dapagliflozin Propanediol 10 MG tablet, Take 10 mg by mouth Daily., Disp: , Rfl:     FLUoxetine (PROzac) 20 MG capsule, Take 3 capsules by mouth Daily., Disp: 270 capsule, Rfl: 1    folic acid (FOLVITE) 1 MG tablet, Take 1 tablet by mouth Daily., Disp: 90 tablet, Rfl: 1    levothyroxine (SYNTHROID, LEVOTHROID) 300 MCG tablet, Take 1 tablet by mouth Every Night., Disp: , Rfl:     losartan (COZAAR) 50 MG tablet, Take 1 tablet by mouth Daily., Disp: 90 tablet, Rfl: 1    metFORMIN ER (GLUCOPHAGE-XR) 500 MG 24 hr tablet, Take 1 tablet by mouth 2 (Two) Times a Day. Resume on 8/4/2023, Disp: , Rfl:     metoprolol succinate XL (TOPROL-XL) 100 MG 24 hr tablet, Take 1 tablet by mouth Daily., Disp: 90 tablet, Rfl: 1    riTUXimab-pvvr (Ruxience) 500 MG/50ML solution injection, Infuse 375 mg/m2 into a venous catheter., Disp: , Rfl:     spironolactone (ALDACTONE) 25 MG tablet, Take 1 tablet by mouth once daily, Disp: 90 tablet, Rfl: 3    sulfaSALAzine (AZULFIDINE) 500 MG tablet, Take 3 tablets by  mouth 2 (Two) Times a Day., Disp: 540 tablet, Rfl: 1    triamterene-hydrochlorothiazide (MAXZIDE-25) 37.5-25 MG per tablet, Take 1 tablet by mouth Daily., Disp: 90 tablet, Rfl: 1    Trulicity 3 MG/0.5ML solution pen-injector, INJECT 3 MG SUBCUTANEOUSLY ONCE A WEEK, Disp: , Rfl:   No current facility-administered medications for this visit.    Facility-Administered Medications Ordered in Other Visits:     mupirocin (BACTROBAN) 2 % nasal ointment, , Nasal, BID, Nabor Correia MD    Past Medical History:   Diagnosis Date    Allergy to mold     Anxiety     Arthritis     Benign essential hypertension     CAD (coronary artery disease)     Cervical disc disorder     Chronic pain disorder     Coronary artery disease     Depression     Extremity pain     Fibromyalgia, primary     H/O bone density study unclear    H/O complete eye exam 2 -3 years    Headache     Headache, tension-type     History of myocardial infarction 2014    History of staph infection     HISTORY OF MSSA IN LEFT FOOT FROM DIABETIC ULCER HEALED    HL (hearing loss)     RIGHT EAR  SEVERE HEARING LOSS    HLD (hyperlipidemia)     Hyperlipidemia     Hypertension     Hypothyroidism     Injury of back     Joint pain     Low back pain     Lumbosacral disc disease     Major depressive disorder, recurrent, in full remission     Myocardial infarction 6/27/2014    Neck pain     OA (osteoarthritis) of knee     Obesity     BRANNON (obstructive sleep apnea)     USE CPAP    Peripheral neuropathy     Primary generalized (osteo)arthritis     Primary osteoarthritis of left knee     Primary osteoarthritis of right knee     Rheumatoid arthritis     Rheumatoid arthritis of multiple sites without organ or system involvement with positive rheumatoid factor     Seasonal allergies     Spinal stenosis     TMJ dysfunction     Tobacco abuse 06/27/2014    QUIT     Type 2 diabetes mellitus        Past Surgical History:   Procedure Laterality Date    BACK SURGERY  10/1995    also 02/2002;  L5-S1; Dr. Carroll Avitia    CARDIAC CATHETERIZATION      CARDIAC CATHETERIZATION N/A 09/03/2019    Procedure: Coronary angiography  ;  Surgeon: Magy Rivera MD;  Location:  [facility];  Service: Cardiovascular    CARDIAC CATHETERIZATION N/A 09/03/2019    Procedure: Left Heart Cath;  Surgeon: Magy Rivera MD;  Location:  [facility];  Service: Cardiovascular    CARDIAC CATHETERIZATION N/A 09/03/2019    Procedure: Left ventriculography;  Surgeon: Magy Rivera MD;  Location:  [facility];  Service: Cardiovascular    CARDIAC CATHETERIZATION N/A 08/02/2023    Procedure: Left Heart Cath;  Surgeon: Magy Rivera MD;  Location:  [facility];  Service: Cardiovascular;  Laterality: N/A;    CARDIAC CATHETERIZATION N/A 08/02/2023    Procedure: Coronary angiography;  Surgeon: Magy Rivera MD;  Location:  [facility];  Service: Cardiovascular;  Laterality: N/A;    CARDIAC CATHETERIZATION N/A 08/02/2023    Procedure: Left ventriculography;  Surgeon: Magy Rivera MD;  Location:  [facility];  Service: Cardiovascular;  Laterality: N/A;    CATARACT EXTRACTION WITH INTRAOCULAR LENS IMPLANT Bilateral 11/2022    COLONOSCOPY      CORONARY STENT PLACEMENT  06/2014    EPIDURAL BLOCK      JOINT REPLACEMENT  2019    KNEE ARTHROSCOPY Right 2010    LAMINECTOMY      MAMMO BILATERAL  due    delcines    MEDIAL BRANCH BLOCK Bilateral 12/4/2024    Procedure: LUMBAR MEDIAL BRANCH BLOCK BILATERAL L4-S1 #1 57269-90, 64494-50 X 2;  Surgeon: Mai Benson MD;  Location: [facility];  Service: Pain Management;  Laterality: Bilateral;    ORTHOPEDIC SURGERY      PAP SMEAR  due    SPINE SURGERY      THYROIDECTOMY Right 07/09/2020    Procedure: RIGHT THYROID LOBECTOMY AND RIGHT PARATHYROID EXPLORATION;  Surgeon: Kimo Carter MD;  [place] [facility] [place] [facility]  Service: ENT;  Laterality: Right;    THYROIDECTOMY Left 04/29/2021     Procedure: COMPLETION THYROIDECTOMY;  Surgeon: Kimo Carter MD;  [place] [facility] [place] [facility] [place]  Service: E[place];  Laterality: Left;    TONSILLECTOMY      age 26    TOTAL KNEE ARTHROPLASTY Left 10/14/2019    Procedure: TOTAL KNEE ARTHROPLASTY;  Surgeon: Nabor Correia MD;  Location: [facility] OR;  Service: Orthopedics    TOTAL KNEE ARTHROPLASTY Right 2020    Procedure: TOTAL KNEE ARTHROPLASTY;  Surgeon: Nabor Correia MD;  Location: [facility] OR;  Service: Orthopedics;  Laterality: Right;    US GUIDED FINE NEEDLE ASPIRATION  2019       Family History   Problem Relation Age of Onset    Cancer Mother         breast    Hypertension Mother     Rheum arthritis Mother     Thyroid disease Mother     Arthritis Mother     Hypertension Father          3/13/2018    Thyroid disease Father          3/13/2018    COPD Father             Depression Father             Hearing loss Father             Heart failure Maternal Grandmother     Rheum arthritis Other         aunt    Depression Daughter     Drug abuse Daughter     Malig Hyperthermia Neg Hx        Social History     Tobacco Use    Smoking status: Former     Current packs/day: 0.00     Average packs/day: 1 pack/day for 32.0 years (32.0 ttl pk-yrs)     Types: Cigarettes     Start date: 1982     Quit date: 2014     Years since quitting: 10.6    Smokeless tobacco: Never    Tobacco comments:     quit 2014   Vaping Use    Vaping status: Never Used   Substance Use Topics    Alcohol use: No    Drug use: No         ECG 12 Lead    Date/Time: 3/3/2025 2:35 PM  Performed by: Michaela Viramontes APRN    Authorized by: Michaela Viramontes APRN  Comparison: compared with previous ECG from 2024  Similar to previous ECG  Rhythm: sinus rhythm  Conduction: left anterior fascicular block             Objective:     Visit Vitals  /76 (BP Location: Right arm, Patient Position: Sitting, Cuff Size: Adult)   Pulse 96   Ht 182.9  "cm (72\")   Wt 131 kg (289 lb)   BMI 39.20 kg/m²             Physical Exam  Constitutional:       Appearance: Normal appearance. She is obese.   HENT:      Head: Normocephalic.   Neck:      Vascular: No carotid bruit.   Cardiovascular:      Rate and Rhythm: Normal rate and regular rhythm.      Chest Wall: PMI is not displaced.      Pulses: Normal pulses.           Radial pulses are 2+ on the right side and 2+ on the left side.        Posterior tibial pulses are 2+ on the right side and 2+ on the left side.      Heart sounds: Normal heart sounds. No murmur heard.     No friction rub. No gallop.   Pulmonary:      Effort: Pulmonary effort is normal.      Breath sounds: Normal breath sounds.   Abdominal:      General: Bowel sounds are normal. There is no distension.      Palpations: Abdomen is soft.   Musculoskeletal:      Right lower leg: No edema.      Left lower leg: No edema.   Skin:     General: Skin is warm and dry.      Capillary Refill: Capillary refill takes less than 2 seconds.   Neurological:      Mental Status: She is alert and oriented to person, place, and time.   Psychiatric:         Mood and Affect: Mood normal.         Behavior: Behavior normal.         Thought Content: Thought content normal.          Lab Review:         Cardiac Procedures:       Assessment:         Diagnoses and all orders for this visit:    1. Cardiomyopathy, unspecified type (Primary)  -     Adult Transthoracic Echo Complete w/ Color, Spectral and Contrast if Necessary Per Protocol; Future    2. Chronic systolic CHF (congestive heart failure)  -     Adult Transthoracic Echo Complete w/ Color, Spectral and Contrast if Necessary Per Protocol; Future    3. Mixed hyperlipidemia    4. History of coronary artery stent placement    5. Dyslipidemia    6. Coronary artery disease involving native coronary artery of native heart without angina pectoris    7. Benign essential hypertension    Other orders  -     ECG 12 Lead                Plan: "         PAF: in sinus rhythm on EKG today.This seems to be a one time episode not requiring anticoagulation  ICM: appears pretty well compensated. On GDMT with Toprol XL, losartan and spironolactone. Appears compensated on exam. Will repeat echocardiogram to reassess her EF on GDMT. Maybe able to cut back on some of her medications if there is improvement. I would likely start with the Maxide since she is having some trouble with feeling unsteady at times and reports some dehydration.   CAD: s/p PCI with stent to LAD. EKG is stable.  HTN: blood pressure is well controlled on current regimen. No changes.    Thank you for allowing me to participate in this patient's care. Please call with any questions or concerns. Ms. Pascal will follow up with Dr. Rivera in 6 months.          Your medication list            Accurate as of March 3, 2025  2:37 PM. If you have any questions, ask your nurse or doctor.                CHANGE how you take these medications        Instructions Last Dose Given Next Dose Due   atorvastatin 10 MG tablet  Commonly known as: LIPITOR  What changed: how much to take      Take 0.5 tablets by mouth Daily.              CONTINUE taking these medications        Instructions Last Dose Given Next Dose Due   ARIPiprazole 2 MG tablet  Commonly known as: Abilify      Take 1 tablet by mouth Every Night.       aspirin 81 MG EC tablet      Take 1 tablet by mouth Daily.       baclofen 20 MG tablet  Commonly known as: LIORESAL      Take 1 tablet by mouth Every Night.       dapagliflozin Propanediol 10 MG tablet      Take 10 mg by mouth Daily.       FLUoxetine 20 MG capsule  Commonly known as: PROzac      Take 3 capsules by mouth Daily.       folic acid 1 MG tablet  Commonly known as: FOLVITE      Take 1 tablet by mouth Daily.       levothyroxine 300 MCG tablet  Commonly known as: SYNTHROID, LEVOTHROID      Take 1 tablet by mouth Every Night.       losartan 50 MG tablet  Commonly known as: COZAAR      Take 1 tablet  by mouth Daily.       metFORMIN  MG 24 hr tablet  Commonly known as: GLUCOPHAGE-XR      Take 1 tablet by mouth 2 (Two) Times a Day. Resume on 8/4/2023       metoprolol succinate  MG 24 hr tablet  Commonly known as: TOPROL-XL      Take 1 tablet by mouth Daily.       Ruxience 500 MG/50ML solution injection  Generic drug: riTUXimab-pvvr      Infuse 375 mg/m2 into a venous catheter.       spironolactone 25 MG tablet  Commonly known as: ALDACTONE      Take 1 tablet by mouth once daily       sulfaSALAzine 500 MG tablet  Commonly known as: AZULFIDINE      Take 3 tablets by mouth 2 (Two) Times a Day.       triamterene-hydrochlorothiazide 37.5-25 MG per tablet  Commonly known as: MAXZIDE-25      Take 1 tablet by mouth Daily.       Trulicity 3 MG/0.5ML solution pen-injector  Generic drug: Dulaglutide      INJECT 3 MG SUBCUTANEOUSLY ONCE A WEEK                  KALI Figueroa  03/03/25  9:11 AM EDT

## 2025-03-12 ENCOUNTER — HOSPITAL ENCOUNTER (OUTPATIENT)
Dept: CARDIOLOGY | Facility: HOSPITAL | Age: 65
Discharge: HOME | End: 2025-03-12
Admitting: NURSE PRACTITIONER
Payer: MEDICARE

## 2025-03-12 VITALS
WEIGHT: 289 LBS | BODY MASS INDEX: 39.14 KG/M2 | DIASTOLIC BLOOD PRESSURE: 70 MMHG | SYSTOLIC BLOOD PRESSURE: 124 MMHG | OXYGEN SATURATION: 97 % | HEIGHT: 72 IN | HEART RATE: 87 BPM

## 2025-03-12 DIAGNOSIS — I50.22 CHRONIC SYSTOLIC CHF (CONGESTIVE HEART FAILURE): ICD-10-CM

## 2025-03-12 DIAGNOSIS — I42.9 CARDIOMYOPATHY, UNSPECIFIED TYPE: ICD-10-CM

## 2025-03-12 LAB
AORTIC ARCH: 2.3 CM
AORTIC DIMENSIONLESS INDEX: 0.89 (DI)
ASCENDING AORTA: 3.4 CM
AV MEAN PRESS GRAD SYS DOP V1V2: 3 MMHG
AV VMAX SYS DOP: 119 CM/SEC
BH CV ECHO MEAS - ACS: 2.04 CM
BH CV ECHO MEAS - AO MAX PG: 5.7 MMHG
BH CV ECHO MEAS - AO ROOT DIAM: 3.3 CM
BH CV ECHO MEAS - AO V2 VTI: 23 CM
BH CV ECHO MEAS - AVA(I,D): 2.8 CM2
BH CV ECHO MEAS - EDV(CUBED): 64 ML
BH CV ECHO MEAS - EDV(MOD-SP2): 158 ML
BH CV ECHO MEAS - EDV(MOD-SP4): 172 ML
BH CV ECHO MEAS - EF(MOD-SP2): 54.4 %
BH CV ECHO MEAS - EF(MOD-SP4): 53.5 %
BH CV ECHO MEAS - ESV(CUBED): 19.8 ML
BH CV ECHO MEAS - ESV(MOD-SP2): 72 ML
BH CV ECHO MEAS - ESV(MOD-SP4): 80 ML
BH CV ECHO MEAS - FS: 32.3 %
BH CV ECHO MEAS - IVS/LVPW: 1 CM
BH CV ECHO MEAS - IVSD: 1.1 CM
BH CV ECHO MEAS - LAT PEAK E' VEL: 8.3 CM/SEC
BH CV ECHO MEAS - LV DIASTOLIC VOL/BSA (35-75): 69 CM2
BH CV ECHO MEAS - LV MASS(C)D: 145.6 GRAMS
BH CV ECHO MEAS - LV MAX PG: 4.4 MMHG
BH CV ECHO MEAS - LV MEAN PG: 2 MMHG
BH CV ECHO MEAS - LV SYSTOLIC VOL/BSA (12-30): 32.1 CM2
BH CV ECHO MEAS - LV V1 MAX: 105 CM/SEC
BH CV ECHO MEAS - LV V1 VTI: 20.4 CM
BH CV ECHO MEAS - LVIDD: 4 CM
BH CV ECHO MEAS - LVIDS: 2.7 CM
BH CV ECHO MEAS - LVOT AREA: 3.2 CM2
BH CV ECHO MEAS - LVOT DIAM: 2.02 CM
BH CV ECHO MEAS - LVPWD: 1.1 CM
BH CV ECHO MEAS - MED PEAK E' VEL: 5.7 CM/SEC
BH CV ECHO MEAS - MR MAX PG: 37.4 MMHG
BH CV ECHO MEAS - MR MAX VEL: 305.8 CM/SEC
BH CV ECHO MEAS - MV A DUR: 0.11 SEC
BH CV ECHO MEAS - MV A MAX VEL: 87 CM/SEC
BH CV ECHO MEAS - MV E MAX VEL: 57.8 CM/SEC
BH CV ECHO MEAS - MV E/A: 0.67
BH CV ECHO MEAS - MV MAX PG: 3.8 MMHG
BH CV ECHO MEAS - MV MEAN PG: 1.6 MMHG
BH CV ECHO MEAS - MV V2 VTI: 19.6 CM
BH CV ECHO MEAS - MVA(VTI): 3.3 CM2
BH CV ECHO MEAS - PA ACC TIME: 0.12 SEC
BH CV ECHO MEAS - PA V2 MAX: 101.8 CM/SEC
BH CV ECHO MEAS - PULM A REVS DUR: 0.1 SEC
BH CV ECHO MEAS - PULM A REVS VEL: 35.9 CM/SEC
BH CV ECHO MEAS - PULM DIAS VEL: 39 CM/SEC
BH CV ECHO MEAS - PULM S/D: 1.15
BH CV ECHO MEAS - PULM SYS VEL: 44.8 CM/SEC
BH CV ECHO MEAS - QP/QS: 0.79
BH CV ECHO MEAS - RV MAX PG: 2.46 MMHG
BH CV ECHO MEAS - RV V1 MAX: 78.4 CM/SEC
BH CV ECHO MEAS - RV V1 VTI: 16.5 CM
BH CV ECHO MEAS - RVOT DIAM: 2 CM
BH CV ECHO MEAS - SUP REN AO DIAM: 2.5 CM
BH CV ECHO MEAS - SV(LVOT): 65.4 ML
BH CV ECHO MEAS - SV(MOD-SP2): 86 ML
BH CV ECHO MEAS - SV(MOD-SP4): 92 ML
BH CV ECHO MEAS - SV(RVOT): 51.8 ML
BH CV ECHO MEAS - SVI(LVOT): 26.3 ML/M2
BH CV ECHO MEAS - SVI(MOD-SP2): 34.5 ML/M2
BH CV ECHO MEAS - SVI(MOD-SP4): 36.9 ML/M2
BH CV ECHO MEAS - TAPSE (>1.6): 1.41 CM
BH CV ECHO MEASUREMENTS AVERAGE E/E' RATIO: 8.26
BH CV XLRA - RV BASE: 3.1 CM
BH CV XLRA - RV LENGTH: 6.2 CM
BH CV XLRA - RV MID: 2.6 CM
BH CV XLRA - TDI S': 11.3 CM/SEC
LEFT ATRIUM VOLUME INDEX: 16 ML/M2
LV EF BIPLANE MOD: 54.2 %
SINUS: 3.2 CM
STJ: 2.9 CM

## 2025-03-12 PROCEDURE — 25510000001 PERFLUTREN 6.52 MG/ML SUSPENSION 2 ML VIAL: Performed by: NURSE PRACTITIONER

## 2025-03-12 PROCEDURE — 93306 TTE W/DOPPLER COMPLETE: CPT

## 2025-03-12 RX ADMIN — PERFLUTREN 1.5 ML: 6.52 INJECTION, SUSPENSION INTRAVENOUS at 13:42

## 2025-03-13 ENCOUNTER — RESULTS FOLLOW-UP (OUTPATIENT)
Dept: CARDIOLOGY | Facility: HOSPITAL | Age: 65
End: 2025-03-13
Payer: MEDICARE

## 2025-03-13 NOTE — TELEPHONE ENCOUNTER
Discussed results of echo with patient. I am going to hold Maxide since she is having issues with dehydration and feeling unsteady. Will call next week to follow up on how she is feeling. If BP or swelling becomes an issue, we can look at increasing losartan or switching metoprolol to carvedilol.

## 2025-03-18 DIAGNOSIS — F33.41 RECURRENT MAJOR DEPRESSIVE DISORDER, IN PARTIAL REMISSION: ICD-10-CM

## 2025-03-18 DIAGNOSIS — M54.2 CERVICALGIA: Chronic | ICD-10-CM

## 2025-03-18 DIAGNOSIS — M54.50 LUMBAR BACK PAIN: Chronic | ICD-10-CM

## 2025-03-18 RX ORDER — BACLOFEN 20 MG/1
20 TABLET ORAL NIGHTLY
Qty: 90 TABLET | Refills: 0 | OUTPATIENT
Start: 2025-03-18

## 2025-03-30 DIAGNOSIS — F33.41 RECURRENT MAJOR DEPRESSIVE DISORDER, IN PARTIAL REMISSION: ICD-10-CM

## 2025-03-30 RX ORDER — ARIPIPRAZOLE 2 MG/1
2 TABLET ORAL NIGHTLY
Qty: 30 TABLET | Refills: 0 | Status: SHIPPED | OUTPATIENT
Start: 2025-03-30

## 2025-04-03 ENCOUNTER — OFFICE VISIT (OUTPATIENT)
Dept: FAMILY MEDICINE CLINIC | Facility: CLINIC | Age: 65
End: 2025-04-03
Payer: MEDICARE

## 2025-04-03 VITALS
DIASTOLIC BLOOD PRESSURE: 80 MMHG | WEIGHT: 285 LBS | BODY MASS INDEX: 38.6 KG/M2 | HEIGHT: 72 IN | OXYGEN SATURATION: 98 % | HEART RATE: 85 BPM | SYSTOLIC BLOOD PRESSURE: 130 MMHG

## 2025-04-03 DIAGNOSIS — M54.50 LUMBAR BACK PAIN: Chronic | ICD-10-CM

## 2025-04-03 DIAGNOSIS — M05.79 RHEUMATOID ARTHRITIS OF MULTIPLE SITES WITHOUT ORGAN OR SYSTEM INVOLVEMENT WITH POSITIVE RHEUMATOID FACTOR: Chronic | ICD-10-CM

## 2025-04-03 DIAGNOSIS — I10 BENIGN ESSENTIAL HYPERTENSION: Primary | Chronic | ICD-10-CM

## 2025-04-03 DIAGNOSIS — Z12.2 SCREENING FOR LUNG CANCER: ICD-10-CM

## 2025-04-03 DIAGNOSIS — F33.41 RECURRENT MAJOR DEPRESSIVE DISORDER, IN PARTIAL REMISSION: Chronic | ICD-10-CM

## 2025-04-03 DIAGNOSIS — L29.9 ITCHING OF EAR: ICD-10-CM

## 2025-04-03 DIAGNOSIS — M54.2 CERVICALGIA: Chronic | ICD-10-CM

## 2025-04-03 RX ORDER — ACETIC ACID 20.65 MG/ML
3 SOLUTION AURICULAR (OTIC) 2 TIMES DAILY PRN
Qty: 15 ML | Refills: 2 | Status: SHIPPED | OUTPATIENT
Start: 2025-04-03

## 2025-04-03 RX ORDER — BACLOFEN 20 MG/1
20 TABLET ORAL NIGHTLY
Qty: 90 TABLET | Refills: 1 | Status: SHIPPED | OUTPATIENT
Start: 2025-04-03

## 2025-04-03 RX ORDER — ARIPIPRAZOLE 2 MG/1
2 TABLET ORAL NIGHTLY
Qty: 90 TABLET | Refills: 1 | Status: SHIPPED | OUTPATIENT
Start: 2025-04-03

## 2025-04-03 RX ORDER — METOPROLOL SUCCINATE 100 MG/1
100 TABLET, EXTENDED RELEASE ORAL DAILY
Qty: 90 TABLET | Refills: 1 | Status: SHIPPED | OUTPATIENT
Start: 2025-04-03

## 2025-04-03 RX ORDER — LOSARTAN POTASSIUM 50 MG/1
50 TABLET ORAL DAILY
Qty: 90 TABLET | Refills: 1 | Status: SHIPPED | OUTPATIENT
Start: 2025-04-03

## 2025-04-03 RX ORDER — FOLIC ACID 1 MG/1
1000 TABLET ORAL DAILY
Qty: 90 TABLET | Refills: 1 | Status: SHIPPED | OUTPATIENT
Start: 2025-04-03

## 2025-04-03 NOTE — PROGRESS NOTES
Chief Complaint:   Chief Complaint   Patient presents with    Ear Problem     Right, itchy    Hypertension       Pratibha Pascal 65 y.o. female who presents today for Medical Management of the below listed issues. She  has a problem list of   Patient Active Problem List   Diagnosis    CAD (coronary artery disease)    Mixed hyperlipidemia    Benign essential hypertension    Hypothyroidism    Major depressive disorder, recurrent, in full remission    Rheumatoid arthritis    Cervicalgia    Diabetes mellitus    Dyslipidemia    BP (high blood pressure)    Obstructive apnea    Proteinuria    Burn    Cellulitis of left leg    Diabetes mellitus with neurological manifestation    MSSA (methicillin susceptible Staphylococcus aureus) infection    Diabetic ulcer of left foot associated with diabetes mellitus due to underlying condition    Morbid obesity    Primary insomnia    Anxiety    Immunodeficiency, unspecified    Long term current use of non-steroidal anti-inflammatories (NSAID)    Personal history of immunosupression therapy    Primary generalized (osteo)arthritis    Raised antibody titer    Rheumatoid arthritis of multiple sites without organ or system involvement with positive rheumatoid factor    Other long term (current) drug therapy    Uncontrolled type 2 diabetes mellitus with hyperglycemia    Vitamin D deficiency, unspecified    Hypercalcemia    Primary osteoarthritis of left knee    OA (osteoarthritis) of knee    Primary osteoarthritis of right knee    Chronic fatigue    Chronic pain of left knee    History of coronary artery stent placement    Multiple thyroid nodules    Thyroid nodule    Degeneration of intervertebral disc of lumbar region with discogenic back pain and lower extremity pain    Peripheral polyneuropathy    Lumbar facet arthropathy    S/P lumbar laminectomy   .  Since the last visit, She has overall felt well, although chronically itching right ear canal issues. No d/c.  she has been compliant  with   Current Outpatient Medications:     ARIPiprazole (ABILIFY) 2 MG tablet, Take 1 tablet by mouth Every Night., Disp: 90 tablet, Rfl: 1    aspirin 81 MG EC tablet, Take 1 tablet by mouth Daily., Disp: , Rfl:     atorvastatin (LIPITOR) 10 MG tablet, Take 0.5 tablets by mouth Daily. (Patient taking differently: Take 1 tablet by mouth Daily.), Disp: 45 tablet, Rfl: 1    baclofen (LIORESAL) 20 MG tablet, Take 1 tablet by mouth Every Night., Disp: 90 tablet, Rfl: 1    dapagliflozin Propanediol 10 MG tablet, Take 10 mg by mouth Daily., Disp: , Rfl:     FLUoxetine (PROzac) 20 MG capsule, Take 3 capsules by mouth Daily., Disp: 270 capsule, Rfl: 1    folic acid (FOLVITE) 1 MG tablet, Take 1 tablet by mouth Daily., Disp: 90 tablet, Rfl: 1    levothyroxine (SYNTHROID, LEVOTHROID) 300 MCG tablet, Take 1 tablet by mouth Every Night., Disp: , Rfl:     losartan (COZAAR) 50 MG tablet, Take 1 tablet by mouth Daily., Disp: 90 tablet, Rfl: 1    metFORMIN ER (GLUCOPHAGE-XR) 500 MG 24 hr tablet, Take 1 tablet by mouth 2 (Two) Times a Day. Resume on 8/4/2023, Disp: , Rfl:     metoprolol succinate XL (TOPROL-XL) 100 MG 24 hr tablet, Take 1 tablet by mouth Daily., Disp: 90 tablet, Rfl: 1    riTUXimab-pvvr (Ruxience) 500 MG/50ML solution injection, Infuse 375 mg/m2 into a venous catheter., Disp: , Rfl:     spironolactone (ALDACTONE) 25 MG tablet, Take 1 tablet by mouth once daily, Disp: 90 tablet, Rfl: 3    sulfaSALAzine (AZULFIDINE) 500 MG tablet, Take 3 tablets by mouth 2 (Two) Times a Day., Disp: 540 tablet, Rfl: 1    Trulicity 3 MG/0.5ML solution pen-injector, INJECT 3 MG SUBCUTANEOUSLY ONCE A WEEK, Disp: , Rfl:     acetic acid (VOSOL) 2 % otic solution, Administer 3 drops to the right ear 2 (Two) Times a Day As Needed (itching)., Disp: 15 mL, Rfl: 2  No current facility-administered medications for this visit.    Facility-Administered Medications Ordered in Other Visits:     mupirocin (BACTROBAN) 2 % nasal ointment, , Nasal, BID,  "Nabor Correia MD.  She denies medication side effects.    All of the other chronic condition(s) listed above are stable w/o issues.    /80   Pulse 85   Ht 182.9 cm (72\")   Wt 129 kg (285 lb)   SpO2 98%   BMI 38.65 kg/m²     Results for orders placed or performed during the hospital encounter of 03/12/25   Adult Transthoracic Echo Complete w/ Color, Spectral and Contrast if Necessary Per Protocol    Collection Time: 03/12/25  1:41 PM   Result Value Ref Range    EF(MOD-bp) 54.2 %    LVIDd 4.0 cm    LVIDs 2.7 cm    IVSd 1.10 cm    LVPWd 1.10 cm    FS 32.3 %    IVS/LVPW 1.00 cm    ESV(cubed) 19.8 ml    LV Sys Vol (BSA corrected) 32.1 cm2    EDV(cubed) 64.0 ml    LV Jung Vol (BSA corrected) 69.0 cm2    LV mass(C)d 145.6 grams    LVOT area 3.2 cm2    LVOT diam 2.02 cm    EDV(MOD-sp2) 158.0 ml    EDV(MOD-sp4) 172.0 ml    ESV(MOD-sp2) 72.0 ml    ESV(MOD-sp4) 80.0 ml    SV(MOD-sp2) 86.0 ml    SV(MOD-sp4) 92.0 ml    SVi(MOD-SP2) 34.5 ml/m2    SVi(MOD-SP4) 36.9 ml/m2    SVi (LVOT) 26.3 ml/m2    EF(MOD-sp2) 54.4 %    EF(MOD-sp4) 53.5 %    MV E max shad 57.8 cm/sec    MV A max shad 87.0 cm/sec    MV E/A 0.67     Pulm A Revs Dur 0.10 sec    MV A dur 0.11 sec    LA ESV Index (BP) 16.0 ml/m2    Med Peak E' Shad 5.7 cm/sec    Lat Peak E' Shad 8.3 cm/sec    Avg E/e' ratio 8.26     SV(LVOT) 65.4 ml    SV(RVOT) 51.8 ml    Qp/Qs 0.79     RV Base 3.1 cm    RV Mid 2.6 cm    RV Length 6.2 cm    TAPSE (>1.6) 1.41 cm    RV S' 11.3 cm/sec    Pulm Sys Shad 44.8 cm/sec    Pulm Jung Hsad 39.0 cm/sec    Pulm S/D 1.15     Pulm A Revs Shad 35.9 cm/sec    LV V1 max 105.0 cm/sec    LV V1 max PG 4.4 mmHg    LV V1 mean PG 2.00 mmHg    LV V1 VTI 20.4 cm    Ao pk shad 119.0 cm/sec    Ao max PG 5.7 mmHg    Ao mean PG 3.0 mmHg    Ao V2 VTI 23.0 cm    SHAAN(I,D) 2.8 cm2    Dimensionless Index 0.89 (DI)    MV max PG 3.8 mmHg    MV mean PG 1.60 mmHg    MV V2 VTI 19.6 cm    MVA(VTI) 3.3 cm2    MR max shad 305.8 cm/sec    MR max PG 37.4 mmHg    RVOT diam 2.00 " cm    RV V1 max PG 2.46 mmHg    RV V1 max 78.4 cm/sec    RV V1 VTI 16.5 cm    PA V2 max 101.8 cm/sec    PA acc time 0.12 sec    Ao root diam 3.3 cm    ACS 2.04 cm    Sinus 3.2 cm    STJ 2.9 cm    Ascending aorta 3.4 cm    Aortic arch 2.3 cm    Abdo Ao Diam 2.5 cm             The following portions of the patient's history were reviewed and updated as appropriate: allergies, current medications, past family history, past medical history, past social history, past surgical history, and problem list.    Review of Systems   Constitutional:  Negative for activity change, chills and fever.   Respiratory:  Negative for cough.    Cardiovascular:  Negative for chest pain.   Psychiatric/Behavioral:  Negative for dysphoric mood.        Objective             Physical Exam  Vitals and nursing note reviewed.   Constitutional:       General: She is not in acute distress.     Appearance: She is well-developed.   HENT:      Left Ear: Tympanic membrane and ear canal normal.   Cardiovascular:      Rate and Rhythm: Normal rate and regular rhythm.   Pulmonary:      Effort: Pulmonary effort is normal.      Breath sounds: Normal breath sounds.   Neurological:      Mental Status: She is alert and oriented to person, place, and time.   Psychiatric:         Behavior: Behavior normal.         Thought Content: Thought content normal.     Labs from endocrinology independently reviewed by me at today's visit.        Diagnoses and all orders for this visit:    1. Benign essential hypertension (Primary)  -     losartan (COZAAR) 50 MG tablet; Take 1 tablet by mouth Daily.  Dispense: 90 tablet; Refill: 1  -     metoprolol succinate XL (TOPROL-XL) 100 MG 24 hr tablet; Take 1 tablet by mouth Daily.  Dispense: 90 tablet; Refill: 1    2. Recurrent major depressive disorder, in partial remission  -     ARIPiprazole (ABILIFY) 2 MG tablet; Take 1 tablet by mouth Every Night.  Dispense: 90 tablet; Refill: 1  -     FLUoxetine (PROzac) 20 MG capsule; Take 3  capsules by mouth Daily.  Dispense: 270 capsule; Refill: 1    3. Cervicalgia  -     baclofen (LIORESAL) 20 MG tablet; Take 1 tablet by mouth Every Night.  Dispense: 90 tablet; Refill: 1    4. Lumbar back pain  -     baclofen (LIORESAL) 20 MG tablet; Take 1 tablet by mouth Every Night.  Dispense: 90 tablet; Refill: 1    5. Rheumatoid arthritis of multiple sites without organ or system involvement with positive rheumatoid factor  -     folic acid (FOLVITE) 1 MG tablet; Take 1 tablet by mouth Daily.  Dispense: 90 tablet; Refill: 1    6. Screening for lung cancer  -      CT Chest Low Dose Cancer Screening WO; Future    7. Itching of ear  -     acetic acid (VOSOL) 2 % otic solution; Administer 3 drops to the right ear 2 (Two) Times a Day As Needed (itching).  Dispense: 15 mL; Refill: 2

## 2025-04-22 NOTE — TELEPHONE ENCOUNTER
Christiana- Can you make sure surgical clearance from a cardiac standpoint has been sent to Dr. Carter? Thanks   [facility] is working on this.

## 2025-05-19 DIAGNOSIS — I10 BENIGN ESSENTIAL HYPERTENSION: Chronic | ICD-10-CM

## 2025-05-19 RX ORDER — TRIAMTERENE AND HYDROCHLOROTHIAZIDE 37.5; 25 MG/1; MG/1
1 TABLET ORAL DAILY
Qty: 90 TABLET | Refills: 0 | OUTPATIENT
Start: 2025-05-19

## 2025-07-29 RX ORDER — SPIRONOLACTONE 25 MG/1
25 TABLET ORAL DAILY
Qty: 90 TABLET | Refills: 1 | Status: SHIPPED | OUTPATIENT
Start: 2025-07-29

## (undated) DEVICE — STPLR SKIN VISISTAT WD 35CT

## (undated) DEVICE — DISPOSABLE BIPOLAR CABLE 12FT. (3.6M): Brand: KIRWAN

## (undated) DEVICE — CATH VENT MIV RADL PIG ST TIP 5F 110CM

## (undated) DEVICE — GLIDESHEATH SLENDER STAINLESS STEEL KIT: Brand: GLIDESHEATH SLENDER

## (undated) DEVICE — KT DRN EVAC WND PVC PCH WTROC RND 10F400

## (undated) DEVICE — SUT VIC 1 CT1 36IN J947H

## (undated) DEVICE — KT MANIFLD CARDIAC

## (undated) DEVICE — JACKSON-PRATT 100CC BULB RESERVOIR: Brand: CARDINAL HEALTH

## (undated) DEVICE — MAT FLR ABSORBENT LG 4FT 10 2.5FT

## (undated) DEVICE — COAGULATOR SXN FTSWTCH 10F6IN

## (undated) DEVICE — TOWEL,OR,DSP,ST,BLUE,STD,4/PK,20PK/CS: Brand: MEDLINE

## (undated) DEVICE — PREP SOL POVIDONE/IODINE BT 4OZ

## (undated) DEVICE — PK CATH CARD 40

## (undated) DEVICE — GLV SURG SENSICARE MICRO PF LF 7 STRL

## (undated) DEVICE — NDL SPINE 22G 31/2IN BLK

## (undated) DEVICE — 3M™ IOBAN™ 2 ANTIMICROBIAL INCISE DRAPE 6640EZ: Brand: IOBAN™ 2

## (undated) DEVICE — SKIN PREP TRAY W/CHG: Brand: MEDLINE INDUSTRIES, INC.

## (undated) DEVICE — NEEDLE QUINCKE 22GX5": Brand: MEDLINE

## (undated) DEVICE — GLV SURG SENSICARE PI PF LF 7 GRN STRL

## (undated) DEVICE — UNDERCAST PADDING: Brand: DEROYAL

## (undated) DEVICE — STANDARD HYPODERMIC NEEDLE,POLYPROPYLENE HUB: Brand: MONOJECT

## (undated) DEVICE — GLV SURG PREMIERPRO ORTHO LTX PF SZ7.5 BRN

## (undated) DEVICE — Device

## (undated) DEVICE — PENCL E/S ULTRAVAC TELESCP NOSE HOLSTR 10FT

## (undated) DEVICE — SUT SILK 3/0 FS1 18IN 684G

## (undated) DEVICE — IRRIGATOR BULB ASEPTO 60CC STRL

## (undated) DEVICE — PK ENT 40

## (undated) DEVICE — GLV SURG SENSICARE W/ALOE PF LF 7.5 STRL

## (undated) DEVICE — CATH DIAG IMPULSE FR4 5F 100CM

## (undated) DEVICE — CATH DIAG IMPULSE FL4 5F 100CM

## (undated) DEVICE — 3M™ STERI-DRAPE™ INSTRUMENT POUCH 1018: Brand: STERI-DRAPE™

## (undated) DEVICE — EPIDURAL TRAY: Brand: MEDLINE INDUSTRIES, INC.

## (undated) DEVICE — GAUZE,SPONGE,4"X4",16PLY,XRAY,STRL,LF: Brand: MEDLINE

## (undated) DEVICE — UNDYED BRAIDED (POLYGLACTIN 910), SYNTHETIC ABSORBABLE SUTURE: Brand: COATED VICRYL

## (undated) DEVICE — GLV SURG BIOGEL LTX PF 7 1/2

## (undated) DEVICE — CATH DIAG IMPULSE FL3.5 5F 100CM

## (undated) DEVICE — BNDG ELAS ELITE V/CLOSE 6IN 5YD LF STRL

## (undated) DEVICE — PREMIUM WET SKIN PREP TRAY: Brand: MEDLINE INDUSTRIES, INC.

## (undated) DEVICE — APPL DURAPREP IODOPHOR APL 26ML

## (undated) DEVICE — GW EMR FIX EXCHG J STD .035 3MM 260CM

## (undated) DEVICE — GLV SURG SENSICARE W/ALOE PF LF 8 STRL

## (undated) DEVICE — MEDI-VAC YANKAUER SUCTION HANDLE W/BULBOUS TIP: Brand: CARDINAL HEALTH

## (undated) DEVICE — SUT ETHLN 5/0 PS2 18IN 1666H

## (undated) DEVICE — SUT VIC 0 CT1 36IN J946H

## (undated) DEVICE — STERILE PATIENT PROTECTIVE PAD FOR IMP® KNEE POSITIONERS & COHESIVE WRAP (10 / CASE): Brand: DE MAYO KNEE POSITIONER®

## (undated) DEVICE — DRN JP RND NO TROC SIL 10F 1/8IN

## (undated) DEVICE — PK KN TOTL 40

## (undated) DEVICE — INTENDED FOR TISSUE SEPARATION, AND OTHER PROCEDURES THAT REQUIRE A SHARP SURGICAL BLADE TO PUNCTURE OR CUT.: Brand: BARD-PARKER ® CARBON RIB-BACK BLADES

## (undated) DEVICE — DUAL CUT SAGITTAL BLADE

## (undated) DEVICE — SUT VIC 3/0 SH 27IN J416H

## (undated) DEVICE — HS FOCUS 17 CM PLUS ADAPTIVE: Brand: HARMONIC

## (undated) DEVICE — PAD UNDRCST WYTEX 2IN 4YD NS LF

## (undated) DEVICE — SOL NACL 0.9PCT 100ML SGL

## (undated) DEVICE — GLV SURG TRIUMPH PF LTX 7.5 STRL

## (undated) DEVICE — TRAP FLD MINIVAC MEGADYNE 100ML

## (undated) DEVICE — NEEDLE, QUINCKE 22GX3.5": Brand: MEDLINE INDUSTRIES, INC.

## (undated) DEVICE — SPNG DISECTOR KTNER XRAY COTN 1/4X9/16IN PK/5